# Patient Record
Sex: FEMALE | Race: WHITE | NOT HISPANIC OR LATINO | URBAN - METROPOLITAN AREA
[De-identification: names, ages, dates, MRNs, and addresses within clinical notes are randomized per-mention and may not be internally consistent; named-entity substitution may affect disease eponyms.]

---

## 2021-03-11 ENCOUNTER — IMMUNIZATION (OUTPATIENT)
Dept: PRIMARY CARE CLINIC | Facility: CLINIC | Age: 46
End: 2021-03-11

## 2021-03-11 DIAGNOSIS — Z23 NEED FOR VACCINATION: Primary | ICD-10-CM

## 2021-03-11 PROCEDURE — 0011A COVID-19, MRNA, LNP-S, PF, 100 MCG/0.5 ML DOSE VACCINE: ICD-10-PCS | Mod: CV19,S$GLB,, | Performed by: FAMILY MEDICINE

## 2021-03-11 PROCEDURE — 91301 COVID-19, MRNA, LNP-S, PF, 100 MCG/0.5 ML DOSE VACCINE: CPT | Mod: S$GLB,,, | Performed by: FAMILY MEDICINE

## 2021-03-11 PROCEDURE — 91301 COVID-19, MRNA, LNP-S, PF, 100 MCG/0.5 ML DOSE VACCINE: ICD-10-PCS | Mod: S$GLB,,, | Performed by: FAMILY MEDICINE

## 2021-03-11 PROCEDURE — 0011A COVID-19, MRNA, LNP-S, PF, 100 MCG/0.5 ML DOSE VACCINE: CPT | Mod: CV19,S$GLB,, | Performed by: FAMILY MEDICINE

## 2021-04-08 ENCOUNTER — IMMUNIZATION (OUTPATIENT)
Dept: PRIMARY CARE CLINIC | Facility: CLINIC | Age: 46
End: 2021-04-08

## 2021-04-08 DIAGNOSIS — Z23 NEED FOR VACCINATION: Primary | ICD-10-CM

## 2021-04-08 PROCEDURE — 91301 COVID-19, MRNA, LNP-S, PF, 100 MCG/0.5 ML DOSE VACCINE: ICD-10-PCS | Mod: S$GLB,,, | Performed by: FAMILY MEDICINE

## 2021-04-08 PROCEDURE — 0012A COVID-19, MRNA, LNP-S, PF, 100 MCG/0.5 ML DOSE VACCINE: CPT | Mod: CV19,S$GLB,, | Performed by: FAMILY MEDICINE

## 2021-04-08 PROCEDURE — 91301 COVID-19, MRNA, LNP-S, PF, 100 MCG/0.5 ML DOSE VACCINE: CPT | Mod: S$GLB,,, | Performed by: FAMILY MEDICINE

## 2021-04-08 PROCEDURE — 0012A COVID-19, MRNA, LNP-S, PF, 100 MCG/0.5 ML DOSE VACCINE: ICD-10-PCS | Mod: CV19,S$GLB,, | Performed by: FAMILY MEDICINE

## 2024-04-01 ENCOUNTER — HOSPITAL ENCOUNTER (OUTPATIENT)
Dept: RADIOLOGY | Facility: HOSPITAL | Age: 49
Discharge: HOME | End: 2024-04-01
Attending: NURSE PRACTITIONER
Payer: COMMERCIAL

## 2024-04-01 ENCOUNTER — OFFICE VISIT (OUTPATIENT)
Dept: INTERNAL MEDICINE | Facility: CLINIC | Age: 49
End: 2024-04-01
Payer: COMMERCIAL

## 2024-04-01 VITALS
TEMPERATURE: 98 F | RESPIRATION RATE: 16 BRPM | SYSTOLIC BLOOD PRESSURE: 120 MMHG | DIASTOLIC BLOOD PRESSURE: 86 MMHG | HEIGHT: 64 IN | WEIGHT: 106.06 LBS | BODY MASS INDEX: 18.11 KG/M2 | OXYGEN SATURATION: 95 % | HEART RATE: 94 BPM

## 2024-04-01 DIAGNOSIS — R06.02 SHORTNESS OF BREATH: ICD-10-CM

## 2024-04-01 DIAGNOSIS — J45.31 MILD PERSISTENT ASTHMA WITH ACUTE EXACERBATION: ICD-10-CM

## 2024-04-01 DIAGNOSIS — R05.9 COUGH, UNSPECIFIED TYPE: ICD-10-CM

## 2024-04-01 DIAGNOSIS — Z76.89 ENCOUNTER TO ESTABLISH CARE: Primary | ICD-10-CM

## 2024-04-01 DIAGNOSIS — J18.9 PNEUMONIA OF RIGHT MIDDLE LOBE DUE TO INFECTIOUS ORGANISM: ICD-10-CM

## 2024-04-01 PROCEDURE — 71046 X-RAY EXAM CHEST 2 VIEWS: CPT | Mod: 26,,, | Performed by: RADIOLOGY

## 2024-04-01 PROCEDURE — 71046 X-RAY EXAM CHEST 2 VIEWS: CPT | Mod: TC,PO

## 2024-04-01 PROCEDURE — 1160F RVW MEDS BY RX/DR IN RCRD: CPT | Mod: CPTII,S$GLB,, | Performed by: NURSE PRACTITIONER

## 2024-04-01 PROCEDURE — 3074F SYST BP LT 130 MM HG: CPT | Mod: CPTII,S$GLB,, | Performed by: NURSE PRACTITIONER

## 2024-04-01 PROCEDURE — 3079F DIAST BP 80-89 MM HG: CPT | Mod: CPTII,S$GLB,, | Performed by: NURSE PRACTITIONER

## 2024-04-01 PROCEDURE — 3044F HG A1C LEVEL LT 7.0%: CPT | Mod: CPTII,S$GLB,, | Performed by: NURSE PRACTITIONER

## 2024-04-01 PROCEDURE — 1159F MED LIST DOCD IN RCRD: CPT | Mod: CPTII,S$GLB,, | Performed by: NURSE PRACTITIONER

## 2024-04-01 PROCEDURE — 3008F BODY MASS INDEX DOCD: CPT | Mod: CPTII,S$GLB,, | Performed by: NURSE PRACTITIONER

## 2024-04-01 PROCEDURE — 99999 PR PBB SHADOW E&M-EST. PATIENT-LVL V: CPT | Mod: PBBFAC,,, | Performed by: NURSE PRACTITIONER

## 2024-04-01 PROCEDURE — 99204 OFFICE O/P NEW MOD 45 MIN: CPT | Mod: S$GLB,,, | Performed by: NURSE PRACTITIONER

## 2024-04-01 RX ORDER — LEVOFLOXACIN 750 MG/1
750 TABLET ORAL DAILY
Qty: 5 TABLET | Refills: 0 | Status: SHIPPED | OUTPATIENT
Start: 2024-04-01 | End: 2024-04-06

## 2024-04-01 RX ORDER — FLUTICASONE PROPIONATE AND SALMETEROL 100; 50 UG/1; UG/1
1 POWDER RESPIRATORY (INHALATION) 2 TIMES DAILY
Qty: 60 EACH | Refills: 0 | Status: SHIPPED | OUTPATIENT
Start: 2024-04-01 | End: 2024-04-04 | Stop reason: SDUPTHER

## 2024-04-01 NOTE — PROGRESS NOTES
Subjective:       Patient ID: Iman Faust is a 48 y.o. female.    Chief Complaint: Cough (4 weeks ago got a shot and antibiotic and 2 weeks ago a steriod shot)    Mrs. Faust presents to clinic for persistent cough.  She would also like to establish care with [facility].  Reports only medical history is asthma, which she was diagnosed with few years prior due to persistent cough.  She was reportedly on an inhaler at one time, but did not follow-up and no longer has inhaler. She is unsure of inhaler name and believes it was a maintenance inhaler.    She started with acute cough and congestion proximally 4 weeks prior.  She was seen at urgent care were she reportedly tested negative for COVID.  States she was diagnosed with bronchitis and received oral antibiotic and steroids.  Symptoms were improving, then worsened.  She returned to urgent care 2 weeks ago for shortness of breath and persistent cough.  Received steroid injection and albuterol inhaler.  Today, reports no improvement in symptoms.  Continues with productive cough and sinus pressure.  Complains of shortness a breath which worsens on exertion.  Using albuterol inhaler without relief.  Denies chest pain, but reports soreness to bilateral sides from coughing. Denies fever or chills. +fatigue    Cough  This is a new problem. The current episode started 1 to 4 weeks ago. The problem has been rapidly worsening. The cough is Productive of sputum. Associated symptoms include postnasal drip, shortness of breath and wheezing. Pertinent negatives include no chest pain, chills, ear congestion, ear pain, fever, headaches, heartburn, hemoptysis, myalgias, nasal congestion, rhinorrhea or sore throat. She has tried oral steroids and a beta-agonist inhaler (abx) for the symptoms. The treatment provided no relief. Her past medical history is significant for asthma and bronchitis.       Patient Active Problem List   Diagnosis    Pneumonia of right middle lobe due to infectious  "organism    Mild persistent asthma with acute exacerbation       History reviewed. No pertinent family history.  History reviewed. No pertinent surgical history.      Current Outpatient Medications:     albuterol (PROVENTIL/VENTOLIN HFA) 90 mcg/actuation inhaler, Inhale 1-2 puffs into the lungs every 6 (six) hours as needed for Wheezing or Shortness of Breath., Disp: , Rfl:     fluticasone-salmeterol diskus inhaler 100-50 mcg, Inhale 1 puff into the lungs 2 (two) times daily. Controller, Disp: 60 each, Rfl: 0    levoFLOXacin (LEVAQUIN) 750 MG tablet, Take 1 tablet (750 mg total) by mouth once daily. for 5 days, Disp: 5 tablet, Rfl: 0    Review of Systems   Constitutional:  Positive for activity change and fatigue. Negative for chills and fever.   HENT:  Positive for congestion, postnasal drip and sinus pressure. Negative for ear pain, rhinorrhea, sore throat and trouble swallowing.    Respiratory:  Positive for cough, shortness of breath and wheezing. Negative for hemoptysis and chest tightness.    Cardiovascular:  Negative for chest pain, palpitations and leg swelling.   Gastrointestinal:  Negative for heartburn.   Musculoskeletal:  Negative for myalgias.   Neurological:  Negative for dizziness, syncope, weakness and headaches.       Objective:   /86 (BP Location: Left arm, Patient Position: Sitting, BP Method: Large (Manual))   Pulse 94   Temp 98.2 °F (36.8 °C) (Oral)   Resp 16   Ht 5' 4" (1.626 m)   Wt 48.1 kg (106 lb 0.7 oz)   SpO2 95%   BMI 18.20 kg/m²      Physical Exam  Constitutional:       General: She is not in acute distress.     Appearance: Normal appearance. She is ill-appearing (mild). She is not toxic-appearing or diaphoretic.   HENT:      Head: Normocephalic.      Right Ear: Tympanic membrane and ear canal normal. There is no impacted cerumen.      Left Ear: Tympanic membrane and ear canal normal. There is no impacted cerumen.      Nose: Congestion present.      Mouth/Throat:      Mouth: " Mucous membranes are moist.      Pharynx: Oropharynx is clear. Posterior oropharyngeal erythema (mild) present.   Eyes:      Extraocular Movements: Extraocular movements intact.      Conjunctiva/sclera: Conjunctivae normal.   Cardiovascular:      Rate and Rhythm: Normal rate and regular rhythm.      Heart sounds: Normal heart sounds.   Pulmonary:      Effort: Pulmonary effort is normal. No respiratory distress.      Breath sounds: Decreased breath sounds present. No wheezing, rhonchi or rales.      Comments: +cough  Musculoskeletal:         General: Normal range of motion.      Cervical back: Normal range of motion. No rigidity or tenderness.   Lymphadenopathy:      Cervical: No cervical adenopathy.   Neurological:      Mental Status: She is alert and oriented to person, place, and time.      Coordination: Coordination normal.      Gait: Gait normal.   Psychiatric:         Behavior: Behavior normal.         Assessment & Plan     1. Encounter to establish care  Comments:  Routine labs today. No prior records available in EMR.  Orders:  -     CBC Auto Differential; Future; Expected date: 04/01/2024  -     Comprehensive Metabolic Panel; Future; Expected date: 04/01/2024  -     TSH; Future; Expected date: 04/01/2024  -     Hemoglobin A1C; Future; Expected date: 04/01/2024  -     Lipid Panel; Future; Expected date: 04/01/2024    2. Mild persistent asthma with acute exacerbation  Overview:  No records available in EMR from prior providers. Start maintenance inhaler. Continue albuterol PRN. Referral to pulmonology to establish care    Orders:  -     Ambulatory referral/consult to Pulmonology; Future; Expected date: 04/08/2024  -     fluticasone-salmeterol diskus inhaler 100-50 mcg; Inhale 1 puff into the lungs 2 (two) times daily. Controller  Dispense: 60 each; Refill: 0    3. Cough, unspecified type  Comments:  Completed abx, oral and IM steroids with worsening symptoms. CXR today. Will further discuss treament plan  pending results.  Orders:  -     X-Ray Chest PA And Lateral; Future; Expected date: 04/01/2024  -     Ambulatory referral/consult to Pulmonology; Future; Expected date: 04/08/2024    4. Shortness of breath  -     X-Ray Chest PA And Lateral; Future; Expected date: 04/01/2024  -     B-TYPE NATRIURETIC PEPTIDE; Future; Expected date: 04/01/2024    5. Pneumonia of right middle lobe due to infectious organism  Assessment & Plan:  Abnormal CXR with elevated WBC. Start Levaquin. Completed antibiotic 4 weeks prior (pt unsure of name, possibly amoxicillin). If no improvement, will proceed with CT chest. Strict RTC and ER precautions reviewed.     Orders:  -     levoFLOXacin (LEVAQUIN) 750 MG tablet; Take 1 tablet (750 mg total) by mouth once daily. for 5 days  Dispense: 5 tablet; Refill: 0        X-Ray Chest PA And Lateral  Narrative: EXAM:  XR CHEST PA AND LATERAL    CLINICAL HISTORY: Shortness of breath    COMPARISON: None available.    TECHNIQUE: PA and lateral views of the chest.    FINDINGS: Small opacity in the region of the right middle lobe at the lung bases.  Additional tiny, irregular opacities appreciated within the bilateral mid lung zones.  No evidence of a large pleural effusion.  No pneumothorax.  The cardiomediastinal silhouette is within normal size limits.  The hilar and mediastinal structures are within normal limits.  No acute osseous abnormality is evident.  Degenerative changes of the spine.  The visualized osseous structures appear intact.  Impression:  Right middle lobe minor infiltrate or atelectasis at the lung base.    Irregular opacities in the bilateral midlung zones may reflect multifocal infiltrates or nodules.  Continued chest radiograph follow-up versus further evaluation with chest CT.    Finalized on: 4/1/2024 4:11 PM By:  Faustino Koehler MD  [facility]# [id number]      2024-04-01 16:13:54.550    [facility]    Lab Results   Component Value Date    WBC 15.21 (H) 04/01/2024    HGB 14.0 04/01/2024    HCT  "43.9 04/01/2024    MCV 91 04/01/2024     (H) 04/01/2024           MICHELE Kline      Portions of this note may have been created with voice recognition software. Occasional "wrong-word" or "sound-a-like" substitutions may have occurred due to the inherent limitations of voice recognition software. Please, read the note carefully and recognize, using context, where substitutions have occurred.     "

## 2024-04-02 PROBLEM — J45.31 MILD PERSISTENT ASTHMA WITH ACUTE EXACERBATION: Status: ACTIVE | Noted: 2024-04-02

## 2024-04-02 PROBLEM — J18.9 PNEUMONIA OF RIGHT MIDDLE LOBE DUE TO INFECTIOUS ORGANISM: Status: ACTIVE | Noted: 2024-04-02

## 2024-04-02 NOTE — ASSESSMENT & PLAN NOTE
Abnormal CXR with elevated WBC. Start Levaquin. Completed antibiotic 4 weeks prior (pt unsure of name, possibly amoxicillin). If no improvement, will proceed with CT chest. Strict RTC and ER precautions reviewed.

## 2024-04-04 ENCOUNTER — OFFICE VISIT (OUTPATIENT)
Dept: PULMONOLOGY | Facility: CLINIC | Age: 49
End: 2024-04-04
Payer: COMMERCIAL

## 2024-04-04 VITALS
HEIGHT: 64 IN | BODY MASS INDEX: 18.06 KG/M2 | HEART RATE: 94 BPM | OXYGEN SATURATION: 95 % | RESPIRATION RATE: 17 BRPM | DIASTOLIC BLOOD PRESSURE: 78 MMHG | WEIGHT: 105.81 LBS | SYSTOLIC BLOOD PRESSURE: 112 MMHG

## 2024-04-04 DIAGNOSIS — J18.9 PNEUMONIA OF RIGHT MIDDLE LOBE DUE TO INFECTIOUS ORGANISM: Primary | ICD-10-CM

## 2024-04-04 DIAGNOSIS — R05.9 COUGH, UNSPECIFIED TYPE: ICD-10-CM

## 2024-04-04 DIAGNOSIS — J45.991 COUGH VARIANT ASTHMA: ICD-10-CM

## 2024-04-04 DIAGNOSIS — J45.31 MILD PERSISTENT ASTHMA WITH ACUTE EXACERBATION: ICD-10-CM

## 2024-04-04 PROCEDURE — 99204 OFFICE O/P NEW MOD 45 MIN: CPT | Mod: S$GLB,,, | Performed by: INTERNAL MEDICINE

## 2024-04-04 PROCEDURE — 3074F SYST BP LT 130 MM HG: CPT | Mod: CPTII,S$GLB,, | Performed by: INTERNAL MEDICINE

## 2024-04-04 PROCEDURE — 3008F BODY MASS INDEX DOCD: CPT | Mod: CPTII,S$GLB,, | Performed by: INTERNAL MEDICINE

## 2024-04-04 PROCEDURE — 3078F DIAST BP <80 MM HG: CPT | Mod: CPTII,S$GLB,, | Performed by: INTERNAL MEDICINE

## 2024-04-04 PROCEDURE — 1159F MED LIST DOCD IN RCRD: CPT | Mod: CPTII,S$GLB,, | Performed by: INTERNAL MEDICINE

## 2024-04-04 PROCEDURE — 99999 PR PBB SHADOW E&M-EST. PATIENT-LVL IV: CPT | Mod: PBBFAC,,, | Performed by: INTERNAL MEDICINE

## 2024-04-04 PROCEDURE — 3044F HG A1C LEVEL LT 7.0%: CPT | Mod: CPTII,S$GLB,, | Performed by: INTERNAL MEDICINE

## 2024-04-04 RX ORDER — ALBUTEROL SULFATE 0.83 MG/ML
2.5 SOLUTION RESPIRATORY (INHALATION)
Qty: 360 ML | Refills: 11 | Status: SHIPPED | OUTPATIENT
Start: 2024-04-04 | End: 2025-04-04

## 2024-04-04 RX ORDER — ALBUTEROL SULFATE 90 UG/1
2 AEROSOL, METERED RESPIRATORY (INHALATION) EVERY 4 HOURS PRN
Qty: 18 G | Refills: 11 | Status: SHIPPED | OUTPATIENT
Start: 2024-04-04

## 2024-04-04 RX ORDER — FLUTICASONE PROPIONATE AND SALMETEROL 100; 50 UG/1; UG/1
1 POWDER RESPIRATORY (INHALATION) 2 TIMES DAILY
Qty: 60 EACH | Refills: 11 | Status: SHIPPED | OUTPATIENT
Start: 2024-04-04 | End: 2024-05-23

## 2024-04-04 RX ORDER — PREDNISONE 20 MG/1
TABLET ORAL
Qty: 20 TABLET | Refills: 0 | Status: SHIPPED | OUTPATIENT
Start: 2024-04-04 | End: 2024-05-23 | Stop reason: SDUPTHER

## 2024-04-04 NOTE — PROGRESS NOTES
Subjective:     Patient ID: Iman Faust is a 48 y.o. female.    Chief Complaint:  Cough and wheezing worse this spring    HPI 49 y/o with history of asthma  This is the first time I am seeing this patient. Prior medical records, radiographic studies and other relevant document available were reviewed as part of the visit.  Asthma  She presents for initial evaluation of asthma and a history of wheezing. The patient has been previously diagnosed with asthma. She was diagnosed with asthma at about age 39 . The patient has never been admitted to the hospital for asthma. The patient is currently having symptoms / an exacerbation. Current symptoms include dyspnea, productive cough, and wheezing. Symptoms have been present since several weeks ago and have been gradually worsening. She denies chest pain. Associated symptoms include poor exercise tolerance, shortness of breath, weakness, and wheezing.  This episode appears to have been triggered by upper respiratory infection. Treatments tried for the current exacerbation include short-acting inhaled beta-adrenergic agonists, which have provided some relief of symptoms. The patient has been having similar episodes for approximately 4 weeks.    Current Disease Severity  The patient is having daytime symptoms throughout the day. The patient is having daytime symptoms more than once per week, but not nightly. The patient is using short-acting beta agonists for symptom control several times per day. She has exacerbations requiring oral systemic corticosteroids 0 times per year. Current limitations in activity from asthma:  cut back on daily activities . Number of days of school or work missed in the last month: 2. Number of urgent/emergent visit in last year: 1.  The patient is not using a spacer with MDIs. Her best peak flow rate is na. She is not monitoring peak flow rates at home.    Past Medical History:   Diagnosis Date    Mild intermittent asthma, uncomplicated      No  "past surgical history on file.  Review of patient's allergies indicates:  No Known Allergies  Current Outpatient Medications on File Prior to Visit   Medication Sig Dispense Refill    levoFLOXacin (LEVAQUIN) 750 MG tablet Take 1 tablet (750 mg total) by mouth once daily. for 5 days 5 tablet 0    [DISCONTINUED] albuterol (PROVENTIL/VENTOLIN HFA) 90 mcg/actuation inhaler Inhale 1-2 puffs into the lungs every 6 (six) hours as needed for Wheezing or Shortness of Breath.      [DISCONTINUED] fluticasone-salmeterol diskus inhaler 100-50 mcg Inhale 1 puff into the lungs 2 (two) times daily. Controller 60 each 0     No current facility-administered medications on file prior to visit.     Social History     Socioeconomic History    Marital status: Single   Tobacco Use    Smoking status: Never    Smokeless tobacco: Never     No family history on file.    Review of Systems   Constitutional:  Negative for fever and fatigue.   HENT:  Negative for postnasal drip, rhinorrhea and congestion.    Eyes:  Negative for redness and itching.   Respiratory:  Positive for cough, sputum production, shortness of breath, dyspnea on extertion, use of rescue inhaler and Paroxysmal Nocturnal Dyspnea.    Cardiovascular:  Negative for chest pain, palpitations and leg swelling.   Genitourinary:  Negative for difficulty urinating and hematuria.   Endocrine:  Negative for cold intolerance and heat intolerance.    Skin:  Negative for rash.   Gastrointestinal:  Negative for nausea and abdominal pain.   Neurological:  Negative for dizziness, syncope, weakness and light-headedness.   Hematological:  Negative for adenopathy. Does not bruise/bleed easily.   Psychiatric/Behavioral:  Negative for sleep disturbance. The patient is not nervous/anxious.        Objective:      /78   Pulse 94   Resp 17   Ht 5' 4" (1.626 m)   Wt 48 kg (105 lb 13.1 oz)   LMP  (LMP Unknown)   SpO2 95%   BMI 18.16 kg/m²   Physical Exam  Vitals and nursing note reviewed. " "  Constitutional:       Appearance: She is well-developed.   HENT:      Head: Normocephalic and atraumatic.      Nose: Nose normal.      Mouth/Throat:      Pharynx: No oropharyngeal exudate.   Eyes:      Conjunctiva/sclera: Conjunctivae normal.      Pupils: Pupils are equal, round, and reactive to light.   Neck:      Thyroid: No thyromegaly.      Vascular: No JVD.      Trachea: No tracheal deviation.   Cardiovascular:      Rate and Rhythm: Normal rate and regular rhythm.      Heart sounds: Normal heart sounds.   Pulmonary:      Effort: Pulmonary effort is normal. No respiratory distress.      Breath sounds: Examination of the right-lower field reveals wheezing. Examination of the left-lower field reveals wheezing. Decreased breath sounds and wheezing present. No rhonchi or rales.   Chest:      Chest wall: No tenderness.   Abdominal:      General: Bowel sounds are normal.      Palpations: Abdomen is soft.   Musculoskeletal:         General: Normal range of motion.      Cervical back: Neck supple.   Lymphadenopathy:      Cervical: No cervical adenopathy.   Skin:     General: Skin is warm and dry.   Neurological:      Mental Status: She is alert and oriented to person, place, and time.       Personal Diagnostic Review  Chest x-ray: hyperinflation , right middle lobe infiiltrate        4/4/2024     9:45 AM   Pulmonary Studies Review   SpO2 95 %   Height 5' 4" (1.626 m)   Weight 48 kg (105 lb 13.1 oz)   BMI (Calculated) 18.2   Predicted Distance 478.76   Predicted Distance Meters (Calculated) 616.95 meters       X-Ray Chest PA And Lateral  Narrative: EXAM:  XR CHEST PA AND LATERAL    CLINICAL HISTORY: Shortness of breath    COMPARISON: None available.    TECHNIQUE: PA and lateral views of the chest.    FINDINGS: Small opacity in the region of the right middle lobe at the lung bases.  Additional tiny, irregular opacities appreciated within the bilateral mid lung zones.  No evidence of a large pleural effusion.  No " "pneumothorax.  The cardiomediastinal silhouette is within normal size limits.  The hilar and mediastinal structures are within normal limits.  No acute osseous abnormality is evident.  Degenerative changes of the spine.  The visualized osseous structures appear intact.  Impression:  Right middle lobe minor infiltrate or atelectasis at the lung base.    Irregular opacities in the bilateral midlung zones may reflect multifocal infiltrates or nodules.  Continued chest radiograph follow-up versus further evaluation with chest CT.    Finalized on: 4/1/2024 4:11 PM By:  Faustino Koehler MD  [facility]# [id number]      2024-04-01 16:13:54.550    [facility]      Office Spirometry Results:         4/4/2024     9:45 AM 4/1/2024     2:21 PM   Pulmonary Function Tests   SpO2 95 % 95 %   Height 5' 4" (1.626 m) 5' 4" (1.626 m)   Weight 48 kg (105 lb 13.1 oz) 48.1 kg (106 lb 0.7 oz)   BMI (Calculated) 18.2 18.2         4/4/2024     9:45 AM   Pulmonary Studies Review   SpO2 95 %   Height 5' 4" (1.626 m)   Weight 48 kg (105 lb 13.1 oz)   BMI (Calculated) 18.2   Predicted Distance 478.76   Predicted Distance Meters (Calculated) 616.95 meters           Recent Results (from the past 336 hour(s))   CBC Auto Differential    Collection Time: 04/01/24  3:05 PM   Result Value Ref Range    WBC 15.21 (H) 3.90 - 12.70 K/uL    Hemoglobin 14.0 12.0 - 16.0 g/dL    Hematocrit 43.9 37.0 - 48.5 %    Platelets 589 (H) 150 - 450 K/uL       REVIEW OF OUTSIDE RECORDS:  Dandre Muñiz MD     3/10/2016  4:38 PM  Severe obstructive ventilatory impairment with no significant  improvement after bronchodilator administration. The lung volumes  are increased in particular the residual volume. This is  indicative of overinflation. The diffusion capacity is normal  Specimen Collected: -- Last Resulted: 03/10/16 16:38   [facility]idi[facility]  Result Received: 04/04/24 1         Assessment:        "     Pneumonia of right middle lobe due to infectious organism  -     X-Ray Chest PA And Lateral; Future; Expected date: 04/04/2024    Cough variant asthma  -     Ambulatory referral/consult to Pulmonology  -     albuterol (PROVENTIL/VENTOLIN HFA) 90 mcg/actuation inhaler; Inhale 2 puffs into the lungs every 4 (four) hours as needed for Wheezing or Shortness of Breath.  Dispense: 18 g; Refill: 11  -     X-Ray Chest PA And Lateral; Future; Expected date: 04/04/2024  -     Spirometry with/without bronchodilator; Future; Expected date: 10/05/2024    Mild persistent asthma with acute exacerbation  -     Ambulatory referral/consult to Pulmonology  -     predniSONE (DELTASONE) 20 MG tablet; Prednisone 60 mg/ day for 3 days, 40 mg/day for 3 days,20 mg/ day for 3 days, (1/2 tablet )10 mg a day for 3 days.  Dispense: 20 tablet; Refill: 0  -     albuterol (PROVENTIL) 2.5 mg /3 mL (0.083 %) nebulizer solution; Take 3 mLs (2.5 mg total) by nebulization every 4 to 6 hours as needed for Wheezing or Shortness of Breath.  Dispense: 360 mL; Refill: 11  -     NEBULIZER KIT (SUPPLIES) FOR HOME USE  -     fluticasone-salmeterol diskus inhaler 100-50 mcg; Inhale 1 puff into the lungs 2 (two) times daily. Controller.Wash out mouth after use  Dispense: 60 each; Refill: 11  -     albuterol (PROVENTIL/VENTOLIN HFA) 90 mcg/actuation inhaler; Inhale 2 puffs into the lungs every 4 (four) hours as needed for Wheezing or Shortness of Breath.  Dispense: 18 g; Refill: 11  -     X-Ray Chest PA And Lateral; Future; Expected date: 04/04/2024    Cough, unspecified type          Outpatient Encounter Medications as of 4/4/2024   Medication Sig Dispense Refill    levoFLOXacin (LEVAQUIN) 750 MG tablet Take 1 tablet (750 mg total) by mouth once daily. for 5 days 5 tablet 0    [DISCONTINUED] albuterol (PROVENTIL/VENTOLIN HFA) 90 mcg/actuation inhaler Inhale 1-2 puffs into the lungs every 6 (six) hours as needed for Wheezing or Shortness of Breath.       [DISCONTINUED] fluticasone-salmeterol diskus inhaler 100-50 mcg Inhale 1 puff into the lungs 2 (two) times daily. Controller 60 each 0    albuterol (PROVENTIL) 2.5 mg /3 mL (0.083 %) nebulizer solution Take 3 mLs (2.5 mg total) by nebulization every 4 to 6 hours as needed for Wheezing or Shortness of Breath. 360 mL 11    albuterol (PROVENTIL/VENTOLIN HFA) 90 mcg/actuation inhaler Inhale 2 puffs into the lungs every 4 (four) hours as needed for Wheezing or Shortness of Breath. 18 g 11    fluticasone-salmeterol diskus inhaler 100-50 mcg Inhale 1 puff into the lungs 2 (two) times daily. Controller.Wash out mouth after use 60 each 11    predniSONE (DELTASONE) 20 MG tablet Prednisone 60 mg/ day for 3 days, 40 mg/day for 3 days,20 mg/ day for 3 days, (1/2 tablet )10 mg a day for 3 days. 20 tablet 0     No facility-administered encounter medications on file as of 4/4/2024.     Plan:       Requested Prescriptions     Signed Prescriptions Disp Refills    predniSONE (DELTASONE) 20 MG tablet 20 tablet 0     Sig: Prednisone 60 mg/ day for 3 days, 40 mg/day for 3 days,20 mg/ day for 3 days, (1/2 tablet )10 mg a day for 3 days.    albuterol (PROVENTIL) 2.5 mg /3 mL (0.083 %) nebulizer solution 360 mL 11     Sig: Take 3 mLs (2.5 mg total) by nebulization every 4 to 6 hours as needed for Wheezing or Shortness of Breath.    fluticasone-salmeterol diskus inhaler 100-50 mcg 60 each 11     Sig: Inhale 1 puff into the lungs 2 (two) times daily. Controller.Wash out mouth after use    albuterol (PROVENTIL/VENTOLIN HFA) 90 mcg/actuation inhaler 18 g 11     Sig: Inhale 2 puffs into the lungs every 4 (four) hours as needed for Wheezing or Shortness of Breath.     Problem List Items Addressed This Visit       Mild persistent asthma with acute exacerbation    Overview     No records available in EMR from prior providers. Start maintenance inhaler. Continue albuterol PRN. Referral to pulmonology to establish care         Relevant Medications     predniSONE (DELTASONE) 20 MG tablet    albuterol (PROVENTIL) 2.5 mg /3 mL (0.083 %) nebulizer solution    fluticasone-salmeterol diskus inhaler 100-50 mcg    albuterol (PROVENTIL/VENTOLIN HFA) 90 mcg/actuation inhaler    Other Relevant Orders    NEBULIZER KIT (SUPPLIES) FOR HOME USE    X-Ray Chest PA And Lateral    Pneumonia of right middle lobe due to infectious organism - Primary    Relevant Orders    X-Ray Chest PA And Lateral     Other Visit Diagnoses       Cough variant asthma        Relevant Medications    albuterol (PROVENTIL/VENTOLIN HFA) 90 mcg/actuation inhaler    Other Relevant Orders    X-Ray Chest PA And Lateral    Spirometry with/without bronchodilator    Cough, unspecified type                   Follow up in about 7 weeks (around 5/22/2024) for CXR and Feliberto, Review progress.    MEDICAL DECISION MAKING: Moderate to high complexity.  Overall, the multiple problems listed are of moderate to high severity that may impact quality of life and activities of daily living. Side effects of medications, treatment plan as well as options and alternatives reviewed and discussed with patient. There was counseling of patient concerning these issues.    Total time spent in counseling and coordination of care - 50  minutes of total time spent on the encounter, which includes face to face time and non-face to face time preparing to see the patient (eg, review of tests), Obtaining and/or reviewing separately obtained history, Documenting clinical information in the electronic or other health record, Independently interpreting results (not separately reported) and communicating results to the patient/family/caregiver, or Care coordination (not separately reported).    Time was used in discussion of prognosis, risks, benefits of treatment, instructions and compliance with regimen . Discussion with other physicians and/or health care providers - home health or for use of durable medical equipment (oxygen, nebulizers,  CPAP, BiPAP) occurred.

## 2024-04-15 ENCOUNTER — TELEPHONE (OUTPATIENT)
Dept: PULMONOLOGY | Facility: CLINIC | Age: 49
End: 2024-04-15
Payer: COMMERCIAL

## 2024-04-15 NOTE — TELEPHONE ENCOUNTER
----- Message from Jeniffer Orellana sent at 4/15/2024  9:17 AM CDT -----  Regarding: pt callback  Name of Who is Calling:Pt         What is the request in detail: Requesting callback please advise           Can the clinic reply by [facility]: no         [facility]:Telephone Information:  Mobile          [phone]

## 2024-04-26 ENCOUNTER — PATIENT MESSAGE (OUTPATIENT)
Dept: PULMONOLOGY | Facility: CLINIC | Age: 49
End: 2024-04-26
Payer: COMMERCIAL

## 2024-04-26 DIAGNOSIS — R60.0 LOCALIZED EDEMA: Primary | ICD-10-CM

## 2024-04-30 RX ORDER — FUROSEMIDE 20 MG/1
20 TABLET ORAL DAILY PRN
Qty: 30 TABLET | Refills: 11 | Status: SHIPPED | OUTPATIENT
Start: 2024-04-30 | End: 2024-05-23 | Stop reason: SDUPTHER

## 2024-04-30 NOTE — TELEPHONE ENCOUNTER
Prescription for lasix sent  The prednisone makes you retain fluid due to excess salt intake.  Reduce your salt intake  Will send prescription for a diuretic to your pharmacy  Only take the diuretic if you have swollen  feet

## 2024-05-23 ENCOUNTER — CLINICAL SUPPORT (OUTPATIENT)
Dept: PULMONOLOGY | Facility: CLINIC | Age: 49
End: 2024-05-23
Attending: INTERNAL MEDICINE
Payer: COMMERCIAL

## 2024-05-23 ENCOUNTER — HOSPITAL ENCOUNTER (OUTPATIENT)
Dept: RADIOLOGY | Facility: HOSPITAL | Age: 49
Discharge: HOME | End: 2024-05-23
Attending: INTERNAL MEDICINE
Payer: COMMERCIAL

## 2024-05-23 VITALS
RESPIRATION RATE: 18 BRPM | WEIGHT: 100.75 LBS | HEIGHT: 64 IN | SYSTOLIC BLOOD PRESSURE: 120 MMHG | DIASTOLIC BLOOD PRESSURE: 74 MMHG | OXYGEN SATURATION: 100 % | BODY MASS INDEX: 17.2 KG/M2 | HEART RATE: 106 BPM

## 2024-05-23 DIAGNOSIS — J45.991 COUGH VARIANT ASTHMA: ICD-10-CM

## 2024-05-23 DIAGNOSIS — J45.31 MILD PERSISTENT ASTHMA WITH ACUTE EXACERBATION: ICD-10-CM

## 2024-05-23 DIAGNOSIS — J45.41 MODERATE PERSISTENT ASTHMA WITH ACUTE EXACERBATION: Primary | ICD-10-CM

## 2024-05-23 DIAGNOSIS — J18.9 PNEUMONIA OF RIGHT MIDDLE LOBE DUE TO INFECTIOUS ORGANISM: ICD-10-CM

## 2024-05-23 DIAGNOSIS — R09.02 EXERCISE HYPOXEMIA: ICD-10-CM

## 2024-05-23 DIAGNOSIS — R60.0 LOCALIZED EDEMA: ICD-10-CM

## 2024-05-23 LAB
BRPFT: NORMAL
FEF 25 75 CHG: -9.9 %
FEF 25 75 LLN: 1.55
FEF 25 75 POST REF: 12.5 %
FEF 25 75 PRE REF: 13.9 %
FEF 25 75 REF: 2.75
FET100 CHG: 45 %
FEV1 CHG: 7 %
FEV1 FVC CHG: -11 %
FEV1 FVC LLN: 69
FEV1 FVC POST REF: 54 %
FEV1 FVC PRE REF: 60.6 %
FEV1 FVC REF: 81
FEV1 LLN: 2.17
FEV1 POST REF: 29.8 %
FEV1 PRE REF: 27.9 %
FEV1 REF: 2.78
FVC CHG: 20.1 %
FVC LLN: 2.72
FVC POST REF: 54.9 %
FVC PRE REF: 45.7 %
FVC REF: 3.48
PEF CHG: 26.4 %
PEF LLN: 5.07
PEF POST REF: 35.3 %
PEF PRE REF: 27.9 %
PEF REF: 6.79
POST FEF 25 75: 0.34 L/S
POST FET 100: 6.81 SEC
POST FEV1 FVC: 43.48 %
POST FEV1: 0.83 L
POST FVC: 1.91 L
POST PEF: 2.4 L/S
PRE FEF 25 75: 0.38 L/S
PRE FET 100: 4.7 SEC
PRE FEV1 FVC: 48.84 %
PRE FEV1: 0.78 L
PRE FVC: 1.59 L
PRE PEF: 1.9 L/S

## 2024-05-23 PROCEDURE — 96372 THER/PROPH/DIAG INJ SC/IM: CPT | Mod: S$GLB,,, | Performed by: INTERNAL MEDICINE

## 2024-05-23 PROCEDURE — 71046 X-RAY EXAM CHEST 2 VIEWS: CPT | Mod: 26,,, | Performed by: STUDENT IN AN ORGANIZED HEALTH CARE EDUCATION/TRAINING PROGRAM

## 2024-05-23 PROCEDURE — 94060 EVALUATION OF WHEEZING: CPT | Mod: S$GLB,,, | Performed by: INTERNAL MEDICINE

## 2024-05-23 PROCEDURE — 3078F DIAST BP <80 MM HG: CPT | Mod: CPTII,S$GLB,, | Performed by: INTERNAL MEDICINE

## 2024-05-23 PROCEDURE — 3008F BODY MASS INDEX DOCD: CPT | Mod: CPTII,S$GLB,, | Performed by: INTERNAL MEDICINE

## 2024-05-23 PROCEDURE — 99214 OFFICE O/P EST MOD 30 MIN: CPT | Mod: 25,S$GLB,, | Performed by: INTERNAL MEDICINE

## 2024-05-23 PROCEDURE — 99999 PR PBB SHADOW E&M-EST. PATIENT-LVL IV: CPT | Mod: PBBFAC,,, | Performed by: INTERNAL MEDICINE

## 2024-05-23 PROCEDURE — 3044F HG A1C LEVEL LT 7.0%: CPT | Mod: CPTII,S$GLB,, | Performed by: INTERNAL MEDICINE

## 2024-05-23 PROCEDURE — 71046 X-RAY EXAM CHEST 2 VIEWS: CPT | Mod: TC

## 2024-05-23 PROCEDURE — 3074F SYST BP LT 130 MM HG: CPT | Mod: CPTII,S$GLB,, | Performed by: INTERNAL MEDICINE

## 2024-05-23 PROCEDURE — 1160F RVW MEDS BY RX/DR IN RCRD: CPT | Mod: CPTII,S$GLB,, | Performed by: INTERNAL MEDICINE

## 2024-05-23 PROCEDURE — 1159F MED LIST DOCD IN RCRD: CPT | Mod: CPTII,S$GLB,, | Performed by: INTERNAL MEDICINE

## 2024-05-23 RX ORDER — PREDNISONE 20 MG/1
TABLET ORAL
Qty: 20 TABLET | Refills: 0 | Status: SHIPPED | OUTPATIENT
Start: 2024-05-23

## 2024-05-23 RX ORDER — FUROSEMIDE 20 MG/1
20 TABLET ORAL 2 TIMES DAILY
Qty: 60 TABLET | Refills: 11 | Status: SHIPPED | OUTPATIENT
Start: 2024-05-23

## 2024-05-23 RX ORDER — DOXYCYCLINE 100 MG/1
100 CAPSULE ORAL EVERY 12 HOURS
Qty: 20 CAPSULE | Refills: 0 | Status: SHIPPED | OUTPATIENT
Start: 2024-05-23 | End: 2024-06-02

## 2024-05-23 RX ORDER — FLUTICASONE PROPIONATE AND SALMETEROL 500; 50 UG/1; UG/1
1 POWDER RESPIRATORY (INHALATION) 2 TIMES DAILY
Qty: 60 EACH | Refills: 11 | Status: SHIPPED | OUTPATIENT
Start: 2024-05-23 | End: 2025-05-23

## 2024-05-23 RX ORDER — POTASSIUM CHLORIDE 20 MEQ/1
20 TABLET, EXTENDED RELEASE ORAL DAILY
Qty: 30 TABLET | Refills: 11 | Status: SHIPPED | OUTPATIENT
Start: 2024-05-23

## 2024-05-23 RX ORDER — TRIAMCINOLONE ACETONIDE 40 MG/ML
40 INJECTION, SUSPENSION INTRA-ARTICULAR; INTRAMUSCULAR
Status: COMPLETED | OUTPATIENT
Start: 2024-05-23 | End: 2024-05-23

## 2024-05-23 RX ADMIN — TRIAMCINOLONE ACETONIDE 40 MG: 40 INJECTION, SUSPENSION INTRA-ARTICULAR; INTRAMUSCULAR at 04:05

## 2024-05-23 NOTE — PROGRESS NOTES
Subjective:     Patient ID: Iman Faust is a 48 y.o. female.    Chief Complaint:  Still wheezing and coughing    HPI C/o bilateral lower extremity edema    Asthma  She presents for follow up evaluation of asthma  The patient has been previously diagnosed with asthma. She was diagnosed with asthma at about age 39 . The patient has never been admitted to the hospital for asthma. The patient is currently having symptoms / an exacerbation. Current symptoms include dyspnea, productive cough, and wheezing. Symptoms have been present since several weeks ago and have been gradually worsening. She denies chest pain. Associated symptoms include poor exercise tolerance, shortness of breath, weakness, and wheezing.  This episode appears to have been triggered by upper respiratory infection. Treatments tried for the current exacerbation include short-acting inhaled beta-adrenergic agonists, which have provided some relief of symptoms. The patient has been having similar episodes for approximately 2- 3 months.  C/o edema in both feet       Current Disease Severity  The patient is having daytime symptoms throughout the day. The patient is having daytime symptoms more than once per week, but not nightly. The patient is using short-acting beta agonists for symptom control several times per day. She has exacerbations requiring oral systemic corticosteroids 0 times per year. Current limitations in activity from asthma: cut back on daily activities. Number of days of school or work missed in the last month: 2. Number of urgent/emergent visit in last year: 1.  The patient is not using a spacer with MDIs. Her best peak flow rate is na. She is not monitoring peak flow rates at home.    The patient is an  and has had no significant occupational exposure. There is no family history of lung disease or liver disease. She does have a dog at home but does not feel sensitive to it. She has no birds. No history of allergy testing.  Does note this strong smells such as cleaning fluids will increase her cough. No swallowing symptoms and no symptoms of reflux.     Past Medical History:   Diagnosis Date    Mild intermittent asthma, uncomplicated      No past surgical history on file.  Review of patient's allergies indicates:  No Known Allergies  Current Outpatient Medications on File Prior to Visit   Medication Sig Dispense Refill    albuterol (PROVENTIL) 2.5 mg /3 mL (0.083 %) nebulizer solution Take 3 mLs (2.5 mg total) by nebulization every 4 to 6 hours as needed for Wheezing or Shortness of Breath. 360 mL 11    albuterol (PROVENTIL/VENTOLIN HFA) 90 mcg/actuation inhaler Inhale 2 puffs into the lungs every 4 (four) hours as needed for Wheezing or Shortness of Breath. 18 g 11    [DISCONTINUED] fluticasone-salmeterol diskus inhaler 100-50 mcg Inhale 1 puff into the lungs 2 (two) times daily. Controller.Wash out mouth after use 60 each 11    [DISCONTINUED] furosemide (LASIX) 20 MG tablet Take 1 tablet (20 mg total) by mouth daily as needed (edema in feet). 30 tablet 11    [DISCONTINUED] predniSONE (DELTASONE) 20 MG tablet Prednisone 60 mg/ day for 3 days, 40 mg/day for 3 days,20 mg/ day for 3 days, (1/2 tablet )10 mg a day for 3 days. 20 tablet 0     No current facility-administered medications on file prior to visit.     Social History     Socioeconomic History    Marital status: Single   Tobacco Use    Smoking status: Never    Smokeless tobacco: Never     No family history on file.    Review of Systems   Constitutional:  Positive for fatigue. Negative for fever.   HENT:  Positive for postnasal drip, rhinorrhea and congestion.    Eyes:  Negative for redness and itching.   Respiratory:  Positive for cough, sputum production, shortness of breath, dyspnea on extertion, use of rescue inhaler and Paroxysmal Nocturnal Dyspnea.    Cardiovascular:  Negative for chest pain, palpitations and leg swelling.   Genitourinary:  Negative for difficulty urinating  "and hematuria.   Endocrine:  Negative for cold intolerance and heat intolerance.    Skin:  Negative for rash.   Gastrointestinal:  Negative for nausea and abdominal pain.   Neurological:  Negative for dizziness, syncope, weakness and light-headedness.   Hematological:  Negative for adenopathy. Does not bruise/bleed easily.   Psychiatric/Behavioral:  Negative for sleep disturbance. The patient is not nervous/anxious.        Objective:      /74   Pulse 106   Resp 18   Ht 5' 4" (1.626 m)   Wt 45.7 kg (100 lb 12 oz)   SpO2 100%   BMI 17.29 kg/m²   Physical Exam  Vitals and nursing note reviewed.   Constitutional:       Appearance: She is well-developed. She is ill-appearing.   HENT:      Head: Normocephalic and atraumatic.      Nose: Nose normal.      Mouth/Throat:      Pharynx: No oropharyngeal exudate.   Eyes:      Conjunctiva/sclera: Conjunctivae normal.      Pupils: Pupils are equal, round, and reactive to light.   Neck:      Thyroid: No thyromegaly.      Vascular: No JVD.      Trachea: No tracheal deviation.   Cardiovascular:      Rate and Rhythm: Normal rate and regular rhythm.      Heart sounds: Normal heart sounds.   Pulmonary:      Effort: Pulmonary effort is normal. No respiratory distress.      Breath sounds: Examination of the right-lower field reveals wheezing. Examination of the left-lower field reveals wheezing. Decreased breath sounds and wheezing present. No rhonchi or rales.   Chest:      Chest wall: No tenderness.   Abdominal:      General: Bowel sounds are normal.      Palpations: Abdomen is soft.   Musculoskeletal:         General: Normal range of motion.      Cervical back: Neck supple.   Lymphadenopathy:      Cervical: No cervical adenopathy.   Skin:     General: Skin is warm and dry.   Neurological:      Mental Status: She is alert and oriented to person, place, and time.       Personal Diagnostic Review  Severe obstruction         5/23/2024     4:08 PM   Pulmonary Studies Review " "  SpO2 100 %   Height 5' 4" (1.626 m)   Weight 45.7 kg (100 lb 12 oz)   BMI (Calculated) 17.3   Predicted Distance 484.38   Predicted Distance Meters (Calculated) 622.21 meters       X-Ray Chest PA And Lateral  Narrative: EXAM: XR CHEST PA AND LATERAL    CLINICAL HISTORY: SOB; [J45.991]-Cough variant asthma./[J45.31]-Mild persistent asthma with (acute) exacerbation./[J18.9]-Pneumonia, unspecified organism.    TECHNIQUE: PA and lateral views of the chest.    COMPARISON: X-ray dated 04/01/2024    FINDINGS:  Faint aortic arch calcification noted. Cardiomediastinal silhouette is otherwise within normal limits. Trachea is midline. Interstitial irregularity noted throughout the mid to lower lungs with faint nodular densities, similar compared to 04/01/2024. Small left greater than right pleural effusion. No significant pneumothorax.  No acute bony abnormality.  Degenerative changes noted in the spine.  Impression: New small left greater than right pleural effusion.  Otherwise unchanged interstitial irregularity in the mid to lower lungs with faint nodular densities.    Finalized on: 5/23/2024 3:03 PM By:  Ang Castillo MD  [facility]# [id number]      2024-05-23 15:05:54.374    [facility]      Office Spirometry Results:         5/23/2024     4:08 PM 4/4/2024     9:45 AM 4/1/2024     2:21 PM   Pulmonary Function Tests   SpO2 100 % 95 % 95 %   Height 5' 4" (1.626 m) 5' 4" (1.626 m) 5' 4" (1.626 m)   Weight 45.7 kg (100 lb 12 oz) 48 kg (105 lb 13.1 oz) 48.1 kg (106 lb 0.7 oz)   BMI (Calculated) 17.3 18.2 18.2         5/23/2024     4:08 PM   Pulmonary Studies Review   SpO2 100 %   Height 5' 4" (1.626 m)   Weight 45.7 kg (100 lb 12 oz)   BMI (Calculated) 17.3   Predicted Distance 484.38   Predicted Distance Meters (Calculated) 622.21 meters           No results found for this or any previous visit (from the past 336 hour(s)).    REVIEW OF PAST RECORDS:  Dandre Muñiz MD     3/10/2016  4:38 PM  Pulmonary Function Studies:  Severe " obstructive ventilatory impairment with no significant  improvement after bronchodilator administration. The lung volumes  are increased in particular the residual volume. This is  indicative of overinflation. The diffusion capacity is normal  Specimen Collected: -- Last Resulted: 03/10/16 16:38   Received From: [facility]idi[facility] Aff[facility]  Result Received: 04/04/24 10:3         Assessment:            Moderate persistent asthma with acute exacerbation  -     triamcinolone acetonide injection 40 mg  -     fluticasone-salmeterol diskus inhaler 500-50 mcg; Inhale 1 puff into the lungs 2 (two) times daily.  Dispense: 60 each; Refill: 11  -     furosemide (LASIX) 20 MG tablet; Take 1 tablet (20 mg total) by mouth 2 (two) times a day.  Dispense: 60 tablet; Refill: 11  -     potassium chloride SA (K-DUR,KLOR-CON) 20 MEQ tablet; Take 1 tablet (20 mEq total) by mouth once daily.  Dispense: 30 tablet; Refill: 11  -     predniSONE (DELTASONE) 20 MG tablet; Prednisone 60 mg/ day for 3 days, 40 mg/day for 3 days,20 mg/ day for 3 days, (1/2 tablet )10 mg a day for 3 days.  Dispense: 20 tablet; Refill: 0  -     doxycycline (MONODOX) 100 MG capsule; Take 1 capsule (100 mg total) by mouth every 12 (twelve) hours. for 10 days  Dispense: 20 capsule; Refill: 0    Localized edema  -     furosemide (LASIX) 20 MG tablet; Take 1 tablet (20 mg total) by mouth 2 (two) times a day.  Dispense: 60 tablet; Refill: 11  -     potassium chloride SA (K-DUR,KLOR-CON) 20 MEQ tablet; Take 1 tablet (20 mEq total) by mouth once daily.  Dispense: 30 tablet; Refill: 11    Exercise hypoxemia  -     Six Minute Walk Test to qualify for Home Oxygen; Future          Outpatient Encounter Medications as of 5/23/2024   Medication Sig Dispense Refill    albuterol (PROVENTIL) 2.5 mg /3 mL (0.083 %) nebulizer solution Take 3 mLs (2.5 mg total) by nebulization every 4 to 6 hours as needed for Wheezing or  Shortness of Breath. 360 mL 11    albuterol (PROVENTIL/VENTOLIN HFA) 90 mcg/actuation inhaler Inhale 2 puffs into the lungs every 4 (four) hours as needed for Wheezing or Shortness of Breath. 18 g 11    [DISCONTINUED] fluticasone-salmeterol diskus inhaler 100-50 mcg Inhale 1 puff into the lungs 2 (two) times daily. Controller.Wash out mouth after use 60 each 11    [DISCONTINUED] furosemide (LASIX) 20 MG tablet Take 1 tablet (20 mg total) by mouth daily as needed (edema in feet). 30 tablet 11    [DISCONTINUED] predniSONE (DELTASONE) 20 MG tablet Prednisone 60 mg/ day for 3 days, 40 mg/day for 3 days,20 mg/ day for 3 days, (1/2 tablet )10 mg a day for 3 days. 20 tablet 0    doxycycline (MONODOX) 100 MG capsule Take 1 capsule (100 mg total) by mouth every 12 (twelve) hours. for 10 days 20 capsule 0    fluticasone-salmeterol diskus inhaler 500-50 mcg Inhale 1 puff into the lungs 2 (two) times daily. 60 each 11    furosemide (LASIX) 20 MG tablet Take 1 tablet (20 mg total) by mouth 2 (two) times a day. 60 tablet 11    potassium chloride SA (K-DUR,KLOR-CON) 20 MEQ tablet Take 1 tablet (20 mEq total) by mouth once daily. 30 tablet 11    predniSONE (DELTASONE) 20 MG tablet Prednisone 60 mg/ day for 3 days, 40 mg/day for 3 days,20 mg/ day for 3 days, (1/2 tablet )10 mg a day for 3 days. 20 tablet 0     Facility-Administered Encounter Medications as of 5/23/2024   Medication Dose Route Frequency Provider Last Rate Last Admin    [COMPLETED] triamcinolone acetonide injection 40 mg  40 mg Intramuscular 1 time in Clinic/HOD    40 mg at 05/23/24 1644     Plan:       Requested Prescriptions     Signed Prescriptions Disp Refills    fluticasone-salmeterol diskus inhaler 500-50 mcg 60 each 11     Sig: Inhale 1 puff into the lungs 2 (two) times daily.    furosemide (LASIX) 20 MG tablet 60 tablet 11     Sig: Take 1 tablet (20 mg total) by mouth 2 (two) times a day.    potassium chloride SA (K-DUR,KLOR-CON) 20 MEQ tablet 30 tablet 11      Sig: Take 1 tablet (20 mEq total) by mouth once daily.    predniSONE (DELTASONE) 20 MG tablet 20 tablet 0     Sig: Prednisone 60 mg/ day for 3 days, 40 mg/day for 3 days,20 mg/ day for 3 days, (1/2 tablet )10 mg a day for 3 days.    doxycycline (MONODOX) 100 MG capsule 20 capsule 0     Sig: Take 1 capsule (100 mg total) by mouth every 12 (twelve) hours. for 10 days     Problem List Items Addressed This Visit       Moderate persistent asthma with acute exacerbation - Primary    Overview     No records available in EMR from prior providers. Start maintenance inhaler. Continue albuterol PRN. Referral to pulmonology to establish care         Relevant Medications    triamcinolone acetonide injection 40 mg (Completed)    fluticasone-salmeterol diskus inhaler 500-50 mcg    furosemide (LASIX) 20 MG tablet    potassium chloride SA (K-DUR,KLOR-CON) 20 MEQ tablet    predniSONE (DELTASONE) 20 MG tablet    doxycycline (MONODOX) 100 MG capsule     Other Visit Diagnoses       Localized edema        Relevant Medications    furosemide (LASIX) 20 MG tablet    potassium chloride SA (K-DUR,KLOR-CON) 20 MEQ tablet    Exercise hypoxemia        Relevant Orders    Six Minute Walk Test to qualify for Home Oxygen               Follow up in about 6 weeks (around 7/4/2024) for Review progress, 6 min walk - on return.    MEDICAL DECISION MAKING: Moderate to high complexity.  Overall, the multiple problems listed are of moderate to high severity that may impact quality of life and activities of daily living. Side effects of medications, treatment plan as well as options and alternatives reviewed and discussed with patient. There was counseling of patient concerning these issues.    Total time spent in counseling and coordination of care - 35  minutes of total time spent on the encounter, which includes face to face time and non-face to face time preparing to see the patient (eg, review of tests), Obtaining and/or reviewing separately obtained  history, Documenting clinical information in the electronic or other health record, Independently interpreting results (not separately reported) and communicating results to the patient/family/caregiver, or Care coordination (not separately reported).    Time was used in discussion of prognosis, risks, benefits of treatment, instructions and compliance with regimen . Discussion with other physicians and/or health care providers - home health or for use of durable medical equipment (oxygen, nebulizers, CPAP, BiPAP) occurred.

## 2024-07-08 PROBLEM — J18.9 PNEUMONIA OF RIGHT MIDDLE LOBE DUE TO INFECTIOUS ORGANISM: Status: RESOLVED | Noted: 2024-04-02 | Resolved: 2024-07-08

## 2024-07-31 ENCOUNTER — LAB VISIT (OUTPATIENT)
Dept: LAB | Facility: HOSPITAL | Age: 49
End: 2024-07-31
Attending: INTERNAL MEDICINE
Payer: COMMERCIAL

## 2024-07-31 ENCOUNTER — CLINICAL SUPPORT (OUTPATIENT)
Dept: PULMONOLOGY | Facility: CLINIC | Age: 49
End: 2024-07-31
Payer: COMMERCIAL

## 2024-07-31 ENCOUNTER — OFFICE VISIT (OUTPATIENT)
Dept: PULMONOLOGY | Facility: CLINIC | Age: 49
End: 2024-07-31
Payer: COMMERCIAL

## 2024-07-31 VITALS
HEIGHT: 64 IN | SYSTOLIC BLOOD PRESSURE: 110 MMHG | OXYGEN SATURATION: 99 % | WEIGHT: 93.5 LBS | DIASTOLIC BLOOD PRESSURE: 80 MMHG | RESPIRATION RATE: 17 BRPM | HEIGHT: 64 IN | BODY MASS INDEX: 15.96 KG/M2 | BODY MASS INDEX: 15.96 KG/M2 | HEART RATE: 90 BPM | WEIGHT: 93.5 LBS

## 2024-07-31 DIAGNOSIS — J45.41 MODERATE PERSISTENT ASTHMA WITH ACUTE EXACERBATION: ICD-10-CM

## 2024-07-31 DIAGNOSIS — R09.02 EXERCISE HYPOXEMIA: ICD-10-CM

## 2024-07-31 DIAGNOSIS — J45.41 MODERATE PERSISTENT ASTHMA WITH ACUTE EXACERBATION: Primary | ICD-10-CM

## 2024-07-31 DIAGNOSIS — R05.9 COUGH, UNSPECIFIED TYPE: ICD-10-CM

## 2024-07-31 DIAGNOSIS — R60.0 LOCALIZED EDEMA: ICD-10-CM

## 2024-07-31 DIAGNOSIS — R05.3 CHRONIC COUGH: ICD-10-CM

## 2024-07-31 LAB
ALBUMIN SERPL BCP-MCNC: 2.4 G/DL (ref 3.5–5.2)
ALP SERPL-CCNC: 86 U/L (ref 55–135)
ALT SERPL W/O P-5'-P-CCNC: 13 U/L (ref 10–44)
ANION GAP SERPL CALC-SCNC: 10 MMOL/L (ref 8–16)
AST SERPL-CCNC: 18 U/L (ref 10–40)
BASOPHILS # BLD AUTO: 0.16 K/UL (ref 0–0.2)
BASOPHILS NFR BLD: 1 % (ref 0–1.9)
BILIRUB SERPL-MCNC: 0.2 MG/DL (ref 0.1–1)
BUN SERPL-MCNC: 32 MG/DL (ref 6–20)
CALCIUM SERPL-MCNC: 9.3 MG/DL (ref 8.7–10.5)
CHLORIDE SERPL-SCNC: 105 MMOL/L (ref 95–110)
CO2 SERPL-SCNC: 23 MMOL/L (ref 23–29)
CREAT SERPL-MCNC: 2.1 MG/DL (ref 0.5–1.4)
DIFFERENTIAL METHOD BLD: ABNORMAL
EOSINOPHIL # BLD AUTO: 0.8 K/UL (ref 0–0.5)
EOSINOPHIL NFR BLD: 4.8 % (ref 0–8)
ERYTHROCYTE [DISTWIDTH] IN BLOOD BY AUTOMATED COUNT: 16.4 % (ref 11.5–14.5)
ERYTHROCYTE [SEDIMENTATION RATE] IN BLOOD BY WESTERGREN METHOD: 97 MM/HR (ref 0–20)
EST. GFR  (NO RACE VARIABLE): 28.2 ML/MIN/1.73 M^2
GLUCOSE SERPL-MCNC: 74 MG/DL (ref 70–110)
HCT VFR BLD AUTO: 31.9 % (ref 37–48.5)
HGB BLD-MCNC: 9 G/DL (ref 12–16)
IMM GRANULOCYTES # BLD AUTO: 0.08 K/UL (ref 0–0.04)
IMM GRANULOCYTES NFR BLD AUTO: 0.5 % (ref 0–0.5)
LYMPHOCYTES # BLD AUTO: 2.4 K/UL (ref 1–4.8)
LYMPHOCYTES NFR BLD: 14.3 % (ref 18–48)
MCH RBC QN AUTO: 25.4 PG (ref 27–31)
MCHC RBC AUTO-ENTMCNC: 28.2 G/DL (ref 32–36)
MCV RBC AUTO: 90 FL (ref 82–98)
MONOCYTES # BLD AUTO: 1.3 K/UL (ref 0.3–1)
MONOCYTES NFR BLD: 7.7 % (ref 4–15)
NEUTROPHILS # BLD AUTO: 12 K/UL (ref 1.8–7.7)
NEUTROPHILS NFR BLD: 71.7 % (ref 38–73)
NRBC BLD-RTO: 0 /100 WBC
PLATELET # BLD AUTO: 864 K/UL (ref 150–450)
PMV BLD AUTO: 10.2 FL (ref 9.2–12.9)
POTASSIUM SERPL-SCNC: 5.1 MMOL/L (ref 3.5–5.1)
PROT SERPL-MCNC: 7.9 G/DL (ref 6–8.4)
RBC # BLD AUTO: 3.55 M/UL (ref 4–5.4)
SODIUM SERPL-SCNC: 138 MMOL/L (ref 136–145)
WBC # BLD AUTO: 16.7 K/UL (ref 3.9–12.7)

## 2024-07-31 PROCEDURE — 85025 COMPLETE CBC W/AUTO DIFF WBC: CPT | Performed by: INTERNAL MEDICINE

## 2024-07-31 PROCEDURE — 83520 IMMUNOASSAY QUANT NOS NONAB: CPT | Performed by: INTERNAL MEDICINE

## 2024-07-31 PROCEDURE — 86038 ANTINUCLEAR ANTIBODIES: CPT | Performed by: INTERNAL MEDICINE

## 2024-07-31 PROCEDURE — 82164 ANGIOTENSIN I ENZYME TEST: CPT | Performed by: INTERNAL MEDICINE

## 2024-07-31 PROCEDURE — 80053 COMPREHEN METABOLIC PANEL: CPT | Performed by: INTERNAL MEDICINE

## 2024-07-31 PROCEDURE — 99999 PR PBB SHADOW E&M-EST. PATIENT-LVL IV: CPT | Mod: PBBFAC,,, | Performed by: INTERNAL MEDICINE

## 2024-07-31 PROCEDURE — 82542 COL CHROMOTOGRAPHY QUAL/QUAN: CPT | Performed by: INTERNAL MEDICINE

## 2024-07-31 PROCEDURE — 99999 PR PBB SHADOW E&M-EST. PATIENT-LVL I: CPT | Mod: PBBFAC,,,

## 2024-07-31 PROCEDURE — 36415 COLL VENOUS BLD VENIPUNCTURE: CPT | Performed by: INTERNAL MEDICINE

## 2024-07-31 PROCEDURE — 85651 RBC SED RATE NONAUTOMATED: CPT | Performed by: INTERNAL MEDICINE

## 2024-07-31 PROCEDURE — 86431 RHEUMATOID FACTOR QUANT: CPT | Performed by: INTERNAL MEDICINE

## 2024-07-31 NOTE — PROGRESS NOTES
Subjective:     Patient ID: Iman Faust is a 49 y.o. female.    Chief Complaint:  Still wheezing and coughing    HPI 49 y.o. c/o nausea, still with a cough , aching , tired     bilateral lower extremity edema has resolved. Shooting pain in right foot    Asthma  She presents for follow up evaluation of asthma  The patient has been previously diagnosed with asthma. She was diagnosed with asthma at about age 39 . The patient has never been admitted to the hospital for asthma. The patient is currently having symptoms / an exacerbation. Current symptoms include dyspnea, productive cough, and wheezing. Symptoms have been present since several weeks ago and have been gradually worsening. She denies chest pain. Associated symptoms include poor exercise tolerance, shortness of breath, weakness, and wheezing.  This episode appears to have been triggered by upper respiratory infection. Treatments tried for the current exacerbation include short-acting inhaled beta-adrenergic agonists, which have provided some relief of symptoms. The patient has been having similar episodes for approximately 2- 3 months.  C/o edema in both feet       Current Disease Severity  The patient is having daytime symptoms throughout the day. The patient is having daytime symptoms more than once per week, but not nightly. The patient is using short-acting beta agonists for symptom control several times per day. She has exacerbations requiring oral systemic corticosteroids 0 times per year. Current limitations in activity from asthma: cut back on daily activities. Number of days of school or work missed in the last month: 2. Number of urgent/emergent visit in last year: 1.  The patient is not using a spacer with MDIs. Her best peak flow rate is na. She is not monitoring peak flow rates at home.    The patient is an  and has had no significant occupational exposure. There is no family history of lung disease or liver disease. She does have a  dog at home but does not feel sensitive to it. She has no birds. No history of allergy testing. Does note this strong smells such as cleaning fluids will increase her cough. No swallowing symptoms and no symptoms of reflux.     Past Medical History:   Diagnosis Date    Mild intermittent asthma, uncomplicated      History reviewed. No pertinent surgical history.  Review of patient's allergies indicates:  No Known Allergies  Current Outpatient Medications on File Prior to Visit   Medication Sig Dispense Refill    albuterol (PROVENTIL) 2.5 mg /3 mL (0.083 %) nebulizer solution Take 3 mLs (2.5 mg total) by nebulization every 4 to 6 hours as needed for Wheezing or Shortness of Breath. 360 mL 11    albuterol (PROVENTIL/VENTOLIN HFA) 90 mcg/actuation inhaler Inhale 2 puffs into the lungs every 4 (four) hours as needed for Wheezing or Shortness of Breath. 18 g 11    fluticasone-salmeterol diskus inhaler 500-50 mcg Inhale 1 puff into the lungs 2 (two) times daily. 60 each 11    furosemide (LASIX) 20 MG tablet Take 1 tablet (20 mg total) by mouth 2 (two) times a day. 60 tablet 11    potassium chloride SA (K-DUR,KLOR-CON) 20 MEQ tablet Take 1 tablet (20 mEq total) by mouth once daily. 30 tablet 11    [DISCONTINUED] predniSONE (DELTASONE) 20 MG tablet Prednisone 60 mg/ day for 3 days, 40 mg/day for 3 days,20 mg/ day for 3 days, (1/2 tablet )10 mg a day for 3 days. (Patient not taking: Reported on 7/31/2024) 20 tablet 0     No current facility-administered medications on file prior to visit.     Social History     Socioeconomic History    Marital status: Single   Tobacco Use    Smoking status: Never    Smokeless tobacco: Never     No family history on file.    Review of Systems   Constitutional:  Positive for fatigue. Negative for fever.   HENT:  Positive for postnasal drip, rhinorrhea and congestion.    Eyes:  Negative for redness and itching.   Respiratory:  Positive for cough, sputum production, shortness of breath, dyspnea  "on extertion, use of rescue inhaler and Paroxysmal Nocturnal Dyspnea.    Cardiovascular:  Negative for chest pain, palpitations and leg swelling.   Genitourinary:  Negative for difficulty urinating and hematuria.   Endocrine:  Negative for cold intolerance and heat intolerance.    Skin:  Negative for rash.   Gastrointestinal:  Negative for nausea and abdominal pain.   Neurological:  Negative for dizziness, syncope, weakness and light-headedness.   Hematological:  Negative for adenopathy. Does not bruise/bleed easily.   Psychiatric/Behavioral:  Negative for sleep disturbance. The patient is not nervous/anxious.        Objective:      /80   Pulse 90   Resp 17   Ht 5' 4" (1.626 m)   Wt 42.4 kg (93 lb 7.6 oz)   SpO2 99%   BMI 16.04 kg/m²   Physical Exam  Vitals and nursing note reviewed.   Constitutional:       Appearance: She is well-developed. She is ill-appearing.   HENT:      Head: Normocephalic and atraumatic.      Nose: Nose normal.      Mouth/Throat:      Pharynx: No oropharyngeal exudate.   Eyes:      Conjunctiva/sclera: Conjunctivae normal.      Pupils: Pupils are equal, round, and reactive to light.   Neck:      Thyroid: No thyromegaly.      Vascular: No JVD.      Trachea: No tracheal deviation.   Cardiovascular:      Rate and Rhythm: Normal rate and regular rhythm.      Heart sounds: Normal heart sounds.   Pulmonary:      Effort: Pulmonary effort is normal. No respiratory distress.      Breath sounds: Examination of the right-lower field reveals wheezing. Examination of the left-lower field reveals wheezing. Decreased breath sounds and wheezing present. No rhonchi or rales.   Chest:      Chest wall: No tenderness.   Abdominal:      General: Bowel sounds are normal.      Palpations: Abdomen is soft.   Musculoskeletal:         General: Normal range of motion.      Cervical back: Neck supple.   Lymphadenopathy:      Cervical: No cervical adenopathy.   Skin:     General: Skin is warm and dry. " "  Neurological:      Mental Status: She is alert and oriented to person, place, and time.       Personal Diagnostic Review  Severe obstruction         7/31/2024    10:54 AM   Pulmonary Studies Review   SpO2 99 %   Height 5' 4" (1.626 m)   Weight 42.4 kg (93 lb 7.6 oz)   BMI (Calculated) 16   Predicted Distance 486.66   Predicted Distance Meters (Calculated) 623.99 meters       X-Ray Chest PA And Lateral  Narrative: EXAM: XR CHEST PA AND LATERAL    CLINICAL HISTORY: SOB; [J45.991]-Cough variant asthma./[J45.31]-Mild persistent asthma with (acute) exacerbation./[J18.9]-Pneumonia, unspecified organism.    TECHNIQUE: PA and lateral views of the chest.    COMPARISON: X-ray dated 04/01/2024    FINDINGS:  Faint aortic arch calcification noted. Cardiomediastinal silhouette is otherwise within normal limits. Trachea is midline. Interstitial irregularity noted throughout the mid to lower lungs with faint nodular densities, similar compared to 04/01/2024. Small left greater than right pleural effusion. No significant pneumothorax.  No acute bony abnormality.  Degenerative changes noted in the spine.  Impression: New small left greater than right pleural effusion.  Otherwise unchanged interstitial irregularity in the mid to lower lungs with faint nodular densities.    Finalized on: 5/23/2024 3:03 PM By:  Ang Castillo MD  R# [id number]      2024-05-23 15:05:[id number]    [facility]      Office Spirometry Results:  Spirometry shows obstruction with very severe ventilatory impairment. This impairment may be due to the obstruction alone but restrictive disease is not ruled out. Spirometry remains unimproved following bronchodilator.   Â   Notes:   Â   The failure to demonstrate improvement in spirometry does not preclude a clinical response to a trial of bronchodilators. Consider lung volume determination to help explain the etiology for the reduced FVC (restriction and/or air trapping). Also consider    DLCO determination if clinically " "indicated. The vital capacity and degree of obstruction seen in baseline spirometry may be underestimated due to the short expiratory time.            7/31/2024    10:54 AM 7/31/2024    10:04 AM 5/23/2024     4:08 PM 4/4/2024     9:45 AM 4/1/2024     2:21 PM   Pulmonary Function Tests   SpO2 99 %  100 % 95 % 95 %   Ordering Provider  Tino ORTEZ      Performing nurse/tech/RT  LS RRT      Height 5' 4" (1.626 m) 5' 4" (1.626 m) 5' 4" (1.626 m) 5' 4" (1.626 m) 5' 4" (1.626 m)   Weight 42.4 kg (93 lb 7.6 oz) 42.4 kg (93 lb 7.6 oz) 45.7 kg (100 lb 12 oz) 48 kg (105 lb 13.1 oz) 48.1 kg (106 lb 0.7 oz)   BMI (Calculated) 16 16 17.3 18.2 18.2   Patient Race        6MWT Status  completed without stopping      Patient Reported  No complaints      Was O2 used?  No      6MW Distance walked (feet)  1325 feet      Distance walked (meters)  403.86 meters      Did patient stop?  No      Type of assistive device(s) used?  no assistive devices      Oxygen Saturation  98 %      Supplemental Oxygen  Room Air      Heart Rate  99 bpm      Blood Pressure  131/74      Chris Dyspnea Rating   moderate      Oxygen Saturation  95 %      Supplemental Oxygen  Room Air      Heart Rate  128 bpm      Blood Pressure  146/80      Chris Dyspnea Rating   somewhat heavy      Recovery Time (seconds)  240 seconds      Oxygen Saturation  98 %      Supplemental Oxygen  Room Air      Heart Rate  100 bpm      Blood Pressure  128/70      Chris Dyspnea Rating   moderate      Is procedure ready for interpretation?  Yes      Oxygen Qualification?  No            7/31/2024    10:54 AM   Pulmonary Studies Review   SpO2 99 %   Height 5' 4" (1.626 m)   Weight 42.4 kg (93 lb 7.6 oz)   BMI (Calculated) 16   Predicted Distance 486.66   Predicted Distance Meters (Calculated) 623.99 meters           No results found for this or any previous visit (from the past 336 hour(s)).    REVIEW OF PAST RECORDS:  Dandre Muñiz MD     3/10/2016  4:38 PM  Pulmonary Function " Studies:  Severe obstructive ventilatory impairment with no significant  improvement after bronchodilator administration. The lung volumes  are increased in particular the residual volume. This is  indicative of overinflation. The diffusion capacity is normal  Specimen Collected: -- Last Resulted: 03/10/16 16:38   Received From: [facility]  Result Received: 04/04/24 10:3     REVIEW OF OUTSIDE RECORDS:        Assessment:            Moderate persistent asthma with acute exacerbation  -     Sedimentation rate; Future; Expected date: 07/31/2024  -     AZAM Screen w/Reflex; Future; Expected date: 07/31/2024  -     Rheumatoid Factor; Future; Expected date: 07/31/2024  -     Angiotensin Converting Enzyme; Future; Expected date: 07/31/2024  -     ALPHA 1 ANTITRYPSIN DEFIICIENCY PROFILE; Future; Expected date: 07/31/2024  -     INTERLEUKIN-2 RECEPTOR; Future; Expected date: 07/31/2024  -     CBC Auto Differential; Future; Expected date: 07/31/2024  -     Comprehensive Metabolic Panel; Future; Expected date: 07/31/2024  -     CT Chest Without Contrast; Future; Expected date: 07/31/2024    Chronic cough  -     CT Chest Without Contrast; Future; Expected date: 07/31/2024    Localized edema    Cough, unspecified type  -     CT Chest Without Contrast; Future; Expected date: 07/31/2024            Outpatient Encounter Medications as of 7/31/2024   Medication Sig Dispense Refill    albuterol (PROVENTIL) 2.5 mg /3 mL (0.083 %) nebulizer solution Take 3 mLs (2.5 mg total) by nebulization every 4 to 6 hours as needed for Wheezing or Shortness of Breath. 360 mL 11    albuterol (PROVENTIL/VENTOLIN HFA) 90 mcg/actuation inhaler Inhale 2 puffs into the lungs every 4 (four) hours as needed for Wheezing or Shortness of Breath. 18 g 11    fluticasone-salmeterol diskus inhaler 500-50 mcg Inhale 1 puff into the lungs 2 (two) times daily. 60 each 11    furosemide (LASIX)  20 MG tablet Take 1 tablet (20 mg total) by mouth 2 (two) times a day. 60 tablet 11    potassium chloride SA (K-DUR,KLOR-CON) 20 MEQ tablet Take 1 tablet (20 mEq total) by mouth once daily. 30 tablet 11    [DISCONTINUED] predniSONE (DELTASONE) 20 MG tablet Prednisone 60 mg/ day for 3 days, 40 mg/day for 3 days,20 mg/ day for 3 days, (1/2 tablet )10 mg a day for 3 days. (Patient not taking: Reported on 7/31/2024) 20 tablet 0     No facility-administered encounter medications on file as of 7/31/2024.     Plan:       Requested Prescriptions      No prescriptions requested or ordered in this encounter     Problem List Items Addressed This Visit       Moderate persistent asthma with acute exacerbation - Primary    Overview     No records available in EMR from prior providers. Start maintenance inhaler. Continue albuterol PRN. Referral to pulmonology to establish care         Relevant Orders    Sedimentation rate    AZAM Screen w/Reflex    Rheumatoid Factor    Angiotensin Converting Enzyme    ALPHA 1 ANTITRYPSIN DEFIICIENCY PROFILE    INTERLEUKIN-2 RECEPTOR    CBC Auto Differential    Comprehensive Metabolic Panel    CT Chest Without Contrast     Other Visit Diagnoses       Chronic cough        Relevant Orders    CT Chest Without Contrast    Localized edema        Cough, unspecified type        Relevant Orders    CT Chest Without Contrast                 Follow up in about 2 weeks (around 8/14/2024) for Review progress, CT - on return visit.    MEDICAL DECISION MAKING: Moderate to high complexity.  Overall, the multiple problems listed are of moderate to high severity that may impact quality of life and activities of daily living. Side effects of medications, treatment plan as well as options and alternatives reviewed and discussed with patient. There was counseling of patient concerning these issues.    Total time spent in counseling and coordination of care - 35  minutes of total time spent on the encounter, which  includes face to face time and non-face to face time preparing to see the patient (eg, review of tests), Obtaining and/or reviewing separately obtained history, Documenting clinical information in the electronic or other health record, Independently interpreting results (not separately reported) and communicating results to the patient/family/caregiver, or Care coordination (not separately reported).    Time was used in discussion of prognosis, risks, benefits of treatment, instructions and compliance with regimen . Discussion with other physicians and/or health care providers - home health or for use of durable medical equipment (oxygen, nebulizers, CPAP, BiPAP) occurred.

## 2024-07-31 NOTE — PROCEDURES
"[facility] - Pulmonary Function  Six Minute Walk     SUMMARY     Ordering Provider: Tino ORTEZ   Interpreting Provider: Tino ORTEZ  Performing nurse/tech/RT: LS RRT  Diagnosis:  (Excercise Hypoxemia)  Height: 5' 4" (162.6 cm)  Weight: 42.4 kg (93 lb 7.6 oz)  BMI (Calculated): 16   Patient Race:             Phase Oxygen Assessment Supplemental O2 Heart   Rate Blood Pressure Chris Dyspnea Scale Rating   Resting 98 % Room Air 99 bpm 131/74 3   Exercise        Minute        1 97 % Room Air 111 bpm     2 98 % Room Air 115 bpm     3 99 % Room Air 121 bpm     4 98 % Room Air 119 bpm     5 95 % Room Air 125 bpm     6  95 % Room Air 128 bpm 146/80 4   Recovery        Minute        1 99 % Room Air 116 bpm     2 99 % Room Air 102 bpm     3 99 % Room Air 101 bpm     4 98 % Room Air 100 bpm 128/70 3     Six Minute Walk Summary  6MWT Status: completed without stopping  Patient Reported: No complaints     Interpretation:  Did the patient stop or pause?: No                                         Total Time Walked (Calculated): 360 seconds  Final Partial Lap Distance (feet): 125 feet  Total Distance Meters (Calculated): 403.86 meters  Predicted Distance Meters (Calculated): 623.99 meters  Percentage of Predicted (Calculated): 64.72  Peak VO2 (Calculated): 16.1  Mets: 4.6  Has The Patient Had a Previous Six Minute Walk Test?: No       Previous 6MWT Results  Has The Patient Had a Previous Six Minute Walk Test?: No      Interpretation:  Total distance walked in six minutes is mildly reduced indicating a reduction in overall  functional capacity. The patient did not meet criteria for supplemental oxygen prescription.  Clinical correlation suggested.  [] Mild exercise-induced hypoxemia described as an arterial oxygen saturation of 93-95% (with a fall of 3-4% with exercise),   [] Moderate exercise-induced hypoxemia as a fall in oxygen saturation to  89-93% (with a fall of 3-4 % with exercise)  [] Severe exercise induced hypoxemia as < " 89% O2 saturation (88% and below).  Medicare Criteria for Oxygen prescription comments: When arterial oxygen saturation is at or below 88% during exercise (severe exercise induced hypoxemia) then the patient falls under   Details about Medicare Group Criteria coverage can be found at http://www.cms.[facility].gov/manuals/downloads/     Ulysses Jiménez MD

## 2024-08-01 LAB
ANA SER QL IF: NORMAL
RHEUMATOID FACT SERPL-ACNC: 412 IU/ML (ref 0–15)

## 2024-08-02 LAB — ACE SERPL-CCNC: 38 U/L (ref 16–85)

## 2024-08-04 LAB — SOL IL2 RECEP SERPL-MCNC: 2818.3 PG/ML (ref 175.3–858.2)

## 2024-08-07 ENCOUNTER — PATIENT MESSAGE (OUTPATIENT)
Dept: PULMONOLOGY | Facility: CLINIC | Age: 49
End: 2024-08-07
Payer: COMMERCIAL

## 2024-08-07 DIAGNOSIS — R05.3 CHRONIC COUGH: ICD-10-CM

## 2024-08-07 DIAGNOSIS — D64.89 ANEMIA DUE TO OTHER CAUSE, NOT CLASSIFIED: ICD-10-CM

## 2024-08-07 DIAGNOSIS — J18.9 PERSISTENT PNEUMONIA: ICD-10-CM

## 2024-08-07 DIAGNOSIS — J45.31 MILD PERSISTENT ASTHMA WITH ACUTE EXACERBATION: Primary | ICD-10-CM

## 2024-08-07 LAB
A1AT PHENOTYP SERPL-IMP: ABNORMAL
A1AT SERPL NEPH-MCNC: 327 MG/DL (ref 100–190)

## 2024-08-13 ENCOUNTER — OFFICE VISIT (OUTPATIENT)
Dept: INTERNAL MEDICINE | Facility: CLINIC | Age: 49
End: 2024-08-13
Payer: COMMERCIAL

## 2024-08-13 ENCOUNTER — HOSPITAL ENCOUNTER (OUTPATIENT)
Dept: RADIOLOGY | Facility: HOSPITAL | Age: 49
Discharge: HOME | End: 2024-08-13
Attending: INTERNAL MEDICINE
Payer: COMMERCIAL

## 2024-08-13 VITALS
HEART RATE: 98 BPM | SYSTOLIC BLOOD PRESSURE: 104 MMHG | HEIGHT: 64 IN | TEMPERATURE: 98 F | DIASTOLIC BLOOD PRESSURE: 72 MMHG | WEIGHT: 97.25 LBS | OXYGEN SATURATION: 95 % | BODY MASS INDEX: 16.6 KG/M2 | RESPIRATION RATE: 18 BRPM

## 2024-08-13 DIAGNOSIS — R76.8 RHEUMATOID FACTOR POSITIVE: ICD-10-CM

## 2024-08-13 DIAGNOSIS — R05.9 COUGH, UNSPECIFIED TYPE: Primary | ICD-10-CM

## 2024-08-13 DIAGNOSIS — Z11.59 ENCOUNTER FOR HEPATITIS C SCREENING TEST FOR LOW RISK PATIENT: ICD-10-CM

## 2024-08-13 DIAGNOSIS — N17.9 AKI (ACUTE KIDNEY INJURY): ICD-10-CM

## 2024-08-13 DIAGNOSIS — D64.9 ANEMIA, UNSPECIFIED TYPE: ICD-10-CM

## 2024-08-13 DIAGNOSIS — D75.839 THROMBOCYTOSIS: ICD-10-CM

## 2024-08-13 DIAGNOSIS — R63.4 UNINTENTIONAL WEIGHT LOSS: ICD-10-CM

## 2024-08-13 DIAGNOSIS — J45.41 MODERATE PERSISTENT ASTHMA WITH ACUTE EXACERBATION: ICD-10-CM

## 2024-08-13 DIAGNOSIS — Z11.4 ENCOUNTER FOR SCREENING FOR HIV: ICD-10-CM

## 2024-08-13 DIAGNOSIS — R05.3 CHRONIC COUGH: ICD-10-CM

## 2024-08-13 DIAGNOSIS — R05.9 COUGH, UNSPECIFIED TYPE: ICD-10-CM

## 2024-08-13 PROCEDURE — 1160F RVW MEDS BY RX/DR IN RCRD: CPT | Mod: CPTII,S$GLB,, | Performed by: NURSE PRACTITIONER

## 2024-08-13 PROCEDURE — 71250 CT THORAX DX C-: CPT | Mod: TC,PO

## 2024-08-13 PROCEDURE — 99999 PR PBB SHADOW E&M-EST. PATIENT-LVL V: CPT | Mod: PBBFAC,,, | Performed by: NURSE PRACTITIONER

## 2024-08-13 PROCEDURE — 3074F SYST BP LT 130 MM HG: CPT | Mod: CPTII,S$GLB,, | Performed by: NURSE PRACTITIONER

## 2024-08-13 PROCEDURE — 99215 OFFICE O/P EST HI 40 MIN: CPT | Mod: S$GLB,,, | Performed by: NURSE PRACTITIONER

## 2024-08-13 PROCEDURE — 3078F DIAST BP <80 MM HG: CPT | Mod: CPTII,S$GLB,, | Performed by: NURSE PRACTITIONER

## 2024-08-13 PROCEDURE — 3044F HG A1C LEVEL LT 7.0%: CPT | Mod: CPTII,S$GLB,, | Performed by: NURSE PRACTITIONER

## 2024-08-13 PROCEDURE — 1159F MED LIST DOCD IN RCRD: CPT | Mod: CPTII,S$GLB,, | Performed by: NURSE PRACTITIONER

## 2024-08-13 PROCEDURE — 3008F BODY MASS INDEX DOCD: CPT | Mod: CPTII,S$GLB,, | Performed by: NURSE PRACTITIONER

## 2024-08-13 PROCEDURE — 71250 CT THORAX DX C-: CPT | Mod: 26,,, | Performed by: RADIOLOGY

## 2024-08-13 RX ORDER — DOXYCYCLINE 100 MG/1
100 CAPSULE ORAL 2 TIMES DAILY
Qty: 20 CAPSULE | Refills: 0 | Status: ON HOLD | OUTPATIENT
Start: 2024-08-13

## 2024-08-13 NOTE — TELEPHONE ENCOUNTER
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

## 2024-08-13 NOTE — PROGRESS NOTES
"Subjective:       Patient ID: Iman Faust is a 49 y.o. female.    Chief Complaint: Fatigue and Cough (Still hasn't felt better since last visit.)    Mrs. Faust returns to clinic for persistent cough and fatigue. She was last seen in clinic on 4/1/24 to establish care and persistent cough. At last visit, she reported history of asthma, but no longer using inhalers. Started with acute cough and congestion with shortness of breath and fatigue in March 2024. She was seen by urgent care twice and treated with antibiotics, steroids, and albuterol inhaler. CXR and labs completed at last visit.    CXR revealed:     "Impression:   Right middle lobe minor infiltrate or atelectasis at the lung base.     Irregular opacities in the bilateral midlung zones may reflect multifocal infiltrates or nodules.  Continued chest radiograph follow-up versus further evaluation with chest CT."    Labs revealed WBC 15.21. Otherwise unremarkable. She was treated with Levaquin and referred to pulmonology for asthma.    She has since been following up with pulmonlogy. Treated with prednisone and started on controller inhaler without improvement in symptoms. Inhaler dosing increased.    Last appt with pulmonology on 7/31 labs were completed with CT chest scheduled to be completed on 8/20.    Labs completed on 7/31 revealing elevated ESR, CRP, RF, Alpha 1 antitrypsin, and interleukin 2. Significant decrease in H/H and kidney function (previously normal).    She continues with cough, fatigue, dyspnea on exertion, sinus congestion, and headache. Also notes she is unintentionally losing weight. No appetite. Lost 8 lbs since April. Drinking protein drinks which has helped. Stopped using controller inhaler as she felt it was making symptoms worse.       Fatigue  Associated symptoms include anorexia, chills, coughing (improved), fatigue, headaches, nausea, numbness (resolved) and weakness (generalized). Pertinent negatives include no abdominal pain, " arthralgias, change in bowel habit, chest pain, congestion, diaphoresis, fever, joint swelling, myalgias, neck pain, rash, sore throat, swollen glands, urinary symptoms, vertigo, visual change or vomiting.     Patient Active Problem List   Diagnosis    Moderate persistent asthma with acute exacerbation    Rheumatoid factor positive    Mild persistent asthma with acute exacerbation    Anemia    JAIME (acute kidney injury)    Cough    Thrombocytosis    Unintentional weight loss    Fatigue       No family history on file.  History reviewed. No pertinent surgical history.      Current Outpatient Medications:     albuterol (PROVENTIL/VENTOLIN HFA) 90 mcg/actuation inhaler, Inhale 2 puffs into the lungs every 4 (four) hours as needed for Wheezing or Shortness of Breath., Disp: 18 g, Rfl: 11    doxycycline (VIBRAMYCIN) 100 MG Cap, Take 1 capsule (100 mg total) by mouth 2 (two) times daily., Disp: 20 capsule, Rfl: 0    Review of Systems   Constitutional:  Positive for activity change, appetite change, chills, fatigue and unexpected weight change. Negative for diaphoresis and fever.   HENT:  Positive for postnasal drip and sinus pressure. Negative for congestion, sore throat and trouble swallowing.    Eyes:  Negative for visual disturbance.   Respiratory:  Positive for cough (improved), shortness of breath (on exertion) and wheezing (at times). Negative for chest tightness.    Cardiovascular:  Positive for palpitations (few days ago, not constant) and leg swelling (resolved). Negative for chest pain.   Gastrointestinal:  Positive for anorexia and nausea. Negative for abdominal pain, blood in stool, change in bowel habit, constipation, diarrhea and vomiting.   Genitourinary:  Negative for difficulty urinating and menstrual problem.   Musculoskeletal:  Negative for arthralgias, joint swelling, myalgias and neck pain.   Skin:  Negative for rash.   Neurological:  Positive for weakness (generalized), numbness (resolved) and  "headaches. Negative for dizziness, vertigo, syncope and light-headedness.   Psychiatric/Behavioral:  Negative for agitation, confusion and dysphoric mood. The patient is not nervous/anxious.        Objective:   /72 (BP Location: Left arm, Patient Position: Sitting, BP Method: Medium (Manual))   Pulse 98   Temp 97.9 °F (36.6 °C)   Resp 18   Ht 5' 4" (1.626 m)   Wt 44.1 kg (97 lb 3.6 oz)   SpO2 95%   BMI 16.69 kg/m²      Physical Exam  Vitals reviewed.   Constitutional:       General: She is awake.      Appearance: Normal appearance. She is underweight. She is not diaphoretic.   HENT:      Head: Normocephalic and atraumatic.      Right Ear: Tympanic membrane, ear canal and external ear normal.      Left Ear: Ear canal and external ear normal.      Nose: Nose normal. No congestion.      Mouth/Throat:      Mouth: Mucous membranes are moist.      Pharynx: Oropharynx is clear. No oropharyngeal exudate or posterior oropharyngeal erythema.   Eyes:      Extraocular Movements: Extraocular movements intact.      Conjunctiva/sclera: Conjunctivae normal.      Pupils: Pupils are equal, round, and reactive to light.   Cardiovascular:      Rate and Rhythm: Normal rate and regular rhythm.      Heart sounds: Normal heart sounds. No murmur heard.  Pulmonary:      Effort: Pulmonary effort is normal. No respiratory distress.      Breath sounds: Examination of the right-lower field reveals wheezing. Decreased breath sounds and wheezing present. No rhonchi or rales.   Abdominal:      General: Bowel sounds are normal. There is no distension.      Palpations: Abdomen is soft. There is no mass.      Tenderness: There is no abdominal tenderness. There is no guarding or rebound.   Musculoskeletal:         General: Normal range of motion.      Cervical back: Normal range of motion. No rigidity or tenderness.      Right lower leg: No edema.      Left lower leg: No edema.   Lymphadenopathy:      Cervical: No cervical adenopathy. "   Skin:     General: Skin is warm and dry.      Coloration: Skin is not pale.      Findings: No erythema or rash.   Neurological:      Mental Status: She is alert and oriented to person, place, and time.      Coordination: Coordination normal.      Gait: Gait normal.   Psychiatric:         Mood and Affect: Mood normal.         Behavior: Behavior normal. Behavior is cooperative.         Assessment & Plan     1. Cough, unspecified type  Assessment & Plan:  Reviewed pulmonology notes and recent labs. Proceed with CT chest now.       2. JAIME (acute kidney injury)  Assessment & Plan:  GFR previously >60, labs 2 week prior GFR 28. No nephrotoxic medications. Increase hydration. Repeat labs today.     Orders:  -     COMPREHENSIVE METABOLIC PANEL; Future; Expected date: 08/13/2024    3. Anemia, unspecified type  Assessment & Plan:  Acute onset. H/H WNL 4 months prior. Denies acute bleeding. Repeat CBC today with anemia panel. Referral to hem/onc. Appt scheduled Friday.    Orders:  -     CBC W/ AUTO DIFFERENTIAL; Future; Expected date: 08/13/2024  -     Ambulatory referral/consult to Hematology / Oncology; Future; Expected date: 08/20/2024  -     C-reactive protein; Future; Expected date: 08/13/2024  -     Haptoglobin; Future; Expected date: 08/13/2024  -     Lactate dehydrogenase; Future; Expected date: 08/13/2024  -     Sedimentation rate; Future; Expected date: 08/13/2024  -     Ferritin; Future; Expected date: 08/13/2024  -     Protein electrophoresis, serum; Future; Expected date: 08/13/2024  -     Iron and TIBC; Future; Expected date: 08/13/2024  -     Reticulocytes; Future; Expected date: 08/13/2024  -     Vitamin B12; Future; Expected date: 08/13/2024  -     Folate; Future; Expected date: 08/13/2024  -     Pathologist Interpretation Differential; Future; Expected date: 08/13/2024    4. Thrombocytosis  Assessment & Plan:  Repeat labs today.    Orders:  -     Ambulatory referral/consult to Hematology / Oncology; Future;  "Expected date: 08/20/2024    5. Rheumatoid factor positive  Assessment & Plan:  Lab Results   Component Value Date    .0 (H) 07/31/2024   Referral to rheumatology. Soonest appointment is January. Will complete Econsult pending labs and imaging today.    Orders:  -     Ambulatory referral/consult to Rheumatology; Future; Expected date: 08/20/2024    6. Unintentional weight loss  Assessment & Plan:  Labs today. Proceed with CT chest today. Referral to hem/onc.      7. Moderate persistent asthma with acute exacerbation  Assessment & Plan:  Reviewed pulmonology notes. No improvement despite inhalers.       8. Encounter for screening for HIV  -     HIV 1/2 Ag/Ab (4th Gen); Future; Expected date: 08/13/2024    9. Encounter for hepatitis C screening test for low risk patient  -     HEPATITIS C ANTIBODY; Future; Expected date: 08/13/2024        I spent a total of 45 minutes on the day of the visit.  This includes face to face time and non-face to face time preparing to see the patient (eg, review of tests), obtaining and/or reviewing separately obtained history, documenting clinical information in the electronic or other health record, independently interpreting results and communicating results to the patient/family/caregiver, or care coordinator.       MICHELE Kline      Portions of this note may have been created with voice recognition software. Occasional "wrong-word" or "sound-a-like" substitutions may have occurred due to the inherent limitations of voice recognition software. Please, read the note carefully and recognize, using context, where substitutions have occurred.     "

## 2024-08-14 ENCOUNTER — TELEPHONE (OUTPATIENT)
Dept: PULMONOLOGY | Facility: CLINIC | Age: 49
End: 2024-08-14
Payer: COMMERCIAL

## 2024-08-14 ENCOUNTER — TELEPHONE (OUTPATIENT)
Dept: HEMATOLOGY/ONCOLOGY | Facility: CLINIC | Age: 49
End: 2024-08-14
Payer: COMMERCIAL

## 2024-08-14 ENCOUNTER — PATIENT MESSAGE (OUTPATIENT)
Dept: INTERNAL MEDICINE | Facility: CLINIC | Age: 49
End: 2024-08-14
Payer: COMMERCIAL

## 2024-08-14 PROBLEM — R76.8 RHEUMATOID FACTOR POSITIVE: Status: ACTIVE | Noted: 2024-08-14

## 2024-08-14 PROBLEM — J45.31 MILD PERSISTENT ASTHMA WITH ACUTE EXACERBATION: Status: ACTIVE | Noted: 2024-08-14

## 2024-08-14 PROBLEM — R53.83 FATIGUE: Status: ACTIVE | Noted: 2024-08-14

## 2024-08-14 PROBLEM — D64.9 ANEMIA: Status: ACTIVE | Noted: 2024-08-14

## 2024-08-14 PROBLEM — D75.839 THROMBOCYTOSIS: Status: ACTIVE | Noted: 2024-08-14

## 2024-08-14 PROBLEM — R05.9 COUGH: Status: ACTIVE | Noted: 2024-08-14

## 2024-08-14 PROBLEM — N17.9 AKI (ACUTE KIDNEY INJURY): Status: ACTIVE | Noted: 2024-08-14

## 2024-08-14 PROBLEM — R63.4 UNINTENTIONAL WEIGHT LOSS: Status: ACTIVE | Noted: 2024-08-14

## 2024-08-14 NOTE — TELEPHONE ENCOUNTER
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

## 2024-08-14 NOTE — ASSESSMENT & PLAN NOTE
Acute onset. H/H WNL 4 months prior. Denies acute bleeding. Repeat CBC today with anemia panel. Referral to hem/onc. Appt scheduled Friday.

## 2024-08-14 NOTE — ASSESSMENT & PLAN NOTE
GFR previously >60, labs 2 week prior GFR 28. No nephrotoxic medications. Increase hydration. Repeat labs today.

## 2024-08-14 NOTE — TELEPHONE ENCOUNTER
Discussed significant abnormal labs and CT chest with patient. Pulmonology recently called patient and advised to proceed with ED for acute work up. Encouraged patient to go to ED at this time. Will have her follow up in clinic after. Keep scheduled appointment with pulmonology. She verbalized understanding.

## 2024-08-14 NOTE — ASSESSMENT & PLAN NOTE
Lab Results   Component Value Date    .0 (H) 07/31/2024   Referral to rheumatology. Soonest appointment is January. Will complete Econsult pending labs and imaging today.

## 2024-08-14 NOTE — TELEPHONE ENCOUNTER
Spoke to pt in reference to Hem/Onc appt scheduled on 8/16 has been canceled and will need to be r/s d/t being scheduled incorrectly on Onc schedule. Appt r/s per request next available. Notice via pt portal.

## 2024-08-15 ENCOUNTER — HOSPITAL ENCOUNTER (INPATIENT)
Facility: HOSPITAL | Age: 49
LOS: 11 days | Discharge: HOME | DRG: 698 | End: 2024-08-26
Attending: EMERGENCY MEDICINE | Admitting: FAMILY MEDICINE
Payer: COMMERCIAL

## 2024-08-15 ENCOUNTER — TELEPHONE (OUTPATIENT)
Dept: INTERNAL MEDICINE | Facility: CLINIC | Age: 49
End: 2024-08-15
Payer: COMMERCIAL

## 2024-08-15 DIAGNOSIS — N18.32 ANEMIA OF CHRONIC RENAL FAILURE, STAGE 3B: ICD-10-CM

## 2024-08-15 DIAGNOSIS — N18.9 ACUTE ON CHRONIC RENAL FAILURE: ICD-10-CM

## 2024-08-15 DIAGNOSIS — N17.9 ACUTE ON CHRONIC RENAL FAILURE: ICD-10-CM

## 2024-08-15 DIAGNOSIS — D63.1 ANEMIA OF CHRONIC RENAL FAILURE, STAGE 3B: ICD-10-CM

## 2024-08-15 DIAGNOSIS — D50.8 OTHER IRON DEFICIENCY ANEMIA: ICD-10-CM

## 2024-08-15 DIAGNOSIS — N17.9 AKI (ACUTE KIDNEY INJURY): Primary | ICD-10-CM

## 2024-08-15 DIAGNOSIS — D64.9 ANEMIA, UNSPECIFIED TYPE: ICD-10-CM

## 2024-08-15 DIAGNOSIS — I77.82 ANCA-POSITIVE VASCULITIS: ICD-10-CM

## 2024-08-15 LAB
ALBUMIN SERPL BCP-MCNC: 2.2 G/DL (ref 3.5–5.2)
ALP SERPL-CCNC: 80 U/L (ref 55–135)
ALT SERPL W/O P-5'-P-CCNC: 21 U/L (ref 10–44)
ANION GAP SERPL CALC-SCNC: 10 MMOL/L (ref 8–16)
AST SERPL-CCNC: 18 U/L (ref 10–40)
BACTERIA #/AREA URNS HPF: ABNORMAL /HPF
BASOPHILS # BLD AUTO: 0.11 K/UL (ref 0–0.2)
BASOPHILS NFR BLD: 0.7 % (ref 0–1.9)
BILIRUB SERPL-MCNC: 0.3 MG/DL (ref 0.1–1)
BILIRUB UR QL STRIP: NEGATIVE
BUN SERPL-MCNC: 43 MG/DL (ref 6–20)
CALCIUM SERPL-MCNC: 9 MG/DL (ref 8.7–10.5)
CHLORIDE SERPL-SCNC: 105 MMOL/L (ref 95–110)
CLARITY UR: CLEAR
CO2 SERPL-SCNC: 22 MMOL/L (ref 23–29)
COLOR UR: YELLOW
CREAT SERPL-MCNC: 2.7 MG/DL (ref 0.5–1.4)
CREAT UR-MCNC: 46.6 MG/DL (ref 15–325)
CREAT UR-MCNC: 46.6 MG/DL (ref 15–325)
DIFFERENTIAL METHOD BLD: ABNORMAL
EOSINOPHIL # BLD AUTO: 0.4 K/UL (ref 0–0.5)
EOSINOPHIL NFR BLD: 2.9 % (ref 0–8)
ERYTHROCYTE [DISTWIDTH] IN BLOOD BY AUTOMATED COUNT: 15.6 % (ref 11.5–14.5)
EST. GFR  (NO RACE VARIABLE): 21 ML/MIN/1.73 M^2
GLUCOSE SERPL-MCNC: 78 MG/DL (ref 70–110)
GLUCOSE UR QL STRIP: NEGATIVE
HCT VFR BLD AUTO: 25.8 % (ref 37–48.5)
HGB BLD-MCNC: 7.8 G/DL (ref 12–16)
HGB UR QL STRIP: ABNORMAL
HYALINE CASTS #/AREA URNS LPF: 3 /LPF
IMM GRANULOCYTES # BLD AUTO: 0.1 K/UL (ref 0–0.04)
IMM GRANULOCYTES NFR BLD AUTO: 0.7 % (ref 0–0.5)
KETONES UR QL STRIP: NEGATIVE
LEUKOCYTE ESTERASE UR QL STRIP: NEGATIVE
LYMPHOCYTES # BLD AUTO: 2.2 K/UL (ref 1–4.8)
LYMPHOCYTES NFR BLD: 14.7 % (ref 18–48)
MCH RBC QN AUTO: 25.5 PG (ref 27–31)
MCHC RBC AUTO-ENTMCNC: 30.2 G/DL (ref 32–36)
MCV RBC AUTO: 84 FL (ref 82–98)
MICROSCOPIC COMMENT: ABNORMAL
MONOCYTES # BLD AUTO: 1.1 K/UL (ref 0.3–1)
MONOCYTES NFR BLD: 7.4 % (ref 4–15)
NEUTROPHILS # BLD AUTO: 11.1 K/UL (ref 1.8–7.7)
NEUTROPHILS NFR BLD: 73.6 % (ref 38–73)
NITRITE UR QL STRIP: NEGATIVE
NRBC BLD-RTO: 0 /100 WBC
PH UR STRIP: 7 [PH] (ref 5–8)
PLATELET # BLD AUTO: 826 K/UL (ref 150–450)
PMV BLD AUTO: 9.4 FL (ref 9.2–12.9)
POTASSIUM SERPL-SCNC: 5 MMOL/L (ref 3.5–5.1)
PROT SERPL-MCNC: 7.7 G/DL (ref 6–8.4)
PROT UR QL STRIP: ABNORMAL
PROT UR-MCNC: 47 MG/DL (ref 0–15)
PROT/CREAT UR: 1.01 MG/G{CREAT} (ref 0–0.2)
RBC # BLD AUTO: 3.06 M/UL (ref 4–5.4)
RBC #/AREA URNS HPF: 17 /HPF (ref 0–4)
SODIUM SERPL-SCNC: 137 MMOL/L (ref 136–145)
SODIUM UR-SCNC: 106 MMOL/L (ref 20–250)
SP GR UR STRIP: 1.01 (ref 1–1.03)
SQUAMOUS #/AREA URNS HPF: 2 /HPF
URN SPEC COLLECT METH UR: ABNORMAL
UROBILINOGEN UR STRIP-ACNC: NEGATIVE EU/DL
WBC # BLD AUTO: 15.14 K/UL (ref 3.9–12.7)
WBC #/AREA URNS HPF: 5 /HPF (ref 0–5)

## 2024-08-15 PROCEDURE — 86160 COMPLEMENT ANTIGEN: CPT | Mod: 59 | Performed by: EMERGENCY MEDICINE

## 2024-08-15 PROCEDURE — 84300 ASSAY OF URINE SODIUM: CPT | Performed by: FAMILY MEDICINE

## 2024-08-15 PROCEDURE — 80053 COMPREHEN METABOLIC PANEL: CPT | Performed by: EMERGENCY MEDICINE

## 2024-08-15 PROCEDURE — 84156 ASSAY OF PROTEIN URINE: CPT | Performed by: FAMILY MEDICINE

## 2024-08-15 PROCEDURE — 87040 BLOOD CULTURE FOR BACTERIA: CPT | Performed by: INTERNAL MEDICINE

## 2024-08-15 PROCEDURE — 86160 COMPLEMENT ANTIGEN: CPT | Performed by: EMERGENCY MEDICINE

## 2024-08-15 PROCEDURE — 85025 COMPLETE CBC W/AUTO DIFF WBC: CPT | Performed by: EMERGENCY MEDICINE

## 2024-08-15 PROCEDURE — 96360 HYDRATION IV INFUSION INIT: CPT

## 2024-08-15 PROCEDURE — 86036 ANCA SCREEN EACH ANTIBODY: CPT | Performed by: EMERGENCY MEDICINE

## 2024-08-15 PROCEDURE — 99285 EMERGENCY DEPT VISIT HI MDM: CPT | Mod: 25

## 2024-08-15 PROCEDURE — 81000 URINALYSIS NONAUTO W/SCOPE: CPT | Performed by: EMERGENCY MEDICINE

## 2024-08-15 PROCEDURE — 86480 TB TEST CELL IMMUN MEASURE: CPT | Performed by: EMERGENCY MEDICINE

## 2024-08-15 PROCEDURE — 99223 1ST HOSP IP/OBS HIGH 75: CPT | Mod: ,,, | Performed by: INTERNAL MEDICINE

## 2024-08-15 PROCEDURE — 25000003 PHARM REV CODE 250: Performed by: FAMILY MEDICINE

## 2024-08-15 PROCEDURE — 11000001 HC ACUTE MED/SURG PRIVATE ROOM

## 2024-08-15 PROCEDURE — 86038 ANTINUCLEAR ANTIBODIES: CPT | Performed by: EMERGENCY MEDICINE

## 2024-08-15 PROCEDURE — 83516 IMMUNOASSAY NONANTIBODY: CPT | Performed by: EMERGENCY MEDICINE

## 2024-08-15 RX ORDER — LANOLIN ALCOHOL/MO/W.PET/CERES
1 CREAM (GRAM) TOPICAL DAILY
Status: DISCONTINUED | OUTPATIENT
Start: 2024-08-15 | End: 2024-08-26 | Stop reason: HOSPADM

## 2024-08-15 RX ORDER — DOXYCYCLINE HYCLATE 100 MG
100 TABLET ORAL 2 TIMES DAILY
Status: DISCONTINUED | OUTPATIENT
Start: 2024-08-15 | End: 2024-08-22

## 2024-08-15 RX ORDER — ACETAMINOPHEN 325 MG/1
650 TABLET ORAL EVERY 6 HOURS PRN
Status: DISCONTINUED | OUTPATIENT
Start: 2024-08-15 | End: 2024-08-26 | Stop reason: HOSPADM

## 2024-08-15 RX ORDER — SODIUM CHLORIDE 9 MG/ML
INJECTION, SOLUTION INTRAVENOUS CONTINUOUS
Status: DISCONTINUED | OUTPATIENT
Start: 2024-08-15 | End: 2024-08-16

## 2024-08-15 RX ORDER — SODIUM CHLORIDE 9 MG/ML
INJECTION, SOLUTION INTRAVENOUS CONTINUOUS
Status: DISCONTINUED | OUTPATIENT
Start: 2024-08-15 | End: 2024-08-23

## 2024-08-15 RX ORDER — HYDRALAZINE HYDROCHLORIDE 20 MG/ML
10 INJECTION INTRAMUSCULAR; INTRAVENOUS EVERY 6 HOURS PRN
Status: DISCONTINUED | OUTPATIENT
Start: 2024-08-15 | End: 2024-08-22

## 2024-08-15 RX ORDER — ONDANSETRON 4 MG/1
4 TABLET, ORALLY DISINTEGRATING ORAL EVERY 6 HOURS PRN
Status: DISCONTINUED | OUTPATIENT
Start: 2024-08-15 | End: 2024-08-26 | Stop reason: HOSPADM

## 2024-08-15 RX ORDER — IPRATROPIUM BROMIDE AND ALBUTEROL SULFATE 2.5; .5 MG/3ML; MG/3ML
3 SOLUTION RESPIRATORY (INHALATION) EVERY 6 HOURS PRN
Status: DISCONTINUED | OUTPATIENT
Start: 2024-08-15 | End: 2024-08-26 | Stop reason: HOSPADM

## 2024-08-15 RX ADMIN — SODIUM CHLORIDE: 9 INJECTION, SOLUTION INTRAVENOUS at 04:08

## 2024-08-15 RX ADMIN — DOXYCYCLINE HYCLATE 100 MG: 100 TABLET, COATED ORAL at 09:08

## 2024-08-15 RX ADMIN — FERROUS SULFATE TAB 325 MG (65 MG ELEMENTAL FE) 1 EACH: 325 (65 FE) TAB at 04:08

## 2024-08-15 NOTE — SUBJECTIVE & OBJECTIVE
Past Medical History:   Diagnosis Date    Mild intermittent asthma, uncomplicated        Past Surgical History:   Procedure Laterality Date    BILATERAL TUBAL LIGATION N/A        Review of patient's allergies indicates:  No Known Allergies    No current facility-administered medications on file prior to encounter.     Current Outpatient Medications on File Prior to Encounter   Medication Sig    doxycycline (VIBRAMYCIN) 100 MG Cap Take 1 capsule (100 mg total) by mouth 2 (two) times daily.    albuterol (PROVENTIL/VENTOLIN HFA) 90 mcg/actuation inhaler Inhale 2 puffs into the lungs every 4 (four) hours as needed for Wheezing or Shortness of Breath.     Family History       Problem Relation (Age of Onset)    Hypertension Mother, Father          Tobacco Use    Smoking status: Never    Smokeless tobacco: Never   Substance and Sexual Activity    Alcohol use: Not Currently    Drug use: Never    Sexual activity: Not on file     Review of Systems   Constitutional:  Positive for fatigue. Negative for fever.   HENT:  Negative for sinus pressure.    Eyes:  Negative for visual disturbance.   Respiratory:  Positive for shortness of breath.    Cardiovascular:  Negative for chest pain.   Gastrointestinal:  Negative for nausea and vomiting.   Genitourinary:  Negative for decreased urine volume and difficulty urinating.   Musculoskeletal:  Negative for back pain.   Skin:  Negative for rash.   Neurological:  Negative for headaches.   Psychiatric/Behavioral:  Negative for confusion.      Objective:     Vital Signs (Most Recent):  Temp: 98.6 °F (37 °C) (08/15/24 1001)  Pulse: 91 (08/15/24 1300)  Resp: 18 (08/15/24 1300)  BP: 129/83 (08/15/24 1300)  SpO2: 97 % (08/15/24 1300) Vital Signs (24h Range):  Temp:  [98.6 °F (37 °C)] 98.6 °F (37 °C)  Pulse:  [82-99] 91  Resp:  [16-19] 18  SpO2:  [96 %-99 %] 97 %  BP: (122-146)/(77-83) 129/83     Weight: 44.2 kg (97 lb 6.4 oz)  Body mass index is 16.72 kg/m².     Physical Exam  Constitutional:        General: She is not in acute distress.     Appearance: She is well-developed. She is not diaphoretic.   HENT:      Head: Normocephalic and atraumatic.   Eyes:      Pupils: Pupils are equal, round, and reactive to light.   Cardiovascular:      Rate and Rhythm: Normal rate and regular rhythm.      Heart sounds: Normal heart sounds. No murmur heard.     No friction rub. No gallop.   Pulmonary:      Effort: Pulmonary effort is normal. No respiratory distress.      Breath sounds: Normal breath sounds. No stridor. No wheezing or rales.   Abdominal:      General: Bowel sounds are normal. There is no distension.      Palpations: Abdomen is soft. There is no mass.      Tenderness: There is no abdominal tenderness. There is no guarding.   Musculoskeletal:      Right lower leg: No edema.      Left lower leg: No edema.   Skin:     General: Skin is warm.      Findings: No erythema.   Neurological:      Mental Status: She is alert and oriented to person, place, and time.              CRANIAL NERVES     CN III, IV, VI   Pupils are equal, round, and reactive to light.       Significant Labs:   Results for orders placed or performed during the hospital encounter of 08/15/24   CBC auto differential   Result Value Ref Range    WBC 15.14 (H) 3.90 - 12.70 K/uL    RBC 3.06 (L) 4.00 - 5.40 M/uL    Hemoglobin 7.8 (L) 12.0 - 16.0 g/dL    Hematocrit 25.8 (L) 37.0 - 48.5 %    MCV 84 82 - 98 fL    MCH 25.5 (L) 27.0 - 31.0 pg    MCHC 30.2 (L) 32.0 - 36.0 g/dL    RDW 15.6 (H) 11.5 - 14.5 %    Platelets 826 (H) 150 - 450 K/uL    MPV 9.4 9.2 - 12.9 fL    Immature Granulocytes 0.7 (H) 0.0 - 0.5 %    Gran # (ANC) 11.1 (H) 1.8 - 7.7 K/uL    Immature Grans (Abs) 0.10 (H) 0.00 - 0.04 K/uL    Lymph # 2.2 1.0 - 4.8 K/uL    Mono # 1.1 (H) 0.3 - 1.0 K/uL    Eos # 0.4 0.0 - 0.5 K/uL    Baso # 0.11 0.00 - 0.20 K/uL    nRBC 0 0 /100 WBC    Gran % 73.6 (H) 38.0 - 73.0 %    Lymph % 14.7 (L) 18.0 - 48.0 %    Mono % 7.4 4.0 - 15.0 %    Eosinophil % 2.9 0.0 -  8.0 %    Basophil % 0.7 0.0 - 1.9 %    Differential Method Automated    Comprehensive metabolic panel   Result Value Ref Range    Sodium 137 136 - 145 mmol/L    Potassium 5.0 3.5 - 5.1 mmol/L    Chloride 105 95 - 110 mmol/L    CO2 22 (L) 23 - 29 mmol/L    Glucose 78 70 - 110 mg/dL    BUN 43 (H) 6 - 20 mg/dL    Creatinine 2.7 (H) 0.5 - 1.4 mg/dL    Calcium 9.0 8.7 - 10.5 mg/dL    Total Protein 7.7 6.0 - 8.4 g/dL    Albumin 2.2 (L) 3.5 - 5.2 g/dL    Total Bilirubin 0.3 0.1 - 1.0 mg/dL    Alkaline Phosphatase 80 55 - 135 U/L    AST 18 10 - 40 U/L    ALT 21 10 - 44 U/L    eGFR 21 (A) >60 mL/min/1.73 m^2    Anion Gap 10 8 - 16 mmol/L   Urinalysis, Reflex to Urine Culture Urine, Clean Catch    Specimen: Urine   Result Value Ref Range    Specimen UA Urine, Clean Catch     Color, UA Yellow Yellow, Straw, Roxy    Appearance, UA Clear Clear    pH, UA 7.0 5.0 - 8.0    Specific Gravity, UA 1.015 1.005 - 1.030    Protein, UA 1+ (A) Negative    Glucose, UA Negative Negative    Ketones, UA Negative Negative    Bilirubin (UA) Negative Negative    Occult Blood UA 1+ (A) Negative    Nitrite, UA Negative Negative    Urobilinogen, UA Negative <2.0 EU/dL    Leukocytes, UA Negative Negative   Urinalysis Microscopic   Result Value Ref Range    RBC, UA 17 (H) 0 - 4 /hpf    WBC, UA 5 0 - 5 /hpf    Bacteria Rare None-Occ /hpf    Squam Epithel, UA 2 /hpf    Hyaline Casts, UA 3 (A) 0-1/lpf /lpf    Microscopic Comment SEE COMMENT         Significant Imaging: I have reviewed all pertinent imaging results/findings within the past 24 hours.

## 2024-08-15 NOTE — H&P
[facility] Emergency Dept.  [facility] Medicine  History & Physical    Patient Name: Iman Faust  MRN: [id number]  Patient Class: OP- Observation  Admission Date: 8/15/2024  Attending Physician: Major See MD  Primary Care Provider: Karine Primary Doctor         Patient information was obtained from patient and ER records.     Subjective:     Principal Problem:JAIME (acute kidney injury)    Chief Complaint:   Chief Complaint   Patient presents with    abnormal labs     Pt. Was sent by her pulmonologist for abnormal blood work.         HPI: Patient is a 49 y.o.  female with a PMHx of asthma who presents to the Emergency Department due to abnormal blood work. Patient currently being seen by Dr. Jiménez for pulmonology. She reports not feeling well for several weeks. Endorses chills and sob. Denies any fever or congestion. Reports having good urinary output. Denies any home meds at this time.     In the ED, wbc: 15.1k, h/h 7.8/25.8, plt: 826, bun/cr: 43/2.7.     Past Medical History:   Diagnosis Date    Mild intermittent asthma, uncomplicated        Past Surgical History:   Procedure Laterality Date    BILATERAL TUBAL LIGATION N/A        Review of patient's allergies indicates:  No Known Allergies    No current facility-administered medications on file prior to encounter.     Current Outpatient Medications on File Prior to Encounter   Medication Sig    doxycycline (VIBRAMYCIN) 100 MG Cap Take 1 capsule (100 mg total) by mouth 2 (two) times daily.    albuterol (PROVENTIL/VENTOLIN HFA) 90 mcg/actuation inhaler Inhale 2 puffs into the lungs every 4 (four) hours as needed for Wheezing or Shortness of Breath.     Family History       Problem Relation (Age of Onset)    Hypertension Mother, Father          Tobacco Use    Smoking status: Never    Smokeless tobacco: Never   Substance and Sexual Activity    Alcohol use: Not Currently    Drug use: Never    Sexual activity: Not on file     Review of Systems   Constitutional:  Positive  for fatigue. Negative for fever.   HENT:  Negative for sinus pressure.    Eyes:  Negative for visual disturbance.   Respiratory:  Positive for shortness of breath.    Cardiovascular:  Negative for chest pain.   Gastrointestinal:  Negative for nausea and vomiting.   Genitourinary:  Negative for decreased urine volume and difficulty urinating.   Musculoskeletal:  Negative for back pain.   Skin:  Negative for rash.   Neurological:  Negative for headaches.   Psychiatric/Behavioral:  Negative for confusion.      Objective:     Vital Signs (Most Recent):  Temp: 98.6 °F (37 °C) (08/15/24 1001)  Pulse: 91 (08/15/24 1300)  Resp: 18 (08/15/24 1300)  BP: 129/83 (08/15/24 1300)  SpO2: 97 % (08/15/24 1300) Vital Signs (24h Range):  Temp:  [98.6 °F (37 °C)] 98.6 °F (37 °C)  Pulse:  [82-99] 91  Resp:  [16-19] 18  SpO2:  [96 %-99 %] 97 %  BP: (122-146)/(77-83) 129/83     Weight: 44.2 kg (97 lb 6.4 oz)  Body mass index is 16.72 kg/m².     Physical Exam  Constitutional:       General: She is not in acute distress.     Appearance: She is well-developed. She is not diaphoretic.   HENT:      Head: Normocephalic and atraumatic.   Eyes:      Pupils: Pupils are equal, round, and reactive to light.   Cardiovascular:      Rate and Rhythm: Normal rate and regular rhythm.      Heart sounds: Normal heart sounds. No murmur heard.     No friction rub. No gallop.   Pulmonary:      Effort: Pulmonary effort is normal. No respiratory distress.      Breath sounds: Normal breath sounds. No stridor. No wheezing or rales.   Abdominal:      General: Bowel sounds are normal. There is no distension.      Palpations: Abdomen is soft. There is no mass.      Tenderness: There is no abdominal tenderness. There is no guarding.   Musculoskeletal:      Right lower leg: No edema.      Left lower leg: No edema.   Skin:     General: Skin is warm.      Findings: No erythema.   Neurological:      Mental Status: She is alert and oriented to person, place, and time.               CRANIAL NERVES     CN III, IV, VI   Pupils are equal, round, and reactive to light.       Significant Labs:   Results for orders placed or performed during the hospital encounter of 08/15/24   CBC auto differential   Result Value Ref Range    WBC 15.14 (H) 3.90 - 12.70 K/uL    RBC 3.06 (L) 4.00 - 5.40 M/uL    Hemoglobin 7.8 (L) 12.0 - 16.0 g/dL    Hematocrit 25.8 (L) 37.0 - 48.5 %    MCV 84 82 - 98 fL    MCH 25.5 (L) 27.0 - 31.0 pg    MCHC 30.2 (L) 32.0 - 36.0 g/dL    RDW 15.6 (H) 11.5 - 14.5 %    Platelets 826 (H) 150 - 450 K/uL    MPV 9.4 9.2 - 12.9 fL    Immature Granulocytes 0.7 (H) 0.0 - 0.5 %    Gran # (ANC) 11.1 (H) 1.8 - 7.7 K/uL    Immature Grans (Abs) 0.10 (H) 0.00 - 0.04 K/uL    Lymph # 2.2 1.0 - 4.8 K/uL    Mono # 1.1 (H) 0.3 - 1.0 K/uL    Eos # 0.4 0.0 - 0.5 K/uL    Baso # 0.11 0.00 - 0.20 K/uL    nRBC 0 0 /100 WBC    Gran % 73.6 (H) 38.0 - 73.0 %    Lymph % 14.7 (L) 18.0 - 48.0 %    Mono % 7.4 4.0 - 15.0 %    Eosinophil % 2.9 0.0 - 8.0 %    Basophil % 0.7 0.0 - 1.9 %    Differential Method Automated    Comprehensive metabolic panel   Result Value Ref Range    Sodium 137 136 - 145 mmol/L    Potassium 5.0 3.5 - 5.1 mmol/L    Chloride 105 95 - 110 mmol/L    CO2 22 (L) 23 - 29 mmol/L    Glucose 78 70 - 110 mg/dL    BUN 43 (H) 6 - 20 mg/dL    Creatinine 2.7 (H) 0.5 - 1.4 mg/dL    Calcium 9.0 8.7 - 10.5 mg/dL    Total Protein 7.7 6.0 - 8.4 g/dL    Albumin 2.2 (L) 3.5 - 5.2 g/dL    Total Bilirubin 0.3 0.1 - 1.0 mg/dL    Alkaline Phosphatase 80 55 - 135 U/L    AST 18 10 - 40 U/L    ALT 21 10 - 44 U/L    eGFR 21 (A) >60 mL/min/1.73 m^2    Anion Gap 10 8 - 16 mmol/L   Urinalysis, Reflex to Urine Culture Urine, Clean Catch    Specimen: Urine   Result Value Ref Range    Specimen UA Urine, Clean Catch     Color, UA Yellow Yellow, Straw, Roxy    Appearance, UA Clear Clear    pH, UA 7.0 5.0 - 8.0    Specific Gravity, UA 1.015 1.005 - 1.030    Protein, UA 1+ (A) Negative    Glucose, UA Negative Negative     Ketones, UA Negative Negative    Bilirubin (UA) Negative Negative    Occult Blood UA 1+ (A) Negative    Nitrite, UA Negative Negative    Urobilinogen, UA Negative <2.0 EU/dL    Leukocytes, UA Negative Negative   Urinalysis Microscopic   Result Value Ref Range    RBC, UA 17 (H) 0 - 4 /hpf    WBC, UA 5 0 - 5 /hpf    Bacteria Rare None-Occ /hpf    Squam Epithel, UA 2 /hpf    Hyaline Casts, UA 3 (A) 0-1/lpf /lpf    Microscopic Comment SEE COMMENT         Significant Imaging: I have reviewed all pertinent imaging results/findings within the past 24 hours.  Assessment/Plan:     * JAIME (acute kidney injury)  08/15/2024  Outpt pulm concerned for wegeners disease  Labs pending   Nephrology consulted on case  Will start IVF  Renal U/S   Urine Na/cr   Discussed possible need for biopsy  May be outpatient if creatinine stable  Patient making urine       Thrombocytosis  Likely reactive due to inflammation       Cough  Cont doxycyline prescribed   Duonebs       Anemia  MARIA ESTHER noted  Will start ferrous sulfate      VTE Risk Mitigation (From admission, onward)      None               On 08/15/2024, patient should be placed in hospital observation services under my care.             Major See MD  Department of Hospital Medicine  [facility] - Emergency Dept.

## 2024-08-15 NOTE — PHARMACY MED REC
"Admission Medication History     The home medication history was taken by Manolo Donato.    You may go to "Admission" then "Reconcile Home Medications" tabs to review and/or act upon these items.     The home medication list has been updated by the Pharmacy department.   Please read ALL comments highlighted in yellow.   Please address this information as you see fit.    Feel free to contact us if you have any questions or require assistance.      Medications listed below were obtained from: Analytic software- [employer], Medical records, and Patient's pharmacy  (Not in a hospital admission)      Manolo Donato  [id number]    Current Outpatient Medications on File Prior to Encounter   Medication Sig Dispense Refill Last Dose    doxycycline (VIBRAMYCIN) 100 MG Cap Take 1 capsule (100 mg total) by mouth 2 (two) times daily. 20 capsule 0 8/15/2024    albuterol (PROVENTIL/VENTOLIN HFA) 90 mcg/actuation inhaler Inhale 2 puffs into the lungs every 4 (four) hours as needed for Wheezing or Shortness of Breath. 18 g 11 Unknown                           .          "

## 2024-08-15 NOTE — CONSULTS
Nephrology Consultation  Consulting Physician:  Mayi  Reason for consultation:  JAIME   Informants: PT and medical records     History of Present Illness     The patient is a 49 y.o. female with a hx of past medical history significant of asthma that has been recently seen by her primary care physician back in April with a chronic cough.  She had stated have been progressing for about 1-4 weeks and has actually been treated with oral steroids as well as a beta agonist inhaler.  None of these improving her symptoms.  Ultimately she was sent to be seen by pulmonology.  CT scan of the chest without contrast did show numerous bilateral lung opacities which could be either atypical infection/inflammation or metastatic neoplasm.  Tree-in-bud micronodularity was also noted in the lower lobes consistent with chronic small airways disease.  PET scan recommended.  Around the same time she has had her labs drawn showing a creatinine that was elevated at 2.1.  Baseline creatinine has been 0.7.  Urinalysis from today did show blood and protein.  Creatinine has progressed and is now at 2.6 thus Nephrology was asked to assist in management.    Patient has no history of nonsteroidal use or other over-the-counter medications.  She denies any other significant past medical history.  She has no arthralgias or myalgias.  She does report weakness and fatigue.  She has had weight loss over the last several months.  She reports appetite has not been good but is able to keep up with fluids.  She has no hematuria or dysuria.  She denies having any hemoptysis.  She denies any fevers been has been having chills.  White count 50400.    Lab results in the computer showed negative studies for hepatitis, AZAM,  and HIV. RF was 412. SPEP and UPEP         PMHx:    Past Medical History:   Diagnosis Date    Mild intermittent asthma, uncomplicated        SocHx:    Social History     Socioeconomic History    Marital status: Single   Tobacco Use    Smoking  "status: Never    Smokeless tobacco: Never   Substance and Sexual Activity    Alcohol use: Not Currently    Drug use: Never       FamHx:    Family History   Problem Relation Name Age of Onset    Hypertension Mother      Hypertension Father         ROS:      12 point review of systems otherwise mentioned in the HPI was negative     Health Status   Allergies:    has No Known Allergies.    Current medications:     Current Facility-Administered Medications:     0.9%  NaCl infusion, , Intravenous, Continuous, Major See MD, Last Rate: 75 mL/hr at 08/15/24 1610, New Bag at 08/15/24 1610    acetaminophen tablet 650 mg, 650 mg, Oral, Q6H PRN, Major See MD    albuterol-ipratropium 2.5 mg-0.5 mg/3 mL nebulizer solution 3 mL, 3 mL, Nebulization, Q6H PRN, Major See MD    doxycycline tablet 100 mg, 100 mg, Oral, BID, Major See MD    ferrous sulfate tablet 1 each, 1 tablet, Oral, Daily, Major See MD, 1 each at 08/15/24 1610    hydrALAZINE injection 10 mg, 10 mg, Intravenous, Q6H PRN, Major See MD    ondansetron disintegrating tablet 4 mg, 4 mg, Oral, Q6H PRN, Major See MD    Current Outpatient Medications:     doxycycline (VIBRAMYCIN) 100 MG Cap, Take 1 capsule (100 mg total) by mouth 2 (two) times daily., Disp: 20 capsule, Rfl: 0    albuterol (PROVENTIL/VENTOLIN HFA) 90 mcg/actuation inhaler, Inhale 2 puffs into the lungs every 4 (four) hours as needed for Wheezing or Shortness of Breath., Disp: 18 g, Rfl: 11     Physical Examination   VS/Measurements   BP (!) 140/77   Pulse 96   Temp 98.6 °F (37 °C) (Oral)   Resp 19   Ht 5' 4" (1.626 m)   Wt 44.2 kg (97 lb 6.4 oz)   SpO2 96%   Breastfeeding No   BMI 16.72 kg/m²     Physical Exam  Vitals reviewed.   Constitutional:       Appearance: Normal appearance.   HENT:      Head: Normocephalic and atraumatic.      Mouth/Throat:      Mouth: Mucous membranes are moist.   Eyes:      Extraocular Movements: Extraocular movements intact.      Pupils: Pupils are equal, round, and reactive to " light.   Cardiovascular:      Rate and Rhythm: Regular rhythm. Tachycardia present.      Pulses: Normal pulses.      Heart sounds: Normal heart sounds.      No friction rub.   Pulmonary:      Effort: Pulmonary effort is normal. No respiratory distress.      Breath sounds: Normal breath sounds. No rhonchi.   Abdominal:      General: Abdomen is flat. There is no distension.      Palpations: Abdomen is soft.      Tenderness: There is no abdominal tenderness.   Musculoskeletal:         General: No swelling.      Cervical back: Normal range of motion.   Skin:     General: Skin is warm and dry.      Findings: No lesion or rash.   Neurological:      Mental Status: She is alert and oriented to person, place, and time.            Review / Management   Laboratory Results   Today's Lab Results :    Recent Results (from the past 24 hour(s))   CBC auto differential    Collection Time: 08/15/24 11:34 AM   Result Value Ref Range    WBC 15.14 (H) 3.90 - 12.70 K/uL    RBC 3.06 (L) 4.00 - 5.40 M/uL    Hemoglobin 7.8 (L) 12.0 - 16.0 g/dL    Hematocrit 25.8 (L) 37.0 - 48.5 %    MCV 84 82 - 98 fL    MCH 25.5 (L) 27.0 - 31.0 pg    MCHC 30.2 (L) 32.0 - 36.0 g/dL    RDW 15.6 (H) 11.5 - 14.5 %    Platelets 826 (H) 150 - 450 K/uL    MPV 9.4 9.2 - 12.9 fL    Immature Granulocytes 0.7 (H) 0.0 - 0.5 %    Gran # (ANC) 11.1 (H) 1.8 - 7.7 K/uL    Immature Grans (Abs) 0.10 (H) 0.00 - 0.04 K/uL    Lymph # 2.2 1.0 - 4.8 K/uL    Mono # 1.1 (H) 0.3 - 1.0 K/uL    Eos # 0.4 0.0 - 0.5 K/uL    Baso # 0.11 0.00 - 0.20 K/uL    nRBC 0 0 /100 WBC    Gran % 73.6 (H) 38.0 - 73.0 %    Lymph % 14.7 (L) 18.0 - 48.0 %    Mono % 7.4 4.0 - 15.0 %    Eosinophil % 2.9 0.0 - 8.0 %    Basophil % 0.7 0.0 - 1.9 %    Differential Method Automated    Comprehensive metabolic panel    Collection Time: 08/15/24 11:34 AM   Result Value Ref Range    Sodium 137 136 - 145 mmol/L    Potassium 5.0 3.5 - 5.1 mmol/L    Chloride 105 95 - 110 mmol/L    CO2 22 (L) 23 - 29 mmol/L    Glucose  78 70 - 110 mg/dL    BUN 43 (H) 6 - 20 mg/dL    Creatinine 2.7 (H) 0.5 - 1.4 mg/dL    Calcium 9.0 8.7 - 10.5 mg/dL    Total Protein 7.7 6.0 - 8.4 g/dL    Albumin 2.2 (L) 3.5 - 5.2 g/dL    Total Bilirubin 0.3 0.1 - 1.0 mg/dL    Alkaline Phosphatase 80 55 - 135 U/L    AST 18 10 - 40 U/L    ALT 21 10 - 44 U/L    eGFR 21 (A) >60 mL/min/1.73 m^2    Anion Gap 10 8 - 16 mmol/L   Urinalysis, Reflex to Urine Culture Urine, Clean Catch    Collection Time: 08/15/24 11:36 AM    Specimen: Urine   Result Value Ref Range    Specimen UA Urine, Clean Catch     Color, UA Yellow Yellow, Straw, Roxy    Appearance, UA Clear Clear    pH, UA 7.0 5.0 - 8.0    Specific Gravity, UA 1.015 1.005 - 1.030    Protein, UA 1+ (A) Negative    Glucose, UA Negative Negative    Ketones, UA Negative Negative    Bilirubin (UA) Negative Negative    Occult Blood UA 1+ (A) Negative    Nitrite, UA Negative Negative    Urobilinogen, UA Negative <2.0 EU/dL    Leukocytes, UA Negative Negative   Urinalysis Microscopic    Collection Time: 08/15/24 11:36 AM   Result Value Ref Range    RBC, UA 17 (H) 0 - 4 /hpf    WBC, UA 5 0 - 5 /hpf    Bacteria Rare None-Occ /hpf    Squam Epithel, UA 2 /hpf    Hyaline Casts, UA 3 (A) 0-1/lpf /lpf    Microscopic Comment SEE COMMENT         Impression and Plan   Diagnosis     JAIME et unknown although progressive with blood and protein on UA.  She has been having URI symptoms and there is a concern for Pulmonary Renal syndrome.  Her RF is positive with a negative AZAM.  She does have a SPEP and UPEP pending.  Discussed with patient that we should proceed with renal biopsy.  We also discussed the potential for empiric steroids.  Considering she has a significant white count with chills recently we will hold off on empiric steroids.  If labs progress we have discussed reassess him risks and benefits of empiric steroids.  We will place on IV fluids overnight.  Renal bx in the am.     Anemia SPEP and UPEP pending.  She did have a  appointment with Hematology although this was canceled and been rescheduled.  Would follow up SPEP and UPEP and consider hematologic evaluation while in-house.      Positive rheumatoid factor referral to Rheumatology was placed in the outpatient setting.  Patient reports she is scheduled for January of 2025.  May need to move this appointment up sooner.   CRP is high.     Proteinuria we will quantitate with a protein to creatinine ratio hold off Ace or ARB at this time.    Hematuria as above we will complete workup and consider empiric steroids indicated.    Metabolic acidosis mild likely due to kidney failure.  We will follow for now.   .     ________________________________________________  Srinivas Collado      This document was created using voice recognition software.  It is possible that there are errors which have persisted after original proofreading.  If there is a question regarding contents of this document please contact me for clarification.

## 2024-08-15 NOTE — ED NOTES
Bed: 09  Expected date:   Expected time:   Means of arrival: Personal Transportation  Comments:     José Gregg, GENET  08/15/24 1045

## 2024-08-15 NOTE — ASSESSMENT & PLAN NOTE
08/15/2024  Outpt pulm concerned for wegeners disease  Labs pending   Nephrology consulted on case  Will start IVF  Renal U/S   Urine Na/cr   Discussed possible need for biopsy  May be outpatient if creatinine stable  Patient making urine

## 2024-08-15 NOTE — ED PROVIDER NOTES
"SCRIBE #1 NOTE: I, Sarah Rosa Elena, am scribing for, and in the presence of, Aaron Ramirez MD. I have scribed the entire note.       History     Chief Complaint   Patient presents with    abnormal labs     Pt. Was sent by her pulmonologist for abnormal blood work.      Review of patient's allergies indicates:  No Known Allergies      History of Present Illness     HPI    8/15/2024, 12:00 PM  History obtained from the patient      History of Present Illness: Iman Faust is a 49 y.o. female patient with a PMHx of asthma who presents to the Emergency Department due to abnormal blood work. Pt was sent by Dr. Jiménez (Pulmonology). Pt reports she is "not feeling good". Symptoms are constant and moderate in severity. No mitigating or exacerbating factors reported. Associated sxs include diaphoresis, chills, and SOB. Patient denies any fever, congestion, leg swelling, abd pain, and all other sxs at this time. No prior Tx. Pt has FMHx of HTN (mother and father). No further complaints or concerns at this time.       Arrival mode: Personal vehicle    PCP: No, Primary Doctor        Past Medical History:  Past Medical History:   Diagnosis Date    Mild intermittent asthma, uncomplicated        Past Surgical History:  Past Surgical History:   Procedure Laterality Date    BILATERAL TUBAL LIGATION N/A          Family History:  Family History   Problem Relation Name Age of Onset    Hypertension Mother      Hypertension Father         Social History:  Social History     Tobacco Use    Smoking status: Never    Smokeless tobacco: Never   Substance and Sexual Activity    Alcohol use: Not Currently    Drug use: Never    Sexual activity: Not on file        Review of Systems     Review of Systems   Constitutional:  Positive for chills and diaphoresis. Negative for fever.   HENT:  Negative for congestion and sore throat.    Respiratory:  Positive for shortness of breath.    Cardiovascular:  Negative for chest pain and leg swelling. " "  Gastrointestinal:  Negative for abdominal pain and nausea.   Genitourinary:  Negative for dysuria.   Musculoskeletal:  Negative for back pain.   Skin:  Negative for rash.   Neurological:  Negative for dizziness and weakness.   Hematological:  Does not bruise/bleed easily.   All other systems reviewed and are negative.       Physical Exam     Initial Vitals [08/15/24 1001]   BP Pulse Resp Temp SpO2   (!) 142/79 99 16 98.6 °F (37 °C) 99 %      MAP       --          Physical Exam  Nursing Notes and Vital Signs Reviewed.  Constitutional: Patient is in no acute distress. Well-developed and well-nourished.  Head: Atraumatic. Normocephalic.  Eyes: PERRL. EOM intact. Conjunctivae are not pale. No scleral icterus.  ENT: Mucous membranes are moist. Oropharynx is clear and symmetric.    Neck: Supple. Full ROM. No lymphadenopathy.  Cardiovascular: Regular rate. Regular rhythm. No murmurs, rubs, or gallops. Distal pulses are 2+ and symmetric.  Pulmonary/Chest: No respiratory distress. Clear to auscultation bilaterally. No wheezing or rales.  Abdominal: Soft and non-distended.  There is no tenderness.  No rebound, guarding, or rigidity. Good bowel sounds.  Genitourinary: No CVA tenderness  Musculoskeletal: Moves all extremities. No obvious deformities. No edema. No calf tenderness.  Skin: Warm and dry.  Neurological:  Alert, awake, and appropriate.  Normal speech.  No acute focal neurological deficits are appreciated.  Psychiatric: Normal affect. Good eye contact. Appropriate in content.     ED Course   Procedures  ED Vital Signs:  Vitals:    08/15/24 1001 08/15/24 1130 08/15/24 1200 08/15/24 1230   BP: (!) 142/79 (!) 146/77 122/77 128/79   Pulse: 99 90 88 82   Resp: 16 18 17 19   Temp: 98.6 °F (37 °C)      TempSrc: Oral      SpO2: 99%  99% 96%   Weight: 44.2 kg (97 lb 6.4 oz)      Height: 5' 4" (1.626 m)       08/15/24 1300   BP: 129/83   Pulse: 91   Resp: 18   Temp:    TempSrc:    SpO2: 97%   Weight:    Height:  "       Abnormal Lab Results:  Labs Reviewed   CBC W/ AUTO DIFFERENTIAL - Abnormal       Result Value    WBC 15.14 (*)     RBC 3.06 (*)     Hemoglobin 7.8 (*)     Hematocrit 25.8 (*)     MCV 84      MCH 25.5 (*)     MCHC 30.2 (*)     RDW 15.6 (*)     Platelets 826 (*)     MPV 9.4      Immature Granulocytes 0.7 (*)     Gran # (ANC) 11.1 (*)     Immature Grans (Abs) 0.10 (*)     Lymph # 2.2      Mono # 1.1 (*)     Eos # 0.4      Baso # 0.11      nRBC 0      Gran % 73.6 (*)     Lymph % 14.7 (*)     Mono % 7.4      Eosinophil % 2.9      Basophil % 0.7      Differential Method Automated     COMPREHENSIVE METABOLIC PANEL - Abnormal    Sodium 137      Potassium 5.0      Chloride 105      CO2 22 (*)     Glucose 78      BUN 43 (*)     Creatinine 2.7 (*)     Calcium 9.0      Total Protein 7.7      Albumin 2.2 (*)     Total Bilirubin 0.3      Alkaline Phosphatase 80      AST 18      ALT 21      eGFR 21 (*)     Anion Gap 10     URINALYSIS, REFLEX TO URINE CULTURE - Abnormal    Specimen UA Urine, Clean Catch      Color, UA Yellow      Appearance, UA Clear      pH, UA 7.0      Specific Gravity, UA 1.015      Protein, UA 1+ (*)     Glucose, UA Negative      Ketones, UA Negative      Bilirubin (UA) Negative      Occult Blood UA 1+ (*)     Nitrite, UA Negative      Urobilinogen, UA Negative      Leukocytes, UA Negative      Narrative:     Specimen Source->Urine   URINALYSIS MICROSCOPIC - Abnormal    RBC, UA 17 (*)     WBC, UA 5      Bacteria Rare      Squam Epithel, UA 2      Hyaline Casts, UA 3 (*)     Microscopic Comment SEE COMMENT      Narrative:     Specimen Source->Urine   C4 COMPLEMENT   C3 COMPLEMENT   GLOMERULAR BASEMENT MEMBRANE ANTIBODIES   QUANTIFERON GOLD TB   ANTI-NEUTROPHILIC CYTOPLASMIC ANTIBODY   AZAM PROFILE I (SCREEN) W/ REFLEX        All Lab Results:  Results for orders placed or performed during the hospital encounter of 08/15/24   CBC auto differential   Result Value Ref Range    WBC 15.14 (H) 3.90 - 12.70 K/uL     RBC 3.06 (L) 4.00 - 5.40 M/uL    Hemoglobin 7.8 (L) 12.0 - 16.0 g/dL    Hematocrit 25.8 (L) 37.0 - 48.5 %    MCV 84 82 - 98 fL    MCH 25.5 (L) 27.0 - 31.0 pg    MCHC 30.2 (L) 32.0 - 36.0 g/dL    RDW 15.6 (H) 11.5 - 14.5 %    Platelets 826 (H) 150 - 450 K/uL    MPV 9.4 9.2 - 12.9 fL    Immature Granulocytes 0.7 (H) 0.0 - 0.5 %    Gran # (ANC) 11.1 (H) 1.8 - 7.7 K/uL    Immature Grans (Abs) 0.10 (H) 0.00 - 0.04 K/uL    Lymph # 2.2 1.0 - 4.8 K/uL    Mono # 1.1 (H) 0.3 - 1.0 K/uL    Eos # 0.4 0.0 - 0.5 K/uL    Baso # 0.11 0.00 - 0.20 K/uL    nRBC 0 0 /100 WBC    Gran % 73.6 (H) 38.0 - 73.0 %    Lymph % 14.7 (L) 18.0 - 48.0 %    Mono % 7.4 4.0 - 15.0 %    Eosinophil % 2.9 0.0 - 8.0 %    Basophil % 0.7 0.0 - 1.9 %    Differential Method Automated    Comprehensive metabolic panel   Result Value Ref Range    Sodium 137 136 - 145 mmol/L    Potassium 5.0 3.5 - 5.1 mmol/L    Chloride 105 95 - 110 mmol/L    CO2 22 (L) 23 - 29 mmol/L    Glucose 78 70 - 110 mg/dL    BUN 43 (H) 6 - 20 mg/dL    Creatinine 2.7 (H) 0.5 - 1.4 mg/dL    Calcium 9.0 8.7 - 10.5 mg/dL    Total Protein 7.7 6.0 - 8.4 g/dL    Albumin 2.2 (L) 3.5 - 5.2 g/dL    Total Bilirubin 0.3 0.1 - 1.0 mg/dL    Alkaline Phosphatase 80 55 - 135 U/L    AST 18 10 - 40 U/L    ALT 21 10 - 44 U/L    eGFR 21 (A) >60 mL/min/1.73 m^2    Anion Gap 10 8 - 16 mmol/L   Urinalysis, Reflex to Urine Culture Urine, Clean Catch    Specimen: Urine   Result Value Ref Range    Specimen UA Urine, Clean Catch     Color, UA Yellow Yellow, Straw, Roxy    Appearance, UA Clear Clear    pH, UA 7.0 5.0 - 8.0    Specific Gravity, UA 1.015 1.005 - 1.030    Protein, UA 1+ (A) Negative    Glucose, UA Negative Negative    Ketones, UA Negative Negative    Bilirubin (UA) Negative Negative    Occult Blood UA 1+ (A) Negative    Nitrite, UA Negative Negative    Urobilinogen, UA Negative <2.0 EU/dL    Leukocytes, UA Negative Negative   Urinalysis Microscopic   Result Value Ref Range    RBC, UA 17 (H) 0 - 4 /hpf     WBC, UA 5 0 - 5 /hpf    Bacteria Rare None-Occ /hpf    Squam Epithel, UA 2 /hpf    Hyaline Casts, UA 3 (A) 0-1/lpf /lpf    Microscopic Comment SEE COMMENT          Imaging Results:  Imaging Results    None                     The Emergency Provider reviewed the vital signs and test results, which are outlined above.     ED Discussion       1:25 PM: Spoke with MICHELE Weiss. States she saw pt 2 days ago for worsening symptoms. According to Dr. Jiménez phone note to patient, he suspects Wegerner's granulomatosis and advised pt to go to ED for renal evaluation.    1:28 PM: Spoke with Ulysses Jiménez MD (Pulmonary Disease) at [facility]. States pt has abnormalities on CT scan--possible pneumonia and has rapidly advancing renal failure. Pt needs to see nephrology for renal biopsy. 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 Needs AZAM Screen w/Reflex, Anti-neutrophilic cytoplasmic antibody, C3 Complement, C4 Complement, Glomerular Basement Membrane Antibodies, and Quantiferon Gold TB. States reason for admission is rapidly advancing renal failure.    1:56 PM: Discussed case with KRISTEN Atkins (Hospital Medicine). Dr. See agrees with current care and management of pt and accepts admission.   Admitting Service:   Admitting Physician: Dr. See  Admit to: OBS Med Surg     1:56 PM: Re-evaluated pt. I have discussed test results, shared treatment plan, and the need for admission with patient and family at bedside. Pt and family express understanding at this time and agree with all information. All questions answered. Pt and family have no further questions or concerns at this time. Pt is ready for admit.        Medical Decision Making  Problems Addressed:  Acute on chronic renal failure: chronic illness or injury with exacerbation, progression, or side effects of treatment  JAIME (acute kidney injury): acute illness or injury that poses a threat to life or bodily functions    Amount and/or Complexity of Data  Reviewed  Independent Historian:      Details: Sister stated pt has FMHx of HTN (mother and father).  External Data Reviewed: notes.     Details: I reviewed the notes from patient's pulmonologist recommended her come to the emergency department for expedited workup  Labs: ordered. Decision-making details documented in ED Course.    Risk  Decision regarding hospitalization.  Risk Details: Differential diagnosis includes Wegener's, dehydration, postobstructive uropathy, cancer, etc.                ED Medication(s):  Medications - No data to display    New Prescriptions    No medications on file               Scribe Attestation:   Scribe #1: I performed the above scribed service and the documentation accurately describes the services I performed. I attest to the accuracy of the note.     Attending:   Physician Attestation Statement for Scribe #1: I, Aaron Ramirez MD, personally performed the services described in this documentation, as scribed by Sarah Cuba, in my presence, and it is both accurate and complete.           Clinical Impression       ICD-10-CM ICD-9-CM   1. Acute on chronic renal failure  N17.9 584.9    N18.9 585.9   2. JAIME (acute kidney injury)  N17.9 584.9       Disposition:   Disposition: Admitted  Condition: Stable         Aaron Ramirez MD  08/16/24 1022

## 2024-08-15 NOTE — TELEPHONE ENCOUNTER
Spoke with patient and advised her that she needed to go to to the ER. Patient advised me that she had to make arrangements for her kids last night. She states that she is going in a little while. She assured me she was going.     *we will check on her before lunch*

## 2024-08-15 NOTE — HPI
Patient is a 49 y.o.  female with a PMHx of asthma who presents to the Emergency Department due to abnormal blood work. Patient currently being seen by Dr. Jiménez for pulmonology. She reports not feeling well for several weeks. Endorses chills and sob. Denies any fever or congestion. Reports having good urinary output. Denies any home meds at this time.     In the ED, wbc: 15.1k, h/h 7.8/25.8, plt: 826, bun/cr: 43/2.7.

## 2024-08-15 NOTE — Clinical Note
Diagnosis: Acute on chronic renal failure [100730]   Future Attending Provider: LEENA SHAH [[id number]]   Special Needs:: No Special Needs [1]

## 2024-08-16 ENCOUNTER — TELEPHONE (OUTPATIENT)
Dept: RADIOLOGY | Facility: HOSPITAL | Age: 49
End: 2024-08-16
Payer: COMMERCIAL

## 2024-08-16 DIAGNOSIS — R80.9 PROTEINURIA: ICD-10-CM

## 2024-08-16 DIAGNOSIS — N17.9 ACUTE KIDNEY FAILURE, UNSPECIFIED: Primary | ICD-10-CM

## 2024-08-16 DIAGNOSIS — D50.0 IRON DEFICIENCY ANEMIA DUE TO CHRONIC BLOOD LOSS: ICD-10-CM

## 2024-08-16 DIAGNOSIS — N17.9 AKI (ACUTE KIDNEY INJURY): Primary | ICD-10-CM

## 2024-08-16 LAB
ALBUMIN SERPL BCP-MCNC: 1.9 G/DL (ref 3.5–5.2)
ANA SER QL IF: NORMAL
ANION GAP SERPL CALC-SCNC: 10 MMOL/L (ref 8–16)
ANION GAP SERPL CALC-SCNC: 9 MMOL/L (ref 8–16)
BASOPHILS # BLD AUTO: 0.1 K/UL (ref 0–0.2)
BASOPHILS # BLD AUTO: 0.1 K/UL (ref 0–0.2)
BASOPHILS NFR BLD: 0.7 % (ref 0–1.9)
BASOPHILS NFR BLD: 0.7 % (ref 0–1.9)
BUN SERPL-MCNC: 37 MG/DL (ref 6–20)
BUN SERPL-MCNC: 39 MG/DL (ref 6–20)
C3 SERPL-MCNC: 131 MG/DL (ref 50–180)
C4 SERPL-MCNC: 33 MG/DL (ref 11–44)
CALCIUM SERPL-MCNC: 8.5 MG/DL (ref 8.7–10.5)
CALCIUM SERPL-MCNC: 8.6 MG/DL (ref 8.7–10.5)
CHLORIDE SERPL-SCNC: 106 MMOL/L (ref 95–110)
CHLORIDE SERPL-SCNC: 107 MMOL/L (ref 95–110)
CK SERPL-CCNC: <7 U/L (ref 20–180)
CO2 SERPL-SCNC: 20 MMOL/L (ref 23–29)
CO2 SERPL-SCNC: 20 MMOL/L (ref 23–29)
CREAT SERPL-MCNC: 2.6 MG/DL (ref 0.5–1.4)
CREAT SERPL-MCNC: 2.6 MG/DL (ref 0.5–1.4)
DIFFERENTIAL METHOD BLD: ABNORMAL
DIFFERENTIAL METHOD BLD: ABNORMAL
EOSINOPHIL # BLD AUTO: 0.3 K/UL (ref 0–0.5)
EOSINOPHIL # BLD AUTO: 0.3 K/UL (ref 0–0.5)
EOSINOPHIL NFR BLD: 2.2 % (ref 0–8)
EOSINOPHIL NFR BLD: 2.2 % (ref 0–8)
ERYTHROCYTE [DISTWIDTH] IN BLOOD BY AUTOMATED COUNT: 15.8 % (ref 11.5–14.5)
ERYTHROCYTE [DISTWIDTH] IN BLOOD BY AUTOMATED COUNT: 15.8 % (ref 11.5–14.5)
EST. GFR  (NO RACE VARIABLE): 22 ML/MIN/1.73 M^2
EST. GFR  (NO RACE VARIABLE): 22 ML/MIN/1.73 M^2
GLUCOSE SERPL-MCNC: 146 MG/DL (ref 70–110)
GLUCOSE SERPL-MCNC: 149 MG/DL (ref 70–110)
HCT VFR BLD AUTO: 27.5 % (ref 37–48.5)
HCT VFR BLD AUTO: 27.5 % (ref 37–48.5)
HGB BLD-MCNC: 8.3 G/DL (ref 12–16)
HGB BLD-MCNC: 8.3 G/DL (ref 12–16)
IMM GRANULOCYTES # BLD AUTO: 0.09 K/UL (ref 0–0.04)
IMM GRANULOCYTES # BLD AUTO: 0.09 K/UL (ref 0–0.04)
IMM GRANULOCYTES NFR BLD AUTO: 0.6 % (ref 0–0.5)
IMM GRANULOCYTES NFR BLD AUTO: 0.6 % (ref 0–0.5)
LYMPHOCYTES # BLD AUTO: 1.8 K/UL (ref 1–4.8)
LYMPHOCYTES # BLD AUTO: 1.8 K/UL (ref 1–4.8)
LYMPHOCYTES NFR BLD: 12.2 % (ref 18–48)
LYMPHOCYTES NFR BLD: 12.2 % (ref 18–48)
MCH RBC QN AUTO: 26.1 PG (ref 27–31)
MCH RBC QN AUTO: 26.1 PG (ref 27–31)
MCHC RBC AUTO-ENTMCNC: 30.2 G/DL (ref 32–36)
MCHC RBC AUTO-ENTMCNC: 30.2 G/DL (ref 32–36)
MCV RBC AUTO: 87 FL (ref 82–98)
MCV RBC AUTO: 87 FL (ref 82–98)
MONOCYTES # BLD AUTO: 1 K/UL (ref 0.3–1)
MONOCYTES # BLD AUTO: 1 K/UL (ref 0.3–1)
MONOCYTES NFR BLD: 6.4 % (ref 4–15)
MONOCYTES NFR BLD: 6.4 % (ref 4–15)
NEUTROPHILS # BLD AUTO: 11.6 K/UL (ref 1.8–7.7)
NEUTROPHILS # BLD AUTO: 11.6 K/UL (ref 1.8–7.7)
NEUTROPHILS NFR BLD: 77.9 % (ref 38–73)
NEUTROPHILS NFR BLD: 77.9 % (ref 38–73)
NRBC BLD-RTO: 0 /100 WBC
NRBC BLD-RTO: 0 /100 WBC
PHOSPHATE SERPL-MCNC: 4.1 MG/DL (ref 2.7–4.5)
PLATELET # BLD AUTO: 487 K/UL (ref 150–450)
PLATELET # BLD AUTO: 487 K/UL (ref 150–450)
PLATELET BLD QL SMEAR: ABNORMAL
PLATELET BLD QL SMEAR: ABNORMAL
PMV BLD AUTO: 11.8 FL (ref 9.2–12.9)
PMV BLD AUTO: 11.8 FL (ref 9.2–12.9)
POTASSIUM SERPL-SCNC: 4.9 MMOL/L (ref 3.5–5.1)
POTASSIUM SERPL-SCNC: 4.9 MMOL/L (ref 3.5–5.1)
RBC # BLD AUTO: 3.18 M/UL (ref 4–5.4)
RBC # BLD AUTO: 3.18 M/UL (ref 4–5.4)
SODIUM SERPL-SCNC: 136 MMOL/L (ref 136–145)
SODIUM SERPL-SCNC: 136 MMOL/L (ref 136–145)
WBC # BLD AUTO: 14.85 K/UL (ref 3.9–12.7)
WBC # BLD AUTO: 14.85 K/UL (ref 3.9–12.7)

## 2024-08-16 PROCEDURE — 11000001 HC ACUTE MED/SURG PRIVATE ROOM

## 2024-08-16 PROCEDURE — 99223 1ST HOSP IP/OBS HIGH 75: CPT | Mod: ,,, | Performed by: INTERNAL MEDICINE

## 2024-08-16 PROCEDURE — 85025 COMPLETE CBC W/AUTO DIFF WBC: CPT | Performed by: FAMILY MEDICINE

## 2024-08-16 PROCEDURE — 82550 ASSAY OF CK (CPK): CPT | Performed by: FAMILY MEDICINE

## 2024-08-16 PROCEDURE — 25000003 PHARM REV CODE 250: Performed by: FAMILY MEDICINE

## 2024-08-16 PROCEDURE — 80048 BASIC METABOLIC PNL TOTAL CA: CPT | Performed by: FAMILY MEDICINE

## 2024-08-16 PROCEDURE — 80069 RENAL FUNCTION PANEL: CPT | Performed by: FAMILY MEDICINE

## 2024-08-16 PROCEDURE — 99232 SBSQ HOSP IP/OBS MODERATE 35: CPT | Mod: ,,, | Performed by: INTERNAL MEDICINE

## 2024-08-16 PROCEDURE — 36415 COLL VENOUS BLD VENIPUNCTURE: CPT | Performed by: FAMILY MEDICINE

## 2024-08-16 PROCEDURE — 83521 IG LIGHT CHAINS FREE EACH: CPT | Mod: 59 | Performed by: INTERNAL MEDICINE

## 2024-08-16 RX ADMIN — DOXYCYCLINE HYCLATE 100 MG: 100 TABLET, COATED ORAL at 08:08

## 2024-08-16 RX ADMIN — FERROUS SULFATE TAB 325 MG (65 MG ELEMENTAL FE) 1 EACH: 325 (65 FE) TAB at 08:08

## 2024-08-16 NOTE — HPI
49-year-old female with a 25 lb weight loss over the last several months.  Patient reports increasing fatigue and weakness patient found to be profoundly iron deficient.  CT chest demonstrated questionable infiltrative lung possible metastatic disease.  ECOG status 2 at bedside with mother

## 2024-08-16 NOTE — ASSESSMENT & PLAN NOTE
Concern over metastatic disease.  Would strongly recommend that patient have imaging done of abdomen least ultrasound of liver.  Abdomen would also recommend that patient undergo EGD colonoscopies ASAP

## 2024-08-16 NOTE — ASSESSMENT & PLAN NOTE
Documented severe iron deficiency anemia exacerbated by renal insufficiency.  Recommend 1 dose of intravenous iron ASAP.  With documented iron deficiency 4 month duration weight loss.  High suspicion for metastatic disease would recommend that patient have ultrasound of abdomen.  Would recommend upper lower endoscopies ASAP to make sure see if she has a GI malignancy.  Discussed implications of answered questions with she and her mother

## 2024-08-16 NOTE — PROGRESS NOTES
Nephrology Progress Note     History of Present Illness     49 y.o. female with a hx of past medical history significant of asthma that has been recently seen by her primary care physician back in April with a chronic cough.  She had stated have been progressing for about 1-4 weeks and has actually been treated with oral steroids as well as a beta agonist inhaler.  None of these improving her symptoms.  Ultimately she was sent to be seen by pulmonology.  CT scan of the chest without contrast did show numerous bilateral lung opacities which could be either atypical infection/inflammation or metastatic neoplasm.  Tree-in-bud micronodularity was also noted in the lower lobes consistent with chronic small airways disease.  PET scan recommended.  Around the same time she has had her labs drawn showing a creatinine that was elevated at 2.1.  Baseline creatinine has been 0.7.  Urinalysis from today did show blood and protein.  Creatinine has progressed and is now at 2.6 thus Nephrology was asked to assist in management.     negative studies for hepatitis, AZAM,  and HIV.   RF was 412.   Serum protein electrophoresis:Two faint closely adjacent IgM kappa specific monoclonal protein   bands in near-gamma and mid-gamma are identified.           Interval History     Overnight/currently:  Overnight no significant response to IV fluids although she has not worsened.  She is tolerating p.o..  Notified by primary team the biopsy will have to be done as an outpatient.  No acute issues at the moment.     Health Status   Allergies:    has No Known Allergies.    Current medications:     Current Facility-Administered Medications:     0.9%  NaCl infusion, , Intravenous, Continuous, Srinivas Collado MD, Last Rate: 100 mL/hr at 08/15/24 1800, Rate Change at 08/15/24 1800    acetaminophen tablet 650 mg, 650 mg, Oral, Q6H PRN, Major See MD    albuterol-ipratropium 2.5 mg-0.5 mg/3 mL nebulizer solution 3 mL, 3 mL, Nebulization, Q6H PRN, Mayi  "MD Major    doxycycline tablet 100 mg, 100 mg, Oral, BID, Major See MD, 100 mg at 08/16/24 0846    ferrous sulfate tablet 1 each, 1 tablet, Oral, Daily, Major See MD, 1 each at 08/16/24 0846    hydrALAZINE injection 10 mg, 10 mg, Intravenous, Q6H PRN, Major See MD    ondansetron disintegrating tablet 4 mg, 4 mg, Oral, Q6H PRN, Major See MD     Physical Examination   VS/Measurements   /69 (BP Location: Left arm, Patient Position: Sitting)   Pulse 86   Temp 98.8 °F (37.1 °C) (Oral)   Resp 18   Ht 5' 4" (1.626 m)   Wt 44.2 kg (97 lb 6.4 oz)   LMP  (LMP Unknown)   SpO2 97%   Breastfeeding No   BMI 16.72 kg/m²      General:  Alert and oriented X3, No acute distress.         Nutritional status:  Thin   Neck:  Supple, No lymphadenopathy.     Respiratory:  Lungs are clear to auscultation, Respirations are non-labored, Symmetrical chest wall expansion.    Cardiovascular:  Normal rate, Regular rhythm.   Gastrointestinal:  Soft, Non-tender, Normal bowel sounds.   Integumentary:  Warm, Dry.   Psychiatric:  Cooperative, Appropriate mood & affect.        Review / Management   Laboratory Results   Today's Lab Results :    Recent Results (from the past 24 hour(s))   Sodium, urine, random    Collection Time: 08/15/24  4:12 PM   Result Value Ref Range    Sodium, Urine 106 20 - 250 mmol/L   Creatinine, urine, random    Collection Time: 08/15/24  4:12 PM   Result Value Ref Range    Creatinine, Urine 46.6 15.0 - 325.0 mg/dL   Protein/Creatinine Ratio, Urine    Collection Time: 08/15/24  4:12 PM   Result Value Ref Range    Protein, Urine Random 47 (H) 0 - 15 mg/dL    Creatinine, Urine 46.6 15.0 - 325.0 mg/dL    Prot/Creat Ratio, Urine 1.01 (H) 0.00 - 0.20   Blood culture    Collection Time: 08/15/24  5:04 PM    Specimen: Peripheral, Antecubital, Right; Blood   Result Value Ref Range    Blood Culture, Routine No Growth to date    CBC Auto Differential    Collection Time: 08/16/24  7:39 AM   Result Value Ref Range    WBC 14.85 " (H) 3.90 - 12.70 K/uL    RBC 3.18 (L) 4.00 - 5.40 M/uL    Hemoglobin 8.3 (L) 12.0 - 16.0 g/dL    Hematocrit 27.5 (L) 37.0 - 48.5 %    MCV 87 82 - 98 fL    MCH 26.1 (L) 27.0 - 31.0 pg    MCHC 30.2 (L) 32.0 - 36.0 g/dL    RDW 15.8 (H) 11.5 - 14.5 %    Platelets 487 (H) 150 - 450 K/uL    MPV 11.8 9.2 - 12.9 fL    Immature Granulocytes 0.6 (H) 0.0 - 0.5 %    Gran # (ANC) 11.6 (H) 1.8 - 7.7 K/uL    Immature Grans (Abs) 0.09 (H) 0.00 - 0.04 K/uL    Lymph # 1.8 1.0 - 4.8 K/uL    Mono # 1.0 0.3 - 1.0 K/uL    Eos # 0.3 0.0 - 0.5 K/uL    Baso # 0.10 0.00 - 0.20 K/uL    nRBC 0 0 /100 WBC    Gran % 77.9 (H) 38.0 - 73.0 %    Lymph % 12.2 (L) 18.0 - 48.0 %    Mono % 6.4 4.0 - 15.0 %    Eosinophil % 2.2 0.0 - 8.0 %    Basophil % 0.7 0.0 - 1.9 %    Platelet Estimate Increased (A)     Differential Method Automated    CBC Auto Differential    Collection Time: 08/16/24  7:39 AM   Result Value Ref Range    WBC 14.85 (H) 3.90 - 12.70 K/uL    RBC 3.18 (L) 4.00 - 5.40 M/uL    Hemoglobin 8.3 (L) 12.0 - 16.0 g/dL    Hematocrit 27.5 (L) 37.0 - 48.5 %    MCV 87 82 - 98 fL    MCH 26.1 (L) 27.0 - 31.0 pg    MCHC 30.2 (L) 32.0 - 36.0 g/dL    RDW 15.8 (H) 11.5 - 14.5 %    Platelets 487 (H) 150 - 450 K/uL    MPV 11.8 9.2 - 12.9 fL    Immature Granulocytes 0.6 (H) 0.0 - 0.5 %    Gran # (ANC) 11.6 (H) 1.8 - 7.7 K/uL    Immature Grans (Abs) 0.09 (H) 0.00 - 0.04 K/uL    Lymph # 1.8 1.0 - 4.8 K/uL    Mono # 1.0 0.3 - 1.0 K/uL    Eos # 0.3 0.0 - 0.5 K/uL    Baso # 0.10 0.00 - 0.20 K/uL    nRBC 0 0 /100 WBC    Gran % 77.9 (H) 38.0 - 73.0 %    Lymph % 12.2 (L) 18.0 - 48.0 %    Mono % 6.4 4.0 - 15.0 %    Eosinophil % 2.2 0.0 - 8.0 %    Basophil % 0.7 0.0 - 1.9 %    Platelet Estimate Increased (A)     Differential Method Automated    Renal function panel    Collection Time: 08/16/24  9:42 AM   Result Value Ref Range    Glucose 149 (H) 70 - 110 mg/dL    Sodium 136 136 - 145 mmol/L    Potassium 4.9 3.5 - 5.1 mmol/L    Chloride 106 95 - 110 mmol/L    CO2 20  (L) 23 - 29 mmol/L    BUN 37 (H) 6 - 20 mg/dL    Calcium 8.6 (L) 8.7 - 10.5 mg/dL    Creatinine 2.6 (H) 0.5 - 1.4 mg/dL    Albumin 1.9 (L) 3.5 - 5.2 g/dL    Phosphorus 4.1 2.7 - 4.5 mg/dL    eGFR 22 (A) >60 mL/min/1.73 m^2    Anion Gap 10 8 - 16 mmol/L   CK    Collection Time: 08/16/24  9:42 AM   Result Value Ref Range    CPK <7 (L) 20 - 180 U/L   Basic Metabolic Panel    Collection Time: 08/16/24  9:43 AM   Result Value Ref Range    Sodium 136 136 - 145 mmol/L    Potassium 4.9 3.5 - 5.1 mmol/L    Chloride 107 95 - 110 mmol/L    CO2 20 (L) 23 - 29 mmol/L    Glucose 146 (H) 70 - 110 mg/dL    BUN 39 (H) 6 - 20 mg/dL    Creatinine 2.6 (H) 0.5 - 1.4 mg/dL    Calcium 8.5 (L) 8.7 - 10.5 mg/dL    Anion Gap 9 8 - 16 mmol/L    eGFR 22 (A) >60 mL/min/1.73 m^2        Impression and Plan   Diagnosis     JAIME et unknown although progressive with blood and protein on UA.  She has been having URI symptoms and there is a concern for Pulmonary Renal syndrome.    --serologic workup pending  --AZAM and complements normal,   --RF was positive.   --SPEP with two monoclonal spikes UPEP and IF pending. Add free light chains.  --Creatinine stable at this time.   --Renal Bx to be done.     Anemia SPEP as above with UPEP pending.  Free light chains added,   --She did have a appointment with Hematology although this was canceled and been rescheduled.  Recommend seeing her here due to prolonged follow up.     Positive rheumatoid factor referral to Rheumatology was placed in the outpatient setting.  Patient reports she is scheduled for January of 2025.  May need to move this appointment up sooner.   CRP is high.      Proteinuria approx 1 gram on PCR.    --hold off Ace or ARB at this time.     Hematuria as above we will complete workup and consider empiric steroids if creatinine rapidly rises. Blood cx are negative at this time.  PT did have a leukocytosis with chills on presentation.      Metabolic acidosis mild likely due to kidney failure.  We  will follow for now.    Addendum:  Nephrology follow-up will be difficult to obtain as spoke with Dr. Montero and he is out of town next week.  I will see the patient in my clinic next week for further assessment.  We will try to arrange renal biopsy prior to Friday if possible.        ______________________________________________  Srinivas Collado      This document was created using voice recognition software.  It is possible that there are errors which have persisted after original proofreading.  If there is a question regarding contents of this document please contact me for clarification.

## 2024-08-16 NOTE — HOSPITAL COURSE
49 year-old female admitted with c/o abnormal blood work. She reports she hasn't been feeling well for a few weeks now. She is being seen and worked up by Dr. Jiménez with pulmonology for for SOB and chills. She started with an URI that progressed to SOB. She had Spirometry in pulm clinic that showed obstruction with very severe ventilatory impairment unimproved with post bronchodilator. She reports unintentional weight loss of 25 lbs. Pulm concerned for Wegener's disease. CT chest showed numerous bilateral lung opacities; consider atypical infection/inflammation as well as metastatic neoplasm; recommend further evaluation with PET-CT scan. Tree in bud micronodularity in lower lobes consistent with chronic small airway disease. CT a/p with punctate nonobstructing renal calculi, no hydronephrosis. Mild to moderate amount of stool in colon, with diverticulitis.  Worsening kidney function. Nephrology consulted. US Kidney showed increased echogenicity to kidneys concerning for medical renal disease. Recommends kidney biopsy, pending this next week, will try for Monday. Will make NPO after midnight. Hem/onc consulted, recommends further metastatic workup with upper/lower endoscopy. Consult GI for evaluation.  Cont rocephin and doxycycline. Hgb 6.7, unknown bleeding source. Transfuse 1 unit PRBC (8/18).      08/19/2024  H&H improved after 1UPRBC. Continue to remain stable since that time. Endoscopic procedure planned this hospitalization. Dr. 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 Consider high dose steroids after renal biopsy. Renal biopsy priority moved up to this hospitalization. Reached out to IR, they will facilitate as soon as schedule allows.      08/20/2024  EGD/Colon complete. Minor gastritis, biopsies taken. Small bowel normal, biopsied for MARIA ESTHER. Otherwise normal. Prep in right colon poor, left colon fair. No masses appreciated. Ileum intubated and normal.  GI Recommend outpatient video capsule to complete work up for MARIA ESTHER. Discussed with IR, plans for renal biopsy sometime today. Will initiate high dose steroids thereafter per Dr. Jiménez' request.     08/21/2024  Discussed case with specialists, the decision for plasma apheresis/exchange to done at this location through [facility] was made. Dr. Galeas with nephrology is communicating with [facility] team to assure this gets done. IR consulted for vascular access.      08/22/2024  Unable to undergo vasc cath yesterday due to urgent case per IR. Vasc cath placed this morning. Plans to undergo first session of apheresis this evening. Discussed case with Nephrology, they are spearheading this treatment. Expect patient to be here for extended period of time.      08/23/2024-8/24/2024  Vasc cath place, underwent first two sessions of apheresis. Tolerated it well.     08/25/2024  Renal biopsy report returned yesterday afternoon, verbally communicated to Dr. Braun. The immunofluorescence results were negative, notably showing no anti-GBM antibodies. 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 Plasmapheresis discontinued.  Nephrology initiated rituximab therapy, with the first dose of 1g administered today. A second dose is planned for two weeks later, with a follow-up plan to continue rituximab therapy for two years in accordance with maintenance trials.  Patient will need follow up with Rheumatology, Nephrology, and Pulmonology. A pulmonologist was recommended in six months to repeat PFTs. Per Nephrology, Vas-Cath may be removed in the morning if renal function remains stable.    08/26/2024:   MARIA. Cr improved to 1.6, pt tolerated Rituxan infusion without issues. Reports good UOP. Denies abd pain, CP, SOB, n/v. After discussion with Nephrology Dr. Rose, vas-cath was removed by IR. She will continue Prednisone taper as recommended by nephrology (decrease Prednisone by 10 mg weekly until pt is on  10 mg daily and can followup with rheumatology for further management). Pt seen and examined on day of discharge and discharged home in stable condition. Followup scheduled with Rheumatology Dr. Douglass on 08/29 for further management and further Rituxan infusions.  Will followup with [facility] Dr. Montero [id number]. Followup with PCP within 3-5 days, GI for video capsule endoscopy and Pulm as prev scheduled

## 2024-08-16 NOTE — ASSESSMENT & PLAN NOTE
08/16/2024  Outpt pulm concerned for wegeners disease  Labs pending   Nephrology consulted on case  Cont VF  Renal U/S negative for obstruction  However does show medical renal disease  Urine Na/cr   Discussed case with IR who plans for outpatient renal biopsy

## 2024-08-16 NOTE — SUBJECTIVE & OBJECTIVE
Interval History:  No acute issues overnight.    Review of Systems   Constitutional:  Positive for fatigue. Negative for fever.   HENT:  Negative for sinus pressure.    Eyes:  Negative for visual disturbance.   Respiratory:  Positive for shortness of breath.    Cardiovascular:  Negative for chest pain.   Gastrointestinal:  Negative for nausea and vomiting.   Genitourinary:  Negative for decreased urine volume and difficulty urinating.   Musculoskeletal:  Negative for back pain.   Skin:  Negative for rash.   Neurological:  Negative for headaches.   Psychiatric/Behavioral:  Negative for confusion.      Objective:     Vital Signs (Most Recent):  Temp: 98.8 °F (37.1 °C) (08/16/24 1204)  Pulse: 86 (08/16/24 1204)  Resp: 18 (08/16/24 1204)  BP: 125/69 (08/16/24 1204)  SpO2: 97 % (08/16/24 1204) Vital Signs (24h Range):  Temp:  [98.1 °F (36.7 °C)-98.9 °F (37.2 °C)] 98.8 °F (37.1 °C)  Pulse:  [] 86  Resp:  [16-18] 18  SpO2:  [90 %-99 %] 97 %  BP: (110-145)/(66-83) 125/69     Weight: 44.2 kg (97 lb 6.4 oz)  Body mass index is 16.72 kg/m².    Intake/Output Summary (Last 24 hours) at 8/16/2024 1634  Last data filed at 8/16/2024 0623  Gross per 24 hour   Intake 418.64 ml   Output --   Net 418.64 ml         Physical Exam  Constitutional:       General: She is not in acute distress.     Appearance: She is well-developed. She is not diaphoretic.   HENT:      Head: Normocephalic and atraumatic.   Eyes:      Pupils: Pupils are equal, round, and reactive to light.   Cardiovascular:      Rate and Rhythm: Normal rate and regular rhythm.      Heart sounds: Normal heart sounds. No murmur heard.     No friction rub. No gallop.   Pulmonary:      Effort: Pulmonary effort is normal. No respiratory distress.      Breath sounds: Normal breath sounds. No stridor. No wheezing or rales.   Abdominal:      General: Bowel sounds are normal. There is no distension.      Palpations: Abdomen is soft. There is no mass.      Tenderness: There is no  abdominal tenderness. There is no guarding.   Musculoskeletal:      Right lower leg: No edema.      Left lower leg: No edema.   Skin:     General: Skin is warm.      Findings: No erythema.   Neurological:      Mental Status: She is alert and oriented to person, place, and time.             Significant Labs: All pertinent labs within the past 24 hours have been reviewed.    Significant Imaging: I have reviewed all pertinent imaging results/findings within the past 24 hours.

## 2024-08-16 NOTE — SUBJECTIVE & OBJECTIVE
Oncology Treatment Plan:   [No matching plan found]    Medications:  Continuous Infusions:   0.9% NaCl   Intravenous Continuous 100 mL/hr at 08/15/24 1800 Rate Change at 08/15/24 1800     Scheduled Meds:   doxycycline  100 mg Oral BID    ferrous sulfate  1 tablet Oral Daily    [START ON 8/17/2024] iron sucrose  200 mg Intravenous Q7 Days     PRN Meds:  Current Facility-Administered Medications:     acetaminophen, 650 mg, Oral, Q6H PRN    albuterol-ipratropium, 3 mL, Nebulization, Q6H PRN    hydrALAZINE, 10 mg, Intravenous, Q6H PRN    ondansetron, 4 mg, Oral, Q6H PRN     Review of patient's allergies indicates:  No Known Allergies     Past Medical History:   Diagnosis Date    Mild intermittent asthma, uncomplicated      Past Surgical History:   Procedure Laterality Date    BILATERAL TUBAL LIGATION N/A      Family History       Problem Relation (Age of Onset)    Hypertension Mother, Father          Tobacco Use    Smoking status: Never    Smokeless tobacco: Never   Substance and Sexual Activity    Alcohol use: Not Currently    Drug use: Never    Sexual activity: Not on file       Review of Systems   Constitutional:  Positive for activity change, appetite change and unexpected weight change. Negative for chills, diaphoresis, fatigue and fever.   HENT:  Negative for congestion, dental problem, drooling, ear discharge, ear pain, facial swelling, hearing loss, mouth sores, nosebleeds, postnasal drip, rhinorrhea, sinus pressure, sneezing, sore throat, tinnitus, trouble swallowing and voice change.    Eyes:  Negative for photophobia, pain, discharge, redness, itching and visual disturbance.   Respiratory:  Negative for cough, choking, chest tightness, shortness of breath, wheezing and stridor.    Cardiovascular:  Negative for chest pain, palpitations and leg swelling.   Gastrointestinal:  Negative for abdominal distention, abdominal pain, anal bleeding, blood in stool, constipation, diarrhea, nausea, rectal pain and  vomiting.   Endocrine: Negative for cold intolerance, heat intolerance, polydipsia, polyphagia and polyuria.   Genitourinary:  Negative for decreased urine volume, difficulty urinating, dyspareunia, dysuria, enuresis, flank pain, frequency, genital sores, hematuria, menstrual problem, pelvic pain, urgency, vaginal bleeding, vaginal discharge and vaginal pain.   Musculoskeletal:  Negative for arthralgias, back pain, gait problem, joint swelling, myalgias, neck pain and neck stiffness.   Skin:  Negative for color change, pallor and rash.   Allergic/Immunologic: Negative for environmental allergies, food allergies and immunocompromised state.   Neurological:  Positive for weakness. Negative for dizziness, tremors, seizures, syncope, facial asymmetry, speech difficulty, light-headedness, numbness and headaches.   Hematological:  Negative for adenopathy. Does not bruise/bleed easily.   Psychiatric/Behavioral:  Positive for dysphoric mood. Negative for agitation, behavioral problems, confusion, decreased concentration, hallucinations, self-injury, sleep disturbance and suicidal ideas. The patient is nervous/anxious. The patient is not hyperactive.      Objective:     Vital Signs (Most Recent):  Temp: 98.8 °F (37.1 °C) (08/16/24 1204)  Pulse: 86 (08/16/24 1204)  Resp: 18 (08/16/24 1204)  BP: 125/69 (08/16/24 1204)  SpO2: 97 % (08/16/24 1204) Vital Signs (24h Range):  Temp:  [98.1 °F (36.7 °C)-98.9 °F (37.2 °C)] 98.8 °F (37.1 °C)  Pulse:  [] 86  Resp:  [16-18] 18  SpO2:  [90 %-99 %] 97 %  BP: (110-145)/(66-88) 125/69     Weight: 44.2 kg (97 lb 6.4 oz)  Body mass index is 16.72 kg/m².  Body surface area is 1.41 meters squared.      Intake/Output Summary (Last 24 hours) at 8/16/2024 1547  Last data filed at 8/16/2024 0623  Gross per 24 hour   Intake 418.64 ml   Output --   Net 418.64 ml        Physical Exam  Vitals reviewed.   Constitutional:       General: She is not in acute distress.     Appearance: She is  well-developed. She is ill-appearing. She is not diaphoretic.   HENT:      Head: Normocephalic and atraumatic.      Right Ear: External ear normal.      Left Ear: External ear normal.      Nose: Nose normal.      Right Sinus: No maxillary sinus tenderness or frontal sinus tenderness.      Left Sinus: No maxillary sinus tenderness or frontal sinus tenderness.      Mouth/Throat:      Pharynx: No oropharyngeal exudate.   Eyes:      General: Lids are normal. No scleral icterus.        Right eye: No discharge.         Left eye: No discharge.      Conjunctiva/sclera: Conjunctivae normal.      Right eye: Right conjunctiva is not injected. No hemorrhage.     Left eye: Left conjunctiva is not injected. No hemorrhage.     Pupils: Pupils are equal, round, and reactive to light.   Neck:      Thyroid: No thyromegaly.      Vascular: No JVD.      Trachea: No tracheal deviation.   Cardiovascular:      Rate and Rhythm: Normal rate and regular rhythm.      Heart sounds: Normal heart sounds.   Pulmonary:      Effort: Pulmonary effort is normal. No respiratory distress.      Breath sounds: Normal breath sounds. No stridor.   Chest:      Chest wall: No tenderness.   Abdominal:      General: Bowel sounds are normal. There is no distension.      Palpations: Abdomen is soft. There is no hepatomegaly, splenomegaly or mass.      Tenderness: There is no abdominal tenderness. There is no rebound.   Musculoskeletal:         General: No tenderness. Normal range of motion.      Cervical back: Normal range of motion and neck supple.   Lymphadenopathy:      Cervical: No cervical adenopathy.      Upper Body:      Right upper body: No supraclavicular adenopathy.      Left upper body: No supraclavicular adenopathy.   Skin:     General: Skin is dry.      Findings: No erythema or rash.   Neurological:      Mental Status: She is alert and oriented to person, place, and time.      Cranial Nerves: No cranial nerve deficit.      Motor: Weakness present.      " Coordination: Coordination normal.   Psychiatric:         Behavior: Behavior normal.         Thought Content: Thought content normal.         Judgment: Judgment normal.          Significant Labs:   BMP:   Recent Labs   Lab 08/15/24  1134 08/16/24  0942 08/16/24  0943   GLU 78 149* 146*    136 136   K 5.0 4.9 4.9    106 107   CO2 22* 20* 20*   BUN 43* 37* 39*   CREATININE 2.7* 2.6* 2.6*   CALCIUM 9.0 8.6* 8.5*   , CBC:   Recent Labs   Lab 08/15/24  1134 08/16/24  0739   WBC 15.14* 14.85*  14.85*   HGB 7.8* 8.3*  8.3*   HCT 25.8* 27.5*  27.5*   * 487*  487*   , CMP:   Recent Labs   Lab 08/15/24  1134 08/16/24  0942 08/16/24  0943    136 136   K 5.0 4.9 4.9    106 107   CO2 22* 20* 20*   GLU 78 149* 146*   BUN 43* 37* 39*   CREATININE 2.7* 2.6* 2.6*   CALCIUM 9.0 8.6* 8.5*   PROT 7.7  --   --    ALBUMIN 2.2* 1.9*  --    BILITOT 0.3  --   --    ALKPHOS 80  --   --    AST 18  --   --    ALT 21  --   --    ANIONGAP 10 10 9   , Coagulation: No results for input(s): "PT", "INR", "APTT" in the last 48 hours., Haptoglobin: No results for input(s): "HAPTOGLOBIN" in the last 48 hours., Immunology: No results for input(s): "SPEP", "ABDELRAHMAN", "AZAM", "FREELAMBDALI" in the last 48 hours., LDH: No results for input(s): "LDHCSF", "BFSOURCE" in the last 48 hours., LFTs:   Recent Labs   Lab 08/15/24  1134 08/16/24  0942   ALT 21  --    AST 18  --    ALKPHOS 80  --    BILITOT 0.3  --    PROT 7.7  --    ALBUMIN 2.2* 1.9*   , Reticulocytes: No results for input(s): "RETIC" in the last 48 hours., Tumor Markers: No results for input(s): "PSA", "CEA", "", "AFPTM", "BS3099", "" in the last 48 hours.    Invalid input(s): "ALGTM", Uric Acid No results for input(s): "URICACID" in the last 48 hours., and Urine Studies:   Recent Labs   Lab 08/15/24  1136   COLORU Yellow   APPEARANCEUA Clear   PHUR 7.0   SPECGRAV 1.015   PROTEINUA 1+*   GLUCUA Negative   KETONESU Negative   BILIRUBINUA Negative   OCCULTUA " 1+*   NITRITE Negative   UROBILINOGEN Negative   LEUKOCYTESUR Negative   RBCUA 17*   WBCUA 5   BACTERIA Rare   SQUAMEPITHEL 2   HYALINECASTS 3*       Diagnostic Results:  I have reviewed all pertinent imaging results/findings within the past 24 hours.

## 2024-08-16 NOTE — CONSULTS
[facility]  Hematology/Oncology  Consult Note    Patient Name: Iman Faust  MRN: [id number]  Admission Date: 8/15/2024  Hospital Length of Stay: 1 days  Code Status: Full Code   Attending Provider: Major See MD  Consulting Provider: Marcin Mercado MD  Primary Care Physician: Karine, Primary Doctor  Principal Problem:JAIME (acute kidney injury)    Consults  Subjective:     HPI:  49-year-old female with a 25 lb weight loss over the last several months.  Patient reports increasing fatigue and weakness patient found to be profoundly iron deficient.  CT chest demonstrated questionable infiltrative lung possible metastatic disease.  ECOG status 2 at bedside with mother    Oncology Treatment Plan:   [No matching plan found]    Medications:  Continuous Infusions:   0.9% NaCl   Intravenous Continuous 100 mL/hr at 08/15/24 1800 Rate Change at 08/15/24 1800     Scheduled Meds:   doxycycline  100 mg Oral BID    ferrous sulfate  1 tablet Oral Daily    [START ON 8/17/2024] iron sucrose  200 mg Intravenous Q7 Days     PRN Meds:  Current Facility-Administered Medications:     acetaminophen, 650 mg, Oral, Q6H PRN    albuterol-ipratropium, 3 mL, Nebulization, Q6H PRN    hydrALAZINE, 10 mg, Intravenous, Q6H PRN    ondansetron, 4 mg, Oral, Q6H PRN     Review of patient's allergies indicates:  No Known Allergies     Past Medical History:   Diagnosis Date    Mild intermittent asthma, uncomplicated      Past Surgical History:   Procedure Laterality Date    BILATERAL TUBAL LIGATION N/A      Family History       Problem Relation (Age of Onset)    Hypertension Mother, Father          Tobacco Use    Smoking status: Never    Smokeless tobacco: Never   Substance and Sexual Activity    Alcohol use: Not Currently    Drug use: Never    Sexual activity: Not on file       Review of Systems   Constitutional:  Positive for activity change, appetite change and unexpected weight change. Negative for chills, diaphoresis, fatigue and fever.   HENT:   Negative for congestion, dental problem, drooling, ear discharge, ear pain, facial swelling, hearing loss, mouth sores, nosebleeds, postnasal drip, rhinorrhea, sinus pressure, sneezing, sore throat, tinnitus, trouble swallowing and voice change.    Eyes:  Negative for photophobia, pain, discharge, redness, itching and visual disturbance.   Respiratory:  Negative for cough, choking, chest tightness, shortness of breath, wheezing and stridor.    Cardiovascular:  Negative for chest pain, palpitations and leg swelling.   Gastrointestinal:  Negative for abdominal distention, abdominal pain, anal bleeding, blood in stool, constipation, diarrhea, nausea, rectal pain and vomiting.   Endocrine: Negative for cold intolerance, heat intolerance, polydipsia, polyphagia and polyuria.   Genitourinary:  Negative for decreased urine volume, difficulty urinating, dyspareunia, dysuria, enuresis, flank pain, frequency, genital sores, hematuria, menstrual problem, pelvic pain, urgency, vaginal bleeding, vaginal discharge and vaginal pain.   Musculoskeletal:  Negative for arthralgias, back pain, gait problem, joint swelling, myalgias, neck pain and neck stiffness.   Skin:  Negative for color change, pallor and rash.   Allergic/Immunologic: Negative for environmental allergies, food allergies and immunocompromised state.   Neurological:  Positive for weakness. Negative for dizziness, tremors, seizures, syncope, facial asymmetry, speech difficulty, light-headedness, numbness and headaches.   Hematological:  Negative for adenopathy. Does not bruise/bleed easily.   Psychiatric/Behavioral:  Positive for dysphoric mood. Negative for agitation, behavioral problems, confusion, decreased concentration, hallucinations, self-injury, sleep disturbance and suicidal ideas. The patient is nervous/anxious. The patient is not hyperactive.      Objective:     Vital Signs (Most Recent):  Temp: 98.8 °F (37.1 °C) (08/16/24 1204)  Pulse: 86 (08/16/24  1204)  Resp: 18 (08/16/24 1204)  BP: 125/69 (08/16/24 1204)  SpO2: 97 % (08/16/24 1204) Vital Signs (24h Range):  Temp:  [98.1 °F (36.7 °C)-98.9 °F (37.2 °C)] 98.8 °F (37.1 °C)  Pulse:  [] 86  Resp:  [16-18] 18  SpO2:  [90 %-99 %] 97 %  BP: (110-145)/(66-88) 125/69     Weight: 44.2 kg (97 lb 6.4 oz)  Body mass index is 16.72 kg/m².  Body surface area is 1.41 meters squared.      Intake/Output Summary (Last 24 hours) at 8/16/2024 1549  Last data filed at 8/16/2024 0623  Gross per 24 hour   Intake 418.64 ml   Output --   Net 418.64 ml        Physical Exam  Vitals reviewed.   Constitutional:       General: She is not in acute distress.     Appearance: She is well-developed. She is ill-appearing. She is not diaphoretic.   HENT:      Head: Normocephalic and atraumatic.      Right Ear: External ear normal.      Left Ear: External ear normal.      Nose: Nose normal.      Right Sinus: No maxillary sinus tenderness or frontal sinus tenderness.      Left Sinus: No maxillary sinus tenderness or frontal sinus tenderness.      Mouth/Throat:      Pharynx: No oropharyngeal exudate.   Eyes:      General: Lids are normal. No scleral icterus.        Right eye: No discharge.         Left eye: No discharge.      Conjunctiva/sclera: Conjunctivae normal.      Right eye: Right conjunctiva is not injected. No hemorrhage.     Left eye: Left conjunctiva is not injected. No hemorrhage.     Pupils: Pupils are equal, round, and reactive to light.   Neck:      Thyroid: No thyromegaly.      Vascular: No JVD.      Trachea: No tracheal deviation.   Cardiovascular:      Rate and Rhythm: Normal rate and regular rhythm.      Heart sounds: Normal heart sounds.   Pulmonary:      Effort: Pulmonary effort is normal. No respiratory distress.      Breath sounds: Normal breath sounds. No stridor.   Chest:      Chest wall: No tenderness.   Abdominal:      General: Bowel sounds are normal. There is no distension.      Palpations: Abdomen is soft. There  "is no hepatomegaly, splenomegaly or mass.      Tenderness: There is no abdominal tenderness. There is no rebound.   Musculoskeletal:         General: No tenderness. Normal range of motion.      Cervical back: Normal range of motion and neck supple.   Lymphadenopathy:      Cervical: No cervical adenopathy.      Upper Body:      Right upper body: No supraclavicular adenopathy.      Left upper body: No supraclavicular adenopathy.   Skin:     General: Skin is dry.      Findings: No erythema or rash.   Neurological:      Mental Status: She is alert and oriented to person, place, and time.      Cranial Nerves: No cranial nerve deficit.      Motor: Weakness present.      Coordination: Coordination normal.   Psychiatric:         Behavior: Behavior normal.         Thought Content: Thought content normal.         Judgment: Judgment normal.          Significant Labs:   BMP:   Recent Labs   Lab 08/15/24  1134 08/16/24  0942 08/16/24  0943   GLU 78 149* 146*    136 136   K 5.0 4.9 4.9    106 107   CO2 22* 20* 20*   BUN 43* 37* 39*   CREATININE 2.7* 2.6* 2.6*   CALCIUM 9.0 8.6* 8.5*   , CBC:   Recent Labs   Lab 08/15/24  1134 08/16/24  0739   WBC 15.14* 14.85*  14.85*   HGB 7.8* 8.3*  8.3*   HCT 25.8* 27.5*  27.5*   * 487*  487*   , CMP:   Recent Labs   Lab 08/15/24  1134 08/16/24  0942 08/16/24  0943    136 136   K 5.0 4.9 4.9    106 107   CO2 22* 20* 20*   GLU 78 149* 146*   BUN 43* 37* 39*   CREATININE 2.7* 2.6* 2.6*   CALCIUM 9.0 8.6* 8.5*   PROT 7.7  --   --    ALBUMIN 2.2* 1.9*  --    BILITOT 0.3  --   --    ALKPHOS 80  --   --    AST 18  --   --    ALT 21  --   --    ANIONGAP 10 10 9   , Coagulation: No results for input(s): "PT", "INR", "APTT" in the last 48 hours., Haptoglobin: No results for input(s): "HAPTOGLOBIN" in the last 48 hours., Immunology: No results for input(s): "SPEP", "ABDELRAHMAN", "AZAM", "FREELAMBDALI" in the last 48 hours., LDH: No results for input(s): "LDHCSF", "BFSOURCE" in " "the last 48 hours., LFTs:   Recent Labs   Lab 08/15/24  1134 08/16/24  0942   ALT 21  --    AST 18  --    ALKPHOS 80  --    BILITOT 0.3  --    PROT 7.7  --    ALBUMIN 2.2* 1.9*   , Reticulocytes: No results for input(s): "RETIC" in the last 48 hours., Tumor Markers: No results for input(s): "PSA", "CEA", "", "AFPTM", "[id number]", "" in the last 48 hours.    Invalid input(s): "ALGTM", Uric Acid No results for input(s): "URICACID" in the last 48 hours., and Urine Studies:   Recent Labs   Lab 08/15/24  1136   COLORU Yellow   APPEARANCEUA Clear   PHUR 7.0   SPECGRAV 1.015   PROTEINUA 1+*   GLUCUA Negative   KETONESU Negative   BILIRUBINUA Negative   OCCULTUA 1+*   NITRITE Negative   UROBILINOGEN Negative   LEUKOCYTESUR Negative   RBCUA 17*   WBCUA 5   BACTERIA Rare   SQUAMEPITHEL 2   HYALINECASTS 3*       Diagnostic Results:  I have reviewed all pertinent imaging results/findings within the past 24 hours.  Assessment/Plan:     Thrombocytosis  Thrombocytosis probably secondary to severe iron deficiency anemia reactive in nature    Cough  Concern over metastatic disease.  Would strongly recommend that patient have imaging done of abdomen least ultrasound of liver.  Abdomen would also recommend that patient undergo EGD colonoscopies ASAP    Anemia  Documented severe iron deficiency anemia exacerbated by renal insufficiency.  Recommend 1 dose of intravenous iron ASAP.  With documented iron deficiency 4 month duration weight loss.  High suspicion for metastatic disease would recommend that patient have ultrasound of abdomen.  Would recommend upper lower endoscopies ASAP to make sure see if she has a GI malignancy.  Discussed implications of answered questions with she and her mother        Thank you for your consult. I will follow-up with patient. Please contact us if you have any additional questions.    Marcin Mercado MD  Hematology/Oncology  [facility]  "

## 2024-08-16 NOTE — PROGRESS NOTES
[facility] Medicine  Progress Note    Patient Name: Iman Faust  MRN: [id number]  Patient Class: IP- Inpatient   Admission Date: 8/15/2024  Length of Stay: 1 days  Attending Physician: Major See MD  Primary Care Provider: Karine, Primary Doctor        Subjective:     Principal Problem:JAIME (acute kidney injury)        HPI:  Patient is a 49 y.o.  female with a PMHx of asthma who presents to the Emergency Department due to abnormal blood work. Patient currently being seen by Dr. Jiménez for pulmonology. She reports not feeling well for several weeks. Endorses chills and sob. Denies any fever or congestion. Reports having good urinary output. Denies any home meds at this time.     In the ED, wbc: 15.1k, h/h 7.8/25.8, plt: 826, bun/cr: 43/2.7.     Overview/Hospital Course:  8/16: Creatinine appears stable.  Discussed case with IR who plans for kidney biopsy sometime next week.  Hematology consult on case.  Concern for metastatic disease.  Will obtain CT abdomen and pelvis.    Interval History:  No acute issues overnight.    Review of Systems   Constitutional:  Positive for fatigue. Negative for fever.   HENT:  Negative for sinus pressure.    Eyes:  Negative for visual disturbance.   Respiratory:  Positive for shortness of breath.    Cardiovascular:  Negative for chest pain.   Gastrointestinal:  Negative for nausea and vomiting.   Genitourinary:  Negative for decreased urine volume and difficulty urinating.   Musculoskeletal:  Negative for back pain.   Skin:  Negative for rash.   Neurological:  Negative for headaches.   Psychiatric/Behavioral:  Negative for confusion.      Objective:     Vital Signs (Most Recent):  Temp: 98.8 °F (37.1 °C) (08/16/24 1204)  Pulse: 86 (08/16/24 1204)  Resp: 18 (08/16/24 1204)  BP: 125/69 (08/16/24 1204)  SpO2: 97 % (08/16/24 1204) Vital Signs (24h Range):  Temp:  [98.1 °F (36.7 °C)-98.9 °F (37.2 °C)] 98.8 °F (37.1 °C)  Pulse:  [] 86  Resp:  [16-18] 18  SpO2:  [90 %-99  %] 97 %  BP: (110-145)/(66-83) 125/69     Weight: 44.2 kg (97 lb 6.4 oz)  Body mass index is 16.72 kg/m².    Intake/Output Summary (Last 24 hours) at 8/16/2024 1634  Last data filed at 8/16/2024 0623  Gross per 24 hour   Intake 418.64 ml   Output --   Net 418.64 ml         Physical Exam  Constitutional:       General: She is not in acute distress.     Appearance: She is well-developed. She is not diaphoretic.   HENT:      Head: Normocephalic and atraumatic.   Eyes:      Pupils: Pupils are equal, round, and reactive to light.   Cardiovascular:      Rate and Rhythm: Normal rate and regular rhythm.      Heart sounds: Normal heart sounds. No murmur heard.     No friction rub. No gallop.   Pulmonary:      Effort: Pulmonary effort is normal. No respiratory distress.      Breath sounds: Normal breath sounds. No stridor. No wheezing or rales.   Abdominal:      General: Bowel sounds are normal. There is no distension.      Palpations: Abdomen is soft. There is no mass.      Tenderness: There is no abdominal tenderness. There is no guarding.   Musculoskeletal:      Right lower leg: No edema.      Left lower leg: No edema.   Skin:     General: Skin is warm.      Findings: No erythema.   Neurological:      Mental Status: She is alert and oriented to person, place, and time.             Significant Labs: All pertinent labs within the past 24 hours have been reviewed.    Significant Imaging: I have reviewed all pertinent imaging results/findings within the past 24 hours.    Assessment/Plan:      * JAIME (acute kidney injury)  08/16/2024  Outpt pulm concerned for wegeners disease  Labs pending   Nephrology consulted on case  Cont VF  Renal U/S negative for obstruction  However does show medical renal disease  Urine Na/cr   Discussed case with IR who plans for outpatient renal biopsy         Thrombocytosis  Likely reactive due to inflammation       Cough  Cont doxycyline prescribed   Duonebs       Anemia  MARIA ESTHER noted  Will start  ferrous sulfate  Venofer given   Hematology consulted on case      VTE Risk Mitigation (From admission, onward)      None            Discharge Planning   POOJA:      Code Status: Full Code   Is the patient medically ready for discharge?:     Reason for patient still in hospital (select all that apply): Patient trending condition  Discharge Plan A: Home                  Major See MD  Department of Hospital Medicine   '[facility] Surg

## 2024-08-16 NOTE — TELEPHONE ENCOUNTER
Interventional Radiology  Scheduled 8:30AM kidney biopsy for 8/23/24 with patient.  Instructed that pt. should arrive by 7:15AM to the hospital ([address]/ Entr[address]) off [facility] [address] in [place] and check in at Patient Registration located on the first floor.  She must have a ride and NPO after midnight the night before.  Pt. denies taking ASA or other blood thinners, NSAIDS, fish oil or GLP-1/GIP agonists.  I gave pt. our direct callback number [phone] and she verbalized understanding of all instructions.

## 2024-08-16 NOTE — PLAN OF CARE
[facility]  Initial Discharge Assessment       Primary Care Provider: No, Primary Doctor    Admission Diagnosis: Acute on chronic renal failure [N17.9, N18.9]  JAIME (acute kidney injury) [N17.9]    Admission Date: 8/15/2024  Expected Discharge Date: Per Attending     Transition of Care Barriers: None    Payor: [employer] / Plan: [facility] CHOICE PLUS / Product Type: Commercial /     Extended Emergency Contact Information  Primary Emergency Contact: Deisy Faust  Mobile Phone: [phone]  Relation: Mother   needed? No    Discharge Plan A: Home         [facility] - [place], [place] - [address]  [address] [place] [address]  Phone: [phone] Fax: [phone]      Initial Assessment (most recent)       Adult Discharge Assessment - 08/16/24 0928          Discharge Assessment    Assessment Type Discharge Planning Assessment     Confirmed/corrected address, phone number and insurance Yes     Confirmed Demographics Correct on Facesheet     Source of Information patient;family     Communicated POOJA with patient/caregiver Date not available/Unable to determine     Reason For Admission JAIME     People in Home significant other     Do you expect to return to your current living situation? Yes     Do you have help at home or someone to help you manage your care at home? Yes     Who are your caregiver(s) and their phone number(s)? family     Prior to hospitilization cognitive status: Alert/Oriented     Current cognitive status: Alert/Oriented     Walking or Climbing Stairs Difficulty no     Dressing/Bathing Difficulty no     Home Layout Able to live on 1st floor     Equipment Currently Used at Home nebulizer     Readmission within 30 days? No     Patient currently being followed by outpatient case management? No     Do you currently have service(s) that help you manage your care at home? No     Do you take prescription medications? Yes     Do you have prescription coverage? Yes      Coverage [facility] Choice Plus     Do you have any problems affording any of your prescribed medications? No     Is the patient taking medications as prescribed? yes     Who is going to help you get home at discharge? family     How do you get to doctors appointments? car, drives self     Discharge Plan A Home     DME Needed Upon Discharge  none     Discharge Plan discussed with: Patient;Parent(s)     Transition of Care Barriers None                   Anticipated DC dispo: Home   Prior Level of Function: Independent   People in home:  Significant other     Comments:  CM met with patient at bedside to introduce role and discuss discharge planning. Family will be help at home and can provide transport at time of discharge. CM discharge needs depends on hospital progress. CM will continue following to assist with other needs.

## 2024-08-17 PROBLEM — J18.9 PNA (PNEUMONIA): Status: ACTIVE | Noted: 2024-08-14

## 2024-08-17 LAB
ALBUMIN SERPL BCP-MCNC: 1.7 G/DL (ref 3.5–5.2)
ANION GAP SERPL CALC-SCNC: 8 MMOL/L (ref 8–16)
ANISOCYTOSIS BLD QL SMEAR: SLIGHT
BASOPHILS # BLD AUTO: 0.12 K/UL (ref 0–0.2)
BASOPHILS # BLD AUTO: 0.12 K/UL (ref 0–0.2)
BASOPHILS NFR BLD: 0.7 % (ref 0–1.9)
BASOPHILS NFR BLD: 1 % (ref 0–1.9)
BUN SERPL-MCNC: 31 MG/DL (ref 6–20)
CALCIUM SERPL-MCNC: 8.1 MG/DL (ref 8.7–10.5)
CHLORIDE SERPL-SCNC: 113 MMOL/L (ref 95–110)
CO2 SERPL-SCNC: 17 MMOL/L (ref 23–29)
CREAT SERPL-MCNC: 2.2 MG/DL (ref 0.5–1.4)
DIFFERENTIAL METHOD BLD: ABNORMAL
DIFFERENTIAL METHOD BLD: ABNORMAL
EOSINOPHIL # BLD AUTO: 0.4 K/UL (ref 0–0.5)
EOSINOPHIL # BLD AUTO: 0.5 K/UL (ref 0–0.5)
EOSINOPHIL NFR BLD: 2.2 % (ref 0–8)
EOSINOPHIL NFR BLD: 4.1 % (ref 0–8)
ERYTHROCYTE [DISTWIDTH] IN BLOOD BY AUTOMATED COUNT: 15.3 % (ref 11.5–14.5)
ERYTHROCYTE [DISTWIDTH] IN BLOOD BY AUTOMATED COUNT: 15.5 % (ref 11.5–14.5)
EST. GFR  (NO RACE VARIABLE): 27 ML/MIN/1.73 M^2
GAMMA INTERFERON BACKGROUND BLD IA-ACNC: 0 IU/ML
GLUCOSE SERPL-MCNC: 73 MG/DL (ref 70–110)
HCT VFR BLD AUTO: 22.9 % (ref 37–48.5)
HCT VFR BLD AUTO: 23.5 % (ref 37–48.5)
HGB BLD-MCNC: 7 G/DL (ref 12–16)
HGB BLD-MCNC: 7.1 G/DL (ref 12–16)
HYPOCHROMIA BLD QL SMEAR: ABNORMAL
IMM GRANULOCYTES # BLD AUTO: 0.08 K/UL (ref 0–0.04)
IMM GRANULOCYTES # BLD AUTO: 0.11 K/UL (ref 0–0.04)
IMM GRANULOCYTES NFR BLD AUTO: 0.6 % (ref 0–0.5)
IMM GRANULOCYTES NFR BLD AUTO: 0.7 % (ref 0–0.5)
LYMPHOCYTES # BLD AUTO: 1.4 K/UL (ref 1–4.8)
LYMPHOCYTES # BLD AUTO: 1.9 K/UL (ref 1–4.8)
LYMPHOCYTES NFR BLD: 15.6 % (ref 18–48)
LYMPHOCYTES NFR BLD: 8.7 % (ref 18–48)
M TB IFN-G CD4+ BCKGRND COR BLD-ACNC: 0 IU/ML
M TB IFN-G CD4+ BCKGRND COR BLD-ACNC: 0 IU/ML
MCH RBC QN AUTO: 25.4 PG (ref 27–31)
MCH RBC QN AUTO: 26.2 PG (ref 27–31)
MCHC RBC AUTO-ENTMCNC: 29.8 G/DL (ref 32–36)
MCHC RBC AUTO-ENTMCNC: 31 G/DL (ref 32–36)
MCV RBC AUTO: 85 FL (ref 82–98)
MCV RBC AUTO: 85 FL (ref 82–98)
MITOGEN IGNF BCKGRD COR BLD-ACNC: 0.01 IU/ML
MONOCYTES # BLD AUTO: 1.1 K/UL (ref 0.3–1)
MONOCYTES # BLD AUTO: 1.3 K/UL (ref 0.3–1)
MONOCYTES NFR BLD: 8.3 % (ref 4–15)
MONOCYTES NFR BLD: 8.5 % (ref 4–15)
NEUTROPHILS # BLD AUTO: 12.9 K/UL (ref 1.8–7.7)
NEUTROPHILS # BLD AUTO: 8.7 K/UL (ref 1.8–7.7)
NEUTROPHILS NFR BLD: 70.2 % (ref 38–73)
NEUTROPHILS NFR BLD: 79.4 % (ref 38–73)
NRBC BLD-RTO: 0 /100 WBC
NRBC BLD-RTO: 0 /100 WBC
PHOSPHATE SERPL-MCNC: 4.4 MG/DL (ref 2.7–4.5)
PLATELET # BLD AUTO: 673 K/UL (ref 150–450)
PLATELET # BLD AUTO: 693 K/UL (ref 150–450)
PLATELET BLD QL SMEAR: ABNORMAL
PMV BLD AUTO: 9.1 FL (ref 9.2–12.9)
PMV BLD AUTO: 9.4 FL (ref 9.2–12.9)
POTASSIUM SERPL-SCNC: 4.7 MMOL/L (ref 3.5–5.1)
RBC # BLD AUTO: 2.71 M/UL (ref 4–5.4)
RBC # BLD AUTO: 2.76 M/UL (ref 4–5.4)
SODIUM SERPL-SCNC: 138 MMOL/L (ref 136–145)
TB GOLD PLUS: ABNORMAL
WBC # BLD AUTO: 12.34 K/UL (ref 3.9–12.7)
WBC # BLD AUTO: 16.19 K/UL (ref 3.9–12.7)

## 2024-08-17 PROCEDURE — 63600175 PHARM REV CODE 636 W HCPCS: Performed by: FAMILY MEDICINE

## 2024-08-17 PROCEDURE — 25000003 PHARM REV CODE 250: Performed by: FAMILY MEDICINE

## 2024-08-17 PROCEDURE — 85025 COMPLETE CBC W/AUTO DIFF WBC: CPT | Mod: 91 | Performed by: FAMILY MEDICINE

## 2024-08-17 PROCEDURE — 63600175 PHARM REV CODE 636 W HCPCS: Performed by: INTERNAL MEDICINE

## 2024-08-17 PROCEDURE — 99232 SBSQ HOSP IP/OBS MODERATE 35: CPT | Mod: ,,, | Performed by: INTERNAL MEDICINE

## 2024-08-17 PROCEDURE — 80069 RENAL FUNCTION PANEL: CPT | Performed by: FAMILY MEDICINE

## 2024-08-17 PROCEDURE — 36415 COLL VENOUS BLD VENIPUNCTURE: CPT | Performed by: INTERNAL MEDICINE

## 2024-08-17 PROCEDURE — 11000001 HC ACUTE MED/SURG PRIVATE ROOM

## 2024-08-17 PROCEDURE — 36415 COLL VENOUS BLD VENIPUNCTURE: CPT | Performed by: FAMILY MEDICINE

## 2024-08-17 PROCEDURE — 83521 IG LIGHT CHAINS FREE EACH: CPT | Mod: 59 | Performed by: INTERNAL MEDICINE

## 2024-08-17 RX ORDER — POLYETHYLENE GLYCOL 3350 17 G/17G
17 POWDER, FOR SOLUTION ORAL DAILY
Status: DISCONTINUED | OUTPATIENT
Start: 2024-08-17 | End: 2024-08-18

## 2024-08-17 RX ADMIN — CEFTRIAXONE 1 G: 1 INJECTION, POWDER, FOR SOLUTION INTRAMUSCULAR; INTRAVENOUS at 02:08

## 2024-08-17 RX ADMIN — ACETAMINOPHEN 650 MG: 325 TABLET ORAL at 12:08

## 2024-08-17 RX ADMIN — IRON SUCROSE 200 MG: 20 INJECTION, SOLUTION INTRAVENOUS at 09:08

## 2024-08-17 RX ADMIN — DOXYCYCLINE HYCLATE 100 MG: 100 TABLET, COATED ORAL at 09:08

## 2024-08-17 RX ADMIN — DOXYCYCLINE HYCLATE 100 MG: 100 TABLET, COATED ORAL at 08:08

## 2024-08-17 RX ADMIN — ACETAMINOPHEN 650 MG: 325 TABLET ORAL at 01:08

## 2024-08-17 RX ADMIN — ONDANSETRON 4 MG: 4 TABLET, ORALLY DISINTEGRATING ORAL at 12:08

## 2024-08-17 RX ADMIN — FERROUS SULFATE TAB 325 MG (65 MG ELEMENTAL FE) 1 EACH: 325 (65 FE) TAB at 09:08

## 2024-08-17 NOTE — ASSESSMENT & PLAN NOTE
08/17/2024  Cont doxycyline prescribed   Duonebs   Patient febrile  Will broaden to Rocephin  DC tomorrow if stable

## 2024-08-17 NOTE — ASSESSMENT & PLAN NOTE
08/17/2024  Outpt pulm concerned for wegeners disease  Labs pending   Nephrology consulted on case  Cont VF  Renal U/S negative for obstruction  However does show medical renal disease  Urine Na/cr   Discussed case with IR who plans for outpatient renal biopsy next week  Creatinine appears stable

## 2024-08-17 NOTE — PLAN OF CARE
[facility]  Discharge Final Note    Primary Care Provider: No, Primary Doctor    Expected Discharge Date: 8/17/2024    Final Discharge Note (most recent)       Final Note - 08/17/24 1003          Final Note    Assessment Type Final Discharge Note     Anticipated Discharge Disposition Home or Self Care        Post-Acute Status    Discharge Delays None known at this time                     Important Message from Medicare         Discharge home, no home health or dme orders noted.

## 2024-08-17 NOTE — PLAN OF CARE
Discussed poc with pt, pt verbalized understanding    Purposeful rounding every 2hours    VS wnl    Fall precautions in place, remains injury free  Pt denies c/o pain   Pain and nausea under control with PRN meds    IVFs  Accurate I&Os  Abx given as prescribed  Bed locked at lowest position  Call light within reach    Chart check complete  Will cont with POC

## 2024-08-17 NOTE — SUBJECTIVE & OBJECTIVE
Interval History:  No acute issues overnight however patient febrile this afternoon.    Review of Systems   Constitutional:  Positive for fever. Negative for fatigue.   HENT:  Negative for sinus pressure.    Eyes:  Negative for visual disturbance.   Respiratory:  Negative for shortness of breath.    Cardiovascular:  Negative for chest pain.   Gastrointestinal:  Negative for nausea and vomiting.   Genitourinary:  Negative for difficulty urinating.   Musculoskeletal:  Negative for back pain.   Skin:  Negative for rash.   Neurological:  Negative for headaches.   Psychiatric/Behavioral:  Negative for confusion.      Objective:     Vital Signs (Most Recent):  Temp: (!) 100.5 °F (38.1 °C) (08/17/24 1248)  Pulse: 90 (08/17/24 1203)  Resp: 16 (08/17/24 1203)  BP: 133/78 (08/17/24 1203)  SpO2: 95 % (08/17/24 1203) Vital Signs (24h Range):  Temp:  [97.9 °F (36.6 °C)-101.1 °F (38.4 °C)] 100.5 °F (38.1 °C)  Pulse:  [76-97] 90  Resp:  [16-18] 16  SpO2:  [94 %-99 %] 95 %  BP: (116-133)/(71-78) 133/78     Weight: 44.2 kg (97 lb 6.4 oz)  Body mass index is 16.72 kg/m².  No intake or output data in the 24 hours ending 08/17/24 2384      Physical Exam  Constitutional:       General: She is not in acute distress.     Appearance: She is well-developed. She is not diaphoretic.   HENT:      Head: Normocephalic and atraumatic.   Eyes:      Pupils: Pupils are equal, round, and reactive to light.   Cardiovascular:      Rate and Rhythm: Normal rate and regular rhythm.      Heart sounds: Normal heart sounds. No murmur heard.     No friction rub. No gallop.   Pulmonary:      Effort: Pulmonary effort is normal. No respiratory distress.      Breath sounds: Normal breath sounds. No stridor. No wheezing or rales.   Abdominal:      General: Bowel sounds are normal. There is no distension.      Palpations: Abdomen is soft. There is no mass.      Tenderness: There is no abdominal tenderness. There is no guarding.   Musculoskeletal:      Right lower  leg: No edema.      Left lower leg: No edema.   Skin:     General: Skin is warm.      Findings: No erythema.   Neurological:      Mental Status: She is alert and oriented to person, place, and time.             Significant Labs: All pertinent labs within the past 24 hours have been reviewed.    Significant Imaging: I have reviewed all pertinent imaging results/findings within the past 24 hours.

## 2024-08-17 NOTE — PROGRESS NOTES
[facility] Surg  Hematology/Oncology  Progress Note    Patient Name: Iman Faust  Admission Date: 8/15/2024  Hospital Length of Stay: 2 days  Code Status: Full Code     Subjective:     Interval History: Patient seen in initial consult by Dr. Mercado 8/16 for acute anemia. Following up with patient today. Noted to have normal MCV. Ferritin is elevated, but suspecting as an acute phase reactant. Iron panel has globally depressed iron markers. Hemoglobin currently 7 grams. Suspected blood loss, source currently unknown. Now on oral ferrous sulfate and received IV iron sucrose 200mg dose this morning.     Oncology Treatment Plan:   [No matching plan found]    Medications:  Continuous Infusions:   0.9% NaCl   Intravenous Continuous 100 mL/hr at 08/15/24 1800 Rate Change at 08/15/24 1800     Scheduled Meds:   doxycycline  100 mg Oral BID    ferrous sulfate  1 tablet Oral Daily    iron sucrose  200 mg Intravenous Q7 Days    polyethylene glycol  17 g Oral Daily     PRN Meds:  Current Facility-Administered Medications:     acetaminophen, 650 mg, Oral, Q6H PRN    albuterol-ipratropium, 3 mL, Nebulization, Q6H PRN    hydrALAZINE, 10 mg, Intravenous, Q6H PRN    ondansetron, 4 mg, Oral, Q6H PRN     Review of Systems  Objective:     Vital Signs (Most Recent):  Temp: (!) 100.5 °F (38.1 °C) (08/17/24 1248)  Pulse: 90 (08/17/24 1203)  Resp: 16 (08/17/24 1203)  BP: 133/78 (08/17/24 1203)  SpO2: 95 % (08/17/24 1203) Vital Signs (24h Range):  Temp:  [97.9 °F (36.6 °C)-101.1 °F (38.4 °C)] 100.5 °F (38.1 °C)  Pulse:  [76-97] 90  Resp:  [16-18] 16  SpO2:  [94 %-99 %] 95 %  BP: (116-133)/(71-78) 133/78     Weight: 44.2 kg (97 lb 6.4 oz)  Body mass index is 16.72 kg/m².  Body surface area is 1.41 meters squared.    No intake or output data in the 24 hours ending 08/17/24 1324    Physical Exam  Vitals and nursing note reviewed.   Constitutional:       Appearance: She is well-developed.   HENT:      Head: Normocephalic and atraumatic.    Eyes:      General: No scleral icterus.     Conjunctiva/sclera: Conjunctivae normal.   Cardiovascular:      Rate and Rhythm: Normal rate.   Pulmonary:      Effort: Pulmonary effort is normal. No respiratory distress.   Abdominal:      General: There is no distension.      Palpations: Abdomen is soft.      Tenderness: There is no abdominal tenderness.   Musculoskeletal:         General: Normal range of motion.      Cervical back: Normal range of motion and neck supple.   Skin:     General: Skin is warm and dry.   Neurological:      Mental Status: She is alert and oriented to person, place, and time.      Cranial Nerves: No cranial nerve deficit.   Psychiatric:         Behavior: Behavior normal.         Significant Labs:   CBC:   Recent Labs   Lab 08/16/24  0739 08/17/24  0657   WBC 14.85*  14.85* 12.34   HGB 8.3*  8.3* 7.0*   HCT 27.5*  27.5* 23.5*   *  487* 693*    and CMP:   Recent Labs   Lab 08/16/24  0942 08/16/24  0943 08/17/24  0657    136 138   K 4.9 4.9 4.7    107 113*   CO2 20* 20* 17*   * 146* 73   BUN 37* 39* 31*   CREATININE 2.6* 2.6* 2.2*   CALCIUM 8.6* 8.5* 8.1*   ALBUMIN 1.9*  --  1.7*   ANIONGAP 10 9 8       Diagnostic Results:  I have reviewed all pertinent imaging results/findings within the past 24 hours.    Assessment/Plan:     Active Diagnoses:    Diagnosis Date Noted POA    PRINCIPAL PROBLEM:  JAIME (acute kidney injury) [N17.9] 08/14/2024 Yes    Anemia [D64.9] 08/14/2024 Yes    Thrombocytosis [D75.839] 08/14/2024 Yes    Cough [R05.9] 08/14/2024 Yes      Problems Resolved During this Admission:     Acute Blood Loss Anemia with reactive thrombocytosis  Continue to monitor CBC  Continue oral iron  If IV iron is given again consider iron dextran with 25mg test dose followed by 975mg treatment dose to give complete treatment total dose that is targeted at 1 gram (can get there with single dose IV dextran vs 5 weekly doses of iron sucrose)  Agree with prior Oncology  recommendation for upper and lower endoscopy for possible identification of source of blood loss.         Thank you for your consult.  Will continue monitor CBC.       Rissa Aggarwal MD  Hematology/Oncology  [facility] - Med S[facility]

## 2024-08-17 NOTE — PLAN OF CARE
Problem: Adult Inpatient Plan of Care  Goal: Plan of Care Review  Outcome: Progressing     Problem: Adult Inpatient Plan of Care  Goal: Patient-Specific Goal (Individualized)  Outcome: Progressing  Flowsheets (Taken 8/17/2024 0353)  Individualized Care Needs: blankets  Anxieties, Fears or Concerns: worried about labs     Problem: Acute Kidney Injury/Impairment  Goal: Fluid and Electrolyte Balance  Outcome: Progressing     Problem: Acute Kidney Injury/Impairment  Goal: Improved Oral Intake  Outcome: Progressing     Problem: Pain Acute  Goal: Optimal Pain Control and Function  Outcome: Progressing     Problem: Fall Injury Risk  Goal: Absence of Fall and Fall-Related Injury  Outcome: Progressing     Problem: Infection  Goal: Absence of Infection Signs and Symptoms  Outcome: Progressing      Discussed POC with pt and family, verbalized understanding.  Patient remains free from injury.  Safety precautions maintained.   Call light and personal belongings within reach, bed in lowest position with bed wheels locked.   No s/s of acute distress.  Purposeful rounding continued this shift.  Pain controlled per MD order.  IVF infusing.   Diet orders continued, pt diet: regular  Vital signs continued per orders this shift.   Chart and orders review completed. Pt education about care completed.

## 2024-08-17 NOTE — PROGRESS NOTES
[facility] Medicine  Progress Note    Patient Name: Iman Faust  MRN: [id number]  Patient Class: IP- Inpatient   Admission Date: 8/15/2024  Length of Stay: 2 days  Attending Physician: Major See MD  Primary Care Provider: Karine, Primary Doctor        Subjective:     Principal Problem:JAIME (acute kidney injury)        HPI:  Patient is a 49 y.o.  female with a PMHx of asthma who presents to the Emergency Department due to abnormal blood work. Patient currently being seen by Dr. Jiménez for pulmonology. She reports not feeling well for several weeks. Endorses chills and sob. Denies any fever or congestion. Reports having good urinary output. Denies any home meds at this time.     In the ED, wbc: 15.1k, h/h 7.8/25.8, plt: 826, bun/cr: 43/2.7.     Overview/Hospital Course:  8/16: Creatinine appears stable.  Discussed case with IR who plans for kidney biopsy sometime next week.  Hematology consult on case.  Concern for metastatic disease.  Will obtain CT abdomen and pelvis.  08/17/2024  Creatinine improving.  H&H appears stable.  Continue iron supplementation.  CT scan negative for intra abdominal malignancy.  Recommended outpatient colonoscopy.  Patient febrile today.  Will add Rocephin.  Continue doxycycline.  DC tomorrow if stable.    Interval History:  No acute issues overnight however patient febrile this afternoon.    Review of Systems   Constitutional:  Positive for fever. Negative for fatigue.   HENT:  Negative for sinus pressure.    Eyes:  Negative for visual disturbance.   Respiratory:  Negative for shortness of breath.    Cardiovascular:  Negative for chest pain.   Gastrointestinal:  Negative for nausea and vomiting.   Genitourinary:  Negative for difficulty urinating.   Musculoskeletal:  Negative for back pain.   Skin:  Negative for rash.   Neurological:  Negative for headaches.   Psychiatric/Behavioral:  Negative for confusion.      Objective:     Vital Signs (Most Recent):  Temp: (!) 100.5  °F (38.1 °C) (08/17/24 1248)  Pulse: 90 (08/17/24 1203)  Resp: 16 (08/17/24 1203)  BP: 133/78 (08/17/24 1203)  SpO2: 95 % (08/17/24 1203) Vital Signs (24h Range):  Temp:  [97.9 °F (36.6 °C)-101.1 °F (38.4 °C)] 100.5 °F (38.1 °C)  Pulse:  [76-97] 90  Resp:  [16-18] 16  SpO2:  [94 %-99 %] 95 %  BP: (116-133)/(71-78) 133/78     Weight: 44.2 kg (97 lb 6.4 oz)  Body mass index is 16.72 kg/m².  No intake or output data in the 24 hours ending 08/17/24 1454      Physical Exam  Constitutional:       General: She is not in acute distress.     Appearance: She is well-developed. She is not diaphoretic.   HENT:      Head: Normocephalic and atraumatic.   Eyes:      Pupils: Pupils are equal, round, and reactive to light.   Cardiovascular:      Rate and Rhythm: Normal rate and regular rhythm.      Heart sounds: Normal heart sounds. No murmur heard.     No friction rub. No gallop.   Pulmonary:      Effort: Pulmonary effort is normal. No respiratory distress.      Breath sounds: Normal breath sounds. No stridor. No wheezing or rales.   Abdominal:      General: Bowel sounds are normal. There is no distension.      Palpations: Abdomen is soft. There is no mass.      Tenderness: There is no abdominal tenderness. There is no guarding.   Musculoskeletal:      Right lower leg: No edema.      Left lower leg: No edema.   Skin:     General: Skin is warm.      Findings: No erythema.   Neurological:      Mental Status: She is alert and oriented to person, place, and time.             Significant Labs: All pertinent labs within the past 24 hours have been reviewed.    Significant Imaging: I have reviewed all pertinent imaging results/findings within the past 24 hours.      Assessment/Plan:      * JAIME (acute kidney injury)  08/17/2024  Outpt pulm concerned for wegeners disease  Labs pending   Nephrology consulted on case  Cont VF  Renal U/S negative for obstruction  However does show medical renal disease  Urine Na/cr   Discussed case with IR who  plans for outpatient renal biopsy next week  Creatinine appears stable        Thrombocytosis  Likely reactive due to inflammation       PNA (pneumonia)  08/17/2024  Cont doxycyline prescribed   Duonebs   Patient febrile  Will broaden to Rocephin  DC tomorrow if stable      Anemia  MARIA ESTHER noted  Will start ferrous sulfate  Venofer given   Hematology consulted on case      VTE Risk Mitigation (From admission, onward)      None            Discharge Planning   POOJA: 8/17/2024     Code Status: Full Code   Is the patient medically ready for discharge?:     Reason for patient still in hospital (select all that apply): Patient trending condition  Discharge Plan A: Home   Discharge Delays: None known at this time              Major See MD  Department of Hospital Medicine   [facility] Med [facility]

## 2024-08-17 NOTE — PROGRESS NOTES
Nephrology Progress Note     History of Present Illness     49 y.o. female with a hx of past medical history significant of asthma that has been recently seen by her primary care physician back in April with a chronic cough.  She had stated have been progressing for about 1-4 weeks and has actually been treated with oral steroids as well as a beta agonist inhaler.  None of these improving her symptoms.  Ultimately she was sent to be seen by pulmonology.  CT scan of the chest without contrast did show numerous bilateral lung opacities which could be either atypical infection/inflammation or metastatic neoplasm.  Tree-in-bud micronodularity was also noted in the lower lobes consistent with chronic small airways disease.  PET scan recommended.  Around the same time she has had her labs drawn showing a creatinine that was elevated at 2.1.  Baseline creatinine has been 0.7.  Urinalysis from today did show blood and protein.  Creatinine has progressed and is now at 2.6 thus Nephrology was asked to assist in management.     negative studies for hepatitis, AZAM,  and HIV.   RF was 412.   Serum protein electrophoresis:Two faint closely adjacent IgM kappa specific monoclonal protein   bands in near-gamma and mid-gamma are identified.           Interval History     Overnight/currently:  Patient is seen by Hematology overnight.  CT scan completed..  Nonobstructing renal calculi noted, pulmonary micronodules in the anterior lung bases once again noted.  She is a little nauseated this morning as she just received IV iron as well as an oral iron dose.  Creatinine overnight has improved slightly at 2.2.  Labs are unrevealing otherwise     Health Status   Allergies:    has No Known Allergies.    Current medications:     Current Facility-Administered Medications:     0.9%  NaCl infusion, , Intravenous, Continuous, Srinivas Collado MD, Last Rate: 100 mL/hr at 08/15/24 1800, Rate Change at 08/15/24 1800    acetaminophen tablet 650  "mg, 650 mg, Oral, Q6H PRN, Major See MD, 650 mg at 08/17/24 1248    albuterol-ipratropium 2.5 mg-0.5 mg/3 mL nebulizer solution 3 mL, 3 mL, Nebulization, Q6H PRN, Major See MD    doxycycline tablet 100 mg, 100 mg, Oral, BID, Major See MD, 100 mg at 08/17/24 0934    ferrous sulfate tablet 1 each, 1 tablet, Oral, Daily, Major See MD, 1 each at 08/17/24 0909    hydrALAZINE injection 10 mg, 10 mg, Intravenous, Q6H PRN, Major See MD    iron sucrose injection 200 mg, 200 mg, Intravenous, Q7 Days, Marcin Mercado MD, 200 mg at 08/17/24 0919    ondansetron disintegrating tablet 4 mg, 4 mg, Oral, Q6H PRN, Major See MD, 4 mg at 08/17/24 1248    polyethylene glycol packet 17 g, 17 g, Oral, Daily, Major See MD     Physical Examination   VS/Measurements   /78 (BP Location: Right arm, Patient Position: Lying)   Pulse 90   Temp (!) 100.5 °F (38.1 °C)   Resp 16   Ht 5' 4" (1.626 m)   Wt 44.2 kg (97 lb 6.4 oz)   LMP  (LMP Unknown)   SpO2 95%   Breastfeeding No   BMI 16.72 kg/m²      General:  Alert and oriented X3, No acute distress.         Nutritional status:  Thin   Neck:  Supple, No lymphadenopathy.     Respiratory:  Lungs are clear to auscultation, Respirations are non-labored, Symmetrical chest wall expansion.    Cardiovascular:  Normal rate, Regular rhythm.   Gastrointestinal:  Soft, Non-tender, Normal bowel sounds.   Integumentary:  Warm, Dry.   Psychiatric:  Cooperative, Appropriate mood & affect.        Review / Management   Laboratory Results   Today's Lab Results :    Recent Results (from the past 24 hour(s))   CBC Auto Differential    Collection Time: 08/17/24  6:57 AM   Result Value Ref Range    WBC 12.34 3.90 - 12.70 K/uL    RBC 2.76 (L) 4.00 - 5.40 M/uL    Hemoglobin 7.0 (L) 12.0 - 16.0 g/dL    Hematocrit 23.5 (L) 37.0 - 48.5 %    MCV 85 82 - 98 fL    MCH 25.4 (L) 27.0 - 31.0 pg    MCHC 29.8 (L) 32.0 - 36.0 g/dL    RDW 15.3 (H) 11.5 - 14.5 %    Platelets 693 (H) 150 - 450 K/uL    MPV 9.4 9.2 - 12.9 fL    " Immature Granulocytes 0.6 (H) 0.0 - 0.5 %    Gran # (ANC) 8.7 (H) 1.8 - 7.7 K/uL    Immature Grans (Abs) 0.08 (H) 0.00 - 0.04 K/uL    Lymph # 1.9 1.0 - 4.8 K/uL    Mono # 1.1 (H) 0.3 - 1.0 K/uL    Eos # 0.5 0.0 - 0.5 K/uL    Baso # 0.12 0.00 - 0.20 K/uL    nRBC 0 0 /100 WBC    Gran % 70.2 38.0 - 73.0 %    Lymph % 15.6 (L) 18.0 - 48.0 %    Mono % 8.5 4.0 - 15.0 %    Eosinophil % 4.1 0.0 - 8.0 %    Basophil % 1.0 0.0 - 1.9 %    Platelet Estimate Increased (A)     Aniso Slight     Hypo Occasional     Differential Method Automated    Renal Function Panel    Collection Time: 08/17/24  6:57 AM   Result Value Ref Range    Glucose 73 70 - 110 mg/dL    Sodium 138 136 - 145 mmol/L    Potassium 4.7 3.5 - 5.1 mmol/L    Chloride 113 (H) 95 - 110 mmol/L    CO2 17 (L) 23 - 29 mmol/L    BUN 31 (H) 6 - 20 mg/dL    Calcium 8.1 (L) 8.7 - 10.5 mg/dL    Creatinine 2.2 (H) 0.5 - 1.4 mg/dL    Albumin 1.7 (L) 3.5 - 5.2 g/dL    Phosphorus 4.4 2.7 - 4.5 mg/dL    eGFR 27 (A) >60 mL/min/1.73 m^2    Anion Gap 8 8 - 16 mmol/L        Impression and Plan   Diagnosis     JAIME et unknown although progressive with blood and protein on UA.  She has been having URI symptoms and there is a concern for Pulmonary Renal syndrome.    --serologic workup pending  --AZAM and complements normal,   --RF was positive.   --SPEP with two monoclonal spikes UPEP and IF pending. Add free light chains.  --Creatinine stable at this time.   --Renal Bx to be done. Next week.     Anemia SPEP as above with UPEP pending.  Free light chains added,   --Heme eval noted  --IVFE x 1, with oral iron.   --CT unrevealing.     Positive rheumatoid factor referral to Rheumatology was placed in the outpatient setting.  Patient reports she is scheduled for January of 2025.  May need to move this appointment up sooner.   CRP is high.    --serology pending    Proteinuria approx 1 gram on PCR.    --hold off Ace or ARB at this time.     Hematuria as above we will complete workup and consider  empiric steroids if creatinine rapidly rises. With improvement would wait until bx results return.  Blood cx are negative at this time.  PT did have a leukocytosis with chills on presentation.      Metabolic acidosis mild likely due to kidney failure.  We will follow for now.            ______________________________________________  Srinivas Collado      This document was created using voice recognition software.  It is possible that there are errors which have persisted after original proofreading.  If there is a question regarding contents of this document please contact me for clarification.

## 2024-08-18 PROBLEM — D50.9 IRON DEFICIENCY ANEMIA: Status: ACTIVE | Noted: 2024-08-14

## 2024-08-18 LAB
ABO + RH BLD: NORMAL
ALBUMIN SERPL BCP-MCNC: 1.7 G/DL (ref 3.5–5.2)
ANION GAP SERPL CALC-SCNC: 8 MMOL/L (ref 8–16)
BASOPHILS # BLD AUTO: 0.12 K/UL (ref 0–0.2)
BASOPHILS NFR BLD: 0.9 % (ref 0–1.9)
BLD GP AB SCN CELLS X3 SERPL QL: NORMAL
BLD PROD TYP BPU: NORMAL
BLOOD UNIT EXPIRATION DATE: NORMAL
BLOOD UNIT TYPE CODE: 5100
BLOOD UNIT TYPE: NORMAL
BUN SERPL-MCNC: 26 MG/DL (ref 6–20)
CALCIUM SERPL-MCNC: 8.2 MG/DL (ref 8.7–10.5)
CHLORIDE SERPL-SCNC: 111 MMOL/L (ref 95–110)
CO2 SERPL-SCNC: 19 MMOL/L (ref 23–29)
CODING SYSTEM: NORMAL
CREAT SERPL-MCNC: 2.1 MG/DL (ref 0.5–1.4)
CROSSMATCH INTERPRETATION: NORMAL
DIFFERENTIAL METHOD BLD: ABNORMAL
DISPENSE STATUS: NORMAL
EOSINOPHIL # BLD AUTO: 0.4 K/UL (ref 0–0.5)
EOSINOPHIL NFR BLD: 3.5 % (ref 0–8)
ERYTHROCYTE [DISTWIDTH] IN BLOOD BY AUTOMATED COUNT: 15.4 % (ref 11.5–14.5)
EST. GFR  (NO RACE VARIABLE): 28 ML/MIN/1.73 M^2
GLUCOSE SERPL-MCNC: 71 MG/DL (ref 70–110)
HCT VFR BLD AUTO: 23 % (ref 37–48.5)
HGB BLD-MCNC: 6.7 G/DL (ref 12–16)
IMM GRANULOCYTES # BLD AUTO: 0.08 K/UL (ref 0–0.04)
IMM GRANULOCYTES NFR BLD AUTO: 0.6 % (ref 0–0.5)
LYMPHOCYTES # BLD AUTO: 2.1 K/UL (ref 1–4.8)
LYMPHOCYTES NFR BLD: 16.8 % (ref 18–48)
MCH RBC QN AUTO: 25.3 PG (ref 27–31)
MCHC RBC AUTO-ENTMCNC: 29.1 G/DL (ref 32–36)
MCV RBC AUTO: 87 FL (ref 82–98)
MONOCYTES # BLD AUTO: 0.9 K/UL (ref 0.3–1)
MONOCYTES NFR BLD: 7.4 % (ref 4–15)
NEUTROPHILS # BLD AUTO: 9 K/UL (ref 1.8–7.7)
NEUTROPHILS NFR BLD: 70.8 % (ref 38–73)
NRBC BLD-RTO: 0 /100 WBC
NUM UNITS TRANS PACKED RBC: NORMAL
PHOSPHATE SERPL-MCNC: 4.8 MG/DL (ref 2.7–4.5)
PLATELET # BLD AUTO: 654 K/UL (ref 150–450)
PMV BLD AUTO: 9.5 FL (ref 9.2–12.9)
POTASSIUM SERPL-SCNC: 5.5 MMOL/L (ref 3.5–5.1)
PROT UR-MCNC: 20 MG/DL (ref 0–15)
RBC # BLD AUTO: 2.65 M/UL (ref 4–5.4)
SODIUM SERPL-SCNC: 138 MMOL/L (ref 136–145)
SPECIMEN OUTDATE: NORMAL
WBC # BLD AUTO: 12.67 K/UL (ref 3.9–12.7)

## 2024-08-18 PROCEDURE — 36415 COLL VENOUS BLD VENIPUNCTURE: CPT | Performed by: NURSE PRACTITIONER

## 2024-08-18 PROCEDURE — 86920 COMPATIBILITY TEST SPIN: CPT | Performed by: NURSE PRACTITIONER

## 2024-08-18 PROCEDURE — 25000003 PHARM REV CODE 250: Performed by: INTERNAL MEDICINE

## 2024-08-18 PROCEDURE — P9016 RBC LEUKOCYTES REDUCED: HCPCS | Performed by: NURSE PRACTITIONER

## 2024-08-18 PROCEDURE — 80069 RENAL FUNCTION PANEL: CPT | Performed by: INTERNAL MEDICINE

## 2024-08-18 PROCEDURE — 36415 COLL VENOUS BLD VENIPUNCTURE: CPT | Performed by: INTERNAL MEDICINE

## 2024-08-18 PROCEDURE — 63600175 PHARM REV CODE 636 W HCPCS: Performed by: FAMILY MEDICINE

## 2024-08-18 PROCEDURE — 36430 TRANSFUSION BLD/BLD COMPNT: CPT

## 2024-08-18 PROCEDURE — 86901 BLOOD TYPING SEROLOGIC RH(D): CPT | Performed by: NURSE PRACTITIONER

## 2024-08-18 PROCEDURE — 84166 PROTEIN E-PHORESIS/URINE/CSF: CPT | Performed by: INTERNAL MEDICINE

## 2024-08-18 PROCEDURE — 85025 COMPLETE CBC W/AUTO DIFF WBC: CPT | Performed by: FAMILY MEDICINE

## 2024-08-18 PROCEDURE — 99232 SBSQ HOSP IP/OBS MODERATE 35: CPT | Mod: ,,, | Performed by: INTERNAL MEDICINE

## 2024-08-18 PROCEDURE — 84166 PROTEIN E-PHORESIS/URINE/CSF: CPT | Mod: 26,,, | Performed by: PATHOLOGY

## 2024-08-18 PROCEDURE — 84156 ASSAY OF PROTEIN URINE: CPT | Performed by: INTERNAL MEDICINE

## 2024-08-18 PROCEDURE — 86850 RBC ANTIBODY SCREEN: CPT | Performed by: NURSE PRACTITIONER

## 2024-08-18 PROCEDURE — 25000003 PHARM REV CODE 250: Performed by: FAMILY MEDICINE

## 2024-08-18 PROCEDURE — 36415 COLL VENOUS BLD VENIPUNCTURE: CPT | Mod: XB | Performed by: STUDENT IN AN ORGANIZED HEALTH CARE EDUCATION/TRAINING PROGRAM

## 2024-08-18 PROCEDURE — 30233N1 TRANSFUSION OF NONAUTOLOGOUS RED BLOOD CELLS INTO PERIPHERAL VEIN, PERCUTANEOUS APPROACH: ICD-10-PCS | Performed by: STUDENT IN AN ORGANIZED HEALTH CARE EDUCATION/TRAINING PROGRAM

## 2024-08-18 PROCEDURE — 11000001 HC ACUTE MED/SURG PRIVATE ROOM

## 2024-08-18 RX ORDER — POLYETHYLENE GLYCOL 3350, SODIUM SULFATE ANHYDROUS, SODIUM BICARBONATE, SODIUM CHLORIDE, POTASSIUM CHLORIDE 236; 22.74; 6.74; 5.86; 2.97 G/4L; G/4L; G/4L; G/4L; G/4L
4000 POWDER, FOR SOLUTION ORAL ONCE
Status: COMPLETED | OUTPATIENT
Start: 2024-08-19 | End: 2024-08-19

## 2024-08-18 RX ORDER — HYDROCODONE BITARTRATE AND ACETAMINOPHEN 500; 5 MG/1; MG/1
TABLET ORAL
Status: DISCONTINUED | OUTPATIENT
Start: 2024-08-18 | End: 2024-08-26 | Stop reason: HOSPADM

## 2024-08-18 RX ORDER — SYRING-NEEDL,DISP,INSUL,0.3 ML 29 G X1/2"
296 SYRINGE, EMPTY DISPOSABLE MISCELLANEOUS ONCE
Status: DISCONTINUED | OUTPATIENT
Start: 2024-08-19 | End: 2024-08-18

## 2024-08-18 RX ADMIN — SODIUM ZIRCONIUM CYCLOSILICATE 10 G: 5 POWDER, FOR SUSPENSION ORAL at 01:08

## 2024-08-18 RX ADMIN — DOXYCYCLINE HYCLATE 100 MG: 100 TABLET, COATED ORAL at 08:08

## 2024-08-18 RX ADMIN — FERROUS SULFATE TAB 325 MG (65 MG ELEMENTAL FE) 1 EACH: 325 (65 FE) TAB at 08:08

## 2024-08-18 RX ADMIN — ONDANSETRON 4 MG: 4 TABLET, ORALLY DISINTEGRATING ORAL at 05:08

## 2024-08-18 RX ADMIN — POLYETHYLENE GLYCOL 3350 17 G: 17 POWDER, FOR SOLUTION ORAL at 08:08

## 2024-08-18 RX ADMIN — CEFTRIAXONE 1 G: 1 INJECTION, POWDER, FOR SOLUTION INTRAMUSCULAR; INTRAVENOUS at 03:08

## 2024-08-18 RX ADMIN — DOXYCYCLINE HYCLATE 100 MG: 100 TABLET, COATED ORAL at 09:08

## 2024-08-18 NOTE — PROGRESS NOTES
Nephrology Progress Note     History of Present Illness     49 y.o. female with a hx of past medical history significant of asthma that has been recently seen by her primary care physician back in April with a chronic cough.  She had stated have been progressing for about 1-4 weeks and has actually been treated with oral steroids as well as a beta agonist inhaler.  None of these improving her symptoms.  Ultimately she was sent to be seen by pulmonology.  CT scan of the chest without contrast did show numerous bilateral lung opacities which could be either atypical infection/inflammation or metastatic neoplasm.  Tree-in-bud micronodularity was also noted in the lower lobes consistent with chronic small airways disease.  PET scan recommended.  Around the same time she has had her labs drawn showing a creatinine that was elevated at 2.1.  Baseline creatinine has been 0.7.  Urinalysis from today did show blood and protein.  Creatinine has progressed and is now at 2.6 thus Nephrology was asked to assist in management.     negative studies for hepatitis, AZAM, comp nl,  and HIV.   RF was 412.   Serum protein electrophoresis:Two faint closely adjacent IgM kappa specific monoclonal protein   bands in near-gamma and mid-gamma are identified.     Pending labs GBM, ANCA, FLC, UPEP,       Interval History     Overnight/currently:  Creatinine stable.  K up, CO2 drifting down.   HH down and getting RBC today.  Sitting up in the bed eating lunch.  Family is at bedside.  She reports feeling well and was without complaints.  Discussed with her again that may try renal biopsy in the morning considering she is still here.  Explained rationale why biopsy is necessary and concern we have unexplained renal failure.  Labs remain pending.  Health Status   Allergies:    has No Known Allergies.    Current medications:     Current Facility-Administered Medications:     0.9%  NaCl infusion (for blood administration), , Intravenous, Q24H PRN,  "Shanda Farris, NP    0.9%  NaCl infusion, , Intravenous, Continuous, Srinivas Collado MD, Last Rate: 100 mL/hr at 08/15/24 1800, Rate Change at 08/15/24 1800    acetaminophen tablet 650 mg, 650 mg, Oral, Q6H PRN, Major See MD, 650 mg at 08/17/24 1248    albuterol-ipratropium 2.5 mg-0.5 mg/3 mL nebulizer solution 3 mL, 3 mL, Nebulization, Q6H PRN, Major See MD    cefTRIAXone (Rocephin) 1 g in D5W 100 mL IVPB (MB+), 1 g, Intravenous, Q24H, Major See MD, Stopped at 08/17/24 1522    doxycycline tablet 100 mg, 100 mg, Oral, BID, Major See MD, 100 mg at 08/18/24 0847    ferrous sulfate tablet 1 each, 1 tablet, Oral, Daily, Major See MD, 1 each at 08/18/24 0847    hydrALAZINE injection 10 mg, 10 mg, Intravenous, Q6H PRN, Major See MD    iron sucrose injection 200 mg, 200 mg, Intravenous, Q7 Days, Marcin Mercado MD, 200 mg at 08/17/24 0919    ondansetron disintegrating tablet 4 mg, 4 mg, Oral, Q6H PRN, Major See MD, 4 mg at 08/17/24 1248    polyethylene glycol packet 17 g, 17 g, Oral, Daily, Major See MD, 17 g at 08/18/24 0847     Physical Examination   VS/Measurements   /73   Pulse 81   Temp 99 °F (37.2 °C) (Oral)   Resp 16   Ht 5' 4" (1.626 m)   Wt 44.2 kg (97 lb 6.4 oz)   LMP  (LMP Unknown)   SpO2 97%   Breastfeeding No   BMI 16.72 kg/m²      General:  Alert and oriented X3, No acute distress.         Nutritional status:  Thin   Neck:  Supple, No lymphadenopathy.     Respiratory:  Lungs are clear to auscultation, Respirations are non-labored, Symmetrical chest wall expansion.    Cardiovascular:  Normal rate, Regular rhythm.   Gastrointestinal:  Soft, Non-tender, Normal bowel sounds.   Integumentary:  Warm, Dry.   Psychiatric:  Cooperative, Appropriate mood & affect.        Review / Management   Laboratory Results   Today's Lab Results :    Recent Results (from the past 24 hour(s))   CBC auto differential    Collection Time: 08/17/24  1:26 PM   Result Value Ref Range    WBC 16.19 (H) 3.90 - 12.70 K/uL    " RBC 2.71 (L) 4.00 - 5.40 M/uL    Hemoglobin 7.1 (L) 12.0 - 16.0 g/dL    Hematocrit 22.9 (L) 37.0 - 48.5 %    MCV 85 82 - 98 fL    MCH 26.2 (L) 27.0 - 31.0 pg    MCHC 31.0 (L) 32.0 - 36.0 g/dL    RDW 15.5 (H) 11.5 - 14.5 %    Platelets 673 (H) 150 - 450 K/uL    MPV 9.1 (L) 9.2 - 12.9 fL    Immature Granulocytes 0.7 (H) 0.0 - 0.5 %    Gran # (ANC) 12.9 (H) 1.8 - 7.7 K/uL    Immature Grans (Abs) 0.11 (H) 0.00 - 0.04 K/uL    Lymph # 1.4 1.0 - 4.8 K/uL    Mono # 1.3 (H) 0.3 - 1.0 K/uL    Eos # 0.4 0.0 - 0.5 K/uL    Baso # 0.12 0.00 - 0.20 K/uL    nRBC 0 0 /100 WBC    Gran % 79.4 (H) 38.0 - 73.0 %    Lymph % 8.7 (L) 18.0 - 48.0 %    Mono % 8.3 4.0 - 15.0 %    Eosinophil % 2.2 0.0 - 8.0 %    Basophil % 0.7 0.0 - 1.9 %    Differential Method Automated    CBC Auto Differential    Collection Time: 08/18/24  5:52 AM   Result Value Ref Range    WBC 12.67 3.90 - 12.70 K/uL    RBC 2.65 (L) 4.00 - 5.40 M/uL    Hemoglobin 6.7 (L) 12.0 - 16.0 g/dL    Hematocrit 23.0 (L) 37.0 - 48.5 %    MCV 87 82 - 98 fL    MCH 25.3 (L) 27.0 - 31.0 pg    MCHC 29.1 (L) 32.0 - 36.0 g/dL    RDW 15.4 (H) 11.5 - 14.5 %    Platelets 654 (H) 150 - 450 K/uL    MPV 9.5 9.2 - 12.9 fL    Immature Granulocytes 0.6 (H) 0.0 - 0.5 %    Gran # (ANC) 9.0 (H) 1.8 - 7.7 K/uL    Immature Grans (Abs) 0.08 (H) 0.00 - 0.04 K/uL    Lymph # 2.1 1.0 - 4.8 K/uL    Mono # 0.9 0.3 - 1.0 K/uL    Eos # 0.4 0.0 - 0.5 K/uL    Baso # 0.12 0.00 - 0.20 K/uL    nRBC 0 0 /100 WBC    Gran % 70.8 38.0 - 73.0 %    Lymph % 16.8 (L) 18.0 - 48.0 %    Mono % 7.4 4.0 - 15.0 %    Eosinophil % 3.5 0.0 - 8.0 %    Basophil % 0.9 0.0 - 1.9 %    Differential Method Automated    Renal Function Panel    Collection Time: 08/18/24  5:52 AM   Result Value Ref Range    Glucose 71 70 - 110 mg/dL    Sodium 138 136 - 145 mmol/L    Potassium 5.5 (H) 3.5 - 5.1 mmol/L    Chloride 111 (H) 95 - 110 mmol/L    CO2 19 (L) 23 - 29 mmol/L    BUN 26 (H) 6 - 20 mg/dL    Calcium 8.2 (L) 8.7 - 10.5 mg/dL    Creatinine 2.1  (H) 0.5 - 1.4 mg/dL    Albumin 1.7 (L) 3.5 - 5.2 g/dL    Phosphorus 4.8 (H) 2.7 - 4.5 mg/dL    eGFR 28 (A) >60 mL/min/1.73 m^2    Anion Gap 8 8 - 16 mmol/L   Prepare RBC 1 Unit    Collection Time: 08/18/24  9:31 AM   Result Value Ref Range    UNIT NUMBER [id number]     Product Code A7958T29     DISPENSE STATUS ISSUED     CODING SYSTEM YZEL307     Unit Blood Type Code 5100     Unit Blood Type O POS     Unit Expiration [id number]     CROSSMATCH INTERPRETATION Compatible    Type & Screen    Collection Time: 08/18/24  9:31 AM   Result Value Ref Range    Group & Rh O POS     Indirect Wali NEG     Specimen Outdate 08/21/2024 23:59         Impression and Plan   Diagnosis     JAIME et unknown although progressive with blood and protein on UA.  She has been having URI symptoms and there is a concern for Pulmonary Renal syndrome.    --serologic workup pending  --AZAM and complements normal,   --RF was positive.   --SPEP with two monoclonal spikes UPEP and IF pending. free light chains and UPEP pending  --Creatinine stable at this time.   --Renal Bx in the morning if still here. Would recommend trying to get it done while in house if possible and not wait until Friday.    --hyperkalemia, low k diet with lokelma x 1    Anemia SPEP as above with UPEP pending.  Free light chains added,   --Heme eval noted  --IVFE x 1, with oral iron.   --CT unrevealing.  --recommending Upper and lower GI     Positive rheumatoid factor referral to Rheumatology was placed in the outpatient setting.  Patient reports she is scheduled for January of 2025.  May need to move this appointment up sooner.   CRP is high.    --serology pending    Proteinuria approx 1 gram on PCR.    --hold off Ace or ARB at this time.     Hematuria as above we will complete workup and consider empiric steroids if creatinine rapidly rises. With improvement would wait until bx results return.  Blood cx are negative at this time.  PT did have a leukocytosis with chills on  presentation.      Metabolic acidosis mild likely due to kidney failure.  We will follow for now.            ______________________________________________  Srinivas Collado      This document was created using voice recognition software.  It is possible that there are errors which have persisted after original proofreading.  If there is a question regarding contents of this document please contact me for clarification.

## 2024-08-18 NOTE — ASSESSMENT & PLAN NOTE
Outpt pulm concerned for wegeners disease  Nephrology following, appreciate  Cont IVF  Renal U/S negative for obstruction  Urine protein/Cr 1.01; urine protein 47; urine sodium 106  Discussed case with IR who plans for outpatient renal biopsy next week, possible Monday  NPO after midnight  Creatinine 2.1, trending down  Monitor labs daily

## 2024-08-18 NOTE — PROGRESS NOTES
[facility] Medicine  Progress Note    Patient Name: Shayy Faust  MRN: [id number]  Patient Class: IP- Inpatient   Admission Date: 8/15/2024  Length of Stay: 3 days  Attending Physician: Cm Lorenzo MD  Primary Care Provider: Karine, Primary Doctor        Subjective:     Principal Problem:JAIME (acute kidney injury)      HPI:  Patient is a 49 y.o.  female with a PMHx of asthma who presents to the Emergency Department due to abnormal blood work. Patient currently being seen by Dr. Jiménez for pulmonology. She reports not feeling well for several weeks. Endorses chills and sob. Denies any fever or congestion. Reports having good urinary output. Denies any home meds at this time.     In the ED, wbc: 15.1k, h/h 7.8/25.8, plt: 826, bun/cr: 43/2.7.     Overview/Hospital Course:  49 year-old female admitted with c/o abnormal blood work. She reports she hasn't been feeling well for a few weeks now. She is being seen and worked up by Dr. Jiménez with pulmonology for for SOB and chills. She started with an URI that progressed to SOB. She had Spirometry in pulm clinic that showed obstruction with very severe ventilatory impairment unimproved with post bronchodilator. She reports unintentional weight loss of 25 lbs. Pulm concerned for Wegener's disease.  CT chest showed numerous bilateral lung opacities; consider atypical infection/inflammation as well as metastatic neoplasm; recommend further evaluation with PET-CT scan. Tree in bud micronodularity in lower lobes consistent with chronic small airway disease.  CT a/p with punctate nonobstructing renal calculi, no hydronephrosis. Mild to moderate amount of stool in colon, with diverticulitis.     Worsening kidney function. Nephrology consulted. US Kidney showed increased echogenicity to kidneys concerning for medical renal disease. Recommends kidney biopsy, pending this next week, will try for Monday. Will make NPO after midnight.    Hem/onc consulted,  recommends further metastatic workup with upper/lower endoscopy. Consult GI for evaluation.   Cont rocephin and doxycycline.     Hgb 6.7, unknown bleeding source. Transfuse 1 unit PRBC (8/18).     Interval History: f/u JAIME. Awake, alert, sitting up in bed, family at bedside. No complaints currently. Hgb 6.7, transfuse 1 unit PRBC today. Nephrology may do kidney biopsy tomorrow? NPO after midnight. Consult GI in am for endoscopy.     Review of Systems  Objective:     Vital Signs (Most Recent):  Temp: 99 °F (37.2 °C) (08/18/24 1155)  Pulse: 81 (08/18/24 1155)  Resp: 16 (08/18/24 1155)  BP: 117/73 (08/18/24 1155)  SpO2: 97 % (08/18/24 1155) Vital Signs (24h Range):  Temp:  [98.2 °F (36.8 °C)-99.4 °F (37.4 °C)] 99 °F (37.2 °C)  Pulse:  [77-92] 81  Resp:  [15-18] 16  SpO2:  [95 %-99 %] 97 %  BP: (109-155)/(63-79) 117/73     Weight: 44.2 kg (97 lb 6.4 oz)  Body mass index is 16.72 kg/m².  No intake or output data in the 24 hours ending 08/18/24 1446      Physical Exam  Vitals and nursing note reviewed.   Constitutional:       Appearance: Normal appearance.   HENT:      Head: Normocephalic.   Eyes:      Pupils: Pupils are equal, round, and reactive to light.   Cardiovascular:      Rate and Rhythm: Normal rate and regular rhythm.      Heart sounds: No murmur heard.  Pulmonary:      Effort: Pulmonary effort is normal.      Breath sounds: Normal breath sounds. No wheezing or rales.   Abdominal:      General: Bowel sounds are normal.      Palpations: Abdomen is soft.   Musculoskeletal:         General: Normal range of motion.   Skin:     General: Skin is warm and dry.   Neurological:      General: No focal deficit present.      Mental Status: She is alert and oriented to person, place, and time.   Psychiatric:         Mood and Affect: Mood normal.         Behavior: Behavior normal.             Significant Labs: All pertinent labs within the past 24 hours have been reviewed.  CBC:   Recent Labs   Lab 08/17/24  0657  08/17/24  1326 08/18/24  0552   WBC 12.34 16.19* 12.67   HGB 7.0* 7.1* 6.7*   HCT 23.5* 22.9* 23.0*   * 673* 654*     CMP:   Recent Labs   Lab 08/17/24  0657 08/18/24  0552    138   K 4.7 5.5*   * 111*   CO2 17* 19*   GLU 73 71   BUN 31* 26*   CREATININE 2.2* 2.1*   CALCIUM 8.1* 8.2*   ALBUMIN 1.7* 1.7*   ANIONGAP 8 8       Significant Imaging: I have reviewed all pertinent imaging results/findings within the past 24 hours.    Assessment/Plan:      * JAIME (acute kidney injury)  Outpt pulm concerned for wegeners disease  Nephrology following, appreciate  Cont IVF  Renal U/S negative for obstruction  Urine protein/Cr 1.01; urine protein 47; urine sodium 106  Discussed case with IR who plans for outpatient renal biopsy next week, possible Monday  NPO after midnight  Creatinine 2.1, trending down  Monitor labs daily    Thrombocytosis  Likely reactive due to inflammation     PNA (pneumonia)  Cont doxycyline and Rocephin IV   Duonebs as needed    Iron deficiency anemia  S/p Venofer given, cont ferrous sulfate  Hema/onc following, appreciate, recommended endoscopy  Consult GI in the morning for endoscopy  Hgb 6.7  Transfuse 1 unit PRBC (8/18)  Repeat labs in the morning      VTE Risk Mitigation (From admission, onward)      None            Discharge Planning   POOJA: 8/17/2024     Code Status: Full Code   Is the patient medically ready for discharge?:     Reason for patient still in hospital (select all that apply): Patient trending condition, Laboratory test, Treatment, and Consult recommendations  Discharge Plan A: Home   Discharge Delays: None known at this time          Shanda Farris NP  Department of Hospital Medicine   [facility] - Med Surg

## 2024-08-18 NOTE — SUBJECTIVE & OBJECTIVE
Interval History: f/u JAIME. Awake, alert, sitting up in bed, family at bedside. No complaints currently. Hgb 6.7, transfuse 1 unit PRBC today. Nephrology may do kidney biopsy tomorrow? NPO after midnight. Consult GI in am for endoscopy.     Review of Systems  Objective:     Vital Signs (Most Recent):  Temp: 99 °F (37.2 °C) (08/18/24 1155)  Pulse: 81 (08/18/24 1155)  Resp: 16 (08/18/24 1155)  BP: 117/73 (08/18/24 1155)  SpO2: 97 % (08/18/24 1155) Vital Signs (24h Range):  Temp:  [98.2 °F (36.8 °C)-99.4 °F (37.4 °C)] 99 °F (37.2 °C)  Pulse:  [77-92] 81  Resp:  [15-18] 16  SpO2:  [95 %-99 %] 97 %  BP: (109-155)/(63-79) 117/73     Weight: 44.2 kg (97 lb 6.4 oz)  Body mass index is 16.72 kg/m².  No intake or output data in the 24 hours ending 08/18/24 1446      Physical Exam  Vitals and nursing note reviewed.   Constitutional:       Appearance: Normal appearance.   HENT:      Head: Normocephalic.   Eyes:      Pupils: Pupils are equal, round, and reactive to light.   Cardiovascular:      Rate and Rhythm: Normal rate and regular rhythm.      Heart sounds: No murmur heard.  Pulmonary:      Effort: Pulmonary effort is normal.      Breath sounds: Normal breath sounds. No wheezing or rales.   Abdominal:      General: Bowel sounds are normal.      Palpations: Abdomen is soft.   Musculoskeletal:         General: Normal range of motion.   Skin:     General: Skin is warm and dry.   Neurological:      General: No focal deficit present.      Mental Status: She is alert and oriented to person, place, and time.   Psychiatric:         Mood and Affect: Mood normal.         Behavior: Behavior normal.             Significant Labs: All pertinent labs within the past 24 hours have been reviewed.  CBC:   Recent Labs   Lab 08/17/24  0657 08/17/24  1326 08/18/24  0552   WBC 12.34 16.19* 12.67   HGB 7.0* 7.1* 6.7*   HCT 23.5* 22.9* 23.0*   * 673* 654*     CMP:   Recent Labs   Lab 08/17/24  0657 08/18/24  0552    138   K 4.7 5.5*    * 111*   CO2 17* 19*   GLU 73 71   BUN 31* 26*   CREATININE 2.2* 2.1*   CALCIUM 8.1* 8.2*   ALBUMIN 1.7* 1.7*   ANIONGAP 8 8       Significant Imaging: I have reviewed all pertinent imaging results/findings within the past 24 hours.

## 2024-08-18 NOTE — PLAN OF CARE
Discussed poc with pt, pt verbalized understanding    Purposeful rounding every 2hours        Blood glucose monitoring   Fall precautions in place, remains injury free  Pt denies c/o pain  Pain and nausea under control with PRN meds    IVFs  Accurate I&Os  Abx given as prescribed  Bed locked at lowest position  Call light within reach    Chart check complete  Will cont with POC

## 2024-08-18 NOTE — PLAN OF CARE
Patient is in stable condition, no acute distress, remained free from injuries, no pain reported this shift, VSS with no temp overnight, and all active orders reviewed. 24 hr chart check performed.

## 2024-08-18 NOTE — ASSESSMENT & PLAN NOTE
S/p Venofer given, cont ferrous sulfate  Hema/onc following, appreciate, recommended endoscopy  Consult GI in the morning for endoscopy  Hgb 6.7  Transfuse 1 unit PRBC (8/18)  Repeat labs in the morning

## 2024-08-19 ENCOUNTER — ANESTHESIA EVENT (OUTPATIENT)
Dept: ENDOSCOPY | Facility: HOSPITAL | Age: 49
End: 2024-08-19
Payer: COMMERCIAL

## 2024-08-19 ENCOUNTER — PATIENT MESSAGE (OUTPATIENT)
Dept: PULMONOLOGY | Facility: CLINIC | Age: 49
End: 2024-08-19
Payer: COMMERCIAL

## 2024-08-19 DIAGNOSIS — N17.9 ACUTE KIDNEY FAILURE, UNSPECIFIED: Primary | ICD-10-CM

## 2024-08-19 DIAGNOSIS — R80.9 PROTEINURIA: ICD-10-CM

## 2024-08-19 LAB
ALBUMIN SERPL BCP-MCNC: 1.8 G/DL (ref 3.5–5.2)
ANION GAP SERPL CALC-SCNC: 9 MMOL/L (ref 8–16)
BASOPHILS # BLD AUTO: 0.12 K/UL (ref 0–0.2)
BASOPHILS NFR BLD: 0.9 % (ref 0–1.9)
BM IGG SER-ACNC: 3.8 U
BUN SERPL-MCNC: 24 MG/DL (ref 6–20)
CALCIUM SERPL-MCNC: 8 MG/DL (ref 8.7–10.5)
CHLORIDE SERPL-SCNC: 109 MMOL/L (ref 95–110)
CO2 SERPL-SCNC: 17 MMOL/L (ref 23–29)
CREAT SERPL-MCNC: 2 MG/DL (ref 0.5–1.4)
DIFFERENTIAL METHOD BLD: ABNORMAL
EOSINOPHIL # BLD AUTO: 0.6 K/UL (ref 0–0.5)
EOSINOPHIL NFR BLD: 4.2 % (ref 0–8)
ERYTHROCYTE [DISTWIDTH] IN BLOOD BY AUTOMATED COUNT: 15.2 % (ref 11.5–14.5)
EST. GFR  (NO RACE VARIABLE): 30 ML/MIN/1.73 M^2
GLUCOSE SERPL-MCNC: 74 MG/DL (ref 70–110)
HCT VFR BLD AUTO: 29.4 % (ref 37–48.5)
HGB BLD-MCNC: 9 G/DL (ref 12–16)
IMM GRANULOCYTES # BLD AUTO: 0.1 K/UL (ref 0–0.04)
IMM GRANULOCYTES NFR BLD AUTO: 0.7 % (ref 0–0.5)
LYMPHOCYTES # BLD AUTO: 2.1 K/UL (ref 1–4.8)
LYMPHOCYTES NFR BLD: 15.2 % (ref 18–48)
MCH RBC QN AUTO: 26.2 PG (ref 27–31)
MCHC RBC AUTO-ENTMCNC: 30.6 G/DL (ref 32–36)
MCV RBC AUTO: 86 FL (ref 82–98)
MONOCYTES # BLD AUTO: 1.1 K/UL (ref 0.3–1)
MONOCYTES NFR BLD: 7.6 % (ref 4–15)
NEUTROPHILS # BLD AUTO: 10 K/UL (ref 1.8–7.7)
NEUTROPHILS NFR BLD: 71.4 % (ref 38–73)
NRBC BLD-RTO: 0 /100 WBC
PHOSPHATE SERPL-MCNC: 4.2 MG/DL (ref 2.7–4.5)
PLATELET # BLD AUTO: 622 K/UL (ref 150–450)
PMV BLD AUTO: 9.5 FL (ref 9.2–12.9)
POTASSIUM SERPL-SCNC: 4.7 MMOL/L (ref 3.5–5.1)
PROT UR-MCNC: 12 MG/DL (ref 0–15)
RBC # BLD AUTO: 3.44 M/UL (ref 4–5.4)
SODIUM SERPL-SCNC: 135 MMOL/L (ref 136–145)
WBC # BLD AUTO: 13.95 K/UL (ref 3.9–12.7)

## 2024-08-19 PROCEDURE — 85025 COMPLETE CBC W/AUTO DIFF WBC: CPT | Performed by: INTERNAL MEDICINE

## 2024-08-19 PROCEDURE — 63600175 PHARM REV CODE 636 W HCPCS: Performed by: FAMILY MEDICINE

## 2024-08-19 PROCEDURE — 25000003 PHARM REV CODE 250: Performed by: FAMILY MEDICINE

## 2024-08-19 PROCEDURE — 99232 SBSQ HOSP IP/OBS MODERATE 35: CPT | Mod: ,,, | Performed by: INTERNAL MEDICINE

## 2024-08-19 PROCEDURE — 99223 1ST HOSP IP/OBS HIGH 75: CPT | Mod: ,,, | Performed by: INTERNAL MEDICINE

## 2024-08-19 PROCEDURE — 86335 IMMUNFIX E-PHORSIS/URINE/CSF: CPT | Mod: 26,,, | Performed by: PATHOLOGY

## 2024-08-19 PROCEDURE — 36415 COLL VENOUS BLD VENIPUNCTURE: CPT | Performed by: INTERNAL MEDICINE

## 2024-08-19 PROCEDURE — 11000001 HC ACUTE MED/SURG PRIVATE ROOM

## 2024-08-19 PROCEDURE — 25000003 PHARM REV CODE 250: Performed by: INTERNAL MEDICINE

## 2024-08-19 PROCEDURE — 84156 ASSAY OF PROTEIN URINE: CPT | Performed by: INTERNAL MEDICINE

## 2024-08-19 PROCEDURE — 80069 RENAL FUNCTION PANEL: CPT | Performed by: INTERNAL MEDICINE

## 2024-08-19 PROCEDURE — 86335 IMMUNFIX E-PHORSIS/URINE/CSF: CPT | Performed by: INTERNAL MEDICINE

## 2024-08-19 RX ADMIN — POLYETHYLENE GLYCOL 3350, SODIUM SULFATE ANHYDROUS, SODIUM BICARBONATE, SODIUM CHLORIDE, POTASSIUM CHLORIDE 4000 ML: 236; 22.74; 6.74; 5.86; 2.97 POWDER, FOR SOLUTION ORAL at 06:08

## 2024-08-19 RX ADMIN — FERROUS SULFATE TAB 325 MG (65 MG ELEMENTAL FE) 1 EACH: 325 (65 FE) TAB at 08:08

## 2024-08-19 RX ADMIN — CEFTRIAXONE 1 G: 1 INJECTION, POWDER, FOR SOLUTION INTRAMUSCULAR; INTRAVENOUS at 03:08

## 2024-08-19 RX ADMIN — DOXYCYCLINE HYCLATE 100 MG: 100 TABLET, COATED ORAL at 08:08

## 2024-08-19 RX ADMIN — SODIUM CHLORIDE: 9 INJECTION, SOLUTION INTRAVENOUS at 11:08

## 2024-08-19 RX ADMIN — ONDANSETRON 4 MG: 4 TABLET, ORALLY DISINTEGRATING ORAL at 11:08

## 2024-08-19 RX ADMIN — DOXYCYCLINE HYCLATE 100 MG: 100 TABLET, COATED ORAL at 09:08

## 2024-08-19 NOTE — ASSESSMENT & PLAN NOTE
Outpt pulm concerned for wegeners disease  Nephrology following, appreciate  Cont IVF  Renal U/S negative for obstruction  Urine protein/Cr 1.01; urine protein 47; urine sodium 106  NPO after midnight  Creatinine 2.1, trending down  Monitor labs daily    08/19/2024  Renal function steadily improving   Discussed with Pulmnology, suspects an element of vasculitis (possibly Wegener's)  Renal biopsy to be perfomed inpatient when IR's schedule allows.   Discussed with IR, will attempt to obtain today if their schedule allows

## 2024-08-19 NOTE — SUBJECTIVE & OBJECTIVE
Interval History:  Patient is scheduled for EGD colonoscopies today [place]    Oncology Treatment Plan:   [No matching plan found]    Medications:  Continuous Infusions:   0.9% NaCl   Intravenous Continuous 100 mL/hr at 08/15/24 1800 Rate Change at 08/15/24 1800     Scheduled Meds:   cefTRIAXone (Rocephin) IV (PEDS and ADULTS)  1 g Intravenous Q24H    doxycycline  100 mg Oral BID    ferrous sulfate  1 tablet Oral Daily    iron sucrose  200 mg Intravenous Q7 Days     PRN Meds:  Current Facility-Administered Medications:     0.9%  NaCl infusion (for blood administration), , Intravenous, Q24H PRN    acetaminophen, 650 mg, Oral, Q6H PRN    albuterol-ipratropium, 3 mL, Nebulization, Q6H PRN    hydrALAZINE, 10 mg, Intravenous, Q6H PRN    ondansetron, 4 mg, Oral, Q6H PRN     Review of Systems   Constitutional:  Positive for fatigue. Negative for activity change, appetite change, chills, diaphoresis, fever and unexpected weight change.   HENT:  Negative for congestion, dental problem, drooling, ear discharge, ear pain, facial swelling, hearing loss, mouth sores, nosebleeds, postnasal drip, rhinorrhea, sinus pressure, sneezing, sore throat, tinnitus, trouble swallowing and voice change.    Eyes:  Negative for photophobia, pain, discharge, redness, itching and visual disturbance.   Respiratory:  Negative for cough, choking, chest tightness, shortness of breath, wheezing and stridor.    Cardiovascular:  Negative for chest pain, palpitations and leg swelling.   Gastrointestinal:  Negative for abdominal distention, abdominal pain, anal bleeding, blood in stool, constipation, diarrhea, nausea, rectal pain and vomiting.   Endocrine: Negative for cold intolerance, heat intolerance, polydipsia, polyphagia and polyuria.   Genitourinary:  Negative for decreased urine volume, difficulty urinating, dyspareunia, dysuria, enuresis, flank pain, frequency, genital sores, hematuria, menstrual problem, pelvic pain, urgency, vaginal bleeding,  vaginal discharge and vaginal pain.   Musculoskeletal:  Negative for arthralgias, back pain, gait problem, joint swelling, myalgias, neck pain and neck stiffness.   Skin:  Negative for color change, pallor and rash.   Allergic/Immunologic: Negative for environmental allergies, food allergies and immunocompromised state.   Neurological:  Positive for weakness. Negative for dizziness, tremors, seizures, syncope, facial asymmetry, speech difficulty, light-headedness, numbness and headaches.   Hematological:  Negative for adenopathy. Does not bruise/bleed easily.   Psychiatric/Behavioral:  Positive for dysphoric mood. Negative for agitation, behavioral problems, confusion, decreased concentration, hallucinations, self-injury, sleep disturbance and suicidal ideas. The patient is nervous/anxious. The patient is not hyperactive.      Objective:     Vital Signs (Most Recent):  Temp: 98.9 °F (37.2 °C) (08/19/24 0740)  Pulse: 79 (08/19/24 0740)  Resp: 18 (08/19/24 0740)  BP: 128/89 (08/19/24 0740)  SpO2: 96 % (08/19/24 0740) Vital Signs (24h Range):  Temp:  [98.4 °F (36.9 °C)-99.6 °F (37.6 °C)] 98.9 °F (37.2 °C)  Pulse:  [78-84] 79  Resp:  [15-18] 18  SpO2:  [93 %-98 %] 96 %  BP: (117-152)/(67-89) 128/89     Weight: 44.2 kg (97 lb 6.4 oz)  Body mass index is 16.72 kg/m².  Body surface area is 1.41 meters squared.      Intake/Output Summary (Last 24 hours) at 8/19/2024 0742  Last data filed at 8/18/2024 1155  Gross per 24 hour   Intake 25 ml   Output --   Net 25 ml        Physical Exam  Vitals reviewed.   Constitutional:       General: She is not in acute distress.     Appearance: She is well-developed. She is ill-appearing. She is not diaphoretic.   HENT:      Head: Normocephalic and atraumatic.      Right Ear: External ear normal.      Left Ear: External ear normal.      Nose: Nose normal.      Right Sinus: No maxillary sinus tenderness or frontal sinus tenderness.      Left Sinus: No maxillary sinus tenderness or frontal  sinus tenderness.      Mouth/Throat:      Pharynx: No oropharyngeal exudate.   Eyes:      General: Lids are normal. No scleral icterus.        Right eye: No discharge.         Left eye: No discharge.      Conjunctiva/sclera: Conjunctivae normal.      Right eye: Right conjunctiva is not injected. No hemorrhage.     Left eye: Left conjunctiva is not injected. No hemorrhage.     Pupils: Pupils are equal, round, and reactive to light.   Neck:      Thyroid: No thyromegaly.      Vascular: No JVD.      Trachea: No tracheal deviation.   Cardiovascular:      Rate and Rhythm: Normal rate.   Pulmonary:      Effort: Pulmonary effort is normal. No respiratory distress.      Breath sounds: No stridor.   Chest:      Chest wall: No tenderness.   Abdominal:      General: Bowel sounds are normal. There is no distension.      Palpations: Abdomen is soft. There is no hepatomegaly, splenomegaly or mass.      Tenderness: There is no abdominal tenderness. There is no rebound.   Musculoskeletal:         General: No tenderness. Normal range of motion.      Cervical back: Normal range of motion and neck supple.   Lymphadenopathy:      Cervical: No cervical adenopathy.      Upper Body:      Right upper body: No supraclavicular adenopathy.      Left upper body: No supraclavicular adenopathy.   Skin:     General: Skin is dry.      Findings: No erythema or rash.   Neurological:      Mental Status: She is alert and oriented to person, place, and time.      Cranial Nerves: No cranial nerve deficit.      Coordination: Coordination normal.   Psychiatric:         Behavior: Behavior normal.         Thought Content: Thought content normal.         Judgment: Judgment normal.          Significant Labs:   BMP:   Recent Labs   Lab 08/18/24  0552 08/19/24  0452   GLU 71 74    135*   K 5.5* 4.7   * 109   CO2 19* 17*   BUN 26* 24*   CREATININE 2.1* 2.0*   CALCIUM 8.2* 8.0*   , CBC:   Recent Labs   Lab 08/17/24  1326 08/18/24  0552 08/19/24  0452  "  WBC 16.19* 12.67 13.95*   HGB 7.1* 6.7* 9.0*   HCT 22.9* 23.0* 29.4*   * 654* 622*   , CMP:   Recent Labs   Lab 08/18/24  0552 08/19/24  0452    135*   K 5.5* 4.7   * 109   CO2 19* 17*   GLU 71 74   BUN 26* 24*   CREATININE 2.1* 2.0*   CALCIUM 8.2* 8.0*   ALBUMIN 1.7* 1.8*   ANIONGAP 8 9   , Coagulation: No results for input(s): "PT", "INR", "APTT" in the last 48 hours., Haptoglobin: No results for input(s): "HAPTOGLOBIN" in the last 48 hours., Immunology: No results for input(s): "SPEP", "ABDELRAHMAN", "AZAM", "FREELAMBDALI" in the last 48 hours., LDH: No results for input(s): "LDHCSF", "BFSOURCE" in the last 48 hours., LFTs:   Recent Labs   Lab 08/18/24  0552 08/19/24  0452   ALBUMIN 1.7* 1.8*   , Reticulocytes: No results for input(s): "RETIC" in the last 48 hours., Tumor Markers: No results for input(s): "PSA", "CEA", "", "AFPTM", "PY5890", "" in the last 48 hours.    Invalid input(s): "ALGTM", Uric Acid No results for input(s): "URICACID" in the last 48 hours., and Urine Studies: No results for input(s): "COLORU", "APPEARANCEUA", "PHUR", "SPECGRAV", "PROTEINUA", "GLUCUA", "KETONESU", "BILIRUBINUA", "OCCULTUA", "NITRITE", "UROBILINOGEN", "LEUKOCYTESUR", "RBCUA", "WBCUA", "BACTERIA", "SQUAMEPITHEL", "HYALINECASTS" in the last 48 hours.    Invalid input(s): "WRIGHTSUR"    Diagnostic Results:  I have reviewed all pertinent imaging results/findings within the past 24 hours.  "

## 2024-08-19 NOTE — SUBJECTIVE & OBJECTIVE
Past Medical History:   Diagnosis Date    Mild intermittent asthma, uncomplicated        Past Surgical History:   Procedure Laterality Date    BILATERAL TUBAL LIGATION N/A        Review of patient's allergies indicates:  No Known Allergies  Family History       Problem Relation (Age of Onset)    Hypertension Mother, Father          Tobacco Use    Smoking status: Never    Smokeless tobacco: Never   Substance and Sexual Activity    Alcohol use: Not Currently    Drug use: Never    Sexual activity: Not on file     Review of Systems   Constitutional:  Positive for fatigue and unexpected weight change.   Respiratory:  Positive for cough.    Neurological:  Positive for weakness.   All other systems reviewed and are negative.    Objective:     Vital Signs (Most Recent):  Temp: 98.5 °F (36.9 °C) (08/19/24 1606)  Pulse: 76 (08/19/24 1606)  Resp: 18 (08/19/24 1606)  BP: 135/84 (08/19/24 1606)  SpO2: 97 % (08/19/24 1606) Vital Signs (24h Range):  Temp:  [98.1 °F (36.7 °C)-99.5 °F (37.5 °C)] 98.5 °F (36.9 °C)  Pulse:  [76-84] 76  Resp:  [16-18] 18  SpO2:  [93 %-97 %] 97 %  BP: (121-152)/(67-89) 135/84     Weight: 44.2 kg (97 lb 6.4 oz) (08/15/24 1001)  Body mass index is 16.72 kg/m².      Intake/Output Summary (Last 24 hours) at 8/19/2024 1738  Last data filed at 8/19/2024 0745  Gross per 24 hour   Intake 0 ml   Output --   Net 0 ml       Lines/Drains/Airways       Peripheral Intravenous Line  Duration                  Peripheral IV - Single Lumen 08/18/24 1826 22 G Anterior;Right Forearm <1 day                     Physical Exam  Vitals and nursing note reviewed.   Constitutional:       Appearance: She is ill-appearing.   HENT:      Head: Normocephalic and atraumatic.   Abdominal:      General: There is no distension.      Palpations: Abdomen is soft.      Tenderness: There is no abdominal tenderness. There is no guarding or rebound.   Neurological:      General: No focal deficit present.      Mental Status: She is alert and  "oriented to person, place, and time. Mental status is at baseline.   Psychiatric:         Mood and Affect: Mood normal.         Thought Content: Thought content normal.      Significant Labs:  CBC:   Recent Labs   Lab 08/18/24  0552 08/19/24  0452   WBC 12.67 13.95*   HGB 6.7* 9.0*   HCT 23.0* 29.4*   * 622*     CMP:   Recent Labs   Lab 08/19/24 0452   GLU 74   CALCIUM 8.0*   ALBUMIN 1.8*   *   K 4.7   CO2 17*      BUN 24*   CREATININE 2.0*     Coagulation: No results for input(s): "PT", "INR", "APTT" in the last 48 hours.  ESR: No results for input(s): "SEDRATE" in the last 48 hours.    Significant Imaging:  Imaging results within the past 24 hours have been reviewed.  "

## 2024-08-19 NOTE — HPI
49 y.o female with chronic cough since March 2024, weakness and significant weight loss referred to [facility] last week for abnormal labs. She was admitted with SOB and ARF. Labs on presentation showed H&H 7.8/25.8, previously labs from 04/2024 show H&H 14/43.9. Iron studies show iron level <10, ferritin 918. Creatinine 2.7 and ESR 87. Patient denies GI symptoms. No melena, hematochezia or hematemesis. There is no abdominal pain. Documented weight loss. In 2016, she weighed 122lbs. On 4/1/24, she weighed 106 lbs and by 8/13/24 she weighed 97 lbs. Denies Fhx of CRC. Denies NSAID use or blood thinners. Sister Regina at bedside.     CT on 8/13/24 showed numerous bilateral irregular and spiculated nodular opacities. Mild bilateral pleuroparenchymal lung scarring. No consolidation or pleural effusion. Patient has been evaluated heme/onc who recommends endoscopic evaluation. She has been transfused pRBCs and administered iron infusions. She is awaiting kidney biopsy.

## 2024-08-19 NOTE — ASSESSMENT & PLAN NOTE
No previous endoscopic evaluation  Labs show iron deficiency  Documented weight loss  Symptomatic anemia s/p pRBC transfusion

## 2024-08-19 NOTE — CONSULTS
[facility]  G[facility]ology  Consult Note    Patient Name: Shayy Faust  MRN: [id number]  Admission Date: 8/15/2024  Hospital Length of Stay: 4 days  Code Status: Full Code   Attending Provider: Cm Lorenzo MD   Consulting Provider: Gena Saxena MD  Primary Care Physician: Karine, Primary Doctor  Principal Problem:JAIME (acute kidney injury)    Inpatient consult to Gastroenterology  Consult performed by: Gena Saxena MD  Consult ordered by: Shanda Farris NP  Reason for consult: MARIA ESTHER        Subjective:     HPI:  49 y.o female with chronic cough since March 2024, weakness and significant weight loss referred to [facility] last week for abnormal labs. She was admitted with SOB and ARF. Labs on presentation showed H&H 7.8/25.8, previously labs from 04/2024 show H&H 14/43.9. Iron studies show iron level <10, ferritin 918. Creatinine 2.7 and ESR 87. Patient denies GI symptoms. No melena, hematochezia or hematemesis. There is no abdominal pain. Documented weight loss. In 2016, she weighed 122lbs. On 4/1/24, she weighed 106 lbs and by 8/13/24 she weighed 97 lbs. Denies Fhx of CRC. Denies NSAID use or blood thinners. Sister eRgina at bedside.     CT on 8/13/24 showed numerous bilateral irregular and spiculated nodular opacities. Mild bilateral pleuroparenchymal lung scarring. No consolidation or pleural effusion. Patient has been evaluated heme/onc who recommends endoscopic evaluation. She has been transfused pRBCs and administered iron infusions. She is awaiting kidney biopsy.    Past Medical History:   Diagnosis Date    Mild intermittent asthma, uncomplicated        Past Surgical History:   Procedure Laterality Date    BILATERAL TUBAL LIGATION N/A        Review of patient's allergies indicates:  No Known Allergies  Family History       Problem Relation (Age of Onset)    Hypertension Mother, Father          Tobacco Use    Smoking status: Never    Smokeless tobacco: Never   Substance and Sexual  Activity    Alcohol use: Not Currently    Drug use: Never    Sexual activity: Not on file     Review of Systems   Constitutional:  Positive for fatigue and unexpected weight change.   Respiratory:  Positive for cough.    Neurological:  Positive for weakness.   All other systems reviewed and are negative.    Objective:     Vital Signs (Most Recent):  Temp: 98.5 °F (36.9 °C) (08/19/24 1606)  Pulse: 76 (08/19/24 1606)  Resp: 18 (08/19/24 1606)  BP: 135/84 (08/19/24 1606)  SpO2: 97 % (08/19/24 1606) Vital Signs (24h Range):  Temp:  [98.1 °F (36.7 °C)-99.5 °F (37.5 °C)] 98.5 °F (36.9 °C)  Pulse:  [76-84] 76  Resp:  [16-18] 18  SpO2:  [93 %-97 %] 97 %  BP: (121-152)/(67-89) 135/84     Weight: 44.2 kg (97 lb 6.4 oz) (08/15/24 1001)  Body mass index is 16.72 kg/m².      Intake/Output Summary (Last 24 hours) at 8/19/2024 1738  Last data filed at 8/19/2024 0745  Gross per 24 hour   Intake 0 ml   Output --   Net 0 ml       Lines/Drains/Airways       Peripheral Intravenous Line  Duration                  Peripheral IV - Single Lumen 08/18/24 1826 22 G Anterior;Right Forearm <1 day                     Physical Exam  Vitals and nursing note reviewed.   Constitutional:       Appearance: She is ill-appearing.   HENT:      Head: Normocephalic and atraumatic.   Abdominal:      General: There is no distension.      Palpations: Abdomen is soft.      Tenderness: There is no abdominal tenderness. There is no guarding or rebound.   Neurological:      General: No focal deficit present.      Mental Status: She is alert and oriented to person, place, and time. Mental status is at baseline.   Psychiatric:         Mood and Affect: Mood normal.         Thought Content: Thought content normal.      Significant Labs:  CBC:   Recent Labs   Lab 08/18/24  0552 08/19/24  0452   WBC 12.67 13.95*   HGB 6.7* 9.0*   HCT 23.0* 29.4*   * 622*     CMP:   Recent Labs   Lab 08/19/24  0452   GLU 74   CALCIUM 8.0*   ALBUMIN 1.8*   *   K 4.7   CO2  "17*      BUN 24*   CREATININE 2.0*     Coagulation: No results for input(s): "PT", "INR", "APTT" in the last 48 hours.  ESR: No results for input(s): "SEDRATE" in the last 48 hours.    Significant Imaging:  Imaging results within the past 24 hours have been reviewed.  Assessment/Plan:     Pulmonary  PNA (pneumonia)  Abnormal CT 8/13/24 showed numerous bilateral irregular and spiculated nodular opacities.  Findings concerning for infection vs malignancy  On abxs      Renal/  * JAIME (acute kidney injury)  No hx renal disease  Crea 2.7 on admission  Nephrology following  Renal biopsy pending    Oncology  Thrombocytosis  2/2 severe MARIA ESTHER    Iron deficiency anemia  No previous endoscopic evaluation  Labs show iron deficiency  Documented weight loss  Symptomatic anemia s/p pRBC transfusion      Recommendations:  Prep today  Clears till MN  EGD/Colonoscopy in AM      Thank you for your consult. I will follow-up with patient. Please contact us if you have any additional questions.    Gena Saxena MD  Gastroenterology  [facility]  "

## 2024-08-19 NOTE — PLAN OF CARE
08/19/24 1400   Rounds   Attendance Provider;Nurse ;Physical therapist;Charge nurse;Occupational therapist   Discharge Plan A Home with family   Why the patient remains in the hospital Requires continued medical care   Transition of Care Barriers None

## 2024-08-19 NOTE — H&P (VIEW-ONLY)
[facility]  [facility]  Consult [facility]    Patient Name: Shayy Faust  MRN: [id number]  Admission Date: 8/15/2024  Hospital Length of Stay: 4 days  Code Status: Full Code   Attending Provider: Cm Lorenzo MD   Consulting Provider: Gena Saxena MD  Primary Care Physician: Karine, Primary Doctor  Principal Problem:JAIME (acute kidney injury)    Inpatient consult to Gastroenterology  Consult performed by: Gena Saxena MD  Consult ordered by: Shanda Farris NP  Reason for consult: MARIA ESTHER        Subjective:     HPI:  49 y.o female with chronic cough since March 2024, weakness and significant weight loss referred to [facility] last week for abnormal labs. She was admitted with SOB and ARF. Labs on presentation showed H&H 7.8/25.8, previously labs from 04/2024 show H&H 14/43.9. Iron studies show iron level <10, ferritin 918. Creatinine 2.7 and ESR 87. Patient denies GI symptoms. No melena, hematochezia or hematemesis. There is no abdominal pain. Documented weight loss. In 2016, she weighed 122lbs. On 4/1/24, she weighed 106 lbs and by 8/13/24 she weighed 97 lbs. Denies Fhx of CRC. Denies NSAID use or blood thinners. Sister Regina at bedside.     CT on 8/13/24 showed numerous bilateral irregular and spiculated nodular opacities. Mild bilateral pleuroparenchymal lung scarring. No consolidation or pleural effusion. Patient has been evaluated heme/onc who recommends endoscopic evaluation. She has been transfused pRBCs and administered iron infusions. She is awaiting kidney biopsy.    Past Medical History:   Diagnosis Date    Mild intermittent asthma, uncomplicated        Past Surgical History:   Procedure Laterality Date    BILATERAL TUBAL LIGATION N/A        Review of patient's allergies indicates:  No Known Allergies  Family History       Problem Relation (Age of Onset)    Hypertension Mother, Father          Tobacco Use    Smoking status: Never    Smokeless tobacco: Never   Substance and Sexual  Activity    Alcohol use: Not Currently    Drug use: Never    Sexual activity: Not on file     Review of Systems   Constitutional:  Positive for fatigue and unexpected weight change.   Respiratory:  Positive for cough.    Neurological:  Positive for weakness.   All other systems reviewed and are negative.    Objective:     Vital Signs (Most Recent):  Temp: 98.5 °F (36.9 °C) (08/19/24 1606)  Pulse: 76 (08/19/24 1606)  Resp: 18 (08/19/24 1606)  BP: 135/84 (08/19/24 1606)  SpO2: 97 % (08/19/24 1606) Vital Signs (24h Range):  Temp:  [98.1 °F (36.7 °C)-99.5 °F (37.5 °C)] 98.5 °F (36.9 °C)  Pulse:  [76-84] 76  Resp:  [16-18] 18  SpO2:  [93 %-97 %] 97 %  BP: (121-152)/(67-89) 135/84     Weight: 44.2 kg (97 lb 6.4 oz) (08/15/24 1001)  Body mass index is 16.72 kg/m².      Intake/Output Summary (Last 24 hours) at 8/19/2024 1738  Last data filed at 8/19/2024 0745  Gross per 24 hour   Intake 0 ml   Output --   Net 0 ml       Lines/Drains/Airways       Peripheral Intravenous Line  Duration                  Peripheral IV - Single Lumen 08/18/24 1826 22 G Anterior;Right Forearm <1 day                     Physical Exam  Vitals and nursing note reviewed.   Constitutional:       Appearance: She is ill-appearing.   HENT:      Head: Normocephalic and atraumatic.   Abdominal:      General: There is no distension.      Palpations: Abdomen is soft.      Tenderness: There is no abdominal tenderness. There is no guarding or rebound.   Neurological:      General: No focal deficit present.      Mental Status: She is alert and oriented to person, place, and time. Mental status is at baseline.   Psychiatric:         Mood and Affect: Mood normal.         Thought Content: Thought content normal.      Significant Labs:  CBC:   Recent Labs   Lab 08/18/24  0552 08/19/24  0452   WBC 12.67 13.95*   HGB 6.7* 9.0*   HCT 23.0* 29.4*   * 622*     CMP:   Recent Labs   Lab 08/19/24  0452   GLU 74   CALCIUM 8.0*   ALBUMIN 1.8*   *   K 4.7   CO2  "17*      BUN 24*   CREATININE 2.0*     Coagulation: No results for input(s): "PT", "INR", "APTT" in the last 48 hours.  ESR: No results for input(s): "SEDRATE" in the last 48 hours.    Significant Imaging:  Imaging results within the past 24 hours have been reviewed.  Assessment/Plan:     Pulmonary  PNA (pneumonia)  Abnormal CT 8/13/24 showed numerous bilateral irregular and spiculated nodular opacities.  Findings concerning for infection vs malignancy  On abxs      Renal/  * JAIME (acute kidney injury)  No hx renal disease  Crea 2.7 on admission  Nephrology following  Renal biopsy pending    Oncology  Thrombocytosis  2/2 severe MARIA ESTHER    Iron deficiency anemia  No previous endoscopic evaluation  Labs show iron deficiency  Documented weight loss  Symptomatic anemia s/p pRBC transfusion      Recommendations:  Prep today  Clears till MN  EGD/Colonoscopy in AM      Thank you for your consult. I will follow-up with patient. Please contact us if you have any additional questions.    Gena Saxena MD  Gastroenterology  [facility]  "

## 2024-08-19 NOTE — ASSESSMENT & PLAN NOTE
Abnormal CT 8/13/24 showed numerous bilateral irregular and spiculated nodular opacities.  Findings concerning for infection vs malignancy  On abxs

## 2024-08-19 NOTE — PROGRESS NOTES
[facility] Medicine  Progress Note    Patient Name: Shayy Faust  MRN: [id number]  Patient Class: IP- Inpatient   Admission Date: 8/15/2024  Length of Stay: 4 days  Attending Physician: Cm Lorenzo MD  Primary Care Provider: Karine, Primary Doctor        Subjective:     Principal Problem:JAIME (acute kidney injury)        HPI:  Patient is a 49 y.o.  female with a PMHx of asthma who presents to the Emergency Department due to abnormal blood work. Patient currently being seen by Dr. Jiménez for pulmonology. She reports not feeling well for several weeks. Endorses chills and sob. Denies any fever or congestion. Reports having good urinary output. Denies any home meds at this time.     In the ED, wbc: 15.1k, h/h 7.8/25.8, plt: 826, bun/cr: 43/2.7.     Overview/Hospital Course:  49 year-old female admitted with c/o abnormal blood work. She reports she hasn't been feeling well for a few weeks now. She is being seen and worked up by Dr. Jiménez with pulmonology for for SOB and chills. She started with an URI that progressed to SOB. She had Spirometry in pulm clinic that showed obstruction with very severe ventilatory impairment unimproved with post bronchodilator. She reports unintentional weight loss of 25 lbs. Pulm concerned for Wegener's disease.  CT chest showed numerous bilateral lung opacities; consider atypical infection/inflammation as well as metastatic neoplasm; recommend further evaluation with PET-CT scan. Tree in bud micronodularity in lower lobes consistent with chronic small airway disease.  CT a/p with punctate nonobstructing renal calculi, no hydronephrosis. Mild to moderate amount of stool in colon, with diverticulitis.     Worsening kidney function. Nephrology consulted. US Kidney showed increased echogenicity to kidneys concerning for medical renal disease. Recommends kidney biopsy, pending this next week, will try for Monday. Will make NPO after midnight.    Hem/onc  "consulted, recommends further metastatic workup with upper/lower endoscopy. Consult GI for evaluation.   Cont rocephin and doxycycline.     Hgb 6.7, unknown bleeding source. Transfuse 1 unit PRBC (8/18).     08/19/2024  H&H improved after 1UPRBC. Continue to remain stable since that time. Endoscopic procedure planned this hospitalization. Dr. 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 Consider high dose steroids after renal biopsy. Renal biopsy priority moved up to this hospitalization. Reached out to IR, they will facilitate as soon as schedule allows.     Objective:   /74 (BP Location: Right arm, Patient Position: Lying)   Pulse 76   Temp 98.1 °F (36.7 °C) (Oral)   Resp 18   Ht 5' 4" (1.626 m)   Wt 44.2 kg (97 lb 6.4 oz)   LMP  (LMP Unknown)   SpO2 97%   Breastfeeding No   BMI 16.72 kg/m²     Intake/Output Summary (Last 24 hours) at 8/19/2024 1400  Last data filed at 8/19/2024 0745  Gross per 24 hour   Intake 0 ml   Output --   Net 0 ml       PHYSICAL EXAM  Vitals reviewed  Constitutional:       Appearance: Normal appearance.   HENT:      Head: Normocephalic.   Eyes:      Pupils: Pupils are equal, round, and reactive to light.   Cardiovascular:      Rate and Rhythm: Normal rate and regular rhythm.      Heart sounds: No murmur heard.  Pulmonary:      Effort: Pulmonary effort is normal.      Breath sounds: Normal breath sounds. No wheezing or rales.   Abdominal:      General: Bowel sounds are normal.      Palpations: Abdomen is soft.   Musculoskeletal:         General: Normal range of motion.   Skin:     General: Skin is warm and dry.   Neurological:      General: No focal deficit present.      Mental Status: She is alert and oriented to person, place, and time.   Psychiatric:         Mood and Affect: Mood normal.         Behavior: Behavior normal.     LABS  All labs from the past 24 hours were reviewed.     BMP:   Recent Labs   Lab " "08/19/24  0452   GLU 74   *   K 4.7      CO2 17*   BUN 24*   CREATININE 2.0*   CALCIUM 8.0*     CBC:   Recent Labs   Lab 08/18/24  0552 08/19/24  0452   WBC 12.67 13.95*   HGB 6.7* 9.0*   HCT 23.0* 29.4*   * 622*     CMP:   Recent Labs   Lab 08/18/24  0552 08/19/24  0452    135*   K 5.5* 4.7   * 109   CO2 19* 17*   GLU 71 74   BUN 26* 24*   CREATININE 2.1* 2.0*   CALCIUM 8.2* 8.0*   ALBUMIN 1.7* 1.8*   ANIONGAP 8 9     Cardiac Markers: No results for input(s): "CKMB", "MYOGLOBIN", "BNP", "TROPISTAT" in the last 48 hours.  Coagulation: No results for input(s): "PT", "INR", "APTT" in the last 48 hours.  Lactic Acid: No results for input(s): "LACTATE" in the last 48 hours.  Magnesium: No results for input(s): "MG" in the last 48 hours.  Troponin: No results for input(s): "TROPONINI", "TROPONINIHS" in the last 48 hours.  TSH:   Recent Labs   Lab 04/01/24  1505   TSH 2.852     Urine Studies:   No results for input(s): "COLORU", "APPEARANCEUA", "PHUR", "SPECGRAV", "PROTEINUA", "GLUCUA", "KETONESU", "BILIRUBINUA", "OCCULTUA", "NITRITE", "UROBILINOGEN", "LEUKOCYTESUR", "RBCUA", "WBCUA", "BACTERIA", "SQUAMEPITHEL", "HYALINECASTS" in the last 48 hours.    Invalid input(s): "WRIGHTSUR"    IMAGING  All imaging from the past 24 hours were reviewed.     Imaging Results              US Kidney (Final result)  Result time 08/15/24 15:43:06      Final result by Storm Boyce MD (08/15/24 15:43:06)                   Impression:      1.  Increased echogenicity to the kidneys concerning for medical renal disease.  Negative for hydronephrosis.    2.  Findings most likely representing small bilateral nonobstructing renal stones measuring up to 3 mm.      Electronically signed by: Storm Boyce MD  Date:    08/15/2024  Time:    15:43               Narrative:    EXAMINATION:  US KIDNEY    CLINICAL HISTORY:  vinh;    TECHNIQUE:  Ultrasound of the kidneys and urinary bladder was performed including color flow " and Doppler evaluation of the kidneys.    COMPARISON:  None.    FINDINGS:  Right kidney: The right kidney measures 12.0 cm. No cortical thinning. There is loss of corticomedullary distinction. Resistive index measures 0.65.  No mass. Possible tiny nonobstructing inferior pole stones noted.  No hydronephrosis.    Left kidney: The left kidney measures 12.2 cm. No cortical thinning. There is loss of corticomedullary distinction. Resistive index measures 0.63.  No mass. Possible 3 mm midpole stone.  No hydronephrosis.    The bladder is partially distended at the time of scanning and has an unremarkable appearance.                                        Assessment/Plan:      * JAIME (acute kidney injury)  Outpt pulm concerned for wegeners disease  Nephrology following, appreciate  Cont IVF  Renal U/S negative for obstruction  Urine protein/Cr 1.01; urine protein 47; urine sodium 106  NPO after midnight  Creatinine 2.1, trending down  Monitor labs daily    08/19/2024  Renal function steadily improving   Discussed with Pulmnology, suspects an element of vasculitis (possibly Wegener's)  Renal biopsy to be perfomed inpatient when IR's schedule allows.   Discussed with IR, will attempt to obtain today if their schedule allows    Thrombocytosis  Likely reactive due to inflammation     PNA (pneumonia)  Cont doxycyline and Rocephin IV   Duonebs as needed    Iron deficiency anemia  S/p Venofer given, cont ferrous sulfate  Hema/onc following, appreciate, recommended endoscopy  Consult GI in the morning for endoscopy  Hgb 6.7  Transfuse 1 unit PRBC (8/18)  Repeat labs in the morning    08/19/2024  --hgb promptly corrected to 9.0 after 1uPRBC  --no obvious signs of bleeding on exam  --serial CBC to trend h&h  --transfuse for hgb < 7          VTE Risk Mitigation (From admission, onward)      None            Discharge Planning   POOJA: 8/17/2024     Code Status: Full Code   Is the patient medically ready for discharge?:     Reason for  patient still in hospital (select all that apply): Patient trending condition and Consult recommendations  Discharge Plan A: Home   Discharge Delays: None known at this time              Cm Lorenzo MD  Department of Hospital Medicine   [facility]

## 2024-08-19 NOTE — ASSESSMENT & PLAN NOTE
S/p Venofer given, cont ferrous sulfate  Hema/onc following, appreciate, recommended endoscopy  Consult GI in the morning for endoscopy  Hgb 6.7  Transfuse 1 unit PRBC (8/18)  Repeat labs in the morning    08/19/2024  --hgb promptly corrected to 9.0 after 1uPRBC  --no obvious signs of bleeding on exam  --serial CBC to trend h&h  --transfuse for hgb < 7

## 2024-08-19 NOTE — PROGRESS NOTES
"Nephrology Progress Note     History of Present Illness     History of asthma presenting to the hospital with 4 week history of respiratory symptoms not improving with steroids and beta agonist inhaler.  On arrival here, creatinine 2.6 with a peak of 2.7 within improving with some IV fluids.  JAIME of uncertain etiology with active sediment on urinalysis in both blood and protein.  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  Thus far AZAM as well as complements unremarkable.  Hepatitis-C negative.  She did have an SPEP with to monoclonal spikes.  Hematology is following.  ANCA, Anti-GBM, FLC, UPEP pending at this time.      Interval History   Overnight/currently:  Creatinine remains stable this morning at 2.0.  Patient feeling well and states that her breathing is overall much improved.  Given the abrupt drop in hemoglobin with need for transfusion, she is evidently being prepped for endoscopy.  This makes doing a renal biopsy tomorrow unlikely.        Allergies:    has No Known Allergies.    Current medications:   Scheduled Meds:   cefTRIAXone (Rocephin) IV (PEDS and ADULTS)  1 g Intravenous Q24H    doxycycline  100 mg Oral BID    ferrous sulfate  1 tablet Oral Daily    iron sucrose  200 mg Intravenous Q7 Days     Continuous Infusions:   0.9% NaCl   Intravenous Continuous 100 mL/hr at 08/15/24 1800 Rate Change at 08/15/24 1800     PRN Meds:.  Current Facility-Administered Medications:     0.9%  NaCl infusion (for blood administration), , Intravenous, Q24H PRN    acetaminophen, 650 mg, Oral, Q6H PRN    albuterol-ipratropium, 3 mL, Nebulization, Q6H PRN    hydrALAZINE, 10 mg, Intravenous, Q6H PRN    ondansetron, 4 mg, Oral, Q6H PRN     Physical Examination     VS/Measurements    /74 (BP Location: Right arm, Patient Position: Lying)   Pulse 76   Temp 98.1 °F (36.7 °C) (Oral)   Resp 18   Ht 5' 4" (1.626 m)   Wt 44.2 kg (97 lb 6.4 oz)   LMP  (LMP Unknown)   SpO2 97%   Breastfeeding No   BMI " 16.72 kg/m²         General:  Moderately built, not in any distress.  Neck:  Supple,   Respiratory: Non-labored,  Lungs are clear to auscultation.    Cardiovascular:  Normal rate, Regular rhythm.   Abdomen:  Soft, Non-tender, Normal bowel sounds.   Muskuloskeletal:  No pedal edema          Laboratory Results   Today's Lab Results :    Recent Results (from the past 24 hour(s))   Protein, Random Urine    Collection Time: 08/19/24  4:41 AM   Result Value Ref Range    Protein, Urine Random 12 0 - 15 mg/dL   CBC Auto Differential    Collection Time: 08/19/24  4:52 AM   Result Value Ref Range    WBC 13.95 (H) 3.90 - 12.70 K/uL    RBC 3.44 (L) 4.00 - 5.40 M/uL    Hemoglobin 9.0 (L) 12.0 - 16.0 g/dL    Hematocrit 29.4 (L) 37.0 - 48.5 %    MCV 86 82 - 98 fL    MCH 26.2 (L) 27.0 - 31.0 pg    MCHC 30.6 (L) 32.0 - 36.0 g/dL    RDW 15.2 (H) 11.5 - 14.5 %    Platelets 622 (H) 150 - 450 K/uL    MPV 9.5 9.2 - 12.9 fL    Immature Granulocytes 0.7 (H) 0.0 - 0.5 %    Gran # (ANC) 10.0 (H) 1.8 - 7.7 K/uL    Immature Grans (Abs) 0.10 (H) 0.00 - 0.04 K/uL    Lymph # 2.1 1.0 - 4.8 K/uL    Mono # 1.1 (H) 0.3 - 1.0 K/uL    Eos # 0.6 (H) 0.0 - 0.5 K/uL    Baso # 0.12 0.00 - 0.20 K/uL    nRBC 0 0 /100 WBC    Gran % 71.4 38.0 - 73.0 %    Lymph % 15.2 (L) 18.0 - 48.0 %    Mono % 7.6 4.0 - 15.0 %    Eosinophil % 4.2 0.0 - 8.0 %    Basophil % 0.9 0.0 - 1.9 %    Differential Method Automated    Renal Function Panel    Collection Time: 08/19/24  4:52 AM   Result Value Ref Range    Glucose 74 70 - 110 mg/dL    Sodium 135 (L) 136 - 145 mmol/L    Potassium 4.7 3.5 - 5.1 mmol/L    Chloride 109 95 - 110 mmol/L    CO2 17 (L) 23 - 29 mmol/L    BUN 24 (H) 6 - 20 mg/dL    Calcium 8.0 (L) 8.7 - 10.5 mg/dL    Creatinine 2.0 (H) 0.5 - 1.4 mg/dL    Albumin 1.8 (L) 3.5 - 5.2 g/dL    Phosphorus 4.2 2.7 - 4.5 mg/dL    eGFR 30 (A) >60 mL/min/1.73 m^2    Anion Gap 9 8 - 16 mmol/L       LABS:  Reviewed Yes      Intake/Output Summary (Last 24 hours) at 8/19/2024  1513  Last data filed at 8/19/2024 0745  Gross per 24 hour   Intake 0 ml   Output --   Net 0 ml       Assessment and Plan     JAIME.  Uncertain etiology with active sediment on urinalysis.  Has also been having your eye problems bringing up and concern for pulmonary renal syndrome.  Thus for AZAM and complements normal.  Hepatitis C was also negative.  Rheumatoid factor was positive and SPEP was positive for to monoclonal spikes.  This point we are still waiting on a UPEP as well as serum free light chain analysis, ANCA panel and anti-GBM.  We have been attempting to get a renal biopsy done.  Question whether or not that can be done tomorrow given the endoscopy.  Unlikely.  Possible outpatient at this point given her improvement in renal function.  Continue supportive efforts with avoidance of hypotension and nephrotoxins.  Continue IV fluids.      Microscopic hematuria.  As above.  Repeat urinalysis tomorrow.    Anemia.  SPEP as above.  Hematology following.  Status post PRBCs.  GI consulted for endoscopy.      Positive rheumatoid factor.  Rheumatology consult placed with the outpatient setting.  Scheduled for January of 2025.  May need that moved up sooner.      Proteinuria.  Approximately 1 g by ratio.  Hold ACE-inhibitor or ARB at this time.    Metabolic acidosis.  Start sodium bicarbonate tablets twice a day.    Thrombocytosis.  Felt to be reactive from severe iron-deficiency anemia.  Hematology following.    ________________________________________________  Hermes Galeas

## 2024-08-19 NOTE — PLAN OF CARE
Problem: Adult Inpatient Plan of Care  Goal: Plan of Care Review  Outcome: Progressing  Goal: Patient-Specific Goal (Individualized)  Outcome: Progressing  Goal: Absence of Hospital-Acquired Illness or Injury  Outcome: Progressing  Goal: Optimal Comfort and Wellbeing  Outcome: Progressing  Goal: Readiness for Transition of Care  Outcome: Progressing     Problem: Acute Kidney Injury/Impairment  Goal: Fluid and Electrolyte Balance  Outcome: Progressing  Goal: Improved Oral Intake  Outcome: Progressing  Goal: Effective Renal Function  Outcome: Progressing     Problem: Pain Acute  Goal: Optimal Pain Control and Function  Outcome: Progressing     Problem: Fall Injury Risk  Goal: Absence of Fall and Fall-Related Injury  Outcome: Progressing     Problem: Infection  Goal: Absence of Infection Signs and Symptoms  Outcome: Progressing     Problem: Pneumonia  Goal: Fluid Balance  Outcome: Progressing  Goal: Resolution of Infection Signs and Symptoms  Outcome: Progressing  Goal: Effective Oxygenation and Ventilation  Outcome: Progressing

## 2024-08-19 NOTE — ASSESSMENT & PLAN NOTE
Documented severe iron deficiency anemia exacerbated by renal insufficiency.  Recommend 1 dose of intravenous iron ASAP.  With documented iron deficiency 4 month duration weight loss.  High suspicion for metastatic disease would recommend that patient have ultrasound of abdomen.  Would recommend upper lower endoscopies ASAP to make sure see if she has a GI malignancy.  Discussed implications of answered questions with she and her mother  08/19/2024 spoke with GI Medicine this morning I spoke with mother in patient's bedside agree with upper lower endoscopies IV iron has been given should be given every 7 days to improve counts

## 2024-08-19 NOTE — PROGRESS NOTES
[facility] Surg  Hematology/Oncology  Progress Note    Patient Name: Shayy Faust  Admission Date: 8/15/2024  Hospital Length of Stay: 4 days  Code Status: Full Code     Subjective:     HPI:  49-year-old female with a 25 lb weight loss over the last several months.  Patient reports increasing fatigue and weakness patient found to be profoundly iron deficient.  CT chest demonstrated questionable infiltrative lung possible metastatic disease.  ECOG status 2 at bedside with mother    Interval History:  Patient is scheduled for EGD colonoscopies today RI    Oncology Treatment Plan:   [No matching plan found]    Medications:  Continuous Infusions:   0.9% NaCl   Intravenous Continuous 100 mL/hr at 08/15/24 1800 Rate Change at 08/15/24 1800     Scheduled Meds:   cefTRIAXone (Rocephin) IV (PEDS and ADULTS)  1 g Intravenous Q24H    doxycycline  100 mg Oral BID    ferrous sulfate  1 tablet Oral Daily    iron sucrose  200 mg Intravenous Q7 Days     PRN Meds:  Current Facility-Administered Medications:     0.9%  NaCl infusion (for blood administration), , Intravenous, Q24H PRN    acetaminophen, 650 mg, Oral, Q6H PRN    albuterol-ipratropium, 3 mL, Nebulization, Q6H PRN    hydrALAZINE, 10 mg, Intravenous, Q6H PRN    ondansetron, 4 mg, Oral, Q6H PRN     Review of Systems   Constitutional:  Positive for fatigue. Negative for activity change, appetite change, chills, diaphoresis, fever and unexpected weight change.   HENT:  Negative for congestion, dental problem, drooling, ear discharge, ear pain, facial swelling, hearing loss, mouth sores, nosebleeds, postnasal drip, rhinorrhea, sinus pressure, sneezing, sore throat, tinnitus, trouble swallowing and voice change.    Eyes:  Negative for photophobia, pain, discharge, redness, itching and visual disturbance.   Respiratory:  Negative for cough, choking, chest tightness, shortness of breath, wheezing and stridor.    Cardiovascular:  Negative for chest pain, palpitations and  leg swelling.   Gastrointestinal:  Negative for abdominal distention, abdominal pain, anal bleeding, blood in stool, constipation, diarrhea, nausea, rectal pain and vomiting.   Endocrine: Negative for cold intolerance, heat intolerance, polydipsia, polyphagia and polyuria.   Genitourinary:  Negative for decreased urine volume, difficulty urinating, dyspareunia, dysuria, enuresis, flank pain, frequency, genital sores, hematuria, menstrual problem, pelvic pain, urgency, vaginal bleeding, vaginal discharge and vaginal pain.   Musculoskeletal:  Negative for arthralgias, back pain, gait problem, joint swelling, myalgias, neck pain and neck stiffness.   Skin:  Negative for color change, pallor and rash.   Allergic/Immunologic: Negative for environmental allergies, food allergies and immunocompromised state.   Neurological:  Positive for weakness. Negative for dizziness, tremors, seizures, syncope, facial asymmetry, speech difficulty, light-headedness, numbness and headaches.   Hematological:  Negative for adenopathy. Does not bruise/bleed easily.   Psychiatric/Behavioral:  Positive for dysphoric mood. Negative for agitation, behavioral problems, confusion, decreased concentration, hallucinations, self-injury, sleep disturbance and suicidal ideas. The patient is nervous/anxious. The patient is not hyperactive.      Objective:     Vital Signs (Most Recent):  Temp: 98.9 °F (37.2 °C) (08/19/24 0740)  Pulse: 79 (08/19/24 0740)  Resp: 18 (08/19/24 0740)  BP: 128/89 (08/19/24 0740)  SpO2: 96 % (08/19/24 0740) Vital Signs (24h Range):  Temp:  [98.4 °F (36.9 °C)-99.6 °F (37.6 °C)] 98.9 °F (37.2 °C)  Pulse:  [78-84] 79  Resp:  [15-18] 18  SpO2:  [93 %-98 %] 96 %  BP: (117-152)/(67-89) 128/89     Weight: 44.2 kg (97 lb 6.4 oz)  Body mass index is 16.72 kg/m².  Body surface area is 1.41 meters squared.      Intake/Output Summary (Last 24 hours) at 8/19/2024 0742  Last data filed at 8/18/2024 1155  Gross per 24 hour   Intake 25 ml    Output --   Net 25 ml        Physical Exam  Vitals reviewed.   Constitutional:       General: She is not in acute distress.     Appearance: She is well-developed. She is ill-appearing. She is not diaphoretic.   HENT:      Head: Normocephalic and atraumatic.      Right Ear: External ear normal.      Left Ear: External ear normal.      Nose: Nose normal.      Right Sinus: No maxillary sinus tenderness or frontal sinus tenderness.      Left Sinus: No maxillary sinus tenderness or frontal sinus tenderness.      Mouth/Throat:      Pharynx: No oropharyngeal exudate.   Eyes:      General: Lids are normal. No scleral icterus.        Right eye: No discharge.         Left eye: No discharge.      Conjunctiva/sclera: Conjunctivae normal.      Right eye: Right conjunctiva is not injected. No hemorrhage.     Left eye: Left conjunctiva is not injected. No hemorrhage.     Pupils: Pupils are equal, round, and reactive to light.   Neck:      Thyroid: No thyromegaly.      Vascular: No JVD.      Trachea: No tracheal deviation.   Cardiovascular:      Rate and Rhythm: Normal rate.   Pulmonary:      Effort: Pulmonary effort is normal. No respiratory distress.      Breath sounds: No stridor.   Chest:      Chest wall: No tenderness.   Abdominal:      General: Bowel sounds are normal. There is no distension.      Palpations: Abdomen is soft. There is no hepatomegaly, splenomegaly or mass.      Tenderness: There is no abdominal tenderness. There is no rebound.   Musculoskeletal:         General: No tenderness. Normal range of motion.      Cervical back: Normal range of motion and neck supple.   Lymphadenopathy:      Cervical: No cervical adenopathy.      Upper Body:      Right upper body: No supraclavicular adenopathy.      Left upper body: No supraclavicular adenopathy.   Skin:     General: Skin is dry.      Findings: No erythema or rash.   Neurological:      Mental Status: She is alert and oriented to person, place, and time.      Cranial  "Nerves: No cranial nerve deficit.      Coordination: Coordination normal.   Psychiatric:         Behavior: Behavior normal.         Thought Content: Thought content normal.         Judgment: Judgment normal.          Significant Labs:   BMP:   Recent Labs   Lab 08/18/24  0552 08/19/24  0452   GLU 71 74    135*   K 5.5* 4.7   * 109   CO2 19* 17*   BUN 26* 24*   CREATININE 2.1* 2.0*   CALCIUM 8.2* 8.0*   , CBC:   Recent Labs   Lab 08/17/24  1326 08/18/24  0552 08/19/24  0452   WBC 16.19* 12.67 13.95*   HGB 7.1* 6.7* 9.0*   HCT 22.9* 23.0* 29.4*   * 654* 622*   , CMP:   Recent Labs   Lab 08/18/24  0552 08/19/24  0452    135*   K 5.5* 4.7   * 109   CO2 19* 17*   GLU 71 74   BUN 26* 24*   CREATININE 2.1* 2.0*   CALCIUM 8.2* 8.0*   ALBUMIN 1.7* 1.8*   ANIONGAP 8 9   , Coagulation: No results for input(s): "PT", "INR", "APTT" in the last 48 hours., Haptoglobin: No results for input(s): "HAPTOGLOBIN" in the last 48 hours., Immunology: No results for input(s): "SPEP", "ABDELRAHMAN", "AZAM", "FREELAMBDALI" in the last 48 hours., LDH: No results for input(s): "LDHCSF", "BFSOURCE" in the last 48 hours., LFTs:   Recent Labs   Lab 08/18/24  0552 08/19/24 0452   ALBUMIN 1.7* 1.8*   , Reticulocytes: No results for input(s): "RETIC" in the last 48 hours., Tumor Markers: No results for input(s): "PSA", "CEA", "", "AFPTM", "IG0643", "" in the last 48 hours.    Invalid input(s): "ALGTM", Uric Acid No results for input(s): "URICACID" in the last 48 hours., and Urine Studies: No results for input(s): "COLORU", "APPEARANCEUA", "PHUR", "SPECGRAV", "PROTEINUA", "GLUCUA", "KETONESU", "BILIRUBINUA", "OCCULTUA", "NITRITE", "UROBILINOGEN", "LEUKOCYTESUR", "RBCUA", "WBCUA", "BACTERIA", "SQUAMEPITHEL", "HYALINECASTS" in the last 48 hours.    Invalid input(s): "WRIGHTSUR"    Diagnostic Results:  I have reviewed all pertinent imaging results/findings within the past 24 hours.  Assessment/Plan: "     Thrombocytosis  Thrombocytosis probably secondary to severe iron deficiency anemia reactive in nature    PNA (pneumonia)  Concern over metastatic disease.  Would strongly recommend that patient have imaging done of abdomen least ultrasound of liver.  Abdomen would also recommend that patient undergo EGD colonoscopies ASAP    Iron deficiency anemia  Documented severe iron deficiency anemia exacerbated by renal insufficiency.  Recommend 1 dose of intravenous iron ASAP.  With documented iron deficiency 4 month duration weight loss.  High suspicion for metastatic disease would recommend that patient have ultrasound of abdomen.  Would recommend upper lower endoscopies ASAP to make sure see if she has a GI malignancy.  Discussed implications of answered questions with she and her mother  08/19/2024 spoke with GI Medicine this morning I spoke with mother in patient's bedside agree with upper lower endoscopies IV iron has been given should be given every 7 days to improve counts        Thank you for your consult. I will follow-up with patient. Please contact us if you have any additional questions.     Marcin Mercado MD  Hematology/Oncology  [facility]

## 2024-08-20 DIAGNOSIS — D50.8 OTHER IRON DEFICIENCY ANEMIA: Primary | ICD-10-CM

## 2024-08-20 LAB
ALBUMIN SERPL BCP-MCNC: 1.8 G/DL (ref 3.5–5.2)
ANCA AB TITR SER IF: ABNORMAL TITER
ANION GAP SERPL CALC-SCNC: 13 MMOL/L (ref 8–16)
B-HCG UR QL: NEGATIVE
BACTERIA #/AREA URNS HPF: ABNORMAL /HPF
BASOPHILS # BLD AUTO: 0.16 K/UL (ref 0–0.2)
BASOPHILS NFR BLD: 0.9 % (ref 0–1.9)
BILIRUB UR QL STRIP: NEGATIVE
BUN SERPL-MCNC: 24 MG/DL (ref 6–20)
CALCIUM SERPL-MCNC: 8.6 MG/DL (ref 8.7–10.5)
CHLORIDE SERPL-SCNC: 108 MMOL/L (ref 95–110)
CLARITY UR: CLEAR
CO2 SERPL-SCNC: 16 MMOL/L (ref 23–29)
COLOR UR: YELLOW
CREAT SERPL-MCNC: 2.1 MG/DL (ref 0.5–1.4)
CTP QC/QA: YES
DIFFERENTIAL METHOD BLD: ABNORMAL
EOSINOPHIL # BLD AUTO: 0.2 K/UL (ref 0–0.5)
EOSINOPHIL NFR BLD: 1.1 % (ref 0–8)
ERYTHROCYTE [DISTWIDTH] IN BLOOD BY AUTOMATED COUNT: 15.1 % (ref 11.5–14.5)
EST. GFR  (NO RACE VARIABLE): 28 ML/MIN/1.73 M^2
GLUCOSE SERPL-MCNC: 60 MG/DL (ref 70–110)
GLUCOSE UR QL STRIP: NEGATIVE
HCT VFR BLD AUTO: 30.3 % (ref 37–48.5)
HGB BLD-MCNC: 9.4 G/DL (ref 12–16)
HGB UR QL STRIP: ABNORMAL
IMM GRANULOCYTES # BLD AUTO: 0.1 K/UL (ref 0–0.04)
IMM GRANULOCYTES NFR BLD AUTO: 0.6 % (ref 0–0.5)
INR PPP: 1.1 (ref 0.8–1.2)
INTERPRETATION UR IFE-IMP: NORMAL
KETONES UR QL STRIP: ABNORMAL
LEUKOCYTE ESTERASE UR QL STRIP: NEGATIVE
LYMPHOCYTES # BLD AUTO: 2 K/UL (ref 1–4.8)
LYMPHOCYTES NFR BLD: 11.5 % (ref 18–48)
MCH RBC QN AUTO: 26.2 PG (ref 27–31)
MCHC RBC AUTO-ENTMCNC: 31 G/DL (ref 32–36)
MCV RBC AUTO: 84 FL (ref 82–98)
MICROSCOPIC COMMENT: ABNORMAL
MONOCYTES # BLD AUTO: 1 K/UL (ref 0.3–1)
MONOCYTES NFR BLD: 5.7 % (ref 4–15)
NEUTROPHILS # BLD AUTO: 14.2 K/UL (ref 1.8–7.7)
NEUTROPHILS NFR BLD: 80.2 % (ref 38–73)
NITRITE UR QL STRIP: NEGATIVE
NRBC BLD-RTO: 0 /100 WBC
P-ANCA TITR SER IF: ABNORMAL TITER
PH UR STRIP: 6 [PH] (ref 5–8)
PHOSPHATE SERPL-MCNC: 4.9 MG/DL (ref 2.7–4.5)
PLATELET # BLD AUTO: 633 K/UL (ref 150–450)
PMV BLD AUTO: 8.8 FL (ref 9.2–12.9)
POTASSIUM SERPL-SCNC: 4.6 MMOL/L (ref 3.5–5.1)
PROT UR QL STRIP: ABNORMAL
PROTHROMBIN TIME: 12.4 SEC (ref 9–12.5)
RBC # BLD AUTO: 3.59 M/UL (ref 4–5.4)
RBC #/AREA URNS HPF: 22 /HPF (ref 0–4)
SODIUM SERPL-SCNC: 137 MMOL/L (ref 136–145)
SP GR UR STRIP: 1.01 (ref 1–1.03)
URN SPEC COLLECT METH UR: ABNORMAL
UROBILINOGEN UR STRIP-ACNC: NEGATIVE EU/DL
WBC # BLD AUTO: 17.67 K/UL (ref 3.9–12.7)
WBC #/AREA URNS HPF: 3 /HPF (ref 0–5)

## 2024-08-20 PROCEDURE — 0TB13ZX EXCISION OF LEFT KIDNEY, PERCUTANEOUS APPROACH, DIAGNOSTIC: ICD-10-PCS | Performed by: RADIOLOGY

## 2024-08-20 PROCEDURE — 63600175 PHARM REV CODE 636 W HCPCS: Performed by: RADIOLOGY

## 2024-08-20 PROCEDURE — 25000003 PHARM REV CODE 250: Performed by: INTERNAL MEDICINE

## 2024-08-20 PROCEDURE — 11000001 HC ACUTE MED/SURG PRIVATE ROOM

## 2024-08-20 PROCEDURE — 81000 URINALYSIS NONAUTO W/SCOPE: CPT | Performed by: INTERNAL MEDICINE

## 2024-08-20 PROCEDURE — 43239 EGD BIOPSY SINGLE/MULTIPLE: CPT | Performed by: INTERNAL MEDICINE

## 2024-08-20 PROCEDURE — 37000009 HC ANESTHESIA EA ADD 15 MINS: Performed by: INTERNAL MEDICINE

## 2024-08-20 PROCEDURE — 81025 URINE PREGNANCY TEST: CPT | Performed by: INTERNAL MEDICINE

## 2024-08-20 PROCEDURE — 0DB98ZX EXCISION OF DUODENUM, VIA NATURAL OR ARTIFICIAL OPENING ENDOSCOPIC, DIAGNOSTIC: ICD-10-PCS | Performed by: INTERNAL MEDICINE

## 2024-08-20 PROCEDURE — 45378 DIAGNOSTIC COLONOSCOPY: CPT | Mod: ,,, | Performed by: INTERNAL MEDICINE

## 2024-08-20 PROCEDURE — 88342 IMHCHEM/IMCYTCHM 1ST ANTB: CPT | Mod: 26,,, | Performed by: STUDENT IN AN ORGANIZED HEALTH CARE EDUCATION/TRAINING PROGRAM

## 2024-08-20 PROCEDURE — 45378 DIAGNOSTIC COLONOSCOPY: CPT | Performed by: INTERNAL MEDICINE

## 2024-08-20 PROCEDURE — 27201012 HC FORCEPS, HOT/COLD, DISP: Performed by: INTERNAL MEDICINE

## 2024-08-20 PROCEDURE — 37000008 HC ANESTHESIA 1ST 15 MINUTES: Performed by: INTERNAL MEDICINE

## 2024-08-20 PROCEDURE — 63600175 PHARM REV CODE 636 W HCPCS: Performed by: INTERNAL MEDICINE

## 2024-08-20 PROCEDURE — 0DJD8ZZ INSPECTION OF LOWER INTESTINAL TRACT, VIA NATURAL OR ARTIFICIAL OPENING ENDOSCOPIC: ICD-10-PCS | Performed by: INTERNAL MEDICINE

## 2024-08-20 PROCEDURE — 88305 TISSUE EXAM BY PATHOLOGIST: CPT | Mod: 26,,, | Performed by: STUDENT IN AN ORGANIZED HEALTH CARE EDUCATION/TRAINING PROGRAM

## 2024-08-20 PROCEDURE — 85025 COMPLETE CBC W/AUTO DIFF WBC: CPT | Performed by: STUDENT IN AN ORGANIZED HEALTH CARE EDUCATION/TRAINING PROGRAM

## 2024-08-20 PROCEDURE — 25000003 PHARM REV CODE 250: Performed by: NURSE ANESTHETIST, CERTIFIED REGISTERED

## 2024-08-20 PROCEDURE — 88342 IMHCHEM/IMCYTCHM 1ST ANTB: CPT | Performed by: STUDENT IN AN ORGANIZED HEALTH CARE EDUCATION/TRAINING PROGRAM

## 2024-08-20 PROCEDURE — 36415 COLL VENOUS BLD VENIPUNCTURE: CPT | Performed by: INTERNAL MEDICINE

## 2024-08-20 PROCEDURE — 0DB78ZX EXCISION OF STOMACH, PYLORUS, VIA NATURAL OR ARTIFICIAL OPENING ENDOSCOPIC, DIAGNOSTIC: ICD-10-PCS | Performed by: INTERNAL MEDICINE

## 2024-08-20 PROCEDURE — 63600175 PHARM REV CODE 636 W HCPCS: Performed by: NURSE ANESTHETIST, CERTIFIED REGISTERED

## 2024-08-20 PROCEDURE — 80069 RENAL FUNCTION PANEL: CPT | Performed by: INTERNAL MEDICINE

## 2024-08-20 PROCEDURE — 99232 SBSQ HOSP IP/OBS MODERATE 35: CPT | Mod: ,,, | Performed by: INTERNAL MEDICINE

## 2024-08-20 PROCEDURE — 88305 TISSUE EXAM BY PATHOLOGIST: CPT | Mod: 59 | Performed by: STUDENT IN AN ORGANIZED HEALTH CARE EDUCATION/TRAINING PROGRAM

## 2024-08-20 PROCEDURE — 99233 SBSQ HOSP IP/OBS HIGH 50: CPT | Mod: ,,, | Performed by: INTERNAL MEDICINE

## 2024-08-20 PROCEDURE — 85610 PROTHROMBIN TIME: CPT | Performed by: STUDENT IN AN ORGANIZED HEALTH CARE EDUCATION/TRAINING PROGRAM

## 2024-08-20 PROCEDURE — 43239 EGD BIOPSY SINGLE/MULTIPLE: CPT | Mod: 51,,, | Performed by: INTERNAL MEDICINE

## 2024-08-20 RX ORDER — DEXTROMETHORPHAN/PSEUDOEPHED 2.5-7.5/.8
DROPS ORAL
Status: DISCONTINUED | OUTPATIENT
Start: 2024-08-20 | End: 2024-08-20 | Stop reason: HOSPADM

## 2024-08-20 RX ORDER — FENTANYL CITRATE 50 UG/ML
INJECTION, SOLUTION INTRAMUSCULAR; INTRAVENOUS CODE/TRAUMA/SEDATION MEDICATION
Status: COMPLETED | OUTPATIENT
Start: 2024-08-20 | End: 2024-08-20

## 2024-08-20 RX ORDER — LIDOCAINE HYDROCHLORIDE 10 MG/ML
INJECTION, SOLUTION EPIDURAL; INFILTRATION; INTRACAUDAL; PERINEURAL
Status: DISCONTINUED | OUTPATIENT
Start: 2024-08-20 | End: 2024-08-20

## 2024-08-20 RX ORDER — SODIUM BICARBONATE 650 MG/1
650 TABLET ORAL 2 TIMES DAILY
Status: DISCONTINUED | OUTPATIENT
Start: 2024-08-20 | End: 2024-08-26 | Stop reason: HOSPADM

## 2024-08-20 RX ORDER — PROPOFOL 10 MG/ML
VIAL (ML) INTRAVENOUS
Status: DISCONTINUED | OUTPATIENT
Start: 2024-08-20 | End: 2024-08-20

## 2024-08-20 RX ORDER — MIDAZOLAM HYDROCHLORIDE 1 MG/ML
INJECTION, SOLUTION INTRAMUSCULAR; INTRAVENOUS CODE/TRAUMA/SEDATION MEDICATION
Status: COMPLETED | OUTPATIENT
Start: 2024-08-20 | End: 2024-08-20

## 2024-08-20 RX ADMIN — FERROUS SULFATE TAB 325 MG (65 MG ELEMENTAL FE) 1 EACH: 325 (65 FE) TAB at 11:08

## 2024-08-20 RX ADMIN — SODIUM BICARBONATE 650 MG: 650 TABLET ORAL at 08:08

## 2024-08-20 RX ADMIN — MIDAZOLAM 0.5 MG: 1 INJECTION INTRAMUSCULAR; INTRAVENOUS at 02:08

## 2024-08-20 RX ADMIN — DOXYCYCLINE HYCLATE 100 MG: 100 TABLET, COATED ORAL at 08:08

## 2024-08-20 RX ADMIN — PROPOFOL 20 MG: 10 INJECTION, EMULSION INTRAVENOUS at 09:08

## 2024-08-20 RX ADMIN — CEFTRIAXONE 1 G: 1 INJECTION, POWDER, FOR SOLUTION INTRAMUSCULAR; INTRAVENOUS at 05:08

## 2024-08-20 RX ADMIN — METHYLPREDNISOLONE SODIUM SUCCINATE 1000 MG: 1 INJECTION, POWDER, LYOPHILIZED, FOR SOLUTION INTRAMUSCULAR; INTRAVENOUS at 05:08

## 2024-08-20 RX ADMIN — DOXYCYCLINE HYCLATE 100 MG: 100 TABLET, COATED ORAL at 11:08

## 2024-08-20 RX ADMIN — FENTANYL CITRATE 25 MCG: 0.05 INJECTION, SOLUTION INTRAMUSCULAR; INTRAVENOUS at 02:08

## 2024-08-20 RX ADMIN — PROPOFOL 100 MG: 10 INJECTION, EMULSION INTRAVENOUS at 09:08

## 2024-08-20 RX ADMIN — SODIUM CHLORIDE, POTASSIUM CHLORIDE, SODIUM LACTATE AND CALCIUM CHLORIDE: 600; 310; 30; 20 INJECTION, SOLUTION INTRAVENOUS at 09:08

## 2024-08-20 RX ADMIN — LIDOCAINE HYDROCHLORIDE 50 MG: 10 SOLUTION INTRAVENOUS at 09:08

## 2024-08-20 NOTE — INTERVAL H&P NOTE
The patient has been examined and the H&P has been reviewed:    I concur with the findings and changes have been noted since the H&P was written: tolerated bowel prep, no bloody output.    Anesthesia/Surgery risks, benefits and alternative options discussed and understood by patient/family.    The risks, benefits and alternatives of the procedure were discussed with the patient in detail. This discussion was had in the presence of endoscopy staff. The risks include, risks of adverse reaction to sedation requiring the use of reversal agents, bleeding requiring blood transfusion, perforation requiring surgical intervention and technical failure. Other risks include aspiration leading to respiratory distress and respiratory failure resulting in endotracheal intubation and mechanical ventilation including death. If anesthesia is being utilized for this procedure, it is up to the anesthesiologist to determine airway safety including elective endotracheal intubation. Questions were answered, they agree to proceed. There was no language barriers.        Active Hospital Problems    Diagnosis  POA    *JAIME (acute kidney injury) [N17.9]  Yes     Priority: 2     Iron deficiency anemia [D50.9]  Yes     Priority: 1 - High    PNA (pneumonia) [J18.9]  Yes     Priority: 3     Thrombocytosis [D75.839]  Yes     Priority: 4       Resolved Hospital Problems   No resolved problems to display.

## 2024-08-20 NOTE — ASSESSMENT & PLAN NOTE
Documented severe iron deficiency anemia exacerbated by renal insufficiency.  Recommend 1 dose of intravenous iron ASAP.  With documented iron deficiency 4 month duration weight loss.  High suspicion for metastatic disease would recommend that patient have ultrasound of abdomen.  Would recommend upper lower endoscopies ASAP to make sure see if she has a GI malignancy.  Discussed implications of answered questions with she and her mother  08/19/2024 spoke with GI Medicine this morning I spoke with mother in patient's bedside agree with upper lower endoscopies IV iron has been given should be given every 7 days to improve counts  08/20/2024 await results of EGD colonoscopies.  Discussed with GI Medicine yesterday rationale for doing so

## 2024-08-20 NOTE — [employer] PATIENT INSTRUCTIONS
Discharge Summary/Instructions after an Endoscopic Procedure  Patient Name: Shayy Faust  Patient MRN: [id number]  Patient YOB: 1975 Tuesday, August 20, 2024 Gena Saxena MD  Dear patient,  As a result of recent federal legislation (The Federal Cures Act), you may   receive lab or pathology results from your procedure in your [facility]   account before your physician is able to contact you. Your physician or   their representative will relay the results to you with their   recommendations at their soonest availability.  Thank you,  RESTRICTIONS:  During your procedure today, you received medications for sedation.  These   medications may affect your judgment, balance and coordination.  Therefore,   for 24 hours, you have the following restrictions:   - DO NOT drive a car, operate machinery, make legal/financial decisions,   sign important papers or drink alcohol.    ACTIVITY:  Today: no heavy lifting, straining or running due to procedural   sedation/anesthesia.  The following day: return to full activity including work.  DIET:  Eat and drink normally unless instructed otherwise.     TREATMENT FOR COMMON SIDE EFFECTS:  - Mild abdominal pain, nausea, belching, bloating or excessive gas:  rest,   eat lightly and use a heating pad.  - Sore Throat: treat with throat lozenges and/or gargle with warm salt   water.  - Because air was used during the procedure, expelling large amounts of air   from your rectum or belching is normal.  - If a bowel prep was taken, you may not have a bowel movement for 1-3 days.    This is normal.  SYMPTOMS TO WATCH FOR AND REPORT TO YOUR PHYSICIAN:  1. Abdominal pain or bloating, other than gas cramps.  2. Chest pain.  3. Back pain.  4. Signs of infection such as: chills or fever occurring within 24 hours   after the procedure.  5. Rectal bleeding, which would show as bright red, maroon, or black stools.   (A tablespoon of blood from the rectum is not serious, especially if    hemorrhoids are present.)  6. Vomiting.  7. Weakness or dizziness.  GO DIRECTLY TO THE NEAREST EMERGENCY ROOM IF YOU HAVE ANY OF THE FOLLOWING:      Difficulty breathing              Chills and/or fever over 101 F   Persistent vomiting and/or vomiting blood   Severe abdominal pain   Severe chest pain   Black, tarry stools   Bleeding- more than one tablespoon   Any other symptom or condition that you feel may need urgent attention  Your doctor recommends these additional instructions:  If any biopsies were taken, your doctors clinic will contact you in 1 to 2   weeks with any results.  - [facility].   - Resume regular diet.   - Continue present medications.   - Repeat colonoscopy in 1 year for screening purposes if health permits.   - The findings and recommendations were discussed with the patient.   - Communicated with hospital medicine and medical oncology services   regarding findings.  - To visualize the small bowel, perform video capsule endoscopy. You will be   contacted to schedule this study by our GI department.  For questions, problems or results please call your physician Gena Saxena MD at Work:  [phone]  If you have any questions about the above instructions, call the GI   department at [phone] or call the endoscopy unit at [phone]   from 7am until 3 pm.  [facility] [place], EMERGENCY ROOM PHONE NUMBER:   [phone]  IF A COMPLICATION OR EMERGENCY SITUATION ARISES AND YOU ARE UNABLE TO REACH   YOUR PHYSICIAN - GO DIRECTLY TO THE EMERGENCY ROOM.  I have read or have had read to me these discharge instructions for my   procedure and have received a written copy.  I understand these   instructions and will follow-up with my physician if I have any questions.     __________________________________       _____________________________________  Nurse Signature                                          Patient/Designated    Responsible Party Signature  MD Gena Arizmendi MD  8/20/2024 10:08:40 AM  This report has been verified and signed electronically.  Dear patient,  As a result of recent federal legislation (The Federal Cures Act), you may   receive lab or pathology results from your procedure in your [facility]   account before your physician is able to contact you. Your physician or   their representative will relay the results to you with their   recommendations at their soonest availability.  Thank you,  [employer]ATION

## 2024-08-20 NOTE — BRIEF OP NOTE
Inpatient Endoscopy Brief Operative Note      Admit Date: 8/15/2024    Procedure Date and Time:  8/20/2024 9:57 AM    Attending Physician: Cm Lorenzo MD     Principal Admitting Diagnoses: JAIME (acute kidney injury)         Discharge Diagnosis: The primary encounter diagnosis was Acute on chronic renal failure. Diagnoses of JAIME (acute kidney injury) and Other iron deficiency anemia were also pertinent to this visit.     Discharged Condition: Stable    Indication for Admission: JAIME (acute kidney injury)     Procedure Performed: Colonoscopy  and EGD with biopsy    SEE [employer]ATIONS REPORTS FOR DETAILS.    Pathology (if any):  Specimen (24h ago, onward)       Start     Ordered    08/20/24 0935  Specimen to Pathology, Surgery Gastrointestinal tract  Once        Comments: Pre-op Diagnosis: Other iron deficiency anemia [D50.8]Procedure(s):EGD (ESOPHAGOGASTRODUODENOSCOPY)COLONOSCOPY 1. Small bowel bx h/o MARIA ESTHER, weight loss r/o celiac2. Gastric bx h/o MARIA ESTHER r/o H. Pylori     References:    Click here for ordering Quick Tip   Question Answer Comment   Procedure Type: Gastrointestinal tract    Release to patient Immediate        08/20/24 0951                    Estimated Blood Loss: 1 ml.    Discussed with: patient.    Disposition: Return to [facility].    Recommendations:   Regular diet  F/U path  Outpatient video capsule to complete work up for MARIA ESTHER      Communicated with hospital medicine and medical oncology services regarding the above findings. Will sign off, please call if questions.       Gena Saxena MD  Inpatient Gastroenterology  [facility]

## 2024-08-20 NOTE — OP NOTE
Pre Op Diagnosis: decreased renal function     Post Op Diagnosis: same     Procedure:  renal biopsy     Procedure performed by: Angelita ORTEZ, Danitza DE LA CRUZ     Written Informed Consent Obtained: Yes     Specimen Removed:  yes     Estimated Blood Loss:  minimal     Findings: Local anesthesia     Sedation:  yes     The patient tolerated the procedure well and there were no complications.      Disposition:  F/U in clinic or with ordering physician    Discharge instructions:  Light activity for 24 hours.  Remove band aid in 24 hours.  No baths (showers are appropriate).      Sterile technique was performed in the left flank, lidocaine was used as a local anesthetic.  Multiple samples taken percutaneously from the left renal cortex.  Pt tolerated the procedure well without immediate complications.  Please see radiologist report for details. F/u with PCP and/or ordering physician.

## 2024-08-20 NOTE — ASSESSMENT & PLAN NOTE
Outpt pulm concerned for wegeners disease  Nephrology following, appreciate  Renal U/S negative for obstruction  Creatinine 2.1, trending down  Monitor labs daily    08/20/2024  Renal function seemingly back at baseline  Discussed with Pulmnology, suspects an element of vasculitis (possibly Wegener's)  Renal biopsy to be perfomed today   Will initiate high dose steroid therapy after biopsy

## 2024-08-20 NOTE — ANESTHESIA POSTPROCEDURE EVALUATION
Anesthesia Post Evaluation    Patient: Shayy Faust    Procedure(s) Performed: Procedure(s) (LRB):  EGD (ESOPHAGOGASTRODUODENOSCOPY) (N/A)  COLONOSCOPY (N/A)    Final Anesthesia Type: MAC      Patient location during evaluation: PACU  Patient participation: Yes- Able to Participate  Level of consciousness: awake  Post-procedure vital signs: reviewed and stable  Pain management: adequate  Airway patency: patent    PONV status at discharge: No PONV  Anesthetic complications: no      Cardiovascular status: blood pressure returned to baseline and hemodynamically stable  Respiratory status: unassisted and spontaneous ventilation  Hydration status: euvolemic  Follow-up not needed.          Vitals Value Taken Time   /60 08/20/24 0959   Temp 36.4 °C (97.6 °F) 08/20/24 0959   Pulse 70 08/20/24 0959   Resp 16 08/20/24 0959   SpO2 100 % 08/20/24 0959         No case tracking events are documented in the log.      Pain/Joel Score: No data recorded

## 2024-08-20 NOTE — ASSESSMENT & PLAN NOTE
S/p Venofer given, cont ferrous sulfate  Hema/onc following, appreciate, recommended endoscopy  Consult GI in the morning for endoscopy  Hgb 6.7  Transfuse 1 unit PRBC (8/18)  Repeat labs in the morning    08/20/2024  --hgb promptly corrected to 9.0 after 1uPRBC  --no obvious signs of bleeding on exam  --serial CBC to trend h&h  --transfuse for hgb < 7    08/20/2024  --s/p scope, no overt findings for MARIA ESTHER per GI report. Biopsies taken, recommend capsule study in the out patient setting. CBC ordered for today and tomorrow to trend

## 2024-08-20 NOTE — PROGRESS NOTES
[facility] Surg  Hematology/Oncology  Progress Note    Patient Name: Shayy Faust  Admission Date: 8/15/2024  Hospital Length of Stay: 5 days  Code Status: Full Code     Subjective:     HPI:  49-year-old female with a 25 lb weight loss over the last several months.  Patient reports increasing fatigue and weakness patient found to be profoundly iron deficient.  CT chest demonstrated questionable infiltrative lung possible metastatic disease.  ECOG status 2 at bedside with mother    Interval History:  Patient have EGD colonoscopies today    Oncology Treatment Plan:   [No matching plan found]    Medications:  Continuous Infusions:   0.9% NaCl   Intravenous Continuous 75 mL/hr at 08/19/24 2319 New Bag at 08/19/24 2319     Scheduled Meds:   cefTRIAXone (Rocephin) IV (PEDS and ADULTS)  1 g Intravenous Q24H    doxycycline  100 mg Oral BID    ferrous sulfate  1 tablet Oral Daily    iron sucrose  200 mg Intravenous Q7 Days     PRN Meds:  Current Facility-Administered Medications:     0.9%  NaCl infusion (for blood administration), , Intravenous, Q24H PRN    acetaminophen, 650 mg, Oral, Q6H PRN    albuterol-ipratropium, 3 mL, Nebulization, Q6H PRN    hydrALAZINE, 10 mg, Intravenous, Q6H PRN    ondansetron, 4 mg, Oral, Q6H PRN     Review of Systems   Constitutional:  Negative for activity change, appetite change, chills, diaphoresis, fatigue, fever and unexpected weight change.   HENT:  Negative for congestion, dental problem, drooling, ear discharge, ear pain, facial swelling, hearing loss, mouth sores, nosebleeds, postnasal drip, rhinorrhea, sinus pressure, sneezing, sore throat, tinnitus, trouble swallowing and voice change.    Eyes:  Negative for photophobia, pain, discharge, redness, itching and visual disturbance.   Respiratory:  Negative for cough, choking, chest tightness, shortness of breath, wheezing and stridor.    Cardiovascular:  Negative for chest pain, palpitations and leg swelling.   Gastrointestinal:   Negative for abdominal distention, abdominal pain, anal bleeding, blood in stool, constipation, diarrhea, nausea, rectal pain and vomiting.   Endocrine: Negative for cold intolerance, heat intolerance, polydipsia, polyphagia and polyuria.   Genitourinary:  Negative for decreased urine volume, difficulty urinating, dyspareunia, dysuria, enuresis, flank pain, frequency, genital sores, hematuria, menstrual problem, pelvic pain, urgency, vaginal bleeding, vaginal discharge and vaginal pain.   Musculoskeletal:  Negative for arthralgias, back pain, gait problem, joint swelling, myalgias, neck pain and neck stiffness.   Skin:  Negative for color change, pallor and rash.   Allergic/Immunologic: Negative for environmental allergies, food allergies and immunocompromised state.   Neurological:  Negative for dizziness, tremors, seizures, syncope, facial asymmetry, speech difficulty, weakness, light-headedness, numbness and headaches.   Hematological:  Negative for adenopathy. Does not bruise/bleed easily.   Psychiatric/Behavioral:  Negative for agitation, behavioral problems, confusion, decreased concentration, dysphoric mood, hallucinations, self-injury, sleep disturbance and suicidal ideas. The patient is not nervous/anxious and is not hyperactive.      Objective:     Vital Signs (Most Recent):  Temp: 98.2 °F (36.8 °C) (08/20/24 0450)  Pulse: 81 (08/20/24 0450)  Resp: 18 (08/20/24 0450)  BP: 138/73 (08/20/24 0450)  SpO2: 98 % (08/20/24 0450) Vital Signs (24h Range):  Temp:  [98.1 °F (36.7 °C)-100.1 °F (37.8 °C)] 98.2 °F (36.8 °C)  Pulse:  [76-90] 81  Resp:  [18-19] 18  SpO2:  [93 %-98 %] 98 %  BP: (114-138)/(67-89) 138/73     Weight: 44.2 kg (97 lb 6.4 oz)  Body mass index is 16.72 kg/m².  Body surface area is 1.41 meters squared.      Intake/Output Summary (Last 24 hours) at 8/20/2024 0760  Last data filed at 8/19/2024 0708  Gross per 24 hour   Intake 0 ml   Output --   Net 0 ml        Physical Exam  Vitals reviewed.    Constitutional:       General: She is not in acute distress.     Appearance: She is well-developed. She is not diaphoretic.   HENT:      Head: Normocephalic and atraumatic.      Right Ear: External ear normal.      Left Ear: External ear normal.      Nose: Nose normal.      Right Sinus: No maxillary sinus tenderness or frontal sinus tenderness.      Left Sinus: No maxillary sinus tenderness or frontal sinus tenderness.      Mouth/Throat:      Pharynx: No oropharyngeal exudate.   Eyes:      General: Lids are normal. No scleral icterus.        Right eye: No discharge.         Left eye: No discharge.      Conjunctiva/sclera: Conjunctivae normal.      Right eye: Right conjunctiva is not injected. No hemorrhage.     Left eye: Left conjunctiva is not injected. No hemorrhage.     Pupils: Pupils are equal, round, and reactive to light.   Neck:      Thyroid: No thyromegaly.      Vascular: No JVD.      Trachea: No tracheal deviation.   Cardiovascular:      Rate and Rhythm: Normal rate.   Pulmonary:      Effort: Pulmonary effort is normal. No respiratory distress.      Breath sounds: No stridor.   Chest:      Chest wall: No tenderness.   Abdominal:      General: Bowel sounds are normal. There is no distension.      Palpations: Abdomen is soft. There is no hepatomegaly, splenomegaly or mass.      Tenderness: There is no abdominal tenderness. There is no rebound.   Musculoskeletal:         General: No tenderness. Normal range of motion.      Cervical back: Normal range of motion and neck supple.   Lymphadenopathy:      Cervical: No cervical adenopathy.      Upper Body:      Right upper body: No supraclavicular adenopathy.      Left upper body: No supraclavicular adenopathy.   Skin:     General: Skin is dry.      Findings: No erythema or rash.   Neurological:      Mental Status: She is alert and oriented to person, place, and time.      Cranial Nerves: No cranial nerve deficit.      Coordination: Coordination normal.  "  Psychiatric:         Behavior: Behavior normal.         Thought Content: Thought content normal.         Judgment: Judgment normal.          Significant Labs:   BMP:   Recent Labs   Lab 08/19/24 0452 08/20/24 0449   GLU 74 60*   * 137   K 4.7 4.6    108   CO2 17* 16*   BUN 24* 24*   CREATININE 2.0* 2.1*   CALCIUM 8.0* 8.6*   , CBC:   Recent Labs   Lab 08/19/24 0452   WBC 13.95*   HGB 9.0*   HCT 29.4*   *   , CMP:   Recent Labs   Lab 08/19/24 0452 08/20/24 0449   * 137   K 4.7 4.6    108   CO2 17* 16*   GLU 74 60*   BUN 24* 24*   CREATININE 2.0* 2.1*   CALCIUM 8.0* 8.6*   ALBUMIN 1.8* 1.8*   ANIONGAP 9 13   , Coagulation: No results for input(s): "PT", "INR", "APTT" in the last 48 hours., Haptoglobin: No results for input(s): "HAPTOGLOBIN" in the last 48 hours., Immunology: No results for input(s): "SPEP", "ABDELRAHMAN", "AZAM", "FREELAMBDALI" in the last 48 hours., LDH: No results for input(s): "LDHCSF", "BFSOURCE" in the last 48 hours., LFTs:   Recent Labs   Lab 08/19/24 0452 08/20/24 0449   ALBUMIN 1.8* 1.8*   , Reticulocytes: No results for input(s): "RETIC" in the last 48 hours., Tumor Markers: No results for input(s): "PSA", "CEA", "", "AFPTM", "HC4486", "" in the last 48 hours.    Invalid input(s): "ALGTM", and Uric Acid No results for input(s): "URICACID" in the last 48 hours.    Diagnostic Results:  I have reviewed all pertinent imaging results/findings within the past 24 hours.  Assessment/Plan:     Thrombocytosis  Thrombocytosis probably secondary to severe iron deficiency anemia reactive in nature    PNA (pneumonia)  Concern over metastatic disease.  Would strongly recommend that patient have imaging done of abdomen least ultrasound of liver.  Abdomen would also recommend that patient undergo EGD colonoscopies ASAP    Iron deficiency anemia  Documented severe iron deficiency anemia exacerbated by renal insufficiency.  Recommend 1 dose of intravenous iron ASAP.  " With documented iron deficiency 4 month duration weight loss.  High suspicion for metastatic disease would recommend that patient have ultrasound of abdomen.  Would recommend upper lower endoscopies ASAP to make sure see if she has a GI malignancy.  Discussed implications of answered questions with she and her mother  08/19/2024 spoke with GI Medicine this morning I spoke with mother in patient's bedside agree with upper lower endoscopies IV iron has been given should be given every 7 days to improve counts  08/20/2024 await results of EGD colonoscopies.  Discussed with GI Medicine yesterday rationale for doing so        Thank you for your consult. I will follow-up with patient. Please contact us if you have any additional questions.     Marcin Mercado MD  Hematology/Oncology  [facility]

## 2024-08-20 NOTE — PROGRESS NOTES
"Nephrology Progress Note     History of Present Illness     History of asthma presenting to the hospital with 4 week history of respiratory symptoms not improving with steroids and beta agonist inhaler.  On arrival here, creatinine 2.6 with a peak of 2.7 within improving with some IV fluids.  JAIME of uncertain etiology with active sediment on urinalysis in both blood and protein.  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  Thus far AZAM as well as complements unremarkable.  Hepatitis-C negative.  She did have an SPEP with to monoclonal spikes.  Hematology is following.  ANCA, Anti-GBM, FLC, UPEP pending at this time.      Interval History   Overnight/currently:  Creatinine remains stable this morning at 2.1.  Currently status post renal biopsy.  Denies any chest pain or shortness of breath at this time.  She has been given post biopsy instructions and we will check a repeat hemoglobin this evening.       Allergies:    has No Known Allergies.    Current medications:   Scheduled Meds:   cefTRIAXone (Rocephin) IV (PEDS and ADULTS)  1 g Intravenous Q24H    doxycycline  100 mg Oral BID    ferrous sulfate  1 tablet Oral Daily    iron sucrose  200 mg Intravenous Q7 Days     Continuous Infusions:   0.9% NaCl   Intravenous Continuous 75 mL/hr at 08/19/24 2319 New Bag at 08/19/24 2319     PRN Meds:.  Current Facility-Administered Medications:     0.9%  NaCl infusion (for blood administration), , Intravenous, Q24H PRN    acetaminophen, 650 mg, Oral, Q6H PRN    albuterol-ipratropium, 3 mL, Nebulization, Q6H PRN    hydrALAZINE, 10 mg, Intravenous, Q6H PRN    ondansetron, 4 mg, Oral, Q6H PRN     Physical Examination     VS/Measurements    /68   Pulse 83   Temp 98.4 °F (36.9 °C) (Oral)   Resp 16   Ht 5' 4" (1.626 m)   Wt 43.5 kg (96 lb)   LMP  (LMP Unknown)   SpO2 98%   Breastfeeding No   BMI 16.48 kg/m²         General:  Moderately built, not in any distress.  Neck:  Supple,   Respiratory: " Non-labored,  Lungs are clear to auscultation.    Cardiovascular:  Normal rate, Regular rhythm.   Abdomen:  Soft, Non-tender, Normal bowel sounds.   Muskuloskeletal:  No pedal edema          Laboratory Results   Today's Lab Results :    Recent Results (from the past 24 hour(s))   Urinalysis    Collection Time: 08/20/24  4:46 AM   Result Value Ref Range    Specimen UA Urine, Clean Catch     Color, UA Yellow Yellow, Straw, Roxy    Appearance, UA Clear Clear    pH, UA 6.0 5.0 - 8.0    Specific Gravity, UA 1.010 1.005 - 1.030    Protein, UA Trace (A) Negative    Glucose, UA Negative Negative    Ketones, UA 1+ (A) Negative    Bilirubin (UA) Negative Negative    Occult Blood UA 2+ (A) Negative    Nitrite, UA Negative Negative    Urobilinogen, UA Negative <2.0 EU/dL    Leukocytes, UA Negative Negative   Urinalysis Microscopic    Collection Time: 08/20/24  4:46 AM   Result Value Ref Range    RBC, UA 22 (H) 0 - 4 /hpf    WBC, UA 3 0 - 5 /hpf    Bacteria Rare None-Occ /hpf    Microscopic Comment SEE COMMENT    Renal Function Panel    Collection Time: 08/20/24  4:49 AM   Result Value Ref Range    Glucose 60 (L) 70 - 110 mg/dL    Sodium 137 136 - 145 mmol/L    Potassium 4.6 3.5 - 5.1 mmol/L    Chloride 108 95 - 110 mmol/L    CO2 16 (L) 23 - 29 mmol/L    BUN 24 (H) 6 - 20 mg/dL    Calcium 8.6 (L) 8.7 - 10.5 mg/dL    Creatinine 2.1 (H) 0.5 - 1.4 mg/dL    Albumin 1.8 (L) 3.5 - 5.2 g/dL    Phosphorus 4.9 (H) 2.7 - 4.5 mg/dL    eGFR 28 (A) >60 mL/min/1.73 m^2    Anion Gap 13 8 - 16 mmol/L   POCT urine pregnancy    Collection Time: 08/20/24  8:13 AM   Result Value Ref Range    POC Preg Test, Ur Negative Negative     Acceptable Yes    CBC Auto Differential    Collection Time: 08/20/24  1:19 PM   Result Value Ref Range    WBC 17.67 (H) 3.90 - 12.70 K/uL    RBC 3.59 (L) 4.00 - 5.40 M/uL    Hemoglobin 9.4 (L) 12.0 - 16.0 g/dL    Hematocrit 30.3 (L) 37.0 - 48.5 %    MCV 84 82 - 98 fL    MCH 26.2 (L) 27.0 - 31.0 pg    MCHC  31.0 (L) 32.0 - 36.0 g/dL    RDW 15.1 (H) 11.5 - 14.5 %    Platelets 633 (H) 150 - 450 K/uL    MPV 8.8 (L) 9.2 - 12.9 fL    Immature Granulocytes 0.6 (H) 0.0 - 0.5 %    Gran # (ANC) 14.2 (H) 1.8 - 7.7 K/uL    Immature Grans (Abs) 0.10 (H) 0.00 - 0.04 K/uL    Lymph # 2.0 1.0 - 4.8 K/uL    Mono # 1.0 0.3 - 1.0 K/uL    Eos # 0.2 0.0 - 0.5 K/uL    Baso # 0.16 0.00 - 0.20 K/uL    nRBC 0 0 /100 WBC    Gran % 80.2 (H) 38.0 - 73.0 %    Lymph % 11.5 (L) 18.0 - 48.0 %    Mono % 5.7 4.0 - 15.0 %    Eosinophil % 1.1 0.0 - 8.0 %    Basophil % 0.9 0.0 - 1.9 %    Differential Method Automated    Protime-INR    Collection Time: 08/20/24  1:19 PM   Result Value Ref Range    Prothrombin Time 12.4 9.0 - 12.5 sec    INR 1.1 0.8 - 1.2       LABS:  Reviewed Yes      Intake/Output Summary (Last 24 hours) at 8/20/2024 1633  Last data filed at 8/20/2024 1013  Gross per 24 hour   Intake 468 ml   Output --   Net 468 ml       Assessment and Plan     JAIME.  Uncertain etiology with active sediment on urinalysis.  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  Thus for AZAM and complements normal.  Hepatitis C was also negative.  Rheumatoid factor was positive and SPEP was positive for to monoclonal spikes.  This point we are still waiting on a UPEP as well as serum free light chain analysis, ANCA panel.  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  Given this, we will discuss plasma pheresis tomorrow with Hospital Medicine.  Cytoxan only if the biopsy is positive for such.    Microscopic hematuria.  As above.      Anemia.  SPEP as above.  Hematology following.  Status post PRBCs.  GI consulted for endoscopy.      Positive rheumatoid factor.  Rheumatology consult placed with the outpatient setting.  Scheduled for January of 2025.  May need that moved up sooner.      Proteinuria.  Approximately 1 g by  ratio.  Hold ACE-inhibitor or ARB at this time.    Metabolic acidosis.  Start sodium bicarbonate tablets twice a day.    Thrombocytosis.  Felt to be reactive from severe iron-deficiency anemia.  Hematology following.    ________________________________________________  Hermes Galeas

## 2024-08-20 NOTE — TRANSFER OF CARE
"Anesthesia Transfer of Care Note    Patient: Shayy Faust    Procedure(s) Performed: Procedure(s) (LRB):  EGD (ESOPHAGOGASTRODUODENOSCOPY) (N/A)  COLONOSCOPY (N/A)    Patient location: PACU    Anesthesia Type: MAC    Transport from OR: Transported from OR on room air with adequate spontaneous ventilation    Post pain: adequate analgesia    Post assessment: no apparent anesthetic complications and tolerated procedure well    Post vital signs: stable    Level of consciousness: sedated    Nausea/Vomiting: no nausea/vomiting    Complications: none    Transfer of care protocol was followed    Last vitals: Visit Vitals  /73 (BP Location: Left arm, Patient Position: Sitting)   Pulse 82   Temp 36.5 °C (97.7 °F) (Temporal)   Resp 18   Ht 5' 4" (1.626 m)   Wt 43.5 kg (96 lb)   LMP  (LMP Unknown)   SpO2 100%   Breastfeeding No   BMI 16.48 kg/m²     "

## 2024-08-20 NOTE — INTERVAL H&P NOTE
The patient has been examined and the H&P has been reviewed:    I concur with the findings and no changes have occurred since H&P was written.    Anesthesia/Surgery risks, benefits and alternative options discussed and understood by patient/family.          Active Hospital Problems    Diagnosis  POA    *JAIME (acute kidney injury) [N17.9]  Yes    Iron deficiency anemia [D50.9]  Yes     8/20/24: EGD/Colon: minor gastritis, otherwise negative/ Poor prep in right colon and fair prep in left colon. No masses seen. No polyps appreciated.       Thrombocytosis [D75.839]  Yes    PNA (pneumonia) [J18.9]  Yes      Resolved Hospital Problems   No resolved problems to display.

## 2024-08-20 NOTE — ANESTHESIA PREPROCEDURE EVALUATION
08/20/2024  Shayy Faust is a 49 y.o., female.      Pre-op Assessment    I have reviewed the Patient Summary Reports.     I have reviewed the Nursing Notes. I have reviewed the NPO Status.   I have reviewed the Medications.     Review of Systems  Anesthesia Hx:  No problems with previous Anesthesia             Denies Family Hx of Anesthesia complications.    Denies Personal Hx of Anesthesia complications.                    Social:  Non-Smoker, No Alcohol Use       Hematology/Oncology:    Oncology Normal    -- Anemia:               Hematology Comments: Thrombocytosis                    Cardiovascular:      Denies Hypertension.   Denies MI.     Denies CABG/stent.     Denies CHF.                                 Pulmonary:  Pneumonia  Asthma     Denies Sleep Apnea.                Renal/:  Renal/ Normal                 Hepatic/GI:      Denies GERD. Denies Liver Disease.  Denies Hepatitis.           Neurological:    Denies CVA.    Denies Seizures.    fatigue                            Endocrine:  Denies Diabetes. Denies Hypothyroidism.  Denies Hyperthyroidism.           Physical Exam  General: Well nourished    Airway:  Mallampati: II   Mouth Opening: Normal    Dental:    Chest/Lungs:  Clear to auscultation, Normal Respiratory Rate    Heart:  Rate: Normal  Rhythm: Regular Rhythm    Anesthesia Plan  Type of Anesthesia, risks & benefits discussed:    Anesthesia Type: MAC  Intra-op Monitoring Plan: Standard ASA Monitors  Induction:  IV  Informed Consent: Informed consent signed with the Patient and all parties understand the risks and agree with anesthesia plan.  All questions answered.   ASA Score: 2  Day of Surgery Review of History & Physical: H&P Update referred to the surgeon/provider.    Ready For Surgery From Anesthesia Perspective.   .

## 2024-08-20 NOTE — PLAN OF CARE
Pt arrived from procedure, AAO, connected to bs monitor, no pain at this time, will continue to monitor

## 2024-08-20 NOTE — PROVATION PATIENT INSTRUCTIONS
Discharge Summary/Instructions after an Endoscopic Procedure  Patient Name: Shayy Faust  Patient MRN: [id number]  Patient YOB: 1975 Tuesday, August 20, 2024 Gena Saxena MD  Dear patient,  As a result of recent federal legislation (The Federal Cures Act), you may   receive lab or pathology results from your procedure in your [facility]   account before your physician is able to contact you. Your physician or   their representative will relay the results to you with their   recommendations at their soonest availability.  Thank you,  RESTRICTIONS:  During your procedure today, you received medications for sedation.  These   medications may affect your judgment, balance and coordination.  Therefore,   for 24 hours, you have the following restrictions:   - DO NOT drive a car, operate machinery, make legal/financial decisions,   sign important papers or drink alcohol.    ACTIVITY:  Today: no heavy lifting, straining or running due to procedural   sedation/anesthesia.  The following day: return to full activity including work.  DIET:  Eat and drink normally unless instructed otherwise.     TREATMENT FOR COMMON SIDE EFFECTS:  - Mild abdominal pain, nausea, belching, bloating or excessive gas:  rest,   eat lightly and use a heating pad.  - Sore Throat: treat with throat lozenges and/or gargle with warm salt   water.  - Because air was used during the procedure, expelling large amounts of air   from your rectum or belching is normal.  - If a bowel prep was taken, you may not have a bowel movement for 1-3 days.    This is normal.  SYMPTOMS TO WATCH FOR AND REPORT TO YOUR PHYSICIAN:  1. Abdominal pain or bloating, other than gas cramps.  2. Chest pain.  3. Back pain.  4. Signs of infection such as: chills or fever occurring within 24 hours   after the procedure.  5. Rectal bleeding, which would show as bright red, maroon, or black stools.   (A tablespoon of blood from the rectum is not serious, especially if    hemorrhoids are present.)  6. Vomiting.  7. Weakness or dizziness.  GO DIRECTLY TO THE NEAREST EMERGENCY ROOM IF YOU HAVE ANY OF THE FOLLOWING:      Difficulty breathing              Chills and/or fever over 101 F   Persistent vomiting and/or vomiting blood   Severe abdominal pain   Severe chest pain   Black, tarry stools   Bleeding- more than one tablespoon   Any other symptom or condition that you feel may need urgent attention  Your doctor recommends these additional instructions:  If any biopsies were taken, your doctors clinic will contact you in 1 to 2   weeks with any results.  - Return patient to [facility] after colonoscopy.  - Resume regular diet.   - Continue present medications.   - Await pathology results.   - Proceed with colonoscopy.  For questions, problems or results please call your physician Gena Saxena MD at Work:  [phone]  If you have any questions about the above instructions, call the GI   department at [phone] or call the endoscopy unit at [phone]   from 7am until 3 pm.  [facility] [place], EMERGENCY ROOM PHONE NUMBER:   [phone]  IF A COMPLICATION OR EMERGENCY SITUATION ARISES AND YOU ARE UNABLE TO REACH   YOUR PHYSICIAN - GO DIRECTLY TO THE EMERGENCY ROOM.  I have read or have had read to me these discharge instructions for my   procedure and have received a written copy.  I understand these   instructions and will follow-up with my physician if I have any questions.     __________________________________       _____________________________________  Nurse Signature                                          Patient/Designated   Responsible Party Signature  MD Gena Arizmendi MD  8/20/2024 10:03:51 AM  This report has been verified and signed electronically.  Dear patient,  As a result of recent federal legislation (The Federal Cures Act), you may   receive lab or pathology results from your procedure in your [facility]    account before your physician is able to contact you. Your physician or   their representative will relay the results to you with their   recommendations at their soonest availability.  Thank you,  PROVATION   patient's belongings returned

## 2024-08-20 NOTE — PLAN OF CARE
Pt AAO, VSS, no pain. Pt transported to med surg. Report called to and handoff completed with med surg nurse

## 2024-08-20 NOTE — PROGRESS NOTES
[facility]  Progress Note    Patient Name: Shayy Faust  MRN: [id number]  Patient Class: IP- Inpatient   Admission Date: 8/15/2024  Length of Stay: 5 days  Attending Physician: Cm Lorenzo MD  Primary Care Provider: Karine, Primary Doctor        Subjective:     Principal Problem:JAIME (acute kidney injury)        HPI:  Patient is a 49 y.o.  female with a PMHx of asthma who presents to the Emergency Department due to abnormal blood work. Patient currently being seen by Dr. Jiménez for pulmonology. She reports not feeling well for several weeks. Endorses chills and sob. Denies any fever or congestion. Reports having good urinary output. Denies any home meds at this time.     In the ED, wbc: 15.1k, h/h 7.8/25.8, plt: 826, bun/cr: 43/2.7.     Overview/Hospital Course:  49 year-old female admitted with c/o abnormal blood work. She reports she hasn't been feeling well for a few weeks now. She is being seen and worked up by Dr. Jiménez with pulmonology for for SOB and chills. She started with an URI that progressed to SOB. She had Spirometry in pulm clinic that showed obstruction with very severe ventilatory impairment unimproved with post bronchodilator. She reports unintentional weight loss of 25 lbs. Pulm concerned for Wegener's disease.  CT chest showed numerous bilateral lung opacities; consider atypical infection/inflammation as well as metastatic neoplasm; recommend further evaluation with PET-CT scan. Tree in bud micronodularity in lower lobes consistent with chronic small airway disease.  CT a/p with punctate nonobstructing renal calculi, no hydronephrosis. Mild to moderate amount of stool in colon, with diverticulitis.     Worsening kidney function. Nephrology consulted. US Kidney showed increased echogenicity to kidneys concerning for medical renal disease. Recommends kidney biopsy, pending this next week, will try for Monday. Will make NPO after midnight.    Hem/onc  "consulted, recommends further metastatic workup with upper/lower endoscopy. Consult GI for evaluation.   Cont rocephin and doxycycline.     Hgb 6.7, unknown bleeding source. Transfuse 1 unit PRBC (8/18).     08/19/2024  H&H improved after 1UPRBC. Continue to remain stable since that time. Endoscopic procedure planned this hospitalization. Dr. 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 Consider high dose steroids after renal biopsy. Renal biopsy priority moved up to this hospitalization. Reached out to IR, they will facilitate as soon as schedule allows.     08/20/2024  EGD/Colon complete. Minor gastritis, biopsies taken. Small bowel normal, biopsied for MARIA ESTHER. Otherwise normal. Prep in right colon poor, left colon fair. No masses appreciated. Ileum intubated and normal. GI Recommend outpatient video capsule to complete work up for MARIA ESTHER. Discussed with IR, plans for renal biopsy sometime today. Will initiate high dose steroids thereafter per Dr. Jiménez' request.    Objective:   /64 (BP Location: Left arm, Patient Position: Lying)   Pulse 70   Temp 98.4 °F (36.9 °C) (Oral)   Resp 18   Ht 5' 4" (1.626 m)   Wt 43.5 kg (96 lb)   LMP  (LMP Unknown)   SpO2 98%   Breastfeeding No   BMI 16.48 kg/m²     Intake/Output Summary (Last 24 hours) at 8/20/2024 1230  Last data filed at 8/20/2024 1013  Gross per 24 hour   Intake 468 ml   Output --   Net 468 ml       PHYSICAL EXAM  Vitals reviewed  Vitals reviewed  Constitutional:       Appearance: Normal appearance.   HENT:      Head: Normocephalic.   Eyes:      Pupils: Pupils are equal, round, and reactive to light.   Cardiovascular:      Rate and Rhythm: Normal rate and regular rhythm.      Heart sounds: No murmur heard.  Pulmonary:      Effort: Pulmonary effort is normal.      Breath sounds: Normal breath sounds. No wheezing or rales.   Abdominal:      General: Bowel sounds are normal.      Palpations: Abdomen is " "soft.   Musculoskeletal:         General: Normal range of motion.   Skin:     General: Skin is warm and dry.   Neurological:      General: No focal deficit present.      Mental Status: She is alert and oriented to person, place, and time.   Psychiatric:         Mood and Affect: Mood normal.         Behavior: Behavior normal.     LABS  All labs from the past 24 hours were reviewed.     BMP:   Recent Labs   Lab 08/20/24 0449   GLU 60*      K 4.6      CO2 16*   BUN 24*   CREATININE 2.1*   CALCIUM 8.6*     CBC:   Recent Labs   Lab 08/19/24 0452   WBC 13.95*   HGB 9.0*   HCT 29.4*   *     CMP:   Recent Labs   Lab 08/19/24 0452 08/20/24 0449   * 137   K 4.7 4.6    108   CO2 17* 16*   GLU 74 60*   BUN 24* 24*   CREATININE 2.0* 2.1*   CALCIUM 8.0* 8.6*   ALBUMIN 1.8* 1.8*   ANIONGAP 9 13     Cardiac Markers: No results for input(s): "CKMB", "MYOGLOBIN", "BNP", "TROPISTAT" in the last 48 hours.  Coagulation: No results for input(s): "PT", "INR", "APTT" in the last 48 hours.  Lactic Acid: No results for input(s): "LACTATE" in the last 48 hours.  Magnesium: No results for input(s): "MG" in the last 48 hours.  Troponin: No results for input(s): "TROPONINI", "TROPONINIHS" in the last 48 hours.  TSH:   Recent Labs   Lab 04/01/24  1505   TSH 2.852     Urine Studies:   Recent Labs   Lab 08/20/24  0446   COLORU Yellow   APPEARANCEUA Clear   PHUR 6.0   SPECGRAV 1.010   PROTEINUA Trace*   GLUCUA Negative   KETONESU 1+*   BILIRUBINUA Negative   OCCULTUA 2+*   NITRITE Negative   UROBILINOGEN Negative   LEUKOCYTESUR Negative   RBCUA 22*   WBCUA 3   BACTERIA Rare       IMAGING  All imaging from the past 24 hours were reviewed.     Imaging Results              US Kidney (Final result)  Result time 08/15/24 15:43:06      Final result by Storm Boyce MD (08/15/24 15:43:06)                   Impression:      1.  Increased echogenicity to the kidneys concerning for medical renal disease.  Negative for " hydronephrosis.    2.  Findings most likely representing small bilateral nonobstructing renal stones measuring up to 3 mm.      Electronically signed by: Storm Boyce MD  Date:    08/15/2024  Time:    15:43               Narrative:    EXAMINATION:  US KIDNEY    CLINICAL HISTORY:  jaime;    TECHNIQUE:  Ultrasound of the kidneys and urinary bladder was performed including color flow and Doppler evaluation of the kidneys.    COMPARISON:  None.    FINDINGS:  Right kidney: The right kidney measures 12.0 cm. No cortical thinning. There is loss of corticomedullary distinction. Resistive index measures 0.65.  No mass. Possible tiny nonobstructing inferior pole stones noted.  No hydronephrosis.    Left kidney: The left kidney measures 12.2 cm. No cortical thinning. There is loss of corticomedullary distinction. Resistive index measures 0.63.  No mass. Possible 3 mm midpole stone.  No hydronephrosis.    The bladder is partially distended at the time of scanning and has an unremarkable appearance.                                        Assessment/Plan:      * JAIME (acute kidney injury)  Outpt pulm concerned for wegeners disease  Nephrology following, appreciate  Renal U/S negative for obstruction  Creatinine 2.1, trending down  Monitor labs daily    08/20/2024  Renal function seemingly back at baseline  Discussed with Pulmnology, suspects an element of vasculitis (possibly Wegener's)  Renal biopsy to be perfomed today   Will initiate high dose steroid therapy after biopsy    Thrombocytosis  Likely reactive due to inflammation     PNA (pneumonia)  Cont doxycyline and Rocephin IV   Duonebs as needed    Iron deficiency anemia  S/p Venofer given, cont ferrous sulfate  Hema/onc following, appreciate, recommended endoscopy  Consult GI in the morning for endoscopy  Hgb 6.7  Transfuse 1 unit PRBC (8/18)  Repeat labs in the morning    08/20/2024  --hgb promptly corrected to 9.0 after 1uPRBC  --no obvious signs of bleeding on  exam  --serial CBC to trend h&h  --transfuse for hgb < 7    08/20/2024  --s/p scope, no overt findings for MARIA ESTHER per GI report. Biopsies taken, recommend capsule study in the out patient setting. CBC ordered for today and tomorrow to trend          VTE Risk Mitigation (From admission, onward)      None            Discharge Planning   POOJA: 8/17/2024     Code Status: Full Code   Is the patient medically ready for discharge?:     Reason for patient still in hospital (select all that apply): Patient trending condition, Laboratory test, Treatment, and Consult recommendations  Discharge Plan A: Home with family   Discharge Delays: None known at this time              Cm Lorenzo MD  Department of Hospital Medicine   [facility] - Med Surg

## 2024-08-20 NOTE — SUBJECTIVE & OBJECTIVE
Interval History:  Patient have EGD colonoscopies today    Oncology Treatment Plan:   [No matching plan found]    Medications:  Continuous Infusions:   0.9% NaCl   Intravenous Continuous 75 mL/hr at 08/19/24 2319 New Bag at 08/19/24 2319     Scheduled Meds:   cefTRIAXone (Rocephin) IV (PEDS and ADULTS)  1 g Intravenous Q24H    doxycycline  100 mg Oral BID    ferrous sulfate  1 tablet Oral Daily    iron sucrose  200 mg Intravenous Q7 Days     PRN Meds:  Current Facility-Administered Medications:     0.9%  NaCl infusion (for blood administration), , Intravenous, Q24H PRN    acetaminophen, 650 mg, Oral, Q6H PRN    albuterol-ipratropium, 3 mL, Nebulization, Q6H PRN    hydrALAZINE, 10 mg, Intravenous, Q6H PRN    ondansetron, 4 mg, Oral, Q6H PRN     Review of Systems   Constitutional:  Negative for activity change, appetite change, chills, diaphoresis, fatigue, fever and unexpected weight change.   HENT:  Negative for congestion, dental problem, drooling, ear discharge, ear pain, facial swelling, hearing loss, mouth sores, nosebleeds, postnasal drip, rhinorrhea, sinus pressure, sneezing, sore throat, tinnitus, trouble swallowing and voice change.    Eyes:  Negative for photophobia, pain, discharge, redness, itching and visual disturbance.   Respiratory:  Negative for cough, choking, chest tightness, shortness of breath, wheezing and stridor.    Cardiovascular:  Negative for chest pain, palpitations and leg swelling.   Gastrointestinal:  Negative for abdominal distention, abdominal pain, anal bleeding, blood in stool, constipation, diarrhea, nausea, rectal pain and vomiting.   Endocrine: Negative for cold intolerance, heat intolerance, polydipsia, polyphagia and polyuria.   Genitourinary:  Negative for decreased urine volume, difficulty urinating, dyspareunia, dysuria, enuresis, flank pain, frequency, genital sores, hematuria, menstrual problem, pelvic pain, urgency, vaginal bleeding, vaginal discharge and vaginal pain.    Musculoskeletal:  Negative for arthralgias, back pain, gait problem, joint swelling, myalgias, neck pain and neck stiffness.   Skin:  Negative for color change, pallor and rash.   Allergic/Immunologic: Negative for environmental allergies, food allergies and immunocompromised state.   Neurological:  Negative for dizziness, tremors, seizures, syncope, facial asymmetry, speech difficulty, weakness, light-headedness, numbness and headaches.   Hematological:  Negative for adenopathy. Does not bruise/bleed easily.   Psychiatric/Behavioral:  Negative for agitation, behavioral problems, confusion, decreased concentration, dysphoric mood, hallucinations, self-injury, sleep disturbance and suicidal ideas. The patient is not nervous/anxious and is not hyperactive.      Objective:     Vital Signs (Most Recent):  Temp: 98.2 °F (36.8 °C) (08/20/24 0450)  Pulse: 81 (08/20/24 0450)  Resp: 18 (08/20/24 0450)  BP: 138/73 (08/20/24 0450)  SpO2: 98 % (08/20/24 0450) Vital Signs (24h Range):  Temp:  [98.1 °F (36.7 °C)-100.1 °F (37.8 °C)] 98.2 °F (36.8 °C)  Pulse:  [76-90] 81  Resp:  [18-19] 18  SpO2:  [93 %-98 %] 98 %  BP: (114-138)/(67-89) 138/73     Weight: 44.2 kg (97 lb 6.4 oz)  Body mass index is 16.72 kg/m².  Body surface area is 1.41 meters squared.      Intake/Output Summary (Last 24 hours) at 8/20/2024 0731  Last data filed at 8/19/2024 0745  Gross per 24 hour   Intake 0 ml   Output --   Net 0 ml        Physical Exam  Vitals reviewed.   Constitutional:       General: She is not in acute distress.     Appearance: She is well-developed. She is not diaphoretic.   HENT:      Head: Normocephalic and atraumatic.      Right Ear: External ear normal.      Left Ear: External ear normal.      Nose: Nose normal.      Right Sinus: No maxillary sinus tenderness or frontal sinus tenderness.      Left Sinus: No maxillary sinus tenderness or frontal sinus tenderness.      Mouth/Throat:      Pharynx: No oropharyngeal exudate.   Eyes:       General: Lids are normal. No scleral icterus.        Right eye: No discharge.         Left eye: No discharge.      Conjunctiva/sclera: Conjunctivae normal.      Right eye: Right conjunctiva is not injected. No hemorrhage.     Left eye: Left conjunctiva is not injected. No hemorrhage.     Pupils: Pupils are equal, round, and reactive to light.   Neck:      Thyroid: No thyromegaly.      Vascular: No JVD.      Trachea: No tracheal deviation.   Cardiovascular:      Rate and Rhythm: Normal rate.   Pulmonary:      Effort: Pulmonary effort is normal. No respiratory distress.      Breath sounds: No stridor.   Chest:      Chest wall: No tenderness.   Abdominal:      General: Bowel sounds are normal. There is no distension.      Palpations: Abdomen is soft. There is no hepatomegaly, splenomegaly or mass.      Tenderness: There is no abdominal tenderness. There is no rebound.   Musculoskeletal:         General: No tenderness. Normal range of motion.      Cervical back: Normal range of motion and neck supple.   Lymphadenopathy:      Cervical: No cervical adenopathy.      Upper Body:      Right upper body: No supraclavicular adenopathy.      Left upper body: No supraclavicular adenopathy.   Skin:     General: Skin is dry.      Findings: No erythema or rash.   Neurological:      Mental Status: She is alert and oriented to person, place, and time.      Cranial Nerves: No cranial nerve deficit.      Coordination: Coordination normal.   Psychiatric:         Behavior: Behavior normal.         Thought Content: Thought content normal.         Judgment: Judgment normal.          Significant Labs:   BMP:   Recent Labs   Lab 08/19/24 0452 08/20/24 0449   GLU 74 60*   * 137   K 4.7 4.6    108   CO2 17* 16*   BUN 24* 24*   CREATININE 2.0* 2.1*   CALCIUM 8.0* 8.6*   , CBC:   Recent Labs   Lab 08/19/24 0452   WBC 13.95*   HGB 9.0*   HCT 29.4*   *   , CMP:   Recent Labs   Lab 08/19/24 0452 08/20/24 0449   * 137  "  K 4.7 4.6    108   CO2 17* 16*   GLU 74 60*   BUN 24* 24*   CREATININE 2.0* 2.1*   CALCIUM 8.0* 8.6*   ALBUMIN 1.8* 1.8*   ANIONGAP 9 13   , Coagulation: No results for input(s): "PT", "INR", "APTT" in the last 48 hours., Haptoglobin: No results for input(s): "HAPTOGLOBIN" in the last 48 hours., Immunology: No results for input(s): "SPEP", "ABDELRAHMAN", "AZAM", "FREELAMBDALI" in the last 48 hours., LDH: No results for input(s): "LDHCSF", "BFSOURCE" in the last 48 hours., LFTs:   Recent Labs   Lab 08/19/24  0452 08/20/24  0449   ALBUMIN 1.8* 1.8*   , Reticulocytes: No results for input(s): "RETIC" in the last 48 hours., Tumor Markers: No results for input(s): "PSA", "CEA", "", "AFPTM", "OI1162", "" in the last 48 hours.    Invalid input(s): "ALGTM", and Uric Acid No results for input(s): "URICACID" in the last 48 hours.    Diagnostic Results:  I have reviewed all pertinent imaging results/findings within the past 24 hours.  "

## 2024-08-20 NOTE — PLAN OF CARE
Report called to Floor JEANETTE Fleming.  Patient resting quietly awaiting transfer to her room.

## 2024-08-20 NOTE — PLAN OF CARE
Plan of care reviewed with pt  Pt NPO for EGD/Colon  IV fluids infusing  Pt remains free from injury  Pt is able to make needs known through out shift  Bed in l;ow lock position with call bell in reach

## 2024-08-21 ENCOUNTER — ANESTHESIA (OUTPATIENT)
Dept: ENDOSCOPY | Facility: HOSPITAL | Age: 49
End: 2024-08-21
Payer: COMMERCIAL

## 2024-08-21 LAB
ABO + RH BLD: NORMAL
ALBUMIN SERPL BCP-MCNC: 1.9 G/DL (ref 3.5–5.2)
ANION GAP SERPL CALC-SCNC: 11 MMOL/L (ref 8–16)
ANION GAP SERPL CALC-SCNC: 11 MMOL/L (ref 8–16)
ANION GAP SERPL CALC-SCNC: 12 MMOL/L (ref 8–16)
BACTERIA BLD CULT: NORMAL
BASOPHILS # BLD AUTO: 0.01 K/UL (ref 0–0.2)
BASOPHILS # BLD AUTO: 0.02 K/UL (ref 0–0.2)
BASOPHILS NFR BLD: 0.1 % (ref 0–1.9)
BASOPHILS NFR BLD: 0.2 % (ref 0–1.9)
BLD GP AB SCN CELLS X3 SERPL QL: NORMAL
BUN SERPL-MCNC: 36 MG/DL (ref 6–20)
BUN SERPL-MCNC: 36 MG/DL (ref 6–20)
BUN SERPL-MCNC: 38 MG/DL (ref 6–20)
CALCIUM SERPL-MCNC: 8.7 MG/DL (ref 8.7–10.5)
CHLORIDE SERPL-SCNC: 108 MMOL/L (ref 95–110)
CHLORIDE SERPL-SCNC: 110 MMOL/L (ref 95–110)
CHLORIDE SERPL-SCNC: 110 MMOL/L (ref 95–110)
CO2 SERPL-SCNC: 17 MMOL/L (ref 23–29)
CREAT SERPL-MCNC: 2.1 MG/DL (ref 0.5–1.4)
DIFFERENTIAL METHOD BLD: ABNORMAL
DIFFERENTIAL METHOD BLD: ABNORMAL
EOSINOPHIL # BLD AUTO: 0 K/UL (ref 0–0.5)
EOSINOPHIL # BLD AUTO: 0 K/UL (ref 0–0.5)
EOSINOPHIL NFR BLD: 0 % (ref 0–8)
EOSINOPHIL NFR BLD: 0 % (ref 0–8)
ERYTHROCYTE [DISTWIDTH] IN BLOOD BY AUTOMATED COUNT: 15.2 % (ref 11.5–14.5)
ERYTHROCYTE [DISTWIDTH] IN BLOOD BY AUTOMATED COUNT: 15.3 % (ref 11.5–14.5)
EST. GFR  (NO RACE VARIABLE): 28 ML/MIN/1.73 M^2
FIBRINOGEN PPP-MCNC: 361 MG/DL (ref 182–400)
GLUCOSE SERPL-MCNC: 155 MG/DL (ref 70–110)
GLUCOSE SERPL-MCNC: 155 MG/DL (ref 70–110)
GLUCOSE SERPL-MCNC: 177 MG/DL (ref 70–110)
HCT VFR BLD AUTO: 31.9 % (ref 37–48.5)
HCT VFR BLD AUTO: 34.8 % (ref 37–48.5)
HGB BLD-MCNC: 10.6 G/DL (ref 12–16)
HGB BLD-MCNC: 9.7 G/DL (ref 12–16)
IMM GRANULOCYTES # BLD AUTO: 0.04 K/UL (ref 0–0.04)
IMM GRANULOCYTES # BLD AUTO: 0.04 K/UL (ref 0–0.04)
IMM GRANULOCYTES NFR BLD AUTO: 0.4 % (ref 0–0.5)
IMM GRANULOCYTES NFR BLD AUTO: 0.4 % (ref 0–0.5)
KAPPA LC SER QL IA: 24.48 MG/DL (ref 0.33–1.94)
KAPPA LC SER QL IA: 28.64 MG/DL (ref 0.33–1.94)
KAPPA LC/LAMBDA SER IA: 1.27 (ref 0.26–1.65)
KAPPA LC/LAMBDA SER IA: 1.28 (ref 0.26–1.65)
LAMBDA LC SER QL IA: 19.09 MG/DL (ref 0.57–2.63)
LAMBDA LC SER QL IA: 22.61 MG/DL (ref 0.57–2.63)
LYMPHOCYTES # BLD AUTO: 1.3 K/UL (ref 1–4.8)
LYMPHOCYTES # BLD AUTO: 1.6 K/UL (ref 1–4.8)
LYMPHOCYTES NFR BLD: 13.5 % (ref 18–48)
LYMPHOCYTES NFR BLD: 14.4 % (ref 18–48)
MAGNESIUM SERPL-MCNC: 1.4 MG/DL (ref 1.6–2.6)
MAGNESIUM SERPL-MCNC: 1.7 MG/DL (ref 1.6–2.6)
MCH RBC QN AUTO: 25.5 PG (ref 27–31)
MCH RBC QN AUTO: 25.9 PG (ref 27–31)
MCHC RBC AUTO-ENTMCNC: 30.4 G/DL (ref 32–36)
MCHC RBC AUTO-ENTMCNC: 30.5 G/DL (ref 32–36)
MCV RBC AUTO: 84 FL (ref 82–98)
MCV RBC AUTO: 85 FL (ref 82–98)
MONOCYTES # BLD AUTO: 0 K/UL (ref 0.3–1)
MONOCYTES # BLD AUTO: 0.1 K/UL (ref 0.3–1)
MONOCYTES NFR BLD: 0.4 % (ref 4–15)
MONOCYTES NFR BLD: 1.2 % (ref 4–15)
NEUTROPHILS # BLD AUTO: 8.4 K/UL (ref 1.8–7.7)
NEUTROPHILS # BLD AUTO: 9.2 K/UL (ref 1.8–7.7)
NEUTROPHILS NFR BLD: 83.9 % (ref 38–73)
NEUTROPHILS NFR BLD: 85.5 % (ref 38–73)
NRBC BLD-RTO: 0 /100 WBC
NRBC BLD-RTO: 0 /100 WBC
PATHOLOGIST INTERPRETATION UIFE: NORMAL
PHOSPHATE SERPL-MCNC: 5.9 MG/DL (ref 2.7–4.5)
PLATELET # BLD AUTO: 637 K/UL (ref 150–450)
PLATELET # BLD AUTO: 648 K/UL (ref 150–450)
PMV BLD AUTO: 9 FL (ref 9.2–12.9)
PMV BLD AUTO: 9.3 FL (ref 9.2–12.9)
POCT GLUCOSE: 210 MG/DL (ref 70–110)
POTASSIUM SERPL-SCNC: 4.4 MMOL/L (ref 3.5–5.1)
POTASSIUM SERPL-SCNC: 4.8 MMOL/L (ref 3.5–5.1)
POTASSIUM SERPL-SCNC: 4.8 MMOL/L (ref 3.5–5.1)
RBC # BLD AUTO: 3.8 M/UL (ref 4–5.4)
RBC # BLD AUTO: 4.1 M/UL (ref 4–5.4)
SODIUM SERPL-SCNC: 137 MMOL/L (ref 136–145)
SODIUM SERPL-SCNC: 138 MMOL/L (ref 136–145)
SODIUM SERPL-SCNC: 138 MMOL/L (ref 136–145)
SPECIMEN OUTDATE: NORMAL
WBC # BLD AUTO: 10.9 K/UL (ref 3.9–12.7)
WBC # BLD AUTO: 9.79 K/UL (ref 3.9–12.7)

## 2024-08-21 PROCEDURE — 63600175 PHARM REV CODE 636 W HCPCS: Performed by: INTERNAL MEDICINE

## 2024-08-21 PROCEDURE — 36415 COLL VENOUS BLD VENIPUNCTURE: CPT | Performed by: INTERNAL MEDICINE

## 2024-08-21 PROCEDURE — 99233 SBSQ HOSP IP/OBS HIGH 50: CPT | Mod: ,,, | Performed by: INTERNAL MEDICINE

## 2024-08-21 PROCEDURE — 80048 BASIC METABOLIC PNL TOTAL CA: CPT | Performed by: STUDENT IN AN ORGANIZED HEALTH CARE EDUCATION/TRAINING PROGRAM

## 2024-08-21 PROCEDURE — 99232 SBSQ HOSP IP/OBS MODERATE 35: CPT | Mod: ,,, | Performed by: INTERNAL MEDICINE

## 2024-08-21 PROCEDURE — 25000003 PHARM REV CODE 250: Performed by: INTERNAL MEDICINE

## 2024-08-21 PROCEDURE — 83735 ASSAY OF MAGNESIUM: CPT | Mod: 91 | Performed by: STUDENT IN AN ORGANIZED HEALTH CARE EDUCATION/TRAINING PROGRAM

## 2024-08-21 PROCEDURE — 85025 COMPLETE CBC W/AUTO DIFF WBC: CPT | Mod: 91 | Performed by: STUDENT IN AN ORGANIZED HEALTH CARE EDUCATION/TRAINING PROGRAM

## 2024-08-21 PROCEDURE — 85384 FIBRINOGEN ACTIVITY: CPT | Performed by: STUDENT IN AN ORGANIZED HEALTH CARE EDUCATION/TRAINING PROGRAM

## 2024-08-21 PROCEDURE — 80069 RENAL FUNCTION PANEL: CPT | Performed by: INTERNAL MEDICINE

## 2024-08-21 PROCEDURE — 86901 BLOOD TYPING SEROLOGIC RH(D): CPT | Performed by: STUDENT IN AN ORGANIZED HEALTH CARE EDUCATION/TRAINING PROGRAM

## 2024-08-21 PROCEDURE — 11000001 HC ACUTE MED/SURG PRIVATE ROOM

## 2024-08-21 PROCEDURE — 99900035 HC TECH TIME PER 15 MIN (STAT)

## 2024-08-21 RX ORDER — HEPARIN 100 UNIT/ML
500 SYRINGE INTRAVENOUS
Status: CANCELLED | OUTPATIENT
Start: 2024-08-28

## 2024-08-21 RX ORDER — SODIUM CHLORIDE 0.9 % (FLUSH) 0.9 %
10 SYRINGE (ML) INJECTION
Status: CANCELLED | OUTPATIENT
Start: 2024-08-28

## 2024-08-21 RX ORDER — SODIUM CHLORIDE 0.9 % (FLUSH) 0.9 %
10 SYRINGE (ML) INJECTION
OUTPATIENT
Start: 2024-08-21

## 2024-08-21 RX ORDER — DIPHENHYDRAMINE HYDROCHLORIDE 50 MG/ML
25 INJECTION INTRAMUSCULAR; INTRAVENOUS ONCE
Status: DISCONTINUED | OUTPATIENT
Start: 2024-08-21 | End: 2024-08-22

## 2024-08-21 RX ORDER — HEPARIN SODIUM 1000 [USP'U]/ML
3000 INJECTION, SOLUTION INTRAVENOUS; SUBCUTANEOUS ONCE
Status: DISCONTINUED | OUTPATIENT
Start: 2024-08-21 | End: 2024-08-22

## 2024-08-21 RX ORDER — SODIUM CHLORIDE 0.9 % (FLUSH) 0.9 %
10 SYRINGE (ML) INJECTION
Status: CANCELLED | OUTPATIENT
Start: 2024-08-21

## 2024-08-21 RX ORDER — ALBUMIN HUMAN 50 G/1000ML
50 SOLUTION INTRAVENOUS ONCE
Status: DISCONTINUED | OUTPATIENT
Start: 2024-08-21 | End: 2024-08-22

## 2024-08-21 RX ORDER — EPINEPHRINE 0.3 MG/.3ML
0.3 INJECTION SUBCUTANEOUS ONCE AS NEEDED
OUTPATIENT
Start: 2024-08-21

## 2024-08-21 RX ORDER — CALCIUM GLUCONATE 20 MG/ML
2 INJECTION, SOLUTION INTRAVENOUS ONCE
Status: DISCONTINUED | OUTPATIENT
Start: 2024-08-21 | End: 2024-08-22

## 2024-08-21 RX ORDER — HEPARIN 100 UNIT/ML
500 SYRINGE INTRAVENOUS
OUTPATIENT
Start: 2024-08-21

## 2024-08-21 RX ORDER — ALBUMIN HUMAN 50 G/1000ML
100 SOLUTION INTRAVENOUS ONCE
Status: DISCONTINUED | OUTPATIENT
Start: 2024-08-21 | End: 2024-08-21

## 2024-08-21 RX ORDER — DIPHENHYDRAMINE HYDROCHLORIDE 50 MG/ML
50 INJECTION INTRAMUSCULAR; INTRAVENOUS ONCE AS NEEDED
OUTPATIENT
Start: 2024-08-21

## 2024-08-21 RX ORDER — HEPARIN 100 UNIT/ML
500 SYRINGE INTRAVENOUS
Status: CANCELLED | OUTPATIENT
Start: 2024-08-21

## 2024-08-21 RX ADMIN — DOXYCYCLINE HYCLATE 100 MG: 100 TABLET, COATED ORAL at 09:08

## 2024-08-21 RX ADMIN — DOXYCYCLINE HYCLATE 100 MG: 100 TABLET, COATED ORAL at 08:08

## 2024-08-21 RX ADMIN — METHYLPREDNISOLONE SODIUM SUCCINATE 1000 MG: 1 INJECTION, POWDER, LYOPHILIZED, FOR SOLUTION INTRAMUSCULAR; INTRAVENOUS at 05:08

## 2024-08-21 RX ADMIN — SODIUM CHLORIDE: 9 INJECTION, SOLUTION INTRAVENOUS at 09:08

## 2024-08-21 RX ADMIN — CEFTRIAXONE 1 G: 1 INJECTION, POWDER, FOR SOLUTION INTRAMUSCULAR; INTRAVENOUS at 03:08

## 2024-08-21 RX ADMIN — SODIUM BICARBONATE 650 MG: 650 TABLET ORAL at 08:08

## 2024-08-21 RX ADMIN — SODIUM CHLORIDE: 9 INJECTION, SOLUTION INTRAVENOUS at 10:08

## 2024-08-21 RX ADMIN — SODIUM BICARBONATE 650 MG: 650 TABLET ORAL at 09:08

## 2024-08-21 RX ADMIN — FERROUS SULFATE TAB 325 MG (65 MG ELEMENTAL FE) 1 EACH: 325 (65 FE) TAB at 08:08

## 2024-08-21 NOTE — PROGRESS NOTES
[facility] Medicine  Progress Note    Patient Name: Shayy Faust  MRN: [id number]  Patient Class: IP- Inpatient   Admission Date: 8/15/2024  Length of Stay: 6 days  Attending Physician: Cm Lorenzo MD  Primary Care Provider: Karine, Primary Doctor        Subjective:     Principal Problem:JAIME (acute kidney injury)        HPI:  Patient is a 49 y.o.  female with a PMHx of asthma who presents to the Emergency Department due to abnormal blood work. Patient currently being seen by Dr. Jiménez for pulmonology. She reports not feeling well for several weeks. Endorses chills and sob. Denies any fever or congestion. Reports having good urinary output. Denies any home meds at this time.     In the ED, wbc: 15.1k, h/h 7.8/25.8, plt: 826, bun/cr: 43/2.7.     Overview/Hospital Course:  49 year-old female admitted with c/o abnormal blood work. She reports she hasn't been feeling well for a few weeks now. She is being seen and worked up by Dr. Jiménez with pulmonology for for SOB and chills. She started with an URI that progressed to SOB. She had Spirometry in pulm clinic that showed obstruction with very severe ventilatory impairment unimproved with post bronchodilator. She reports unintentional weight loss of 25 lbs. Pulm concerned for Wegener's disease.  CT chest showed numerous bilateral lung opacities; consider atypical infection/inflammation as well as metastatic neoplasm; recommend further evaluation with PET-CT scan. Tree in bud micronodularity in lower lobes consistent with chronic small airway disease.  CT a/p with punctate nonobstructing renal calculi, no hydronephrosis. Mild to moderate amount of stool in colon, with diverticulitis.     Worsening kidney function. Nephrology consulted. US Kidney showed increased echogenicity to kidneys concerning for medical renal disease. Recommends kidney biopsy, pending this next week, will try for Monday. Will make NPO after midnight.    Hem/onc  "consulted, recommends further metastatic workup with upper/lower endoscopy. Consult GI for evaluation.   Cont rocephin and doxycycline.     Hgb 6.7, unknown bleeding source. Transfuse 1 unit PRBC (8/18).     08/19/2024  H&H improved after 1UPRBC. Continue to remain stable since that time. Endoscopic procedure planned this hospitalization. Dr. 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 Consider high dose steroids after renal biopsy. Renal biopsy priority moved up to this hospitalization. Reached out to IR, they will facilitate as soon as schedule allows.     08/20/2024  EGD/Colon complete. Minor gastritis, biopsies taken. Small bowel normal, biopsied for MARIA ESTHER. Otherwise normal. Prep in right colon poor, left colon fair. No masses appreciated. Ileum intubated and normal. GI Recommend outpatient video capsule to complete work up for MARIA ESTHER. Discussed with IR, plans for renal biopsy sometime today. Will initiate high dose steroids thereafter per Dr. Jiménez' request.    08/21/2024  Discussed case with specialists, the decision for plasma apheresis/exchange to done at this location through [facility] was made. Dr. Galeas with nephrology is communicating with [place] team to assure this gets done. IR consulted for vascular access.       Objective:   /79 (BP Location: Left arm, Patient Position: Sitting)   Pulse 67   Temp 98.5 °F (36.9 °C) (Oral)   Resp 18   Ht 5' 4" (1.626 m)   Wt 43.5 kg (96 lb)   LMP  (LMP Unknown)   SpO2 97%   Breastfeeding No   BMI 16.48 kg/m²     Intake/Output Summary (Last 24 hours) at 8/21/2024 1417  Last data filed at 8/21/2024 0847  Gross per 24 hour   Intake 150 ml   Output --   Net 150 ml       PHYSICAL EXAM  Vitals reviewed  Constitutional:       Appearance: Normal appearance.   HENT:      Head: Normocephalic.   Eyes:      Pupils: Pupils are equal, round, and reactive to light.   Cardiovascular:      Rate and Rhythm: Normal " "rate and regular rhythm.      Heart sounds: No murmur heard.  Pulmonary:      Effort: Pulmonary effort is normal.      Breath sounds: Normal breath sounds. No wheezing or rales.   Abdominal:      General: Bowel sounds are normal.      Palpations: Abdomen is soft.   Musculoskeletal:         General: Normal range of motion.   Skin:     General: Skin is warm and dry.   Neurological:      General: No focal deficit present.      Mental Status: She is alert and oriented to person, place, and time.   Psychiatric:         Mood and Affect: Mood normal.         Behavior: Behavior normal.     LABS  All labs from the past 24 hours were reviewed.     BMP:   Recent Labs   Lab 08/21/24  1201   *      K 4.4      CO2 17*   BUN 38*   CREATININE 2.1*   CALCIUM 8.7   MG 1.7     CBC:   Recent Labs   Lab 08/20/24  1319 08/21/24  0638 08/21/24  1201   WBC 17.67* 9.79 10.90   HGB 9.4* 10.6* 9.7*   HCT 30.3* 34.8* 31.9*   * 637* 648*     CMP:   Recent Labs   Lab 08/20/24  0449 08/21/24  0638 08/21/24  1201    138  138 137   K 4.6 4.8  4.8 4.4    110  110 108   CO2 16* 17*  17* 17*   GLU 60* 155*  155* 177*   BUN 24* 36*  36* 38*   CREATININE 2.1* 2.1*  2.1* 2.1*   CALCIUM 8.6* 8.7  8.7 8.7   ALBUMIN 1.8* 1.9*  --    ANIONGAP 13 11  11 12     Cardiac Markers: No results for input(s): "CKMB", "MYOGLOBIN", "BNP", "TROPISTAT" in the last 48 hours.  Coagulation:   Recent Labs   Lab 08/20/24  1319   INR 1.1     Lactic Acid: No results for input(s): "LACTATE" in the last 48 hours.  Magnesium:   Recent Labs   Lab 08/21/24  1201   MG 1.7     Troponin: No results for input(s): "TROPONINI", "TROPONINIHS" in the last 48 hours.  TSH:   Recent Labs   Lab 04/01/24  1505   TSH 2.852     Urine Studies:   Recent Labs   Lab 08/20/24  0446   COLORU Yellow   APPEARANCEUA Clear   PHUR 6.0   SPECGRAV 1.010   PROTEINUA Trace*   GLUCUA Negative   KETONESU 1+*   BILIRUBINUA Negative   OCCULTUA 2+*   NITRITE Negative "   UROBILINOGEN Negative   LEUKOCYTESUR Negative   RBCUA 22*   WBCUA 3   BACTERIA Rare       IMAGING  All imaging from the past 24 hours were reviewed.     Imaging Results              US Kidney (Final result)  Result time 08/15/24 15:43:06      Final result by Storm Boyce MD (08/15/24 15:43:06)                   Impression:      1.  Increased echogenicity to the kidneys concerning for medical renal disease.  Negative for hydronephrosis.    2.  Findings most likely representing small bilateral nonobstructing renal stones measuring up to 3 mm.      Electronically signed by: Storm Boyce MD  Date:    08/15/2024  Time:    15:43               Narrative:    EXAMINATION:  US KIDNEY    CLINICAL HISTORY:  jaime;    TECHNIQUE:  Ultrasound of the kidneys and urinary bladder was performed including color flow and Doppler evaluation of the kidneys.    COMPARISON:  None.    FINDINGS:  Right kidney: The right kidney measures 12.0 cm. No cortical thinning. There is loss of corticomedullary distinction. Resistive index measures 0.65.  No mass. Possible tiny nonobstructing inferior pole stones noted.  No hydronephrosis.    Left kidney: The left kidney measures 12.2 cm. No cortical thinning. There is loss of corticomedullary distinction. Resistive index measures 0.63.  No mass. Possible 3 mm midpole stone.  No hydronephrosis.    The bladder is partially distended at the time of scanning and has an unremarkable appearance.                                        Assessment/Plan:      * JAIME (acute kidney injury)  Outpt pulm concerned for wegeners disease  Nephrology following, appreciate  Renal U/S negative for obstruction  Creatinine 2.1, trending down  Monitor labs daily    08/20/2024  Renal function seemingly back at baseline  Discussed with Pulmnology, suspects an element of vasculitis (possibly Wegener's)  Renal biopsy to be perfomed today   Will initiate high dose steroid therapy after biopsy    08/21/2024  Day 2 of high dose  steroids  Plasma apheresis ordered  Vasc cath to be placed today by IR  Nephrology facilitating the above, appreciate recs    Thrombocytosis  Likely reactive due to inflammation     PNA (pneumonia)  Cont doxycyline and Rocephin IV   Duonebs as needed    Iron deficiency anemia  S/p Venofer given, cont ferrous sulfate  Hema/onc following, appreciate, recommended endoscopy  Consult GI in the morning for endoscopy  Hgb 6.7  Transfuse 1 unit PRBC (8/18)  Repeat labs in the morning    08/20/2024  --hgb promptly corrected to 9.0 after 1uPRBC  --no obvious signs of bleeding on exam  --serial CBC to trend h&h  --transfuse for hgb < 7    08/21/2024  --s/p scope, no overt findings for MARIA ESTHER per GI report. Biopsies taken, recommend capsule study in the out patient setting. CBC ordered for today and tomorrow to trend          VTE Risk Mitigation (From admission, onward)           Ordered     heparin (porcine) injection 3,000 Units  Once         08/21/24 1141                    Discharge Planning   POOJA: 8/17/2024     Code Status: Full Code   Is the patient medically ready for discharge?:     Reason for patient still in hospital (select all that apply): Patient trending condition, Laboratory test, Treatment, and Consult recommendations  Discharge Plan A: Home with family   Discharge Delays: None known at this time              Cm Lorenzo MD  Department of Hospital Medicine   [facility] - Med Surg

## 2024-08-21 NOTE — PROGRESS NOTES
[facility] Surg  Hematology/Oncology  Progress Note    Patient Name: Shayy Faust  Admission Date: 8/15/2024  Hospital Length of Stay: 6 days  Code Status: Full Code     Subjective:     HPI:  49-year-old female with a 25 lb weight loss over the last several months.  Patient reports increasing fatigue and weakness patient found to be profoundly iron deficient.  CT chest demonstrated questionable infiltrative lung possible metastatic disease.  ECOG status 2 at bedside with mother    Interval History:  Resting comfortably after upper lower endoscopies    Oncology Treatment Plan:   [No matching plan found]    Medications:  Continuous Infusions:   0.9% NaCl   Intravenous Continuous 75 mL/hr at 08/19/24 2319 New Bag at 08/19/24 2319     Scheduled Meds:   cefTRIAXone (Rocephin) IV (PEDS and ADULTS)  1 g Intravenous Q24H    doxycycline  100 mg Oral BID    ferrous sulfate  1 tablet Oral Daily    iron sucrose  200 mg Intravenous Q7 Days    methylPREDNISolone injection (PEDS and ADULTS)  1,000 mg Intravenous Q24H    sodium bicarbonate  650 mg Oral BID     PRN Meds:  Current Facility-Administered Medications:     0.9%  NaCl infusion (for blood administration), , Intravenous, Q24H PRN    acetaminophen, 650 mg, Oral, Q6H PRN    albuterol-ipratropium, 3 mL, Nebulization, Q6H PRN    hydrALAZINE, 10 mg, Intravenous, Q6H PRN    ondansetron, 4 mg, Oral, Q6H PRN     Review of Systems   Constitutional:  Negative for activity change, appetite change, chills, diaphoresis, fatigue, fever and unexpected weight change.   HENT:  Negative for congestion, dental problem, drooling, ear discharge, ear pain, facial swelling, hearing loss, mouth sores, nosebleeds, postnasal drip, rhinorrhea, sinus pressure, sneezing, sore throat, tinnitus, trouble swallowing and voice change.    Eyes:  Negative for photophobia, pain, discharge, redness, itching and visual disturbance.   Respiratory:  Negative for cough, choking, chest tightness, shortness  of breath, wheezing and stridor.    Cardiovascular:  Negative for chest pain, palpitations and leg swelling.   Gastrointestinal:  Negative for abdominal distention, abdominal pain, anal bleeding, blood in stool, constipation, diarrhea, nausea, rectal pain and vomiting.   Endocrine: Negative for cold intolerance, heat intolerance, polydipsia, polyphagia and polyuria.   Genitourinary:  Negative for decreased urine volume, difficulty urinating, dyspareunia, dysuria, enuresis, flank pain, frequency, genital sores, hematuria, menstrual problem, pelvic pain, urgency, vaginal bleeding, vaginal discharge and vaginal pain.   Musculoskeletal:  Negative for arthralgias, back pain, gait problem, joint swelling, myalgias, neck pain and neck stiffness.   Skin:  Negative for color change, pallor and rash.   Allergic/Immunologic: Negative for environmental allergies, food allergies and immunocompromised state.   Neurological:  Negative for dizziness, tremors, seizures, syncope, facial asymmetry, speech difficulty, weakness, light-headedness, numbness and headaches.   Hematological:  Negative for adenopathy. Does not bruise/bleed easily.   Psychiatric/Behavioral:  Negative for agitation, behavioral problems, confusion, decreased concentration, dysphoric mood, hallucinations, self-injury, sleep disturbance and suicidal ideas. The patient is not nervous/anxious and is not hyperactive.      Objective:     Vital Signs (Most Recent):  Temp: 97.6 °F (36.4 °C) (08/21/24 0443)  Pulse: (!) 56 (08/21/24 0443)  Resp: 18 (08/21/24 0443)  BP: 126/75 (08/21/24 0443)  SpO2: 97 % (08/21/24 0443) Vital Signs (24h Range):  Temp:  [97.5 °F (36.4 °C)-99.2 °F (37.3 °C)] 97.6 °F (36.4 °C)  Pulse:  [56-84] 56  Resp:  [16-18] 18  SpO2:  [92 %-100 %] 97 %  BP: (105-143)/(54-91) 126/75     Weight: 43.5 kg (96 lb)  Body mass index is 16.48 kg/m².  Body surface area is 1.4 meters squared.      Intake/Output Summary (Last 24 hours) at 8/21/2024 0617  Last data  filed at 8/20/2024 1013  Gross per 24 hour   Intake 468 ml   Output --   Net 468 ml        Physical Exam  Vitals reviewed.   Constitutional:       General: She is not in acute distress.     Appearance: Normal appearance. She is well-developed. She is ill-appearing. She is not diaphoretic.   HENT:      Head: Normocephalic and atraumatic.      Right Ear: External ear normal.      Left Ear: External ear normal.      Nose: Nose normal.      Right Sinus: No maxillary sinus tenderness or frontal sinus tenderness.      Left Sinus: No maxillary sinus tenderness or frontal sinus tenderness.      Mouth/Throat:      Pharynx: No oropharyngeal exudate.   Eyes:      General: Lids are normal. No scleral icterus.        Right eye: No discharge.         Left eye: No discharge.      Conjunctiva/sclera: Conjunctivae normal.      Right eye: Right conjunctiva is not injected. No hemorrhage.     Left eye: Left conjunctiva is not injected. No hemorrhage.     Pupils: Pupils are equal, round, and reactive to light.   Neck:      Thyroid: No thyromegaly.      Vascular: No JVD.      Trachea: No tracheal deviation.   Cardiovascular:      Rate and Rhythm: Normal rate.   Pulmonary:      Effort: Pulmonary effort is normal. No respiratory distress.      Breath sounds: No stridor.   Chest:      Chest wall: No tenderness.   Abdominal:      General: Bowel sounds are normal. There is no distension.      Palpations: Abdomen is soft. There is no hepatomegaly, splenomegaly or mass.      Tenderness: There is no abdominal tenderness. There is no rebound.   Musculoskeletal:         General: No tenderness. Normal range of motion.      Cervical back: Normal range of motion and neck supple.   Lymphadenopathy:      Cervical: No cervical adenopathy.      Upper Body:      Right upper body: No supraclavicular adenopathy.      Left upper body: No supraclavicular adenopathy.   Skin:     General: Skin is dry.      Findings: No erythema or rash.   Neurological:       "Mental Status: She is alert and oriented to person, place, and time.      Cranial Nerves: No cranial nerve deficit.      Coordination: Coordination normal.   Psychiatric:         Behavior: Behavior normal.         Thought Content: Thought content normal.         Judgment: Judgment normal.          Significant Labs:   BMP:   Recent Labs   Lab 08/20/24  0449   GLU 60*      K 4.6      CO2 16*   BUN 24*   CREATININE 2.1*   CALCIUM 8.6*   , CBC:   Recent Labs   Lab 08/20/24  1319   WBC 17.67*   HGB 9.4*   HCT 30.3*   *   , CMP:   Recent Labs   Lab 08/20/24  0449      K 4.6      CO2 16*   GLU 60*   BUN 24*   CREATININE 2.1*   CALCIUM 8.6*   ALBUMIN 1.8*   ANIONGAP 13   , Coagulation:   Recent Labs   Lab 08/20/24  1319   INR 1.1   , Haptoglobin: No results for input(s): "HAPTOGLOBIN" in the last 48 hours., Immunology: No results for input(s): "SPEP", "ABDELRAHMAN", "AZAM", "FREELAMBDALI" in the last 48 hours., LDH: No results for input(s): "LDHCSF", "BFSOURCE" in the last 48 hours., LFTs:   Recent Labs   Lab 08/20/24  0449   ALBUMIN 1.8*   , Reticulocytes: No results for input(s): "RETIC" in the last 48 hours., Tumor Markers: No results for input(s): "PSA", "CEA", "", "AFPTM", "FE9223", "" in the last 48 hours.    Invalid input(s): "ALGTM", Uric Acid No results for input(s): "URICACID" in the last 48 hours., and Urine Studies:   Recent Labs   Lab 08/20/24  0446   COLORU Yellow   APPEARANCEUA Clear   PHUR 6.0   SPECGRAV 1.010   PROTEINUA Trace*   GLUCUA Negative   KETONESU 1+*   BILIRUBINUA Negative   OCCULTUA 2+*   NITRITE Negative   UROBILINOGEN Negative   LEUKOCYTESUR Negative   RBCUA 22*   WBCUA 3   BACTERIA Rare       Diagnostic Results:  I have reviewed all pertinent imaging results/findings within the past 24 hours.  Assessment/Plan:     Thrombocytosis  Thrombocytosis probably secondary to severe iron deficiency anemia reactive in nature    PNA (pneumonia)  Concern over metastatic " disease.  Would strongly recommend that patient have imaging done of abdomen least ultrasound of liver.  Abdomen would also recommend that patient undergo EGD colonoscopies ASAP    Iron deficiency anemia  Documented severe iron deficiency anemia exacerbated by renal insufficiency.  Recommend 1 dose of intravenous iron ASAP.  With documented iron deficiency 4 month duration weight loss.  High suspicion for metastatic disease would recommend that patient have ultrasound of abdomen.  Would recommend upper lower endoscopies ASAP to make sure see if she has a GI malignancy.  Discussed implications of answered questions with she and her mother  08/19/2024 spoke with GI Medicine this morning I spoke with mother in patient's bedside agree with upper lower endoscopies IV iron has been given should be given every 7 days to improve counts  08/20/2024 await results of EGD colonoscopies.  Discussed with GI Medicine yesterday rationale for doing so  08/21/2024.  Results of upper lower endoscopies demonstrates no significant pathology agree with IV iron in outpatient setting orders have been written.  For arrangements to be made as soon as discharge available        Thank you for your consult. I will follow-up with patient. Please contact us if you have any additional questions.     Marcin Mercado MD  Hematology/Oncology  [facility] - Med Surg

## 2024-08-21 NOTE — CONSULTS
Chart reviewed and patient is amenable to vascular catheter placement. Will place order for vascular catheter and will be placed on schedule for today    Impression/Plan  JAIME: plan for vascular catheter placement    Thank you for the consult

## 2024-08-21 NOTE — SUBJECTIVE & OBJECTIVE
Interval History:  Resting comfortably after upper lower endoscopies    Oncology Treatment Plan:   [No matching plan found]    Medications:  Continuous Infusions:   0.9% NaCl   Intravenous Continuous 75 mL/hr at 08/19/24 2319 New Bag at 08/19/24 2319     Scheduled Meds:   cefTRIAXone (Rocephin) IV (PEDS and ADULTS)  1 g Intravenous Q24H    doxycycline  100 mg Oral BID    ferrous sulfate  1 tablet Oral Daily    iron sucrose  200 mg Intravenous Q7 Days    methylPREDNISolone injection (PEDS and ADULTS)  1,000 mg Intravenous Q24H    sodium bicarbonate  650 mg Oral BID     PRN Meds:  Current Facility-Administered Medications:     0.9%  NaCl infusion (for blood administration), , Intravenous, Q24H PRN    acetaminophen, 650 mg, Oral, Q6H PRN    albuterol-ipratropium, 3 mL, Nebulization, Q6H PRN    hydrALAZINE, 10 mg, Intravenous, Q6H PRN    ondansetron, 4 mg, Oral, Q6H PRN     Review of Systems   Constitutional:  Negative for activity change, appetite change, chills, diaphoresis, fatigue, fever and unexpected weight change.   HENT:  Negative for congestion, dental problem, drooling, ear discharge, ear pain, facial swelling, hearing loss, mouth sores, nosebleeds, postnasal drip, rhinorrhea, sinus pressure, sneezing, sore throat, tinnitus, trouble swallowing and voice change.    Eyes:  Negative for photophobia, pain, discharge, redness, itching and visual disturbance.   Respiratory:  Negative for cough, choking, chest tightness, shortness of breath, wheezing and stridor.    Cardiovascular:  Negative for chest pain, palpitations and leg swelling.   Gastrointestinal:  Negative for abdominal distention, abdominal pain, anal bleeding, blood in stool, constipation, diarrhea, nausea, rectal pain and vomiting.   Endocrine: Negative for cold intolerance, heat intolerance, polydipsia, polyphagia and polyuria.   Genitourinary:  Negative for decreased urine volume, difficulty urinating, dyspareunia, dysuria, enuresis, flank pain,  frequency, genital sores, hematuria, menstrual problem, pelvic pain, urgency, vaginal bleeding, vaginal discharge and vaginal pain.   Musculoskeletal:  Negative for arthralgias, back pain, gait problem, joint swelling, myalgias, neck pain and neck stiffness.   Skin:  Negative for color change, pallor and rash.   Allergic/Immunologic: Negative for environmental allergies, food allergies and immunocompromised state.   Neurological:  Negative for dizziness, tremors, seizures, syncope, facial asymmetry, speech difficulty, weakness, light-headedness, numbness and headaches.   Hematological:  Negative for adenopathy. Does not bruise/bleed easily.   Psychiatric/Behavioral:  Negative for agitation, behavioral problems, confusion, decreased concentration, dysphoric mood, hallucinations, self-injury, sleep disturbance and suicidal ideas. The patient is not nervous/anxious and is not hyperactive.      Objective:     Vital Signs (Most Recent):  Temp: 97.6 °F (36.4 °C) (08/21/24 0443)  Pulse: (!) 56 (08/21/24 0443)  Resp: 18 (08/21/24 0443)  BP: 126/75 (08/21/24 0443)  SpO2: 97 % (08/21/24 0443) Vital Signs (24h Range):  Temp:  [97.5 °F (36.4 °C)-99.2 °F (37.3 °C)] 97.6 °F (36.4 °C)  Pulse:  [56-84] 56  Resp:  [16-18] 18  SpO2:  [92 %-100 %] 97 %  BP: (105-143)/(54-91) 126/75     Weight: 43.5 kg (96 lb)  Body mass index is 16.48 kg/m².  Body surface area is 1.4 meters squared.      Intake/Output Summary (Last 24 hours) at 8/21/2024 0617  Last data filed at 8/20/2024 1013  Gross per 24 hour   Intake 468 ml   Output --   Net 468 ml        Physical Exam  Vitals reviewed.   Constitutional:       General: She is not in acute distress.     Appearance: Normal appearance. She is well-developed. She is ill-appearing. She is not diaphoretic.   HENT:      Head: Normocephalic and atraumatic.      Right Ear: External ear normal.      Left Ear: External ear normal.      Nose: Nose normal.      Right Sinus: No maxillary sinus tenderness or  frontal sinus tenderness.      Left Sinus: No maxillary sinus tenderness or frontal sinus tenderness.      Mouth/Throat:      Pharynx: No oropharyngeal exudate.   Eyes:      General: Lids are normal. No scleral icterus.        Right eye: No discharge.         Left eye: No discharge.      Conjunctiva/sclera: Conjunctivae normal.      Right eye: Right conjunctiva is not injected. No hemorrhage.     Left eye: Left conjunctiva is not injected. No hemorrhage.     Pupils: Pupils are equal, round, and reactive to light.   Neck:      Thyroid: No thyromegaly.      Vascular: No JVD.      Trachea: No tracheal deviation.   Cardiovascular:      Rate and Rhythm: Normal rate.   Pulmonary:      Effort: Pulmonary effort is normal. No respiratory distress.      Breath sounds: No stridor.   Chest:      Chest wall: No tenderness.   Abdominal:      General: Bowel sounds are normal. There is no distension.      Palpations: Abdomen is soft. There is no hepatomegaly, splenomegaly or mass.      Tenderness: There is no abdominal tenderness. There is no rebound.   Musculoskeletal:         General: No tenderness. Normal range of motion.      Cervical back: Normal range of motion and neck supple.   Lymphadenopathy:      Cervical: No cervical adenopathy.      Upper Body:      Right upper body: No supraclavicular adenopathy.      Left upper body: No supraclavicular adenopathy.   Skin:     General: Skin is dry.      Findings: No erythema or rash.   Neurological:      Mental Status: She is alert and oriented to person, place, and time.      Cranial Nerves: No cranial nerve deficit.      Coordination: Coordination normal.   Psychiatric:         Behavior: Behavior normal.         Thought Content: Thought content normal.         Judgment: Judgment normal.          Significant Labs:   BMP:   Recent Labs   Lab 08/20/24  0449   GLU 60*      K 4.6      CO2 16*   BUN 24*   CREATININE 2.1*   CALCIUM 8.6*   , CBC:   Recent Labs   Lab  "08/20/24  1319   WBC 17.67*   HGB 9.4*   HCT 30.3*   *   , CMP:   Recent Labs   Lab 08/20/24  0449      K 4.6      CO2 16*   GLU 60*   BUN 24*   CREATININE 2.1*   CALCIUM 8.6*   ALBUMIN 1.8*   ANIONGAP 13   , Coagulation:   Recent Labs   Lab 08/20/24  1319   INR 1.1   , Haptoglobin: No results for input(s): "HAPTOGLOBIN" in the last 48 hours., Immunology: No results for input(s): "SPEP", "ABDELRAHMAN", "AZAM", "FREELAMBDALI" in the last 48 hours., LDH: No results for input(s): "LDHCSF", "BFSOURCE" in the last 48 hours., LFTs:   Recent Labs   Lab 08/20/24  0449   ALBUMIN 1.8*   , Reticulocytes: No results for input(s): "RETIC" in the last 48 hours., Tumor Markers: No results for input(s): "PSA", "CEA", "", "AFPTM", "[id number]", "" in the last 48 hours.    Invalid input(s): "ALGTM", Uric Acid No results for input(s): "URICACID" in the last 48 hours., and Urine Studies:   Recent Labs   Lab 08/20/24  0446   COLORU Yellow   APPEARANCEUA Clear   PHUR 6.0   SPECGRAV 1.010   PROTEINUA Trace*   GLUCUA Negative   KETONESU 1+*   BILIRUBINUA Negative   OCCULTUA 2+*   NITRITE Negative   UROBILINOGEN Negative   LEUKOCYTESUR Negative   RBCUA 22*   WBCUA 3   BACTERIA Rare       Diagnostic Results:  I have reviewed all pertinent imaging results/findings within the past 24 hours.  "

## 2024-08-21 NOTE — PROGRESS NOTES
Nephrology Progress Note     History of Present Illness     History of asthma presenting to the hospital with 4 week history of respiratory symptoms not improving with steroids and beta agonist inhaler. On arrival here, creatinine 2.6 with a peak of 2.7 within improving with some IV fluids. JAIME of uncertain etiology with active sediment on urinalysis in both blood and protein. 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 Thus far AZAM as well as complements unremarkable. Hepatitis-C negative. She did have an SPEP with to monoclonal spikes.  However, kappa lambda ratio fairly unremarkable with fairly similar elevation in both.  Hematology is following.  Ultimately, anti-GBM slightly elevated antibodies.  Also ANCA with p-ANCA positivity 1:80.    Renal Bx 8/20/2024.  Results pending.  Sent to [facility].    Started on Medrol 1g daily x 3 days 8/20/2024  Started on Apheresis 8/20/2024 x 5 treatmenst.  2 sequential and then QOD.    Interval History   Overnight/currently:  Status post renal biopsy yesterday with hemoglobin stable overnight.  Denies any shortness of breath or chest pain and creatinine is stable at 2.1.  Again, awaiting biopsy results.  Plans for Vas-Cath today with apheresis treatment #1 today.        Allergies:    has No Known Allergies.    Current medications:   Scheduled Meds:   albumin human 5%  100 g Intravenous Once    calcium gluconate IVPB  2 g Intravenous Once    cefTRIAXone (Rocephin) IV (PEDS and ADULTS)  1 g Intravenous Q24H    doxycycline  100 mg Oral BID    ferrous sulfate  1 tablet Oral Daily    heparin (porcine)  3,000 Units Intravenous Once    iron sucrose  200 mg Intravenous Q7 Days    methylPREDNISolone injection (PEDS and ADULTS)  1,000 mg Intravenous Q24H    sodium bicarbonate  650 mg Oral BID     Continuous Infusions:   0.9% NaCl   Intravenous Continuous 75 mL/hr at 08/21/24 0900 New Bag at 08/21/24 0900     PRN Meds:.  Current Facility-Administered Medications:      "0.9%  NaCl infusion (for blood administration), , Intravenous, Q24H PRN    acetaminophen, 650 mg, Oral, Q6H PRN    albuterol-ipratropium, 3 mL, Nebulization, Q6H PRN    hydrALAZINE, 10 mg, Intravenous, Q6H PRN    ondansetron, 4 mg, Oral, Q6H PRN     Physical Examination     VS/Measurements    /79 (BP Location: Left arm, Patient Position: Sitting)   Pulse 67   Temp 98.5 °F (36.9 °C) (Oral)   Resp 18   Ht 5' 4" (1.626 m)   Wt 43.5 kg (96 lb)   LMP  (LMP Unknown)   SpO2 97%   Breastfeeding No   BMI 16.48 kg/m²         General:  Moderately built, not in any distress.  Neck:  Supple,   Respiratory: Non-labored,  Lungs are clear to auscultation.    Cardiovascular:  Normal rate, Regular rhythm.   Abdomen:  Soft, Non-tender, Normal bowel sounds.   Muskuloskeletal:  No pedal edema          Laboratory Results   Today's Lab Results :    Recent Results (from the past 24 hour(s))   CBC Auto Differential    Collection Time: 08/21/24  6:38 AM   Result Value Ref Range    WBC 9.79 3.90 - 12.70 K/uL    RBC 4.10 4.00 - 5.40 M/uL    Hemoglobin 10.6 (L) 12.0 - 16.0 g/dL    Hematocrit 34.8 (L) 37.0 - 48.5 %    MCV 85 82 - 98 fL    MCH 25.9 (L) 27.0 - 31.0 pg    MCHC 30.5 (L) 32.0 - 36.0 g/dL    RDW 15.3 (H) 11.5 - 14.5 %    Platelets 637 (H) 150 - 450 K/uL    MPV 9.3 9.2 - 12.9 fL    Immature Granulocytes 0.4 0.0 - 0.5 %    Gran # (ANC) 8.4 (H) 1.8 - 7.7 K/uL    Immature Grans (Abs) 0.04 0.00 - 0.04 K/uL    Lymph # 1.3 1.0 - 4.8 K/uL    Mono # 0.0 (L) 0.3 - 1.0 K/uL    Eos # 0.0 0.0 - 0.5 K/uL    Baso # 0.02 0.00 - 0.20 K/uL    nRBC 0 0 /100 WBC    Gran % 85.5 (H) 38.0 - 73.0 %    Lymph % 13.5 (L) 18.0 - 48.0 %    Mono % 0.4 (L) 4.0 - 15.0 %    Eosinophil % 0.0 0.0 - 8.0 %    Basophil % 0.2 0.0 - 1.9 %    Differential Method Automated    Basic Metabolic Panel    Collection Time: 08/21/24  6:38 AM   Result Value Ref Range    Sodium 138 136 - 145 mmol/L    Potassium 4.8 3.5 - 5.1 mmol/L    Chloride 110 95 - 110 mmol/L    CO2 " 17 (L) 23 - 29 mmol/L    Glucose 155 (H) 70 - 110 mg/dL    BUN 36 (H) 6 - 20 mg/dL    Creatinine 2.1 (H) 0.5 - 1.4 mg/dL    Calcium 8.7 8.7 - 10.5 mg/dL    Anion Gap 11 8 - 16 mmol/L    eGFR 28 (A) >60 mL/min/1.73 m^2   Renal Function Panel    Collection Time: 08/21/24  6:38 AM   Result Value Ref Range    Glucose 155 (H) 70 - 110 mg/dL    Sodium 138 136 - 145 mmol/L    Potassium 4.8 3.5 - 5.1 mmol/L    Chloride 110 95 - 110 mmol/L    CO2 17 (L) 23 - 29 mmol/L    BUN 36 (H) 6 - 20 mg/dL    Calcium 8.7 8.7 - 10.5 mg/dL    Creatinine 2.1 (H) 0.5 - 1.4 mg/dL    Albumin 1.9 (L) 3.5 - 5.2 g/dL    Phosphorus 5.9 (H) 2.7 - 4.5 mg/dL    eGFR 28 (A) >60 mL/min/1.73 m^2    Anion Gap 11 8 - 16 mmol/L   CBC Auto Differential    Collection Time: 08/21/24 12:01 PM   Result Value Ref Range    WBC 10.90 3.90 - 12.70 K/uL    RBC 3.80 (L) 4.00 - 5.40 M/uL    Hemoglobin 9.7 (L) 12.0 - 16.0 g/dL    Hematocrit 31.9 (L) 37.0 - 48.5 %    MCV 84 82 - 98 fL    MCH 25.5 (L) 27.0 - 31.0 pg    MCHC 30.4 (L) 32.0 - 36.0 g/dL    RDW 15.2 (H) 11.5 - 14.5 %    Platelets 648 (H) 150 - 450 K/uL    MPV 9.0 (L) 9.2 - 12.9 fL    Immature Granulocytes 0.4 0.0 - 0.5 %    Gran # (ANC) 9.2 (H) 1.8 - 7.7 K/uL    Immature Grans (Abs) 0.04 0.00 - 0.04 K/uL    Lymph # 1.6 1.0 - 4.8 K/uL    Mono # 0.1 (L) 0.3 - 1.0 K/uL    Eos # 0.0 0.0 - 0.5 K/uL    Baso # 0.01 0.00 - 0.20 K/uL    nRBC 0 0 /100 WBC    Gran % 83.9 (H) 38.0 - 73.0 %    Lymph % 14.4 (L) 18.0 - 48.0 %    Mono % 1.2 (L) 4.0 - 15.0 %    Eosinophil % 0.0 0.0 - 8.0 %    Basophil % 0.1 0.0 - 1.9 %    Differential Method Automated    Fibrinogen    Collection Time: 08/21/24 12:01 PM   Result Value Ref Range    Fibrinogen 361 182 - 400 mg/dL       LABS:  Reviewed Yes      Intake/Output Summary (Last 24 hours) at 8/21/2024 1320  Last data filed at 8/21/2024 0847  Gross per 24 hour   Intake 150 ml   Output --   Net 150 ml       Assessment and Plan     JAIME.  Uncertain etiology with active sediment on  urinalysis.  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  Thus for AZAM and complements normal.  Hepatitis C was also negative.  Rheumatoid factor was strongly positive and SPEP was positive for to monoclonal spikes.  However, serum free light chain ratio appears to be okay.  ANCA panel with 1:80 pANCA and neg cANCA.  Anti-GBM antibodies did come back positive but only slightly elevated (hard to tell though if waning or increasing).  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  Given this, we will proceed with apheresis starting today for 5 treatments.  Serial x2 and then every other day for 3 more treatments after that..  Follow up on renal biopsy for definitive treatment.  However, this will need to be discussed with Hematology/Oncology as he has listed a concern for possible metastatic disease given some pulmonary nodules.  Abdominal imaging was unconcerning for any lymphadenopathy or liver masses.     Microscopic hematuria.  As above.       Anemia.  Hematology following.  Status post PRBCs.  GI consulted for endoscopy.      Pulmonary nodules.  This can be seen in vasculitis and I think this is more likely.  Nonetheless, we will need to touch base with heme/Onc before initiating Rituxan or Cytoxan as they have expressed a concern for possible metastatic disease process.  No other obvious lesions/masses/adenopathy on CT of the abdomen and pelvis.     Positive rheumatoid factor.  Rheumatology consult placed with the outpatient setting.  Scheduled for January of 2025.  May need that moved up sooner.       Proteinuria.  Approximately 1 g by ratio.  Hold ACE-inhibitor or ARB at this time.     Metabolic acidosis.  Continue sodium bicarbonate tablets twice a day.     Thrombocytosis.  Felt to be reactive from severe iron-deficiency anemia.  Hematology  following.    Disposition.  I have consented the patient for plasmapheresis after a long discussion as well as explaining the risks and benefits of treatment.  IR her has been consulted to place Vas-Cath.  Labs ordered for pheresis treatment.  Fibrinogen appears to be okay.  Hemoglobin is stable and she should not require a transfusion at this time.  Magnesium okay as well.  We will need to use at least 50% FFP for the 1st 2 treatments with the recent renal biopsy as we have encountered bleeding in the past when using all albumin.    ________________________________________________  Hermes Galeas

## 2024-08-21 NOTE — PLAN OF CARE
Discussed poc with pt, pt verbalized understanding    Purposeful rounding every 2hours    VS wnl    Fall precautions in place, remains injury free  Pain and nausea under control with PRN meds    IVFs as ordered  Accurate I&Os  Abx given as prescribed  Bed locked at lowest position  Call light within reach    Chart check complete  Will cont with POC

## 2024-08-21 NOTE — ASSESSMENT & PLAN NOTE
Documented severe iron deficiency anemia exacerbated by renal insufficiency.  Recommend 1 dose of intravenous iron ASAP.  With documented iron deficiency 4 month duration weight loss.  High suspicion for metastatic disease would recommend that patient have ultrasound of abdomen.  Would recommend upper lower endoscopies ASAP to make sure see if she has a GI malignancy.  Discussed implications of answered questions with she and her mother  08/19/2024 spoke with GI Medicine this morning I spoke with mother in patient's bedside agree with upper lower endoscopies IV iron has been given should be given every 7 days to improve counts  08/20/2024 await results of EGD colonoscopies.  Discussed with GI Medicine yesterday rationale for doing so  08/21/2024.  Results of upper lower endoscopies demonstrates no significant pathology agree with IV iron in outpatient setting orders have been written.  For arrangements to be made as soon as discharge available

## 2024-08-22 LAB
ALBUMIN SERPL BCP-MCNC: 1.8 G/DL (ref 3.5–5.2)
ALP SERPL-CCNC: 56 U/L (ref 55–135)
ALT SERPL W/O P-5'-P-CCNC: 9 U/L (ref 10–44)
ANION GAP SERPL CALC-SCNC: 9 MMOL/L (ref 8–16)
ANION GAP SERPL CALC-SCNC: 9 MMOL/L (ref 8–16)
AST SERPL-CCNC: 9 U/L (ref 10–40)
BASOPHILS # BLD AUTO: 0.02 K/UL (ref 0–0.2)
BASOPHILS NFR BLD: 0.1 % (ref 0–1.9)
BILIRUB SERPL-MCNC: 0.1 MG/DL (ref 0.1–1)
BLD PROD TYP BPU: NORMAL
BLOOD UNIT EXPIRATION DATE: NORMAL
BLOOD UNIT TYPE CODE: 5100
BLOOD UNIT TYPE CODE: 600
BLOOD UNIT TYPE CODE: 6200
BLOOD UNIT TYPE CODE: 9500
BLOOD UNIT TYPE: NORMAL
BUN SERPL-MCNC: 44 MG/DL (ref 6–20)
BUN SERPL-MCNC: 44 MG/DL (ref 6–20)
CALCIUM SERPL-MCNC: 8.3 MG/DL (ref 8.7–10.5)
CALCIUM SERPL-MCNC: 8.3 MG/DL (ref 8.7–10.5)
CHLORIDE SERPL-SCNC: 110 MMOL/L (ref 95–110)
CHLORIDE SERPL-SCNC: 110 MMOL/L (ref 95–110)
CO2 SERPL-SCNC: 17 MMOL/L (ref 23–29)
CO2 SERPL-SCNC: 17 MMOL/L (ref 23–29)
CODING SYSTEM: NORMAL
CREAT SERPL-MCNC: 2 MG/DL (ref 0.5–1.4)
CREAT SERPL-MCNC: 2 MG/DL (ref 0.5–1.4)
CROSSMATCH INTERPRETATION: NORMAL
DIFFERENTIAL METHOD BLD: ABNORMAL
DISPENSE STATUS: NORMAL
EOSINOPHIL # BLD AUTO: 0 K/UL (ref 0–0.5)
EOSINOPHIL NFR BLD: 0 % (ref 0–8)
ERYTHROCYTE [DISTWIDTH] IN BLOOD BY AUTOMATED COUNT: 15.2 % (ref 11.5–14.5)
EST. GFR  (NO RACE VARIABLE): 30 ML/MIN/1.73 M^2
EST. GFR  (NO RACE VARIABLE): 30 ML/MIN/1.73 M^2
FERRITIN SERPL-MCNC: 863 NG/ML (ref 20–300)
FIBRINOGEN PPP-MCNC: 234 MG/DL (ref 182–400)
GLUCOSE SERPL-MCNC: 149 MG/DL (ref 70–110)
GLUCOSE SERPL-MCNC: 149 MG/DL (ref 70–110)
HCT VFR BLD AUTO: 27.2 % (ref 37–48.5)
HGB BLD-MCNC: 8.5 G/DL (ref 12–16)
IMM GRANULOCYTES # BLD AUTO: 0.11 K/UL (ref 0–0.04)
IMM GRANULOCYTES NFR BLD AUTO: 0.7 % (ref 0–0.5)
IRON SERPL-MCNC: 69 UG/DL (ref 30–160)
LYMPHOCYTES # BLD AUTO: 1.8 K/UL (ref 1–4.8)
LYMPHOCYTES NFR BLD: 11.3 % (ref 18–48)
MAGNESIUM SERPL-MCNC: 1.7 MG/DL (ref 1.6–2.6)
MCH RBC QN AUTO: 26.2 PG (ref 27–31)
MCHC RBC AUTO-ENTMCNC: 31.3 G/DL (ref 32–36)
MCV RBC AUTO: 84 FL (ref 82–98)
MONOCYTES # BLD AUTO: 0.2 K/UL (ref 0.3–1)
MONOCYTES NFR BLD: 1.4 % (ref 4–15)
NEUTROPHILS # BLD AUTO: 13.6 K/UL (ref 1.8–7.7)
NEUTROPHILS NFR BLD: 86.5 % (ref 38–73)
NRBC BLD-RTO: 0 /100 WBC
NUM UNITS TRANS FFP: NORMAL
PLATELET # BLD AUTO: 551 K/UL (ref 150–450)
PMV BLD AUTO: 9.4 FL (ref 9.2–12.9)
POTASSIUM SERPL-SCNC: 4.4 MMOL/L (ref 3.5–5.1)
POTASSIUM SERPL-SCNC: 4.4 MMOL/L (ref 3.5–5.1)
PROT PATTERN UR ELPH-IMP: NORMAL
PROT SERPL-MCNC: 5.6 G/DL (ref 6–8.4)
RBC # BLD AUTO: 3.24 M/UL (ref 4–5.4)
SATURATED IRON: 52 % (ref 20–50)
SODIUM SERPL-SCNC: 136 MMOL/L (ref 136–145)
SODIUM SERPL-SCNC: 136 MMOL/L (ref 136–145)
TOTAL IRON BINDING CAPACITY: 132 UG/DL (ref 250–450)
TRANSFERRIN SERPL-MCNC: 89 MG/DL (ref 200–375)
WBC # BLD AUTO: 15.72 K/UL (ref 3.9–12.7)

## 2024-08-22 PROCEDURE — 0JH63XZ INSERTION OF TUNNELED VASCULAR ACCESS DEVICE INTO CHEST SUBCUTANEOUS TISSUE AND FASCIA, PERCUTANEOUS APPROACH: ICD-10-PCS | Performed by: RADIOLOGY

## 2024-08-22 PROCEDURE — 25000003 PHARM REV CODE 250: Mod: JZ,JG | Performed by: STUDENT IN AN ORGANIZED HEALTH CARE EDUCATION/TRAINING PROGRAM

## 2024-08-22 PROCEDURE — 25000003 PHARM REV CODE 250: Performed by: RADIOLOGY

## 2024-08-22 PROCEDURE — 25000003 PHARM REV CODE 250: Performed by: INTERNAL MEDICINE

## 2024-08-22 PROCEDURE — 36415 COLL VENOUS BLD VENIPUNCTURE: CPT | Performed by: INTERNAL MEDICINE

## 2024-08-22 PROCEDURE — 99232 SBSQ HOSP IP/OBS MODERATE 35: CPT | Mod: ,,, | Performed by: INTERNAL MEDICINE

## 2024-08-22 PROCEDURE — 63600175 PHARM REV CODE 636 W HCPCS: Performed by: RADIOLOGY

## 2024-08-22 PROCEDURE — P9017 PLASMA 1 DONOR FRZ W/IN 8 HR: HCPCS | Performed by: STUDENT IN AN ORGANIZED HEALTH CARE EDUCATION/TRAINING PROGRAM

## 2024-08-22 PROCEDURE — 85025 COMPLETE CBC W/AUTO DIFF WBC: CPT | Performed by: STUDENT IN AN ORGANIZED HEALTH CARE EDUCATION/TRAINING PROGRAM

## 2024-08-22 PROCEDURE — 63600175 PHARM REV CODE 636 W HCPCS: Performed by: INTERNAL MEDICINE

## 2024-08-22 PROCEDURE — 83735 ASSAY OF MAGNESIUM: CPT | Performed by: INTERNAL MEDICINE

## 2024-08-22 PROCEDURE — 80053 COMPREHEN METABOLIC PANEL: CPT | Performed by: INTERNAL MEDICINE

## 2024-08-22 PROCEDURE — 94761 N-INVAS EAR/PLS OXIMETRY MLT: CPT

## 2024-08-22 PROCEDURE — 6A551Z3 PHERESIS OF PLASMA, MULTIPLE: ICD-10-PCS | Performed by: PATHOLOGY

## 2024-08-22 PROCEDURE — P9045 ALBUMIN (HUMAN), 5%, 250 ML: HCPCS | Mod: JZ,JG | Performed by: STUDENT IN AN ORGANIZED HEALTH CARE EDUCATION/TRAINING PROGRAM

## 2024-08-22 PROCEDURE — 82728 ASSAY OF FERRITIN: CPT | Performed by: INTERNAL MEDICINE

## 2024-08-22 PROCEDURE — 63600175 PHARM REV CODE 636 W HCPCS: Mod: JZ,JG | Performed by: STUDENT IN AN ORGANIZED HEALTH CARE EDUCATION/TRAINING PROGRAM

## 2024-08-22 PROCEDURE — 84466 ASSAY OF TRANSFERRIN: CPT | Performed by: INTERNAL MEDICINE

## 2024-08-22 PROCEDURE — 85384 FIBRINOGEN ACTIVITY: CPT | Performed by: INTERNAL MEDICINE

## 2024-08-22 PROCEDURE — 11000001 HC ACUTE MED/SURG PRIVATE ROOM

## 2024-08-22 PROCEDURE — 02HV33Z INSERTION OF INFUSION DEVICE INTO SUPERIOR VENA CAVA, PERCUTANEOUS APPROACH: ICD-10-PCS | Performed by: RADIOLOGY

## 2024-08-22 RX ORDER — LIDOCAINE HYDROCHLORIDE 20 MG/ML
INJECTION, SOLUTION EPIDURAL; INFILTRATION; INTRACAUDAL; PERINEURAL CODE/TRAUMA/SEDATION MEDICATION
Status: COMPLETED | OUTPATIENT
Start: 2024-08-22 | End: 2024-08-22

## 2024-08-22 RX ORDER — ALBUMIN HUMAN 50 G/1000ML
50 SOLUTION INTRAVENOUS ONCE
Status: DISCONTINUED | OUTPATIENT
Start: 2024-08-22 | End: 2024-08-22 | Stop reason: SDUPTHER

## 2024-08-22 RX ORDER — FENTANYL CITRATE 50 UG/ML
INJECTION, SOLUTION INTRAMUSCULAR; INTRAVENOUS CODE/TRAUMA/SEDATION MEDICATION
Status: COMPLETED | OUTPATIENT
Start: 2024-08-22 | End: 2024-08-22

## 2024-08-22 RX ORDER — CALCIUM GLUCONATE 20 MG/ML
2 INJECTION, SOLUTION INTRAVENOUS ONCE
Status: DISCONTINUED | OUTPATIENT
Start: 2024-08-22 | End: 2024-08-22 | Stop reason: SDUPTHER

## 2024-08-22 RX ORDER — HEPARIN SODIUM 1000 [USP'U]/ML
INJECTION, SOLUTION INTRAVENOUS; SUBCUTANEOUS CODE/TRAUMA/SEDATION MEDICATION
Status: COMPLETED | OUTPATIENT
Start: 2024-08-22 | End: 2024-08-22

## 2024-08-22 RX ORDER — HEPARIN SODIUM 1000 [USP'U]/ML
3000 INJECTION, SOLUTION INTRAVENOUS; SUBCUTANEOUS ONCE
Status: DISCONTINUED | OUTPATIENT
Start: 2024-08-22 | End: 2024-08-22 | Stop reason: SDUPTHER

## 2024-08-22 RX ORDER — PANTOPRAZOLE SODIUM 40 MG/1
40 TABLET, DELAYED RELEASE ORAL DAILY
Status: DISCONTINUED | OUTPATIENT
Start: 2024-08-22 | End: 2024-08-26 | Stop reason: HOSPADM

## 2024-08-22 RX ORDER — ALBUMIN HUMAN 50 G/1000ML
50 SOLUTION INTRAVENOUS ONCE
Status: COMPLETED | OUTPATIENT
Start: 2024-08-22 | End: 2024-08-22

## 2024-08-22 RX ORDER — SULFAMETHOXAZOLE AND TRIMETHOPRIM 800; 160 MG/1; MG/1
1 TABLET ORAL
Status: DISCONTINUED | OUTPATIENT
Start: 2024-08-22 | End: 2024-08-23

## 2024-08-22 RX ORDER — ACETAMINOPHEN 500 MG
5000 TABLET ORAL DAILY
Status: DISCONTINUED | OUTPATIENT
Start: 2024-08-22 | End: 2024-08-26 | Stop reason: HOSPADM

## 2024-08-22 RX ORDER — HEPARIN SODIUM 1000 [USP'U]/ML
3000 INJECTION, SOLUTION INTRAVENOUS; SUBCUTANEOUS ONCE
Status: COMPLETED | OUTPATIENT
Start: 2024-08-22 | End: 2024-08-22

## 2024-08-22 RX ORDER — CALCIUM GLUCONATE 20 MG/ML
2 INJECTION, SOLUTION INTRAVENOUS ONCE
Status: COMPLETED | OUTPATIENT
Start: 2024-08-22 | End: 2024-08-22

## 2024-08-22 RX ORDER — DIPHENHYDRAMINE HYDROCHLORIDE 50 MG/ML
INJECTION INTRAMUSCULAR; INTRAVENOUS CODE/TRAUMA/SEDATION MEDICATION
Status: COMPLETED | OUTPATIENT
Start: 2024-08-22 | End: 2024-08-22

## 2024-08-22 RX ORDER — DIPHENHYDRAMINE HYDROCHLORIDE 50 MG/ML
25 INJECTION INTRAMUSCULAR; INTRAVENOUS EVERY 6 HOURS PRN
Status: DISCONTINUED | OUTPATIENT
Start: 2024-08-22 | End: 2024-08-25

## 2024-08-22 RX ORDER — DIPHENHYDRAMINE HYDROCHLORIDE 50 MG/ML
25 INJECTION INTRAMUSCULAR; INTRAVENOUS EVERY 6 HOURS PRN
Status: DISCONTINUED | OUTPATIENT
Start: 2024-08-22 | End: 2024-08-22 | Stop reason: SDUPTHER

## 2024-08-22 RX ADMIN — LIDOCAINE HYDROCHLORIDE 5 ML: 20 INJECTION, SOLUTION EPIDURAL; INFILTRATION; INTRACAUDAL; PERINEURAL at 11:08

## 2024-08-22 RX ADMIN — DIPHENHYDRAMINE HYDROCHLORIDE 25 MG: 50 INJECTION INTRAMUSCULAR; INTRAVENOUS at 11:08

## 2024-08-22 RX ADMIN — SODIUM BICARBONATE 650 MG: 650 TABLET ORAL at 09:08

## 2024-08-22 RX ADMIN — HEPARIN SODIUM 3200 UNITS: 1000 INJECTION, SOLUTION INTRAVENOUS; SUBCUTANEOUS at 11:08

## 2024-08-22 RX ADMIN — FERROUS SULFATE TAB 325 MG (65 MG ELEMENTAL FE) 1 EACH: 325 (65 FE) TAB at 09:08

## 2024-08-22 RX ADMIN — CHOLECALCIFEROL TAB 125 MCG (5000 UNIT) 5000 UNITS: 125 TAB at 11:08

## 2024-08-22 RX ADMIN — SODIUM CHLORIDE: 9 INJECTION, SOLUTION INTRAVENOUS at 10:08

## 2024-08-22 RX ADMIN — DOXYCYCLINE HYCLATE 100 MG: 100 TABLET, COATED ORAL at 09:08

## 2024-08-22 RX ADMIN — ALBUMIN (HUMAN) 50 G: 12.5 INJECTION, SOLUTION INTRAVENOUS at 02:08

## 2024-08-22 RX ADMIN — CEFTRIAXONE 1 G: 1 INJECTION, POWDER, FOR SOLUTION INTRAMUSCULAR; INTRAVENOUS at 05:08

## 2024-08-22 RX ADMIN — METHYLPREDNISOLONE SODIUM SUCCINATE 1000 MG: 1 INJECTION, POWDER, LYOPHILIZED, FOR SOLUTION INTRAMUSCULAR; INTRAVENOUS at 06:08

## 2024-08-22 RX ADMIN — PANTOPRAZOLE SODIUM 40 MG: 40 TABLET, DELAYED RELEASE ORAL at 11:08

## 2024-08-22 RX ADMIN — FENTANYL CITRATE 50 MCG: 0.05 INJECTION, SOLUTION INTRAMUSCULAR; INTRAVENOUS at 11:08

## 2024-08-22 RX ADMIN — HEPARIN SODIUM 3000 UNITS: 1000 INJECTION, SOLUTION INTRAVENOUS; SUBCUTANEOUS at 02:08

## 2024-08-22 RX ADMIN — CALCIUM GLUCONATE 2 G: 20 INJECTION, SOLUTION INTRAVENOUS at 03:08

## 2024-08-22 RX ADMIN — SULFAMETHOXAZOLE AND TRIMETHOPRIM 1 TABLET: 800; 160 TABLET ORAL at 11:08

## 2024-08-22 NOTE — PROCEDURES
[facility] - Med Surg  Transfusion Medicine  Procedure Note    SUMMARY   Therapeutic Plasma Exchange (Apheresis)    Date/Time: 8/22/2024 2:00 PM    Performed by: Shanda Christianson MD  Authorized by: Shanda Christianson MD        Date of Procedure: 8/22/2024     Procedure: Plasma Exchange    Provider: Shanda Christianson MD     Assisting Provider: None    Pre-Procedure Diagnosis: JAIME (acute kidney injury); Anti-GBM    Post-Procedure Diagnosis: JAIME (acute kidney injury); Anti-GBM    Follow-up Assessment:   Shayy Faust is a 49 year-old female with history of asthma with 4 week history of respiratory symptoms not improving with steroids and beta agonist inhaler. She also has abnormal blood work. CT chest showed numerous bilateral lung opacities; consider atypical infection/inflammation as well as metastatic neoplasm. Tree in bud micronodularity in lower lobes consistent with chronic small airway disease. Pulmonary is concerned for Wegener's disease due to P-ANCA antibody. She has worsening kidney function, with acute kidney injury (JAIME) of uncertain etiology with active sediment on urinalysis in both blood and protein. 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     Today's procedure (#1 of 5) was well tolerated and without complications. Next treatment scheduled for 8/23/2024.      Pertinent Laboratory Data: Complete Blood Count:   Lab Results   Component Value Date    HGB 8.5 (L) 08/22/2024    HCT 27.2 (L) 08/22/2024     (H) 08/22/2024    WBC 15.72 (H) 08/22/2024     Comprehensive Metabolic Panel:   Lab Results   Component Value Date     08/22/2024     08/22/2024    K 4.4 08/22/2024    K 4.4 08/22/2024    CL  110 08/22/2024     08/22/2024    CO2 17 (L) 08/22/2024    CO2 17 (L) 08/22/2024     (H) 08/22/2024     (H) 08/22/2024    BUN 44 (H) 08/22/2024    BUN 44 (H) 08/22/2024    CREATININE 2.0 (H) 08/22/2024    CREATININE 2.0 (H) 08/22/2024    CALCIUM 8.3 (L) 08/22/2024    CALCIUM 8.3 (L) 08/22/2024    PROT 5.6 (L) 08/22/2024    ALBUMIN 1.8 (L) 08/22/2024    BILITOT 0.1 08/22/2024    ALKPHOS 56 08/22/2024    AST 9 (L) 08/22/2024    ALT 9 (L) 08/22/2024    ANIONGAP 9 08/22/2024    ANIONGAP 9 08/22/2024     Pertinent Medications: None contraindicated in plasma exchange    Review of patient's allergies indicates:  No Known Allergies    Anesthesia: None     Technical Procedures Used: Plasma Exchange: Volume exchanged - 2.0 Liters; Replacement fluid - Albumin/Fresh Frozen Plasma; Number of procedures 1; Date of next procedure - 8/23/2024.  1 Liter albumin and 1 Liter plasma due to recent biopsy.     Description of the Findings of the Procedure:     Please see Apheresis Nurse flowsheet for details.    The patient was evaluated and all clinical and laboratory data relevant to the treatment was reviewed, and a decision was made to proceed with the Apheresis procedure.    I was available to the clinical staff throughout the procedure.    Significant Surgical Tasks Conducted by the Assistant(s): Not applicable    Complications: None    Estimated Blood Loss (EBL): None    Implants: None     Specimens: None

## 2024-08-22 NOTE — ASSESSMENT & PLAN NOTE
Documented severe iron deficiency anemia exacerbated by renal insufficiency.  Recommend 1 dose of intravenous iron ASAP.  With documented iron deficiency 4 month duration weight loss.  High suspicion for metastatic disease would recommend that patient have ultrasound of abdomen.  Would recommend upper lower endoscopies ASAP to make sure see if she has a GI malignancy.  Discussed implications of answered questions with she and her mother  08/19/2024 spoke with GI Medicine this morning I spoke with mother in patient's bedside agree with upper lower endoscopies IV iron has been given should be given every 7 days to improve counts  08/20/2024 await results of EGD colonoscopies.  Discussed with GI Medicine yesterday rationale for doing so  08/21/2024.  Results of upper lower endoscopies demonstrates no significant pathology agree with IV iron in outpatient setting orders have been written.  For arrangements to be made as soon as discharge available  08/22/2024.  Will check iron status today.  If iron repleted then will begin erythropoietin on a weekly basis.  Next dose of IV iron scheduled on 08/24.  If not repleted

## 2024-08-22 NOTE — PROGRESS NOTES
Nephrology Progress Note     History of Present Illness     History of asthma presenting to the hospital with 4 week history of respiratory symptoms not improving with steroids and beta agonist inhaler. On arrival here, creatinine 2.6 with a peak of 2.7 within improving with some IV fluids. JAIME of uncertain etiology with active sediment on urinalysis in both blood and protein. 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 Thus far AZAM as well as complements unremarkable. Hepatitis-C negative. She did have an SPEP with to monoclonal spikes.  However, kappa lambda ratio fairly unremarkable with fairly similar elevation in both.  Hematology is following.  Ultimately, anti-GBM slightly elevated antibodies.  Also ANCA with p-ANCA positivity 1:80.     Renal Bx 8/20/2024.  Results pending.  Sent to [facility].    Started on Medrol 1g daily x 3 days 8/20/2024  Started on Apheresis 8/20/2024 x 5 treatmenst.  2 sequential and then QOD.    Interval History     Overnight/currently:  Case reviewed.  Pleasant 49-year-old with history of recently establish intrinsic asthma starting for 5 years ago followed by Dr. Vazquez with recent worsening since April compounded by nausea weight loss fatigue admitted with JAIME active sediments and positive serology for anti-GBM and p-ANCA.  No history of any drug intake that can explain this positivity.  CT chest suggest multiple nodules.  Suspect GBM disease clinically agree with measures taken with empirical steroids and initiation of plasmapheresis until such time renal pathology confirmed so refused this clinical judgment discussed with hospitalist need for optimization of glycemic control nutrition and infection prophylaxis while on high-dose steroids.  We will avoid use of hydralazine in this patient given its propensity to cause ANCA positivity     Health Status   Allergies:    has No Known Allergies.    Current medications:     Current Facility-Administered Medications:      "0.9%  NaCl infusion (for blood administration), , Intravenous, Q24H PRN, Gena Saxena MD    0.9%  NaCl infusion, , Intravenous, Continuous, Gena Saxena MD, Last Rate: 75 mL/hr at 08/21/24 2247, New Bag at 08/21/24 2247    acetaminophen tablet 650 mg, 650 mg, Oral, Q6H PRN, Gena Saxena MD, 650 mg at 08/17/24 1248    albumin human 5% bottle 50 g, 50 g, Intravenous, Once, Shanda Christianson MD    albuterol-ipratropium 2.5 mg-0.5 mg/3 mL nebulizer solution 3 mL, 3 mL, Nebulization, Q6H PRN, Gena Saxena MD    calcium gluconate 1 g in NS IVPB (premixed), 2 g, Intravenous, Once, Shanda Christianson MD    cefTRIAXone (Rocephin) 1 g in D5W 100 mL IVPB (MB+), 1 g, Intravenous, Q24H, Gena Saxena MD, Stopped at 08/21/24 1629    diphenhydrAMINE injection 25 mg, 25 mg, Intravenous, Once, Shanda Christianson MD    doxycycline tablet 100 mg, 100 mg, Oral, BID, Gena Saxena MD, 100 mg at 08/22/24 0932    ferrous sulfate tablet 1 each, 1 tablet, Oral, Daily, Gena Saxena MD, 1 each at 08/22/24 0932    heparin (porcine) injection 3,000 Units, 3,000 Units, Intravenous, Once, Shanda Christianson MD    hydrALAZINE injection 10 mg, 10 mg, Intravenous, Q6H PRN, Gena Saxena MD    iron sucrose injection 200 mg, 200 mg, Intravenous, Q7 Days, Gena Saxena MD, 200 mg at 08/17/24 0919    methylPREDNISolone sodium succinate (SOLU-MEDROL) 1,000 mg in D5W 100 mL IVPB, 1,000 mg, Intravenous, Q24H, Hermes Galeas MD, Stopped at 08/21/24 1829    ondansetron disintegrating tablet 4 mg, 4 mg, Oral, Q6H PRN, Gena Saxena MD, 4 mg at 08/19/24 2318    sodium bicarbonate tablet 650 mg, 650 mg, Oral, BID, Hermes Galeas MD, 650 mg at 08/22/24 0932     Physical Examination   VS/Measurements   /70 (BP Location: Left arm, Patient Position: Lying)   Pulse (!) 44   Temp 97.8 °F (36.6 °C) (Oral)   Resp 18   Ht 5' 4" (1.626 m)   Wt 43.5 kg (96 lb)   LMP  (LMP Unknown)   " SpO2 98%   Breastfeeding No   BMI 16.48 kg/m²      General:  Pleasant  lady malnourished Alert and oriented X3, No acute distress.         Nutritional status:  Pallor noted    Neck:  Supple, No lymphadenopathy.   No obvious cervical lymphadenopathy  Respiratory:  Scattered wheeze   Cardiovascular:  Normal rate, Regular rhythm.   Gastrointestinal:  Soft, Non-tender, Normal bowel sounds.  No mass felt   Integumentary:  Warm, Dry.  No rash petechiae  Psychiatric:  Cooperative, Appropriate mood & affect.        Review / Management   Laboratory Results   Today's Lab Results :    Recent Results (from the past 24 hour(s))   CBC Auto Differential    Collection Time: 08/21/24 12:01 PM   Result Value Ref Range    WBC 10.90 3.90 - 12.70 K/uL    RBC 3.80 (L) 4.00 - 5.40 M/uL    Hemoglobin 9.7 (L) 12.0 - 16.0 g/dL    Hematocrit 31.9 (L) 37.0 - 48.5 %    MCV 84 82 - 98 fL    MCH 25.5 (L) 27.0 - 31.0 pg    MCHC 30.4 (L) 32.0 - 36.0 g/dL    RDW 15.2 (H) 11.5 - 14.5 %    Platelets 648 (H) 150 - 450 K/uL    MPV 9.0 (L) 9.2 - 12.9 fL    Immature Granulocytes 0.4 0.0 - 0.5 %    Gran # (ANC) 9.2 (H) 1.8 - 7.7 K/uL    Immature Grans (Abs) 0.04 0.00 - 0.04 K/uL    Lymph # 1.6 1.0 - 4.8 K/uL    Mono # 0.1 (L) 0.3 - 1.0 K/uL    Eos # 0.0 0.0 - 0.5 K/uL    Baso # 0.01 0.00 - 0.20 K/uL    nRBC 0 0 /100 WBC    Gran % 83.9 (H) 38.0 - 73.0 %    Lymph % 14.4 (L) 18.0 - 48.0 %    Mono % 1.2 (L) 4.0 - 15.0 %    Eosinophil % 0.0 0.0 - 8.0 %    Basophil % 0.1 0.0 - 1.9 %    Differential Method Automated    Basic Metabolic Panel    Collection Time: 08/21/24 12:01 PM   Result Value Ref Range    Sodium 137 136 - 145 mmol/L    Potassium 4.4 3.5 - 5.1 mmol/L    Chloride 108 95 - 110 mmol/L    CO2 17 (L) 23 - 29 mmol/L    Glucose 177 (H) 70 - 110 mg/dL    BUN 38 (H) 6 - 20 mg/dL    Creatinine 2.1 (H) 0.5 - 1.4 mg/dL    Calcium 8.7 8.7 - 10.5 mg/dL    Anion Gap 12 8 - 16 mmol/L    eGFR 28 (A) >60 mL/min/1.73 m^2   Fibrinogen    Collection Time:  08/21/24 12:01 PM   Result Value Ref Range    Fibrinogen 361 182 - 400 mg/dL   Magnesium    Collection Time: 08/21/24 12:01 PM   Result Value Ref Range    Magnesium 1.7 1.6 - 2.6 mg/dL   Type & Screen    Collection Time: 08/21/24  2:12 PM   Result Value Ref Range    Group & Rh O POS     Indirect Wali NEG     Specimen Outdate 08/24/2024 23:59    Magnesium    Collection Time: 08/21/24  2:12 PM   Result Value Ref Range    Magnesium 1.4 (L) 1.6 - 2.6 mg/dL   Prepare Fresh Frozen Plasma    Collection Time: 08/21/24  2:12 PM   Result Value Ref Range    UNIT NUMBER [id number]     Product Code H1942Q69     DISPENSE STATUS CROSSMATCHED     CODING SYSTEM EJBP410     Unit Blood Type Code 9500     Unit Blood Type O NEG     Unit Expiration 202408262359     CROSSMATCH INTERPRETATION Not Required     UNIT NUMBER [id number]     Product Code G2467EU8     DISPENSE STATUS CROSSMATCHED     CODING SYSTEM MGJR483     Unit Blood Type Code 5100     Unit Blood Type O POS     Unit Expiration 202408262359     CROSSMATCH INTERPRETATION Not Required     UNIT NUMBER [id number]     Product Code F7806S58     DISPENSE STATUS CROSSMATCHED     CODING SYSTEM NSYK613     Unit Blood Type Code 5100     Unit Blood Type O POS     Unit Expiration 202408262359     CROSSMATCH INTERPRETATION Not Required     UNIT NUMBER [id number]     Product Code L9281R28     DISPENSE STATUS RETURNED     CODING SYSTEM MCJE730     Unit Blood Type Code 0600     Unit Blood Type A NEG     Unit Expiration 202408212359     CROSSMATCH INTERPRETATION Not Required     UNIT NUMBER [id number]     Product Code N0892I94     DISPENSE STATUS RETURNED     CODING SYSTEM PKYN003     Unit Blood Type Code 6200     Unit Blood Type A POS     Unit Expiration 202408212359     CROSSMATCH INTERPRETATION Not Required     UNIT NUMBER [id number]     Product Code N6288X98     DISPENSE STATUS RETURNED     CODING SYSTEM JZXC696     Unit Blood Type Code 6200     Unit Blood Type A POS      Unit Expiration 202408212359     CROSSMATCH INTERPRETATION Not Required     UNIT NUMBER [id number]     Product Code D4966U85     DISPENSE STATUS RETURNED     CODING SYSTEM TNOB138     Unit Blood Type Code 6200     Unit Blood Type A POS     Unit Expiration 202408212359     CROSSMATCH INTERPRETATION Not Required     UNIT NUMBER [id number]     Product Code F2019X26     DISPENSE STATUS CROSSMATCHED     CODING SYSTEM KCHC887     Unit Blood Type Code 5100     Unit Blood Type O POS     Unit Expiration 202408262359     CROSSMATCH INTERPRETATION Not Required    POCT glucose    Collection Time: 08/21/24  9:36 PM   Result Value Ref Range    POCT Glucose 210 (H) 70 - 110 mg/dL   CBC Auto Differential    Collection Time: 08/22/24  5:43 AM   Result Value Ref Range    WBC 15.72 (H) 3.90 - 12.70 K/uL    RBC 3.24 (L) 4.00 - 5.40 M/uL    Hemoglobin 8.5 (L) 12.0 - 16.0 g/dL    Hematocrit 27.2 (L) 37.0 - 48.5 %    MCV 84 82 - 98 fL    MCH 26.2 (L) 27.0 - 31.0 pg    MCHC 31.3 (L) 32.0 - 36.0 g/dL    RDW 15.2 (H) 11.5 - 14.5 %    Platelets 551 (H) 150 - 450 K/uL    MPV 9.4 9.2 - 12.9 fL    Immature Granulocytes 0.7 (H) 0.0 - 0.5 %    Gran # (ANC) 13.6 (H) 1.8 - 7.7 K/uL    Immature Grans (Abs) 0.11 (H) 0.00 - 0.04 K/uL    Lymph # 1.8 1.0 - 4.8 K/uL    Mono # 0.2 (L) 0.3 - 1.0 K/uL    Eos # 0.0 0.0 - 0.5 K/uL    Baso # 0.02 0.00 - 0.20 K/uL    nRBC 0 0 /100 WBC    Gran % 86.5 (H) 38.0 - 73.0 %    Lymph % 11.3 (L) 18.0 - 48.0 %    Mono % 1.4 (L) 4.0 - 15.0 %    Eosinophil % 0.0 0.0 - 8.0 %    Basophil % 0.1 0.0 - 1.9 %    Differential Method Automated    Basic Metabolic Panel    Collection Time: 08/22/24  5:43 AM   Result Value Ref Range    Sodium 136 136 - 145 mmol/L    Potassium 4.4 3.5 - 5.1 mmol/L    Chloride 110 95 - 110 mmol/L    CO2 17 (L) 23 - 29 mmol/L    Glucose 149 (H) 70 - 110 mg/dL    BUN 44 (H) 6 - 20 mg/dL    Creatinine 2.0 (H) 0.5 - 1.4 mg/dL    Calcium 8.3 (L) 8.7 - 10.5 mg/dL    Anion Gap 9 8 - 16 mmol/L    eGFR 30  (A) >60 mL/min/1.73 m^2   Magnesium    Collection Time: 08/22/24  5:43 AM   Result Value Ref Range    Magnesium 1.7 1.6 - 2.6 mg/dL   Comprehensive metabolic panel    Collection Time: 08/22/24  5:43 AM   Result Value Ref Range    Sodium 136 136 - 145 mmol/L    Potassium 4.4 3.5 - 5.1 mmol/L    Chloride 110 95 - 110 mmol/L    CO2 17 (L) 23 - 29 mmol/L    Glucose 149 (H) 70 - 110 mg/dL    BUN 44 (H) 6 - 20 mg/dL    Creatinine 2.0 (H) 0.5 - 1.4 mg/dL    Calcium 8.3 (L) 8.7 - 10.5 mg/dL    Total Protein 5.6 (L) 6.0 - 8.4 g/dL    Albumin 1.8 (L) 3.5 - 5.2 g/dL    Total Bilirubin 0.1 0.1 - 1.0 mg/dL    Alkaline Phosphatase 56 55 - 135 U/L    AST 9 (L) 10 - 40 U/L    ALT 9 (L) 10 - 44 U/L    eGFR 30 (A) >60 mL/min/1.73 m^2    Anion Gap 9 8 - 16 mmol/L   Fibrinogen    Collection Time: 08/22/24  5:45 AM   Result Value Ref Range    Fibrinogen 234 182 - 400 mg/dL   Ferritin    Collection Time: 08/22/24  5:45 AM   Result Value Ref Range    Ferritin 863 (H) 20.0 - 300.0 ng/mL        Impression and Plan   Diagnosis   JAIME with active sediments subnephrotic proteinuria of 1 g positive serology for GBM 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  Explained to patient pros and cons of plasmapheresis steroids need for infection prophylaxis nutrition vitamin-D.  All questions answered.  First session of plasmapheresis postponed till today discussed with hospitalist start prednisone 40 mg from tomorrow    History of asthma pulmonary nodules per pulmonology    History of nausea weight loss unremarkable endoscopy per patient     severe malnutrition malnutrition inflammation syndrome                  ______________________________________________  Rohit Braun MD    This document was created using voice recognition software.  It is possible that there are errors which have persisted  after original proofreading.  If there is a question regarding contents of this document please contact me for clarification.

## 2024-08-22 NOTE — OP NOTE
Pre Op Diagnosis: HD     Post Op Diagnosis: same     Procedure:  Vas cath     Procedure performed by: Wilda ORTEZ, Danitza DE LA CRUZ     Written Informed Consent Obtained: Yes     Specimen Removed:  no     Estimated Blood Loss:  minimal    Sterile technique was performed in the right neck, lidocaine was used as a local anesthetic.  Vas cath placed.  Pt tolerated the procedure well without immediate complications.  Please see radiologist report for details. F/u with PCP and/or ordering physician.

## 2024-08-22 NOTE — PLAN OF CARE
Discussed poc with pt, pt verbalized understanding    Purposeful rounding every 2hours    VS wnl  Fall precautions in place, remains injury free  Pt denies c/o pain  IVFs  Accurate I&Os  Bed locked at lowest position  Call light within reach    Chart check complete  Will cont with POC

## 2024-08-22 NOTE — PROGRESS NOTES
[facility]  Hematology/Oncology  Progress Note    Patient Name: Shayy Faust  Admission Date: 8/15/2024  Hospital Length of Stay: 7 days  Code Status: Full Code     Subjective:     HPI:  49-year-old female with a 25 lb weight loss over the last several months.  Patient reports increasing fatigue and weakness patient found to be profoundly iron deficient.  CT chest demonstrated questionable infiltrative lung possible metastatic disease.  ECOG status 2 at bedside with mother    Interval History:  Resting comfortably    Oncology Treatment Plan:   [No matching plan found]    Medications:  Continuous Infusions:   0.9% NaCl   Intravenous Continuous 75 mL/hr at 08/21/24 2247 New Bag at 08/21/24 2247     Scheduled Meds:   albumin human 5%  50 g Intravenous Once    calcium gluconate IVPB  2 g Intravenous Once    cefTRIAXone (Rocephin) IV (PEDS and ADULTS)  1 g Intravenous Q24H    diphenhydrAMINE  25 mg Intravenous Once    doxycycline  100 mg Oral BID    ferrous sulfate  1 tablet Oral Daily    heparin (porcine)  3,000 Units Intravenous Once    iron sucrose  200 mg Intravenous Q7 Days    methylPREDNISolone injection (PEDS and ADULTS)  1,000 mg Intravenous Q24H    sodium bicarbonate  650 mg Oral BID     PRN Meds:  Current Facility-Administered Medications:     0.9%  NaCl infusion (for blood administration), , Intravenous, Q24H PRN    acetaminophen, 650 mg, Oral, Q6H PRN    albuterol-ipratropium, 3 mL, Nebulization, Q6H PRN    hydrALAZINE, 10 mg, Intravenous, Q6H PRN    ondansetron, 4 mg, Oral, Q6H PRN     Review of Systems   Constitutional:  Negative for activity change, appetite change, chills, diaphoresis, fatigue, fever and unexpected weight change.   HENT:  Negative for congestion, dental problem, drooling, ear discharge, ear pain, facial swelling, hearing loss, mouth sores, nosebleeds, postnasal drip, rhinorrhea, sinus pressure, sneezing, sore throat, tinnitus, trouble swallowing and voice change.    Eyes:   Negative for photophobia, pain, discharge, redness, itching and visual disturbance.   Respiratory:  Negative for cough, choking, chest tightness, shortness of breath, wheezing and stridor.    Cardiovascular:  Negative for chest pain, palpitations and leg swelling.   Gastrointestinal:  Negative for abdominal distention, abdominal pain, anal bleeding, blood in stool, constipation, diarrhea, nausea, rectal pain and vomiting.   Endocrine: Negative for cold intolerance, heat intolerance, polydipsia, polyphagia and polyuria.   Genitourinary:  Negative for decreased urine volume, difficulty urinating, dyspareunia, dysuria, enuresis, flank pain, frequency, genital sores, hematuria, menstrual problem, pelvic pain, urgency, vaginal bleeding, vaginal discharge and vaginal pain.   Musculoskeletal:  Negative for arthralgias, back pain, gait problem, joint swelling, myalgias, neck pain and neck stiffness.   Skin:  Negative for color change, pallor and rash.   Allergic/Immunologic: Negative for environmental allergies, food allergies and immunocompromised state.   Neurological:  Negative for dizziness, tremors, seizures, syncope, facial asymmetry, speech difficulty, weakness, light-headedness, numbness and headaches.   Hematological:  Negative for adenopathy. Does not bruise/bleed easily.   Psychiatric/Behavioral:  Negative for agitation, behavioral problems, confusion, decreased concentration, dysphoric mood, hallucinations, self-injury, sleep disturbance and suicidal ideas. The patient is not nervous/anxious and is not hyperactive.      Objective:     Vital Signs (Most Recent):  Temp: 97.8 °F (36.6 °C) (08/22/24 0403)  Pulse: (!) 52 (08/22/24 0403)  Resp: 18 (08/22/24 0403)  BP: 123/77 (08/22/24 0403)  SpO2: 96 % (08/22/24 0403) Vital Signs (24h Range):  Temp:  [97.6 °F (36.4 °C)-98.5 °F (36.9 °C)] 97.8 °F (36.6 °C)  Pulse:  [52-67] 52  Resp:  [18] 18  SpO2:  [96 %-98 %] 96 %  BP: (123-139)/(69-79) 123/77     Weight: 43.5 kg (96  lb)  Body mass index is 16.48 kg/m².  Body surface area is 1.4 meters squared.      Intake/Output Summary (Last 24 hours) at 8/22/2024 0719  Last data filed at 8/22/2024 0604  Gross per 24 hour   Intake 390 ml   Output --   Net 390 ml        Physical Exam  Vitals reviewed.   Constitutional:       General: She is not in acute distress.     Appearance: Normal appearance. She is well-developed. She is not diaphoretic.   HENT:      Head: Normocephalic and atraumatic.      Right Ear: External ear normal.      Left Ear: External ear normal.      Nose: Nose normal.      Right Sinus: No maxillary sinus tenderness or frontal sinus tenderness.      Left Sinus: No maxillary sinus tenderness or frontal sinus tenderness.      Mouth/Throat:      Pharynx: No oropharyngeal exudate.   Eyes:      General: Lids are normal. No scleral icterus.        Right eye: No discharge.         Left eye: No discharge.      Conjunctiva/sclera: Conjunctivae normal.      Right eye: Right conjunctiva is not injected. No hemorrhage.     Left eye: Left conjunctiva is not injected. No hemorrhage.     Pupils: Pupils are equal, round, and reactive to light.   Neck:      Thyroid: No thyromegaly.      Vascular: No JVD.      Trachea: No tracheal deviation.   Cardiovascular:      Rate and Rhythm: Normal rate.   Pulmonary:      Effort: Pulmonary effort is normal. No respiratory distress.      Breath sounds: No stridor.   Chest:      Chest wall: No tenderness.   Abdominal:      General: Bowel sounds are normal. There is no distension.      Palpations: Abdomen is soft. There is no hepatomegaly, splenomegaly or mass.      Tenderness: There is no abdominal tenderness. There is no rebound.   Musculoskeletal:         General: No tenderness. Normal range of motion.      Cervical back: Normal range of motion and neck supple.   Lymphadenopathy:      Cervical: No cervical adenopathy.      Upper Body:      Right upper body: No supraclavicular adenopathy.      Left upper  "body: No supraclavicular adenopathy.   Skin:     General: Skin is dry.      Findings: No erythema or rash.   Neurological:      Mental Status: She is alert and oriented to person, place, and time.      Cranial Nerves: No cranial nerve deficit.      Coordination: Coordination normal.   Psychiatric:         Behavior: Behavior normal.         Thought Content: Thought content normal.         Judgment: Judgment normal.          Significant Labs:   BMP:   Recent Labs   Lab 08/21/24  0638 08/21/24  1201 08/21/24  1412 08/22/24  0543   *  155* 177*  --  149*  149*     138 137  --  136  136   K 4.8  4.8 4.4  --  4.4  4.4     110 108  --  110  110   CO2 17*  17* 17*  --  17*  17*   BUN 36*  36* 38*  --  44*  44*   CREATININE 2.1*  2.1* 2.1*  --  2.0*  2.0*   CALCIUM 8.7  8.7 8.7  --  8.3*  8.3*   MG  --  1.7 1.4* 1.7   , CBC:   Recent Labs   Lab 08/21/24  0638 08/21/24  1201 08/22/24  0543   WBC 9.79 10.90 15.72*   HGB 10.6* 9.7* 8.5*   HCT 34.8* 31.9* 27.2*   * 648* 551*   , CMP:   Recent Labs   Lab 08/21/24  0638 08/21/24  1201 08/22/24  0543     138 137 136  136   K 4.8  4.8 4.4 4.4  4.4     110 108 110  110   CO2 17*  17* 17* 17*  17*   *  155* 177* 149*  149*   BUN 36*  36* 38* 44*  44*   CREATININE 2.1*  2.1* 2.1* 2.0*  2.0*   CALCIUM 8.7  8.7 8.7 8.3*  8.3*   PROT  --   --  5.6*   ALBUMIN 1.9*  --  1.8*   BILITOT  --   --  0.1   ALKPHOS  --   --  56   AST  --   --  9*   ALT  --   --  9*   ANIONGAP 11  11 12 9  9   , Coagulation:   Recent Labs   Lab 08/20/24  1319   INR 1.1   , Haptoglobin: No results for input(s): "HAPTOGLOBIN" in the last 48 hours., Immunology: No results for input(s): "SPEP", "ABDELRAHMAN", "AZAM", "FREELAMBDALI" in the last 48 hours., LDH: No results for input(s): "LDHCSF", "BFSOURCE" in the last 48 hours., LFTs:   Recent Labs   Lab 08/21/24  0638 08/22/24  0543   ALT  --  9*   AST  --  9*   ALKPHOS  --  56   BILITOT  --  0.1 " "  PROT  --  5.6*   ALBUMIN 1.9* 1.8*   , Reticulocytes: No results for input(s): "RETIC" in the last 48 hours., Tumor Markers: No results for input(s): "PSA", "CEA", "", "AFPTM", "[id number]", "" in the last 48 hours.    Invalid input(s): "ALGTM", Uric Acid No results for input(s): "URICACID" in the last 48 hours., and Urine Studies: No results for input(s): "COLORU", "APPEARANCEUA", "PHUR", "SPECGRAV", "PROTEINUA", "GLUCUA", "KETONESU", "BILIRUBINUA", "OCCULTUA", "NITRITE", "UROBILINOGEN", "LEUKOCYTESUR", "RBCUA", "WBCUA", "BACTERIA", "SQUAMEPITHEL", "HYALINECASTS" in the last 48 hours.    Invalid input(s): "WRIGHTSUR"    Diagnostic Results:  I have reviewed all pertinent imaging results/findings within the past 24 hours.  Assessment/Plan:     Thrombocytosis  Thrombocytosis probably secondary to severe iron deficiency anemia reactive in nature    PNA (pneumonia)  Concern over metastatic disease.  Would strongly recommend that patient have imaging done of abdomen least ultrasound of liver.  Abdomen would also recommend that patient undergo EGD colonoscopies ASAP    Iron deficiency anemia  Documented severe iron deficiency anemia exacerbated by renal insufficiency.  Recommend 1 dose of intravenous iron ASAP.  With documented iron deficiency 4 month duration weight loss.  High suspicion for metastatic disease would recommend that patient have ultrasound of abdomen.  Would recommend upper lower endoscopies ASAP to make sure see if she has a GI malignancy.  Discussed implications of answered questions with she and her mother  08/19/2024 spoke with GI Medicine this morning I spoke with mother in patient's bedside agree with upper lower endoscopies IV iron has been given should be given every 7 days to improve counts  08/20/2024 await results of EGD colonoscopies.  Discussed with GI Medicine yesterday rationale for doing so  08/21/2024.  Results of upper lower endoscopies demonstrates no significant pathology agree " with IV iron in outpatient setting orders have been written.  For arrangements to be made as soon as discharge available  08/22/2024.  Will check iron status today.  If iron repleted then will begin erythropoietin on a weekly basis.  Next dose of IV iron scheduled on 08/24.  If not repleted        Thank you for your consult. I will follow-up with patient. Please contact us if you have any additional questions.     Marcin Mercado MD  Hematology/Oncology  [facility]

## 2024-08-22 NOTE — PROGRESS NOTES
[facility] Medicine  Progress Note    Patient Name: Shayy Faust  MRN: [id number]  Patient Class: IP- Inpatient   Admission Date: 8/15/2024  Length of Stay: 7 days  Attending Physician: Cm Lorenzo MD  Primary Care Provider: Karine, Primary Doctor        Subjective:     Principal Problem:JAIME (acute kidney injury)        HPI:  Patient is a 49 y.o.  female with a PMHx of asthma who presents to the Emergency Department due to abnormal blood work. Patient currently being seen by Dr. Jiménez for pulmonology. She reports not feeling well for several weeks. Endorses chills and sob. Denies any fever or congestion. Reports having good urinary output. Denies any home meds at this time.     In the ED, wbc: 15.1k, h/h 7.8/25.8, plt: 826, bun/cr: 43/2.7.     Overview/Hospital Course:  49 year-old female admitted with c/o abnormal blood work. She reports she hasn't been feeling well for a few weeks now. She is being seen and worked up by Dr. Jiménez with pulmonology for for SOB and chills. She started with an URI that progressed to SOB. She had Spirometry in pulm clinic that showed obstruction with very severe ventilatory impairment unimproved with post bronchodilator. She reports unintentional weight loss of 25 lbs. Pulm concerned for Wegener's disease.  CT chest showed numerous bilateral lung opacities; consider atypical infection/inflammation as well as metastatic neoplasm; recommend further evaluation with PET-CT scan. Tree in bud micronodularity in lower lobes consistent with chronic small airway disease.  CT a/p with punctate nonobstructing renal calculi, no hydronephrosis. Mild to moderate amount of stool in colon, with diverticulitis.     Worsening kidney function. Nephrology consulted. US Kidney showed increased echogenicity to kidneys concerning for medical renal disease. Recommends kidney biopsy, pending this next week, will try for Monday. Will make NPO after midnight.    Hem/onc  "consulted, recommends further metastatic workup with upper/lower endoscopy. Consult GI for evaluation.   Cont rocephin and doxycycline.     Hgb 6.7, unknown bleeding source. Transfuse 1 unit PRBC (8/18).     08/19/2024  H&H improved after 1UPRBC. Continue to remain stable since that time. Endoscopic procedure planned this hospitalization. Dr. 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 Consider high dose steroids after renal biopsy. Renal biopsy priority moved up to this hospitalization. Reached out to IR, they will facilitate as soon as schedule allows.     08/20/2024  EGD/Colon complete. Minor gastritis, biopsies taken. Small bowel normal, biopsied for MARIA ESTHER. Otherwise normal. Prep in right colon poor, left colon fair. No masses appreciated. Ileum intubated and normal. GI Recommend outpatient video capsule to complete work up for MARIA ESTHER. Discussed with IR, plans for renal biopsy sometime today. Will initiate high dose steroids thereafter per Dr. Jiménez' request.    08/21/2024  Discussed case with specialists, the decision for plasma apheresis/exchange to done at this location through [facility] was made. Dr. Galeas with nephrology is communicating with [place] team to assure this gets done. IR consulted for vascular access.     08/22/2024  Unable to undergo vasc cath yesterday due to urgent case per IR. Vasc cath placed this morning. Plans to undergo first session of apheresis this evening. Discussed case with Nephrology, they are spearheading this treatment. Expect patient to be here for extended period of time.         Objective:   BP (!) 151/73 (BP Location: Left arm, Patient Position: Lying)   Pulse (!) 44   Temp 97.8 °F (36.6 °C) (Oral)   Resp 18   Ht 5' 4" (1.626 m)   Wt 43.5 kg (96 lb)   LMP  (LMP Unknown)   SpO2 97%   Breastfeeding No   BMI 16.48 kg/m²     Intake/Output Summary (Last 24 hours) at 8/22/2024 1325  Last data filed at 8/22/2024 " "0604  Gross per 24 hour   Intake 240 ml   Output --   Net 240 ml       PHYSICAL EXAM  Vitals reviewed  Constitutional:       Appearance: Normal appearance.   HENT:      Head: Normocephalic.   Eyes:      Pupils: Pupils are equal, round, and reactive to light.   Cardiovascular:      Rate and Rhythm: Normal rate and regular rhythm.      Heart sounds: No murmur heard.  Pulmonary:      Effort: Pulmonary effort is normal.      Breath sounds: Normal breath sounds. No wheezing or rales.   Abdominal:      General: Bowel sounds are normal.      Palpations: Abdomen is soft.   Musculoskeletal:         General: Normal range of motion.   Skin:     General: Skin is warm and dry.   Neurological:      General: No focal deficit present.      Mental Status: She is alert and oriented to person, place, and time.   Psychiatric:         Mood and Affect: Mood normal.         Behavior: Behavior normal.     LABS  All labs from the past 24 hours were reviewed.     BMP:   Recent Labs   Lab 08/22/24  0543   *  149*     136   K 4.4  4.4     110   CO2 17*  17*   BUN 44*  44*   CREATININE 2.0*  2.0*   CALCIUM 8.3*  8.3*   MG 1.7     CBC:   Recent Labs   Lab 08/21/24  0638 08/21/24  1201 08/22/24  0543   WBC 9.79 10.90 15.72*   HGB 10.6* 9.7* 8.5*   HCT 34.8* 31.9* 27.2*   * 648* 551*     CMP:   Recent Labs   Lab 08/21/24  0638 08/21/24  1201 08/22/24  0543     138 137 136  136   K 4.8  4.8 4.4 4.4  4.4     110 108 110  110   CO2 17*  17* 17* 17*  17*   *  155* 177* 149*  149*   BUN 36*  36* 38* 44*  44*   CREATININE 2.1*  2.1* 2.1* 2.0*  2.0*   CALCIUM 8.7  8.7 8.7 8.3*  8.3*   PROT  --   --  5.6*   ALBUMIN 1.9*  --  1.8*   BILITOT  --   --  0.1   ALKPHOS  --   --  56   AST  --   --  9*   ALT  --   --  9*   ANIONGAP 11  11 12 9  9     Cardiac Markers: No results for input(s): "CKMB", "MYOGLOBIN", "BNP", "TROPISTAT" in the last 48 hours.  Coagulation:   No results for input(s): " ""PT", "INR", "APTT" in the last 48 hours.    Lactic Acid: No results for input(s): "LACTATE" in the last 48 hours.  Magnesium:   Recent Labs   Lab 08/21/24  1201 08/21/24  1412 08/22/24  0543   MG 1.7 1.4* 1.7     Troponin: No results for input(s): "TROPONINI", "TROPONINIHS" in the last 48 hours.  TSH:   Recent Labs   Lab 04/01/24  1505   TSH 2.852     Urine Studies:   No results for input(s): "COLORU", "APPEARANCEUA", "PHUR", "SPECGRAV", "PROTEINUA", "GLUCUA", "KETONESU", "BILIRUBINUA", "OCCULTUA", "NITRITE", "UROBILINOGEN", "LEUKOCYTESUR", "RBCUA", "WBCUA", "BACTERIA", "SQUAMEPITHEL", "HYALINECASTS" in the last 48 hours.    Invalid input(s): "WRIGHTSUR"      IMAGING  All imaging from the past 24 hours were reviewed.     Imaging Results              US Kidney (Final result)  Result time 08/15/24 15:43:06      Final result by Storm Boyce MD (08/15/24 15:43:06)                   Impression:      1.  Increased echogenicity to the kidneys concerning for medical renal disease.  Negative for hydronephrosis.    2.  Findings most likely representing small bilateral nonobstructing renal stones measuring up to 3 mm.      Electronically signed by: Storm Boyce MD  Date:    08/15/2024  Time:    15:43               Narrative:    EXAMINATION:  US KIDNEY    CLINICAL HISTORY:  vinh;    TECHNIQUE:  Ultrasound of the kidneys and urinary bladder was performed including color flow and Doppler evaluation of the kidneys.    COMPARISON:  None.    FINDINGS:  Right kidney: The right kidney measures 12.0 cm. No cortical thinning. There is loss of corticomedullary distinction. Resistive index measures 0.65.  No mass. Possible tiny nonobstructing inferior pole stones noted.  No hydronephrosis.    Left kidney: The left kidney measures 12.2 cm. No cortical thinning. There is loss of corticomedullary distinction. Resistive index measures 0.63.  No mass. Possible 3 mm midpole stone.  No hydronephrosis.    The bladder is partially distended " at the time of scanning and has an unremarkable appearance.                                        Assessment/Plan:      * JAIME (acute kidney injury)  Outpt pulm concerned for wegeners disease  Nephrology following, appreciate  Renal U/S negative for obstruction  Creatinine 2.1, trending down  Monitor labs daily    08/20/2024  Renal function seemingly back at baseline  Discussed with Pulmnology, suspects an element of vasculitis (possibly Wegener's)  Renal biopsy to be perfomed today   Will initiate high dose steroid therapy after biopsy    08/21/2024  Day 2 of high dose steroids  Plasma apheresis ordered  Vasc cath to be placed today by IR  Nephrology facilitating the above, appreciate recs    08/22/2024  Day 3 of high dose IV steroids- transition to PO tomorrow  Vasc cath placed today  Plasma apheresis planned for this evening  Discussed with nephrology, anticipate duration of therapy to be done while     Thrombocytosis  Likely reactive due to inflammation     PNA (pneumonia)  Cont doxycyline and Rocephin IV   Duonebs as needed    Iron deficiency anemia  S/p Venofer given, cont ferrous sulfate  Hema/onc following, appreciate, recommended endoscopy  Consult GI in the morning for endoscopy  Hgb 6.7  Transfuse 1 unit PRBC (8/18)  Repeat labs in the morning    08/20/2024  --hgb promptly corrected to 9.0 after 1uPRBC  --no obvious signs of bleeding on exam  --serial CBC to trend h&h  --transfuse for hgb < 7    08/21/2024  --s/p scope, no overt findings for MARIA ESTHER per GI report. Biopsies taken, recommend capsule study in the out patient setting. CBC ordered for today and tomorrow to trend          VTE Risk Mitigation (From admission, onward)           Ordered     heparin (porcine) injection 3,000 Units  Once         08/22/24 1316                    Discharge Planning   POOJA: 8/17/2024     Code Status: Full Code   Is the patient medically ready for discharge?:     Reason for patient still in hospital (select all that apply):  Patient trending condition, Laboratory test, Treatment, and Consult recommendations  Discharge Plan A: Home with family   Discharge Delays: None known at this time              Cm Lorenzo MD  Department of Hospital Medicine   [facility]

## 2024-08-22 NOTE — SUBJECTIVE & OBJECTIVE
Interval History:  Resting comfortably    Oncology Treatment Plan:   [No matching plan found]    Medications:  Continuous Infusions:   0.9% NaCl   Intravenous Continuous 75 mL/hr at 08/21/24 2247 New Bag at 08/21/24 2247     Scheduled Meds:   albumin human 5%  50 g Intravenous Once    calcium gluconate IVPB  2 g Intravenous Once    cefTRIAXone (Rocephin) IV (PEDS and ADULTS)  1 g Intravenous Q24H    diphenhydrAMINE  25 mg Intravenous Once    doxycycline  100 mg Oral BID    ferrous sulfate  1 tablet Oral Daily    heparin (porcine)  3,000 Units Intravenous Once    iron sucrose  200 mg Intravenous Q7 Days    methylPREDNISolone injection (PEDS and ADULTS)  1,000 mg Intravenous Q24H    sodium bicarbonate  650 mg Oral BID     PRN Meds:  Current Facility-Administered Medications:     0.9%  NaCl infusion (for blood administration), , Intravenous, Q24H PRN    acetaminophen, 650 mg, Oral, Q6H PRN    albuterol-ipratropium, 3 mL, Nebulization, Q6H PRN    hydrALAZINE, 10 mg, Intravenous, Q6H PRN    ondansetron, 4 mg, Oral, Q6H PRN     Review of Systems   Constitutional:  Negative for activity change, appetite change, chills, diaphoresis, fatigue, fever and unexpected weight change.   HENT:  Negative for congestion, dental problem, drooling, ear discharge, ear pain, facial swelling, hearing loss, mouth sores, nosebleeds, postnasal drip, rhinorrhea, sinus pressure, sneezing, sore throat, tinnitus, trouble swallowing and voice change.    Eyes:  Negative for photophobia, pain, discharge, redness, itching and visual disturbance.   Respiratory:  Negative for cough, choking, chest tightness, shortness of breath, wheezing and stridor.    Cardiovascular:  Negative for chest pain, palpitations and leg swelling.   Gastrointestinal:  Negative for abdominal distention, abdominal pain, anal bleeding, blood in stool, constipation, diarrhea, nausea, rectal pain and vomiting.   Endocrine: Negative for cold intolerance, heat intolerance,  polydipsia, polyphagia and polyuria.   Genitourinary:  Negative for decreased urine volume, difficulty urinating, dyspareunia, dysuria, enuresis, flank pain, frequency, genital sores, hematuria, menstrual problem, pelvic pain, urgency, vaginal bleeding, vaginal discharge and vaginal pain.   Musculoskeletal:  Negative for arthralgias, back pain, gait problem, joint swelling, myalgias, neck pain and neck stiffness.   Skin:  Negative for color change, pallor and rash.   Allergic/Immunologic: Negative for environmental allergies, food allergies and immunocompromised state.   Neurological:  Negative for dizziness, tremors, seizures, syncope, facial asymmetry, speech difficulty, weakness, light-headedness, numbness and headaches.   Hematological:  Negative for adenopathy. Does not bruise/bleed easily.   Psychiatric/Behavioral:  Negative for agitation, behavioral problems, confusion, decreased concentration, dysphoric mood, hallucinations, self-injury, sleep disturbance and suicidal ideas. The patient is not nervous/anxious and is not hyperactive.      Objective:     Vital Signs (Most Recent):  Temp: 97.8 °F (36.6 °C) (08/22/24 0403)  Pulse: (!) 52 (08/22/24 0403)  Resp: 18 (08/22/24 0403)  BP: 123/77 (08/22/24 0403)  SpO2: 96 % (08/22/24 0403) Vital Signs (24h Range):  Temp:  [97.6 °F (36.4 °C)-98.5 °F (36.9 °C)] 97.8 °F (36.6 °C)  Pulse:  [52-67] 52  Resp:  [18] 18  SpO2:  [96 %-98 %] 96 %  BP: (123-139)/(69-79) 123/77     Weight: 43.5 kg (96 lb)  Body mass index is 16.48 kg/m².  Body surface area is 1.4 meters squared.      Intake/Output Summary (Last 24 hours) at 8/22/2024 0719  Last data filed at 8/22/2024 0604  Gross per 24 hour   Intake 390 ml   Output --   Net 390 ml        Physical Exam  Vitals reviewed.   Constitutional:       General: She is not in acute distress.     Appearance: Normal appearance. She is well-developed. She is not diaphoretic.   HENT:      Head: Normocephalic and atraumatic.      Right Ear:  External ear normal.      Left Ear: External ear normal.      Nose: Nose normal.      Right Sinus: No maxillary sinus tenderness or frontal sinus tenderness.      Left Sinus: No maxillary sinus tenderness or frontal sinus tenderness.      Mouth/Throat:      Pharynx: No oropharyngeal exudate.   Eyes:      General: Lids are normal. No scleral icterus.        Right eye: No discharge.         Left eye: No discharge.      Conjunctiva/sclera: Conjunctivae normal.      Right eye: Right conjunctiva is not injected. No hemorrhage.     Left eye: Left conjunctiva is not injected. No hemorrhage.     Pupils: Pupils are equal, round, and reactive to light.   Neck:      Thyroid: No thyromegaly.      Vascular: No JVD.      Trachea: No tracheal deviation.   Cardiovascular:      Rate and Rhythm: Normal rate.   Pulmonary:      Effort: Pulmonary effort is normal. No respiratory distress.      Breath sounds: No stridor.   Chest:      Chest wall: No tenderness.   Abdominal:      General: Bowel sounds are normal. There is no distension.      Palpations: Abdomen is soft. There is no hepatomegaly, splenomegaly or mass.      Tenderness: There is no abdominal tenderness. There is no rebound.   Musculoskeletal:         General: No tenderness. Normal range of motion.      Cervical back: Normal range of motion and neck supple.   Lymphadenopathy:      Cervical: No cervical adenopathy.      Upper Body:      Right upper body: No supraclavicular adenopathy.      Left upper body: No supraclavicular adenopathy.   Skin:     General: Skin is dry.      Findings: No erythema or rash.   Neurological:      Mental Status: She is alert and oriented to person, place, and time.      Cranial Nerves: No cranial nerve deficit.      Coordination: Coordination normal.   Psychiatric:         Behavior: Behavior normal.         Thought Content: Thought content normal.         Judgment: Judgment normal.          Significant Labs:   BMP:   Recent Labs   Lab  "08/21/24  0638 08/21/24  1201 08/21/24  1412 08/22/24  0543   *  155* 177*  --  149*  149*     138 137  --  136  136   K 4.8  4.8 4.4  --  4.4  4.4     110 108  --  110  110   CO2 17*  17* 17*  --  17*  17*   BUN 36*  36* 38*  --  44*  44*   CREATININE 2.1*  2.1* 2.1*  --  2.0*  2.0*   CALCIUM 8.7  8.7 8.7  --  8.3*  8.3*   MG  --  1.7 1.4* 1.7   , CBC:   Recent Labs   Lab 08/21/24  0638 08/21/24  1201 08/22/24  0543   WBC 9.79 10.90 15.72*   HGB 10.6* 9.7* 8.5*   HCT 34.8* 31.9* 27.2*   * 648* 551*   , CMP:   Recent Labs   Lab 08/21/24  0638 08/21/24  1201 08/22/24  0543     138 137 136  136   K 4.8  4.8 4.4 4.4  4.4     110 108 110  110   CO2 17*  17* 17* 17*  17*   *  155* 177* 149*  149*   BUN 36*  36* 38* 44*  44*   CREATININE 2.1*  2.1* 2.1* 2.0*  2.0*   CALCIUM 8.7  8.7 8.7 8.3*  8.3*   PROT  --   --  5.6*   ALBUMIN 1.9*  --  1.8*   BILITOT  --   --  0.1   ALKPHOS  --   --  56   AST  --   --  9*   ALT  --   --  9*   ANIONGAP 11  11 12 9  9   , Coagulation:   Recent Labs   Lab 08/20/24  1319   INR 1.1   , Haptoglobin: No results for input(s): "HAPTOGLOBIN" in the last 48 hours., Immunology: No results for input(s): "SPEP", "ABDELRAHMAN", "AZAM", "FREELAMBDALI" in the last 48 hours., LDH: No results for input(s): "LDHCSF", "BFSOURCE" in the last 48 hours., LFTs:   Recent Labs   Lab 08/21/24  0638 08/22/24  0543   ALT  --  9*   AST  --  9*   ALKPHOS  --  56   BILITOT  --  0.1   PROT  --  5.6*   ALBUMIN 1.9* 1.8*   , Reticulocytes: No results for input(s): "RETIC" in the last 48 hours., Tumor Markers: No results for input(s): "PSA", "CEA", "", "AFPTM", "[id number]", "" in the last 48 hours.    Invalid input(s): "ALGTM", Uric Acid No results for input(s): "URICACID" in the last 48 hours., and Urine Studies: No results for input(s): "COLORU", "APPEARANCEUA", "PHUR", "SPECGRAV", "PROTEINUA", "GLUCUA", "KETONESU", "BILIRUBINUA", "OCCULTUA", " ""NITRITE", "UROBILINOGEN", "LEUKOCYTESUR", "RBCUA", "WBCUA", "BACTERIA", "SQUAMEPITHEL", "HYALINECASTS" in the last 48 hours.    Invalid input(s): "LINDASUR"    Diagnostic Results:  I have reviewed all pertinent imaging results/findings within the past 24 hours.  "

## 2024-08-22 NOTE — INTERVAL H&P NOTE
The patient has been examined and the H&P has been reviewed:    I concur with the findings and no changes have occurred since H&P was written.    Anesthesia risks, benefits and alternative options discussed and understood by patient/family.          Active Hospital Problems    Diagnosis  POA    *JAIME (acute kidney injury) [N17.9]  Yes    Iron deficiency anemia [D50.9]  Yes     8/20/24: EGD/Colon: minor gastritis, otherwise negative/ Poor prep in right colon and fair prep in left colon. No masses seen. No polyps appreciated.       Thrombocytosis [D75.839]  Yes    PNA (pneumonia) [J18.9]  Yes      Resolved Hospital Problems   No resolved problems to display.

## 2024-08-22 NOTE — PROGRESS NOTES
[facility] - Med Surg  Transfusion Medicine  Consult Note    Patient Name: Shayy Fuast  MRN: [id number]  Admission Date: 8/15/2024  Hospital Length of Stay: 6 days  Attending Physician: Cm Lorenzo MD  Primary Care Provider: Karine, Primary Doctor     Consults  Subjective:     Principal Problem:JAIME (acute kidney injury), Anti-GBM, P-ANCA Disease    History of Present Illness:   Shayy Faust is a 49 year-old female with history of asthma with 4 week history of respiratory symptoms not improving with steroids and beta agonist inhaler. She also has abnormal blood work. She started with an URI that progressed to SOB. She had Spirometry in pulm clinic that showed obstruction with very severe ventilatory impairment unimproved with post bronchodilator. She reports unintentional weight loss of 25 lbs. Pulm concerned for Wegener's disease. CT chest showed numerous bilateral lung opacities; consider atypical infection/inflammation as well as metastatic neoplasm. Tree in bud micronodularity in lower lobes consistent with chronic small airway disease. She has worsening kidney function, with acute kidney injury (JAIME) of uncertain etiology with active sediment on urinalysis in both blood and protein. 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 Anti-GBM slightly elevated antibodies.  Also ANCA with p-ANCA positivity 1:80. 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     PMH and PSH reviewed 08/21/2024 and relevant items addressed in HPI.    Past Medical History:   Diagnosis Date    Mild intermittent asthma, uncomplicated      Past Surgical History:   Procedure Laterality Date    BILATERAL TUBAL LIGATION N/A     COLONOSCOPY N/A 8/20/2024    Procedure: COLONOSCOPY;  Surgeon: Gena Saxena MD;  Location: [facility];  Service: Endoscopy;  Laterality: N/A;    ESOPHAGOGASTRODUODENOSCOPY N/A  8/20/2024    Procedure: EGD (ESOPHAGOGASTRODUODENOSCOPY);  Surgeon: Gena Saxena MD;  Location[facility];  Service: Endoscopy;  Laterality: N/A;     Review of patient's allergies indicates:  No Known Allergies    All medications reviewed 08/21/2024 and ace inhibitors not identified.      Current Facility-Administered Medications:     0.9%  NaCl infusion (for blood administration), , Intravenous, Q24H PRN, Gena Saxena MD    0.9%  NaCl infusion, , Intravenous, Continuous, Gena Saxena MD, Last Rate: 75 mL/hr at 08/21/24 0900, New Bag at 08/21/24 0900    acetaminophen tablet 650 mg, 650 mg, Oral, Q6H PRN, Gena Saxena MD, 650 mg at 08/17/24 1248    albumin human 5% bottle 50 g, 50 g, Intravenous, Once, Shanda Christianson MD    albuterol-ipratropium 2.5 mg-0.5 mg/3 mL nebulizer solution 3 mL, 3 mL, Nebulization, Q6H PRN, Gena Saxena MD    calcium gluconate 1 g in NS IVPB (premixed), 2 g, Intravenous, Once, Shanda Christianson MD    cefTRIAXone (Rocephin) 1 g in D5W 100 mL IVPB (MB+), 1 g, Intravenous, Q24H, Gena Saxena MD, Stopped at 08/21/24 1629    diphenhydrAMINE injection 25 mg, 25 mg, Intravenous, Once, Shanda Christianson MD    doxycycline tablet 100 mg, 100 mg, Oral, BID, Gena Saxena MD, 100 mg at 08/21/24 0852    ferrous sulfate tablet 1 each, 1 tablet, Oral, Daily, Gena Saxena MD, 1 each at 08/21/24 0852    heparin (porcine) injection 3,000 Units, 3,000 Units, Intravenous, Once, Shanda Christianson MD    hydrALAZINE injection 10 mg, 10 mg, Intravenous, Q6H PRN, Gena Saxena MD    iron sucrose injection 200 mg, 200 mg, Intravenous, Q7 Days, Gena Saxena MD, 200 mg at 08/17/24 0919    methylPREDNISolone sodium succinate (SOLU-MEDROL) 1,000 mg in D5W 100 mL IVPB, 1,000 mg, Intravenous, Q24H, Hermes Galeas MD, Stopped at 08/21/24 1829    ondansetron disintegrating tablet 4 mg, 4 mg, Oral, Q6H PRN, Gena Saxena MD, 4 mg at 08/19/24  2318    sodium bicarbonate tablet 650 mg, 650 mg, Oral, BID, Hermes Galeas MD, 650 mg at 08/21/24 0852    Family History       Problem Relation (Age of Onset)    Hypertension Mother, Father          Tobacco Use    Smoking status: Never    Smokeless tobacco: Never   Substance and Sexual Activity    Alcohol use: Not Currently    Drug use: Never    Sexual activity: Not on file     Review of Systems: ROS as above in HPI.    Objective:     Vital Signs (Most Recent):  Temp: 98 °F (36.7 °C) (08/21/24 1915)  Pulse: 60 (08/21/24 1915)  Resp: 18 (08/21/24 1915)  BP: 138/76 (08/21/24 1915)  SpO2: 96 % (08/21/24 1915) Vital Signs (24h Range):  Temp:  [97.5 °F (36.4 °C)-98.5 °F (36.9 °C)] 98 °F (36.7 °C)  Pulse:  [56-67] 60  Resp:  [18] 18  SpO2:  [96 %-98 %] 96 %  BP: (126-139)/(69-91) 138/76     Weight: 43.5 kg (96 lb)    Physical Exam: Exam deferred to primary team.    Significant Labs: BMP:   Recent Labs   Lab 08/21/24  1201 08/21/24  1412   *  --      --    K 4.4  --      --    CO2 17*  --    BUN 38*  --    CREATININE 2.1*  --    CALCIUM 8.7  --    MG 1.7 1.4*     CBC:   Recent Labs   Lab 08/20/24  1319 08/21/24  0638 08/21/24  1201   WBC 17.67* 9.79 10.90   HGB 9.4* 10.6* 9.7*   HCT 30.3* 34.8* 31.9*   * 637* 648*     CMP:   Recent Labs   Lab 08/20/24  0449 08/21/24  0638 08/21/24  1201    138  138 137   K 4.6 4.8  4.8 4.4    110  110 108   CO2 16* 17*  17* 17*   GLU 60* 155*  155* 177*   BUN 24* 36*  36* 38*   CREATININE 2.1* 2.1*  2.1* 2.1*   CALCIUM 8.6* 8.7  8.7 8.7   ALBUMIN 1.8* 1.9*  --    ANIONGAP 13 11  11 12       Assessment/Plan:     Anti-Glomerular Basement Membrane Disease - Dialysis [place] carries a Category I Grade 1B indication for therapeutic apheresis via the 2023 Journal of Clinical Apheresis Guidelines (Abigail MEDINA et al. Journal of Clinical Apheresis 2023; 348:). The apheresis plan is as follows: Plasma exchange, 1x volume, 5% albumin  and plasma (if biopsy is within 48 hours). The 5 treatments will be serial x 2 days, and then every other day for 3 more treatments. Central venous catheter is required for the procedures.     Active Diagnoses:    Diagnosis Date Noted POA    PRINCIPAL PROBLEM:  JAIME (acute kidney injury) [N17.9] 08/14/2024 Yes    Iron deficiency anemia [D50.9] 08/14/2024 Yes    Thrombocytosis [D75.839] 08/14/2024 Yes    PNA (pneumonia) [J18.9] 08/14/2024 Yes      Problems Resolved During this Admission:     Shanda Christianson M.D., Ph.D.  Section of Transfusion Medicine & Blood Donor Services  Department of Pathology and Laboratory Medicine  [facility]  [phone] (Blood Bank)  08/21/2024    Shanda Christianson MD  Transfusion Medicine  [facility]

## 2024-08-22 NOTE — PLAN OF CARE
Discussed poc with pt, pt verbalized understanding    Purposeful rounding every 2hours    VS wnl    Fall precautions in place, remains injury free  Pain and nausea under control with PRN meds    IVFs  Accurate I&Os  Abx given as prescribed  Bed locked at lowest position  Call light within reach    Chart check complete  Will cont with POC

## 2024-08-23 ENCOUNTER — PATIENT MESSAGE (OUTPATIENT)
Dept: INFUSION THERAPY | Facility: HOSPITAL | Age: 49
End: 2024-08-23
Payer: COMMERCIAL

## 2024-08-23 PROBLEM — E44.0 MODERATE PROTEIN-CALORIE MALNUTRITION: Status: ACTIVE | Noted: 2024-08-23

## 2024-08-23 PROBLEM — D63.1 ANEMIA, CHRONIC RENAL FAILURE, STAGE 4 (SEVERE): Status: ACTIVE | Noted: 2024-08-23

## 2024-08-23 PROBLEM — N18.4 ANEMIA, CHRONIC RENAL FAILURE, STAGE 4 (SEVERE): Status: ACTIVE | Noted: 2024-08-23

## 2024-08-23 LAB
ALBUMIN SERPL BCP-MCNC: 2.7 G/DL (ref 3.5–5.2)
ANION GAP SERPL CALC-SCNC: 8 MMOL/L (ref 8–16)
ANION GAP SERPL CALC-SCNC: 8 MMOL/L (ref 8–16)
BASOPHILS # BLD AUTO: 0 K/UL (ref 0–0.2)
BASOPHILS NFR BLD: 0 % (ref 0–1.9)
BUN SERPL-MCNC: 46 MG/DL (ref 6–20)
BUN SERPL-MCNC: 46 MG/DL (ref 6–20)
CALCIUM SERPL-MCNC: 8.4 MG/DL (ref 8.7–10.5)
CALCIUM SERPL-MCNC: 8.4 MG/DL (ref 8.7–10.5)
CHLORIDE SERPL-SCNC: 112 MMOL/L (ref 95–110)
CHLORIDE SERPL-SCNC: 112 MMOL/L (ref 95–110)
CO2 SERPL-SCNC: 20 MMOL/L (ref 23–29)
CO2 SERPL-SCNC: 20 MMOL/L (ref 23–29)
CREAT SERPL-MCNC: 2 MG/DL (ref 0.5–1.4)
CREAT SERPL-MCNC: 2 MG/DL (ref 0.5–1.4)
DIFFERENTIAL METHOD BLD: ABNORMAL
EOSINOPHIL # BLD AUTO: 0 K/UL (ref 0–0.5)
EOSINOPHIL NFR BLD: 0 % (ref 0–8)
ERYTHROCYTE [DISTWIDTH] IN BLOOD BY AUTOMATED COUNT: 15.5 % (ref 11.5–14.5)
EST. GFR  (NO RACE VARIABLE): 30 ML/MIN/1.73 M^2
EST. GFR  (NO RACE VARIABLE): 30 ML/MIN/1.73 M^2
FIBRINOGEN PPP-MCNC: 152 MG/DL (ref 182–400)
GLUCOSE SERPL-MCNC: 146 MG/DL (ref 70–110)
GLUCOSE SERPL-MCNC: 146 MG/DL (ref 70–110)
HCT VFR BLD AUTO: 28.1 % (ref 37–48.5)
HGB BLD-MCNC: 8.7 G/DL (ref 12–16)
IMM GRANULOCYTES # BLD AUTO: 0.11 K/UL (ref 0–0.04)
IMM GRANULOCYTES NFR BLD AUTO: 0.9 % (ref 0–0.5)
LYMPHOCYTES # BLD AUTO: 1.6 K/UL (ref 1–4.8)
LYMPHOCYTES NFR BLD: 12.4 % (ref 18–48)
MAGNESIUM SERPL-MCNC: 1.6 MG/DL (ref 1.6–2.6)
MCH RBC QN AUTO: 26 PG (ref 27–31)
MCHC RBC AUTO-ENTMCNC: 31 G/DL (ref 32–36)
MCV RBC AUTO: 84 FL (ref 82–98)
MONOCYTES # BLD AUTO: 0.1 K/UL (ref 0.3–1)
MONOCYTES NFR BLD: 1 % (ref 4–15)
NEUTROPHILS # BLD AUTO: 10.9 K/UL (ref 1.8–7.7)
NEUTROPHILS NFR BLD: 85.7 % (ref 38–73)
NRBC BLD-RTO: 0 /100 WBC
PHOSPHATE SERPL-MCNC: 4.8 MG/DL (ref 2.7–4.5)
PLATELET # BLD AUTO: 450 K/UL (ref 150–450)
PMV BLD AUTO: 9.6 FL (ref 9.2–12.9)
POTASSIUM SERPL-SCNC: 4.2 MMOL/L (ref 3.5–5.1)
POTASSIUM SERPL-SCNC: 4.2 MMOL/L (ref 3.5–5.1)
RBC # BLD AUTO: 3.35 M/UL (ref 4–5.4)
SODIUM SERPL-SCNC: 140 MMOL/L (ref 136–145)
SODIUM SERPL-SCNC: 140 MMOL/L (ref 136–145)
WBC # BLD AUTO: 12.75 K/UL (ref 3.9–12.7)

## 2024-08-23 PROCEDURE — 25000003 PHARM REV CODE 250: Performed by: INTERNAL MEDICINE

## 2024-08-23 PROCEDURE — 85025 COMPLETE CBC W/AUTO DIFF WBC: CPT | Performed by: STUDENT IN AN ORGANIZED HEALTH CARE EDUCATION/TRAINING PROGRAM

## 2024-08-23 PROCEDURE — 85384 FIBRINOGEN ACTIVITY: CPT | Performed by: STUDENT IN AN ORGANIZED HEALTH CARE EDUCATION/TRAINING PROGRAM

## 2024-08-23 PROCEDURE — 36415 COLL VENOUS BLD VENIPUNCTURE: CPT | Performed by: STUDENT IN AN ORGANIZED HEALTH CARE EDUCATION/TRAINING PROGRAM

## 2024-08-23 PROCEDURE — P9045 ALBUMIN (HUMAN), 5%, 250 ML: HCPCS | Mod: JZ,JG | Performed by: PATHOLOGY

## 2024-08-23 PROCEDURE — 63600175 PHARM REV CODE 636 W HCPCS: Performed by: INTERNAL MEDICINE

## 2024-08-23 PROCEDURE — 83735 ASSAY OF MAGNESIUM: CPT | Performed by: INTERNAL MEDICINE

## 2024-08-23 PROCEDURE — 25000003 PHARM REV CODE 250: Mod: JZ,JG | Performed by: PATHOLOGY

## 2024-08-23 PROCEDURE — 99232 SBSQ HOSP IP/OBS MODERATE 35: CPT | Mod: ,,, | Performed by: INTERNAL MEDICINE

## 2024-08-23 PROCEDURE — 63600175 PHARM REV CODE 636 W HCPCS: Mod: JZ,EC,JG | Performed by: INTERNAL MEDICINE

## 2024-08-23 PROCEDURE — 11000001 HC ACUTE MED/SURG PRIVATE ROOM

## 2024-08-23 PROCEDURE — 80069 RENAL FUNCTION PANEL: CPT | Performed by: INTERNAL MEDICINE

## 2024-08-23 PROCEDURE — 25000003 PHARM REV CODE 250: Performed by: STUDENT IN AN ORGANIZED HEALTH CARE EDUCATION/TRAINING PROGRAM

## 2024-08-23 PROCEDURE — 63600175 PHARM REV CODE 636 W HCPCS: Mod: JZ,JG | Performed by: PATHOLOGY

## 2024-08-23 RX ORDER — CALCIUM GLUCONATE 20 MG/ML
2 INJECTION, SOLUTION INTRAVENOUS ONCE
Status: DISCONTINUED | OUTPATIENT
Start: 2024-08-26 | End: 2024-08-25

## 2024-08-23 RX ORDER — PREDNISONE 20 MG/1
20 TABLET ORAL 2 TIMES DAILY
Status: DISCONTINUED | OUTPATIENT
Start: 2024-08-23 | End: 2024-08-26 | Stop reason: HOSPADM

## 2024-08-23 RX ORDER — HEPARIN SODIUM 1000 [USP'U]/ML
3000 INJECTION, SOLUTION INTRAVENOUS; SUBCUTANEOUS ONCE
Status: COMPLETED | OUTPATIENT
Start: 2024-08-24 | End: 2024-08-24

## 2024-08-23 RX ORDER — MUPIROCIN 20 MG/G
OINTMENT TOPICAL 2 TIMES DAILY
Status: DISCONTINUED | OUTPATIENT
Start: 2024-08-23 | End: 2024-08-26 | Stop reason: HOSPADM

## 2024-08-23 RX ORDER — HEPARIN SODIUM 1000 [USP'U]/ML
3000 INJECTION, SOLUTION INTRAVENOUS; SUBCUTANEOUS ONCE
Status: COMPLETED | OUTPATIENT
Start: 2024-08-23 | End: 2024-08-23

## 2024-08-23 RX ORDER — SULFAMETHOXAZOLE AND TRIMETHOPRIM 400; 80 MG/1; MG/1
1 TABLET ORAL DAILY
Status: DISCONTINUED | OUTPATIENT
Start: 2024-08-23 | End: 2024-08-24 | Stop reason: DRUGHIGH

## 2024-08-23 RX ORDER — CALCIUM GLUCONATE 20 MG/ML
2 INJECTION, SOLUTION INTRAVENOUS ONCE
Status: COMPLETED | OUTPATIENT
Start: 2024-08-24 | End: 2024-08-24

## 2024-08-23 RX ORDER — HEPARIN SODIUM 1000 [USP'U]/ML
3000 INJECTION, SOLUTION INTRAVENOUS; SUBCUTANEOUS ONCE
Status: DISCONTINUED | OUTPATIENT
Start: 2024-08-26 | End: 2024-08-26

## 2024-08-23 RX ORDER — MAGNESIUM SULFATE HEPTAHYDRATE 40 MG/ML
2 INJECTION, SOLUTION INTRAVENOUS ONCE
Status: COMPLETED | OUTPATIENT
Start: 2024-08-23 | End: 2024-08-23

## 2024-08-23 RX ORDER — CALCIUM GLUCONATE 20 MG/ML
2 INJECTION, SOLUTION INTRAVENOUS ONCE
Status: COMPLETED | OUTPATIENT
Start: 2024-08-23 | End: 2024-08-23

## 2024-08-23 RX ORDER — ALBUMIN HUMAN 50 G/1000ML
25 SOLUTION INTRAVENOUS ONCE
Status: COMPLETED | OUTPATIENT
Start: 2024-08-23 | End: 2024-08-23

## 2024-08-23 RX ORDER — ALBUMIN HUMAN 50 G/1000ML
100 SOLUTION INTRAVENOUS ONCE
Status: DISCONTINUED | OUTPATIENT
Start: 2024-08-26 | End: 2024-08-25

## 2024-08-23 RX ORDER — ALBUMIN HUMAN 50 G/1000ML
100 SOLUTION INTRAVENOUS ONCE
Status: COMPLETED | OUTPATIENT
Start: 2024-08-24 | End: 2024-08-24

## 2024-08-23 RX ADMIN — HEPARIN SODIUM 3000 UNITS: 1000 INJECTION INTRAVENOUS; SUBCUTANEOUS at 02:08

## 2024-08-23 RX ADMIN — ALBUMIN (HUMAN) 25 G: 12.5 SOLUTION INTRAVENOUS at 02:08

## 2024-08-23 RX ADMIN — SULFAMETHOXAZOLE AND TRIMETHOPRIM 1 TABLET: 400; 80 TABLET ORAL at 10:08

## 2024-08-23 RX ADMIN — MAGNESIUM SULFATE HEPTAHYDRATE 2 G: 40 INJECTION, SOLUTION INTRAVENOUS at 02:08

## 2024-08-23 RX ADMIN — SODIUM BICARBONATE 650 MG: 650 TABLET ORAL at 09:08

## 2024-08-23 RX ADMIN — PREDNISONE 20 MG: 20 TABLET ORAL at 10:08

## 2024-08-23 RX ADMIN — EPOETIN ALFA-EPBX 2160 UNITS: 4000 INJECTION, SOLUTION INTRAVENOUS; SUBCUTANEOUS at 09:08

## 2024-08-23 RX ADMIN — MUPIROCIN: 20 OINTMENT TOPICAL at 10:08

## 2024-08-23 RX ADMIN — PANTOPRAZOLE SODIUM 40 MG: 40 TABLET, DELAYED RELEASE ORAL at 09:08

## 2024-08-23 RX ADMIN — FERROUS SULFATE TAB 325 MG (65 MG ELEMENTAL FE) 1 EACH: 325 (65 FE) TAB at 09:08

## 2024-08-23 RX ADMIN — MUPIROCIN: 20 OINTMENT TOPICAL at 09:08

## 2024-08-23 RX ADMIN — CEFTRIAXONE 1 G: 1 INJECTION, POWDER, FOR SOLUTION INTRAMUSCULAR; INTRAVENOUS at 04:08

## 2024-08-23 RX ADMIN — CHOLECALCIFEROL TAB 125 MCG (5000 UNIT) 5000 UNITS: 125 TAB at 09:08

## 2024-08-23 RX ADMIN — SODIUM BICARBONATE 650 MG: 650 TABLET ORAL at 10:08

## 2024-08-23 RX ADMIN — CALCIUM GLUCONATE 2 G: 20 INJECTION, SOLUTION INTRAVENOUS at 02:08

## 2024-08-23 NOTE — PROGRESS NOTES
Nephrology Progress Note     History of Present Illness   49 yr History of recent diagnosis of asthma presenting to the hospital with 4 week history of respiratory symptoms not improving with steroids and beta agonist inhaler. On arrival here, creatinine 2.6 with a peak of 2.7 within improving with some IV fluids. JAIME of uncertain etiology with active sediment on urinalysis. 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  In the context of losing weight poor appetite.  . She did have an SPEP with  monoclonal spikes.  However, kappa lambda ratio fairly unremarkable with fairly similar elevation in both.   anti-GBM slightly elevated antibodies.  Also ANCA with p-ANCA positivity 1:80.     Renal Bx 8/20/2024.  Results pending.  Sent to [place].    Started on Medrol 1g daily x 3 days 8/20/2024  Started on Apheresis 8/20/2024 x 5 treatmenst.  2 sequential and then QOD.      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  Renal biopsy still not reported to start on Cytoxan.  Continue plasmapheresis daily with albumin replacement fluid we will start oral steroids 1 milligram/kilogram body weight.  If biopsies confirm start Cytoxan at 1.2 milligrams/kilogram body weight discussed with patient complications including bone marrow suppression hemorrhagic cystitis opportunistic infections bone marrow depression aloepecia sterility.   I have called pathology to help with obtaining renal biopsy as soon as possible to institute definitive treatment with Cytoxan steroids and plasma pheresisl  Health Status   Allergies:    has No Known Allergies.    Current medications:     Current Facility-Administered Medications:     0.9%  NaCl infusion (for blood administration), , Intravenous, Q24H PRN, Gena Saxena MD    0.9%  NaCl infusion, , Intravenous, Continuous, Gena Saxena MD, Last Rate: 75 mL/hr at 08/22/24  "2238, New Bag at 08/22/24 2238    acetaminophen tablet 650 mg, 650 mg, Oral, Q6H PRN, Gena Saxena MD, 650 mg at 08/17/24 1248    albuterol-ipratropium 2.5 mg-0.5 mg/3 mL nebulizer solution 3 mL, 3 mL, Nebulization, Q6H PRN, Gena Saxena MD    cefTRIAXone (Rocephin) 1 g in D5W 100 mL IVPB (MB+), 1 g, Intravenous, Q24H, Gena Saxena MD, Stopped at 08/22/24 1818    cholecalciferol (vitamin D3) 125 mcg (5,000 unit) tablet 5,000 Units, 5,000 Units, Oral, Daily, Rohit Braun MD, 5,000 Units at 08/22/24 1154    diphenhydrAMINE injection 25 mg, 25 mg, Intravenous, Q6H PRN, Cm Lorenzo MD    epoetin natalie-epbx injection 2,160 Units, 50 Units/kg, Subcutaneous, Q7 Days, Marcin Mercado MD    ferrous sulfate tablet 1 each, 1 tablet, Oral, Daily, Gena Saxena MD, 1 each at 08/22/24 0932    iron sucrose injection 200 mg, 200 mg, Intravenous, Q7 Days, Gena Saxena MD, 200 mg at 08/17/24 0919    mupirocin 2 % ointment, , Nasal, BID, Cm Lorenzo MD    ondansetron disintegrating tablet 4 mg, 4 mg, Oral, Q6H PRN, Gena Saxena MD, 4 mg at 08/19/24 2318    pantoprazole EC tablet 40 mg, 40 mg, Oral, Daily, Rohit Braun MD, 40 mg at 08/22/24 1154    sodium bicarbonate tablet 650 mg, 650 mg, Oral, BID, Hermes Galeas MD, 650 mg at 08/22/24 2119    sulfamethoxazole-trimethoprim 800-160mg per tablet 1 tablet, 1 tablet, Oral, Once per day on Monday Wednesday Friday, Rohit Braun MD, 1 tablet at 08/22/24 1154     Physical Examination   VS/Measurements   BP (!) 161/69 (BP Location: Right arm, Patient Position: Lying)   Pulse (!) 46   Temp 98.4 °F (36.9 °C) (Oral)   Resp 18   Ht 5' 4" (1.626 m)   Wt 42.8 kg (94 lb 5.7 oz)   LMP  (LMP Unknown)   SpO2 98%   Breastfeeding No   BMI 16.20 kg/m²    General:  Pleasant  lady malnourished Alert and oriented X3, No acute distress.         Nutritional status:  Pallor noted    Neck:  Supple, No lymphadenopathy.   No " obvious cervical lymphadenopathy  Respiratory:  Scattered wheeze   Cardiovascular:  Normal rate, Regular rhythm.   Gastrointestinal:  Soft, Non-tender, Normal bowel sounds.  No mass felt   Integumentary:  Warm, Dry.  No rash petechiae  Psychiatric:  Cooperative, Appropriate mood & affect.             Review / Management   Laboratory Results   Today's Lab Results :    Recent Results (from the past 24 hour(s))   CBC Auto Differential    Collection Time: 08/23/24  4:36 AM   Result Value Ref Range    WBC 12.75 (H) 3.90 - 12.70 K/uL    RBC 3.35 (L) 4.00 - 5.40 M/uL    Hemoglobin 8.7 (L) 12.0 - 16.0 g/dL    Hematocrit 28.1 (L) 37.0 - 48.5 %    MCV 84 82 - 98 fL    MCH 26.0 (L) 27.0 - 31.0 pg    MCHC 31.0 (L) 32.0 - 36.0 g/dL    RDW 15.5 (H) 11.5 - 14.5 %    Platelets 450 150 - 450 K/uL    MPV 9.6 9.2 - 12.9 fL    Immature Granulocytes 0.9 (H) 0.0 - 0.5 %    Gran # (ANC) 10.9 (H) 1.8 - 7.7 K/uL    Immature Grans (Abs) 0.11 (H) 0.00 - 0.04 K/uL    Lymph # 1.6 1.0 - 4.8 K/uL    Mono # 0.1 (L) 0.3 - 1.0 K/uL    Eos # 0.0 0.0 - 0.5 K/uL    Baso # 0.00 0.00 - 0.20 K/uL    nRBC 0 0 /100 WBC    Gran % 85.7 (H) 38.0 - 73.0 %    Lymph % 12.4 (L) 18.0 - 48.0 %    Mono % 1.0 (L) 4.0 - 15.0 %    Eosinophil % 0.0 0.0 - 8.0 %    Basophil % 0.0 0.0 - 1.9 %    Differential Method Automated    Basic Metabolic Panel    Collection Time: 08/23/24  4:36 AM   Result Value Ref Range    Sodium 140 136 - 145 mmol/L    Potassium 4.2 3.5 - 5.1 mmol/L    Chloride 112 (H) 95 - 110 mmol/L    CO2 20 (L) 23 - 29 mmol/L    Glucose 146 (H) 70 - 110 mg/dL    BUN 46 (H) 6 - 20 mg/dL    Creatinine 2.0 (H) 0.5 - 1.4 mg/dL    Calcium 8.4 (L) 8.7 - 10.5 mg/dL    Anion Gap 8 8 - 16 mmol/L    eGFR 30 (A) >60 mL/min/1.73 m^2   Renal Function Panel    Collection Time: 08/23/24  4:36 AM   Result Value Ref Range    Glucose 146 (H) 70 - 110 mg/dL    Sodium 140 136 - 145 mmol/L    Potassium 4.2 3.5 - 5.1 mmol/L    Chloride 112 (H) 95 - 110 mmol/L    CO2 20 (L) 23 - 29  mmol/L    BUN 46 (H) 6 - 20 mg/dL    Calcium 8.4 (L) 8.7 - 10.5 mg/dL    Creatinine 2.0 (H) 0.5 - 1.4 mg/dL    Albumin 2.7 (L) 3.5 - 5.2 g/dL    Phosphorus 4.8 (H) 2.7 - 4.5 mg/dL    eGFR 30 (A) >60 mL/min/1.73 m^2    Anion Gap 8 8 - 16 mmol/L   Magnesium    Collection Time: 08/23/24  4:36 AM   Result Value Ref Range    Magnesium 1.6 1.6 - 2.6 mg/dL   Fibrinogen    Collection Time: 08/23/24  4:36 AM   Result Value Ref Range    Fibrinogen 152 (L) 182 - 400 mg/dL        Impression and Plan   Diagnosis   JAIME with active sediments subnephrotic proteinuria of 1 g positive serology for GBM 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  Explained to patient pros and cons of plasmapheresis steroids need for infection prophylaxis nutrition vitamin-D.  All questions answered.  First session of plasmapheresis postponed till today discussed with hospitalist start prednisone 40 mg from tomorrow     History of asthma pulmonary nodules per pulmonology     History of nausea weight loss unremarkable endoscopy per patient      severe malnutrition malnutrition inflammation syndrome      ______________________________________________  Rohit Braun MD    This document was created using voice recognition software.  It is possible that there are errors which have persisted after original proofreading.  If there is a question regarding contents of this document please contact me for clarification.

## 2024-08-23 NOTE — NURSING
Apheresis nurse unable to scan meds, Primary nurse JEANETTE Kaufman scanned all meds in MAR for administration by aphresis nurse, April     Talked to charge nurses Lorraine and Janny DUPREE to confirm that scanning is okay

## 2024-08-23 NOTE — PLAN OF CARE
Discussed poc with pt, pt verbalized understanding    Purposeful rounding every 2hours    VS wnl except for heart rate rhythm - bradycardic  Fall precautions in place, remains injury free  Pt denies c/o pain    IVFs  Accurate I&Os  Bed locked at lowest position  Call light within reach    Chart check complete  Will cont with POC

## 2024-08-23 NOTE — SUBJECTIVE & OBJECTIVE
Interval History:  Patient reports feeling better    Oncology Treatment Plan:   [No matching plan found]    Medications:  Continuous Infusions:   0.9% NaCl   Intravenous Continuous 75 mL/hr at 08/22/24 2238 New Bag at 08/22/24 2238     Scheduled Meds:   cefTRIAXone (Rocephin) IV (PEDS and ADULTS)  1 g Intravenous Q24H    cholecalciferol (vitamin D3)  5,000 Units Oral Daily    epoetin natalie-epbx  50 Units/kg Subcutaneous Q7 Days    ferrous sulfate  1 tablet Oral Daily    iron sucrose  200 mg Intravenous Q7 Days    pantoprazole  40 mg Oral Daily    sodium bicarbonate  650 mg Oral BID    sulfamethoxazole-trimethoprim 800-160mg  1 tablet Oral Once per day on Monday Wednesday Friday     PRN Meds:  Current Facility-Administered Medications:     0.9%  NaCl infusion (for blood administration), , Intravenous, Q24H PRN    acetaminophen, 650 mg, Oral, Q6H PRN    albuterol-ipratropium, 3 mL, Nebulization, Q6H PRN    diphenhydrAMINE, 25 mg, Intravenous, Q6H PRN    ondansetron, 4 mg, Oral, Q6H PRN     Review of Systems   Constitutional:  Positive for fatigue. Negative for activity change, appetite change, chills, diaphoresis, fever and unexpected weight change.   HENT:  Negative for congestion, dental problem, drooling, ear discharge, ear pain, facial swelling, hearing loss, mouth sores, nosebleeds, postnasal drip, rhinorrhea, sinus pressure, sneezing, sore throat, tinnitus, trouble swallowing and voice change.    Eyes:  Negative for photophobia, pain, discharge, redness, itching and visual disturbance.   Respiratory:  Negative for cough, choking, chest tightness, shortness of breath, wheezing and stridor.    Cardiovascular:  Negative for chest pain, palpitations and leg swelling.   Gastrointestinal:  Negative for abdominal distention, abdominal pain, anal bleeding, blood in stool, constipation, diarrhea, nausea, rectal pain and vomiting.   Endocrine: Negative for cold intolerance, heat intolerance, polydipsia, polyphagia and  polyuria.   Genitourinary:  Negative for decreased urine volume, difficulty urinating, dyspareunia, dysuria, enuresis, flank pain, frequency, genital sores, hematuria, menstrual problem, pelvic pain, urgency, vaginal bleeding, vaginal discharge and vaginal pain.   Musculoskeletal:  Negative for arthralgias, back pain, gait problem, joint swelling, myalgias, neck pain and neck stiffness.   Skin:  Negative for color change, pallor and rash.   Allergic/Immunologic: Negative for environmental allergies, food allergies and immunocompromised state.   Neurological:  Positive for weakness. Negative for dizziness, tremors, seizures, syncope, facial asymmetry, speech difficulty, light-headedness, numbness and headaches.   Hematological:  Negative for adenopathy. Does not bruise/bleed easily.   Psychiatric/Behavioral:  Negative for agitation, behavioral problems, confusion, decreased concentration, dysphoric mood, hallucinations, self-injury, sleep disturbance and suicidal ideas. The patient is not nervous/anxious and is not hyperactive.      Objective:     Vital Signs (Most Recent):  Temp: 98.2 °F (36.8 °C) (08/23/24 0455)  Pulse: 62 (08/23/24 0455)  Resp: 18 (08/23/24 0455)  BP: 139/70 (08/23/24 0455)  SpO2: 96 % (08/23/24 0455) Vital Signs (24h Range):  Temp:  [97.7 °F (36.5 °C)-98.2 °F (36.8 °C)] 98.2 °F (36.8 °C)  Pulse:  [44-62] 62  Resp:  [18] 18  SpO2:  [95 %-98 %] 96 %  BP: (130-151)/(70-75) 139/70     Weight: 42.8 kg (94 lb 5.7 oz)  Body mass index is 16.2 kg/m².  Body surface area is 1.39 meters squared.    No intake or output data in the 24 hours ending 08/23/24 0606     Physical Exam  Vitals reviewed.   Constitutional:       General: She is not in acute distress.     Appearance: Normal appearance. She is well-developed. She is ill-appearing. She is not diaphoretic.   HENT:      Head: Normocephalic and atraumatic.      Right Ear: External ear normal.      Left Ear: External ear normal.      Nose: Nose normal.       Right Sinus: No maxillary sinus tenderness or frontal sinus tenderness.      Left Sinus: No maxillary sinus tenderness or frontal sinus tenderness.      Mouth/Throat:      Pharynx: No oropharyngeal exudate.   Eyes:      General: Lids are normal. No scleral icterus.        Right eye: No discharge.         Left eye: No discharge.      Conjunctiva/sclera: Conjunctivae normal.      Right eye: Right conjunctiva is not injected. No hemorrhage.     Left eye: Left conjunctiva is not injected. No hemorrhage.     Pupils: Pupils are equal, round, and reactive to light.   Neck:      Thyroid: No thyromegaly.      Vascular: No JVD.      Trachea: No tracheal deviation.   Cardiovascular:      Rate and Rhythm: Normal rate.      Heart sounds: Normal heart sounds.   Pulmonary:      Effort: Pulmonary effort is normal. No respiratory distress.      Breath sounds: No stridor.   Chest:      Chest wall: No tenderness.   Abdominal:      General: Bowel sounds are normal. There is no distension.      Palpations: Abdomen is soft. There is no hepatomegaly, splenomegaly or mass.      Tenderness: There is no abdominal tenderness. There is no rebound.   Musculoskeletal:         General: No tenderness. Normal range of motion.      Cervical back: Normal range of motion and neck supple.   Lymphadenopathy:      Cervical: No cervical adenopathy.      Upper Body:      Right upper body: No supraclavicular adenopathy.      Left upper body: No supraclavicular adenopathy.   Skin:     General: Skin is dry.      Findings: No erythema or rash.   Neurological:      Mental Status: She is alert and oriented to person, place, and time.      Cranial Nerves: No cranial nerve deficit.      Coordination: Coordination normal.   Psychiatric:         Behavior: Behavior normal.         Thought Content: Thought content normal.         Judgment: Judgment normal.          Significant Labs:   BMP:   Recent Labs   Lab 08/21/24  1201 08/21/24  1412 08/22/24  0543 08/23/24  1654  "  *  --  149*  149* 146*  146*     --  136  136 140  140   K 4.4  --  4.4  4.4 4.2  4.2     --  110  110 112*  112*   CO2 17*  --  17*  17* 20*  20*   BUN 38*  --  44*  44* 46*  46*   CREATININE 2.1*  --  2.0*  2.0* 2.0*  2.0*   CALCIUM 8.7  --  8.3*  8.3* 8.4*  8.4*   MG 1.7 1.4* 1.7 1.6   , CBC:   Recent Labs   Lab 08/21/24  1201 08/22/24  0543 08/23/24  0436   WBC 10.90 15.72* 12.75*   HGB 9.7* 8.5* 8.7*   HCT 31.9* 27.2* 28.1*   * 551* 450   , CMP:   Recent Labs   Lab 08/21/24  0638 08/21/24  1201 08/22/24  0543 08/23/24  0436     138 137 136  136 140  140   K 4.8  4.8 4.4 4.4  4.4 4.2  4.2     110 108 110  110 112*  112*   CO2 17*  17* 17* 17*  17* 20*  20*   *  155* 177* 149*  149* 146*  146*   BUN 36*  36* 38* 44*  44* 46*  46*   CREATININE 2.1*  2.1* 2.1* 2.0*  2.0* 2.0*  2.0*   CALCIUM 8.7  8.7 8.7 8.3*  8.3* 8.4*  8.4*   PROT  --   --  5.6*  --    ALBUMIN 1.9*  --  1.8* 2.7*   BILITOT  --   --  0.1  --    ALKPHOS  --   --  56  --    AST  --   --  9*  --    ALT  --   --  9*  --    ANIONGAP 11  11 12 9  9 8  8   , Coagulation: No results for input(s): "PT", "INR", "APTT" in the last 48 hours., Haptoglobin: No results for input(s): "HAPTOGLOBIN" in the last 48 hours., Immunology: No results for input(s): "SPEP", "ABDELRAHMAN", "AZAM", "FREELAMBDALI" in the last 48 hours., LDH: No results for input(s): "LDHCSF", "BFSOURCE" in the last 48 hours., LFTs:   Recent Labs   Lab 08/21/24  0638 08/22/24  0543 08/23/24  0436   ALT  --  9*  --    AST  --  9*  --    ALKPHOS  --  56  --    BILITOT  --  0.1  --    PROT  --  5.6*  --    ALBUMIN 1.9* 1.8* 2.7*   , Reticulocytes: No results for input(s): "RETIC" in the last 48 hours., Tumor Markers: No results for input(s): "PSA", "CEA", "", "AFPTM", "[id number]", "" in the last 48 hours.    Invalid input(s): "ALGTM", Uric Acid No results for input(s): "URICACID" in the last 48 hours., and " "Urine Studies: No results for input(s): "COLORU", "APPEARANCEUA", "PHUR", "SPECGRAV", "PROTEINUA", "GLUCUA", "KETONESU", "BILIRUBINUA", "OCCULTUA", "NITRITE", "UROBILINOGEN", "LEUKOCYTESUR", "RBCUA", "WBCUA", "BACTERIA", "SQUAMEPITHEL", "HYALINECASTS" in the last 48 hours.    Invalid input(s): "WRIGHTSUR"    Diagnostic Results:  I have reviewed all pertinent imaging results/findings within the past 24 hours.  "

## 2024-08-23 NOTE — ASSESSMENT & PLAN NOTE
Malnutrition Type:  Context: acute illness or injury, chronic illness  Level: moderate    Related to (etiology):   Physiological causes increasing nutrient needs d/t illness  Alteration in GI tract structure/ function     Signs and Symptoms (as evidenced by):   BMI < 18.5  Unintentional weight loss, adult, 11% wt change x 4 months    Underweight with loss of fat and muscle  Unable or unwilling to eat sufficient energy/protein to maintain a healthy weight  Pneumonia    Malnutrition Characteristic Summary:  Subcutaneous Fat (Malnutrition): mild depletion  Muscle Mass (Malnutrition): mild depletion      Interventions/Recommendations (treatment strategy):  1. Mineral modified diet  2. Commercial beverage medical food supplement therapy  3. Meal setup management   4. Collaboration by nutrition professional with other providers    Nutrition Diagnosis Status:   New/ Improving

## 2024-08-23 NOTE — PROGRESS NOTES
[facility]  Adult Nutrition  Progress Note    SUMMARY       Recommendations    Recommendation/Intervention:   1. Recommend modify pt's diet to Renal diet   2. Recommend pt continues Novasource renal BID until PO intake > 75%   3. Encourage PO intake   4. Weigh twice weekly    Goals:   1. Pt's diet will be modified prior to RD follow up   2. Pt will tolerate and consume > 75% EEN and EPN prior to RD follow up  Nutrition Goal Status: new  Communication of RD Recs: other (comment) (POC, sticky note)    Assessment and Plan    Endocrine  Moderate protein-calorie malnutrition  Malnutrition Type:  Context: acute illness or injury, chronic illness  Level: moderate    Related to (etiology):   Physiological causes increasing nutrient needs d/t illness  Alteration in GI tract structure/ function     Signs and Symptoms (as evidenced by):   BMI < 18.5  Unintentional weight loss, adult, 11% wt change x 4 months    Underweight with loss of fat and muscle  Unable or unwilling to eat sufficient energy/protein to maintain a healthy weight  Pneumonia    Malnutrition Characteristic Summary:  Subcutaneous Fat (Malnutrition): mild depletion  Muscle Mass (Malnutrition): mild depletion      Interventions/Recommendations (treatment strategy):  1. Mineral modified diet  2. Commercial beverage medical food supplement therapy  3. Meal setup management   4. Collaboration by nutrition professional with other providers    Nutrition Diagnosis Status:   New/ Improving          Malnutrition Assessment (8/23/24):  Malnutrition Context: acute illness or injury, chronic illness  Malnutrition Level: moderate  Skin (Micronutrient): none (John score = 22 (no risk)       Subcutaneous Fat (Malnutrition): mild depletion  Muscle Mass (Malnutrition): mild depletion   Orbital Region (Subcutaneous Fat Loss): mild depletion (Slighty darkwe circles)  Upper Arm Region (Subcutaneous Fat Loss): mild depletion (Some depth pinch but not ample)   [place] Region  (Muscle Loss): mild depletion (Slight depression)  Clavicle Bone Region (Muscle Loss): mild depletion (Some protrusion in female)  Clavicle and Acromion Bone Region (Muscle Loss): mild depletion (Acromion process may slightly protrude)  Dorsal Hand (Muscle Loss): mild depletion (Slightly depressed)  Patellar Region (Muscle Loss): mild depletion (Knee cap looks prominent, more rounded)  Anterior Thigh Region (Muscle Loss): mild depletion (Mild depression on inner thigh)  Posterior Calf Region (Muscle Loss): mild depletion (Not well developed)                 Reason for Assessment    Reason For Assessment: length of stay  Diagnosis:  (JAIME (acute kidney injury))  Relevant Medical History: Iron deficiency anemia, PNA, Thrombocytosis, Anemia, JAIME stage 4 severe   Hx: Unintentional wt loss, fatigue, asthma  General Information Comments:   8/23/24: 49 y.o. Female admitted for JAIME (acute kidney injury). Pt currently on a Regular diet. H&P noted that the pt presented to the ED d/t abnormal blood work from her pulmonologist, concern for Wegener's disease, pt reported not feeling well for several weeks and endorsed chills, SOB, and unintentional weight loss of 25 lbs. EMR noted Vasc cath placed 8/22/24 , underwent first session of apheresis today (8/23) per Nephrology, noted pt tolerated it well, pt expected to be here for extended period of time. Visited pt at bedside, pt sitting up in bed, pt family memeber present. Pt reported her appetite is returning, confirmed 75% PO intake, stated that she was naseuous which caused decreased appetite of > 25% but has resolved, confirmed not experieincing any N/V/D, abd pain or difficulties chewing/swallowing. Pt reproted she has not received any of the ordered Boost, dicussed protein calorie benefits of Novasource renal, pt receptive to try, modified order and confirmed added to pt trays, pt family member stated she prepares the pt protein milkshakes at home. Provided pt with a menu to  "encourage pt preferred food choices. Pt stated she normally to eat 2 meals/day and confirmed she has access to adequate and healthy foods. Pt states her UBW is 120-125 lbs, last weighed in [facility] 2024 x 4-5 months ago. NFPE performed, mild malnutrition noted. Reviewed chart: LBM ; Skin: WDL; John score: 22 (no risk); Edema: None. Labs, meds, weight reviewed. Note ondansetron PRN.Weight charted 24 106 lbs, 24 94 lbs (BMI 16.20, protein-energy malnutrition grade II), -12 lb wt loss (11% wt change) x 4+ months, note 24 100 lbs, -6 lb wt loss (6% wt change) x 3 months. RD will continue to follow and monitor pt's nutritional status during admit.    Nutrition Discharge Planning: Renal diet + Novasource renal as warranted    Nutrition Risk Screen    Nutrition Risk Screen: reduced oral intake over the last month    Nutrition Related Social Determinants of Health: SDOH: Adequate food in home environment and None Identified    Nutrition/Diet History    Spiritual, Cultural Beliefs, Denominational Practices, Values that Affect Care: no  Food Allergies: NKFA  Factors Affecting Nutritional Intake: decreased appetite    Anthropometrics    Temp: 98.3 °F (36.8 °C)  Height Method: Stated  Height: 5' 4" (162.6 cm)  Height (inches): 64 in  Weight Method: Bed Scale  Weight: 42.8 kg (94 lb 5.7 oz)  Weight (lb): 94.36 lb  Ideal Body Weight (IBW), Female: 120 lb  % Ideal Body Weight, Female (lb): 78.63 %  % Ideal Body Weight Malnutrition: 70-79%: moderate deficit  BMI (Calculated): 16.2  BMI Grade: 16 - 16.9 protein-energy malnutrition grade II  Weight Loss: unintentional  Usual Body Weight (UBW), k.18 kg  Weight Change Amount: 12 lb  % Usual Body Weight: 89.02  % Weight Change From Usual Weight: -11.17 %     Wt Readings from Last 15 Encounters:   24 42.8 kg (94 lb 5.7 oz)   24 44.1 kg (97 lb 3.6 oz)   24 42.4 kg (93 lb 7.6 oz)   24 42.4 kg (93 lb 7.6 oz)   24 45.7 kg (100 lb 12 oz) " "  04/04/24 48 kg (105 lb 13.1 oz)   04/01/24 48.1 kg (106 lb 0.7 oz)     Lab/Procedures/Meds    Pertinent Labs Reviewed: reviewed  Pertinent Labs Comments: Calcium (L), Albumin (L), Cl (H), Glu (H), BUN (H), Cr (H), Phos (H), eGFR 30  Pertinent Medications Reviewed: reviewed  Pertinent Medications Comments: sulfamethoxazole-triethoprim, sodium bicarbonate, prednisone, pantoprazole, iron sucrose via IV, heaprin, ferrous sulfate, epoetin, vitamin D3, Abx in D5W, calcium gluconate, albumin human 5%    BMP  Lab Results   Component Value Date     08/23/2024     08/23/2024    K 4.2 08/23/2024    K 4.2 08/23/2024     (H) 08/23/2024     (H) 08/23/2024    CO2 20 (L) 08/23/2024    CO2 20 (L) 08/23/2024    BUN 46 (H) 08/23/2024    BUN 46 (H) 08/23/2024    CREATININE 2.0 (H) 08/23/2024    CREATININE 2.0 (H) 08/23/2024    CALCIUM 8.4 (L) 08/23/2024    CALCIUM 8.4 (L) 08/23/2024    ANIONGAP 8 08/23/2024    ANIONGAP 8 08/23/2024    EGFRNORACEVR 30 (A) 08/23/2024    EGFRNORACEVR 30 (A) 08/23/2024     Lab Results   Component Value Date    CALCIUM 8.4 (L) 08/23/2024    CALCIUM 8.4 (L) 08/23/2024    PHOS 4.8 (H) 08/23/2024     Lab Results   Component Value Date    ALBUMIN 2.7 (L) 08/23/2024     Lab Results   Component Value Date    ALT 9 (L) 08/22/2024    AST 9 (L) 08/22/2024    ALKPHOS 56 08/22/2024    BILITOT 0.1 08/22/2024     No results for input(s): "POCTGLUCOSE" in the last 24 hours.    Lab Results   Component Value Date    HGBA1C 5.6 04/01/2024     Lab Results   Component Value Date    WBC 12.75 (H) 08/23/2024    HGB 8.7 (L) 08/23/2024    HCT 28.1 (L) 08/23/2024    MCV 84 08/23/2024     08/23/2024       Scheduled Meds:   cefTRIAXone (Rocephin) IV (PEDS and ADULTS)  1 g Intravenous Q24H    cholecalciferol (vitamin D3)  5,000 Units Oral Daily    epoetin natalie-epbx  50 Units/kg Subcutaneous Q7 Days    ferrous sulfate  1 tablet Oral Daily    iron sucrose  200 mg Intravenous Q7 Days    mupirocin   Nasal " BID    pantoprazole  40 mg Oral Daily    predniSONE  20 mg Oral BID    sodium bicarbonate  650 mg Oral BID    sulfamethoxazole-trimethoprim 400-80mg  1 tablet Oral Daily     Continuous Infusions:  PRN Meds:.  Current Facility-Administered Medications:     0.9%  NaCl infusion (for blood administration), , Intravenous, Q24H PRN    acetaminophen, 650 mg, Oral, Q6H PRN    albuterol-ipratropium, 3 mL, Nebulization, Q6H PRN    diphenhydrAMINE, 25 mg, Intravenous, Q6H PRN    ondansetron, 4 mg, Oral, Q6H PRN      Physical Findings/Assessment         Estimated/Assessed Needs    Weight Used For Calorie Calculations: 42.8 kg (94 lb 5.7 oz)  Energy Calorie Requirements (kcal): 4127-9270 kcals (30-35 kcals/kg ABW (Underweight vs JAIME)  Energy Need Method: Kcal/kg  Protein Requirements: 34-42 g (0.8-1.0 g/kg ABW (JAIME, no dialysis vs Underweight)  Weight Used For Protein Calculations: 42.8 kg (94 lb 5.7 oz)  Fluid Requirements (mL): 500 mL + total output (JAIME)  Estimated Fluid Requirement Method: other (see comments)  RDA Method (mL): 1284  CHO Requirement: 160-187 g (2285-5801 kcals/8)      Nutrition Prescription Ordered    Current Diet Order: Regular diet  Oral Nutrition Supplement: Novasource renal BID    Evaluation of Received Nutrient/Fluid Intake  I/O: (Net since admit):  8/23/24: +2357.6 mL    Energy Calories Required: meeting needs  Protein Required: meeting needs  Fluid Required: exceeds needs  Total Fluid Intake (mL): 1056  Total Fluid Output (mL): None  Tolerance: tolerating  % Intake of Estimated Energy Needs: 75 - 100 %  % Meal Intake: 50 - 75 %    Nutrition Risk    Level of Risk/Frequency of Follow-up: low (F/u x 1 weekly)     Monitor and Evaluation    Food and Nutrient Intake: food and beverage intake, energy intake  Food and Nutrient Adminstration: diet order  Knowledge/Beliefs/Attitudes: food and nutrition knowledge/skill, beliefs and attitudes  Anthropometric Measurements: weight, weight change, body mass  index  Biochemical Data, Medical Tests and Procedures: electrolyte and renal panel, gastrointestinal profile  Nutrition-Focused Physical Findings: overall appearance     Nutrition Follow-Up    RD Follow-up?: Yes  SANIYA Hampton, RDN, LDN

## 2024-08-23 NOTE — NURSING
"Patient needs new IV old one has , pt refused stating that it works fine and she did not want to be "poked again"    Patient educated on risk for infection, etc  "

## 2024-08-23 NOTE — PROGRESS NOTES
[facility] Surg  Hematology/Oncology  Progress Note    Patient Name: Shayy Faust  Admission Date: 8/15/2024  Hospital Length of Stay: 8 days  Code Status: Full Code     Subjective:     HPI:  49-year-old female with a 25 lb weight loss over the last several months.  Patient reports increasing fatigue and weakness patient found to be profoundly iron deficient.  CT chest demonstrated questionable infiltrative lung possible metastatic disease.  ECOG status 2 at bedside with mother    Interval History:  Patient reports feeling better    Oncology Treatment Plan:   [No matching plan found]    Medications:  Continuous Infusions:   0.9% NaCl   Intravenous Continuous 75 mL/hr at 08/22/24 2238 New Bag at 08/22/24 2238     Scheduled Meds:   cefTRIAXone (Rocephin) IV (PEDS and ADULTS)  1 g Intravenous Q24H    cholecalciferol (vitamin D3)  5,000 Units Oral Daily    epoetin natalie-epbx  50 Units/kg Subcutaneous Q7 Days    ferrous sulfate  1 tablet Oral Daily    iron sucrose  200 mg Intravenous Q7 Days    pantoprazole  40 mg Oral Daily    sodium bicarbonate  650 mg Oral BID    sulfamethoxazole-trimethoprim 800-160mg  1 tablet Oral Once per day on Monday Wednesday Friday     PRN Meds:  Current Facility-Administered Medications:     0.9%  NaCl infusion (for blood administration), , Intravenous, Q24H PRN    acetaminophen, 650 mg, Oral, Q6H PRN    albuterol-ipratropium, 3 mL, Nebulization, Q6H PRN    diphenhydrAMINE, 25 mg, Intravenous, Q6H PRN    ondansetron, 4 mg, Oral, Q6H PRN     Review of Systems   Constitutional:  Positive for fatigue. Negative for activity change, appetite change, chills, diaphoresis, fever and unexpected weight change.   HENT:  Negative for congestion, dental problem, drooling, ear discharge, ear pain, facial swelling, hearing loss, mouth sores, nosebleeds, postnasal drip, rhinorrhea, sinus pressure, sneezing, sore throat, tinnitus, trouble swallowing and voice change.    Eyes:  Negative for  photophobia, pain, discharge, redness, itching and visual disturbance.   Respiratory:  Negative for cough, choking, chest tightness, shortness of breath, wheezing and stridor.    Cardiovascular:  Negative for chest pain, palpitations and leg swelling.   Gastrointestinal:  Negative for abdominal distention, abdominal pain, anal bleeding, blood in stool, constipation, diarrhea, nausea, rectal pain and vomiting.   Endocrine: Negative for cold intolerance, heat intolerance, polydipsia, polyphagia and polyuria.   Genitourinary:  Negative for decreased urine volume, difficulty urinating, dyspareunia, dysuria, enuresis, flank pain, frequency, genital sores, hematuria, menstrual problem, pelvic pain, urgency, vaginal bleeding, vaginal discharge and vaginal pain.   Musculoskeletal:  Negative for arthralgias, back pain, gait problem, joint swelling, myalgias, neck pain and neck stiffness.   Skin:  Negative for color change, pallor and rash.   Allergic/Immunologic: Negative for environmental allergies, food allergies and immunocompromised state.   Neurological:  Positive for weakness. Negative for dizziness, tremors, seizures, syncope, facial asymmetry, speech difficulty, light-headedness, numbness and headaches.   Hematological:  Negative for adenopathy. Does not bruise/bleed easily.   Psychiatric/Behavioral:  Negative for agitation, behavioral problems, confusion, decreased concentration, dysphoric mood, hallucinations, self-injury, sleep disturbance and suicidal ideas. The patient is not nervous/anxious and is not hyperactive.      Objective:     Vital Signs (Most Recent):  Temp: 98.2 °F (36.8 °C) (08/23/24 0455)  Pulse: 62 (08/23/24 0455)  Resp: 18 (08/23/24 0455)  BP: 139/70 (08/23/24 0455)  SpO2: 96 % (08/23/24 0455) Vital Signs (24h Range):  Temp:  [97.7 °F (36.5 °C)-98.2 °F (36.8 °C)] 98.2 °F (36.8 °C)  Pulse:  [44-62] 62  Resp:  [18] 18  SpO2:  [95 %-98 %] 96 %  BP: (130-151)/(70-75) 139/70     Weight: 42.8 kg (94 lb  5.7 oz)  Body mass index is 16.2 kg/m².  Body surface area is 1.39 meters squared.    No intake or output data in the 24 hours ending 08/23/24 0606     Physical Exam  Vitals reviewed.   Constitutional:       General: She is not in acute distress.     Appearance: Normal appearance. She is well-developed. She is ill-appearing. She is not diaphoretic.   HENT:      Head: Normocephalic and atraumatic.      Right Ear: External ear normal.      Left Ear: External ear normal.      Nose: Nose normal.      Right Sinus: No maxillary sinus tenderness or frontal sinus tenderness.      Left Sinus: No maxillary sinus tenderness or frontal sinus tenderness.      Mouth/Throat:      Pharynx: No oropharyngeal exudate.   Eyes:      General: Lids are normal. No scleral icterus.        Right eye: No discharge.         Left eye: No discharge.      Conjunctiva/sclera: Conjunctivae normal.      Right eye: Right conjunctiva is not injected. No hemorrhage.     Left eye: Left conjunctiva is not injected. No hemorrhage.     Pupils: Pupils are equal, round, and reactive to light.   Neck:      Thyroid: No thyromegaly.      Vascular: No JVD.      Trachea: No tracheal deviation.   Cardiovascular:      Rate and Rhythm: Normal rate.      Heart sounds: Normal heart sounds.   Pulmonary:      Effort: Pulmonary effort is normal. No respiratory distress.      Breath sounds: No stridor.   Chest:      Chest wall: No tenderness.   Abdominal:      General: Bowel sounds are normal. There is no distension.      Palpations: Abdomen is soft. There is no hepatomegaly, splenomegaly or mass.      Tenderness: There is no abdominal tenderness. There is no rebound.   Musculoskeletal:         General: No tenderness. Normal range of motion.      Cervical back: Normal range of motion and neck supple.   Lymphadenopathy:      Cervical: No cervical adenopathy.      Upper Body:      Right upper body: No supraclavicular adenopathy.      Left upper body: No supraclavicular  "adenopathy.   Skin:     General: Skin is dry.      Findings: No erythema or rash.   Neurological:      Mental Status: She is alert and oriented to person, place, and time.      Cranial Nerves: No cranial nerve deficit.      Coordination: Coordination normal.   Psychiatric:         Behavior: Behavior normal.         Thought Content: Thought content normal.         Judgment: Judgment normal.          Significant Labs:   BMP:   Recent Labs   Lab 08/21/24  1201 08/21/24  1412 08/22/24  0543 08/23/24  0436   *  --  149*  149* 146*  146*     --  136  136 140  140   K 4.4  --  4.4  4.4 4.2  4.2     --  110  110 112*  112*   CO2 17*  --  17*  17* 20*  20*   BUN 38*  --  44*  44* 46*  46*   CREATININE 2.1*  --  2.0*  2.0* 2.0*  2.0*   CALCIUM 8.7  --  8.3*  8.3* 8.4*  8.4*   MG 1.7 1.4* 1.7 1.6   , CBC:   Recent Labs   Lab 08/21/24  1201 08/22/24  0543 08/23/24  0436   WBC 10.90 15.72* 12.75*   HGB 9.7* 8.5* 8.7*   HCT 31.9* 27.2* 28.1*   * 551* 450   , CMP:   Recent Labs   Lab 08/21/24  0638 08/21/24  1201 08/22/24  0543 08/23/24  0436     138 137 136  136 140  140   K 4.8  4.8 4.4 4.4  4.4 4.2  4.2     110 108 110  110 112*  112*   CO2 17*  17* 17* 17*  17* 20*  20*   *  155* 177* 149*  149* 146*  146*   BUN 36*  36* 38* 44*  44* 46*  46*   CREATININE 2.1*  2.1* 2.1* 2.0*  2.0* 2.0*  2.0*   CALCIUM 8.7  8.7 8.7 8.3*  8.3* 8.4*  8.4*   PROT  --   --  5.6*  --    ALBUMIN 1.9*  --  1.8* 2.7*   BILITOT  --   --  0.1  --    ALKPHOS  --   --  56  --    AST  --   --  9*  --    ALT  --   --  9*  --    ANIONGAP 11  11 12 9  9 8  8   , Coagulation: No results for input(s): "PT", "INR", "APTT" in the last 48 hours., Haptoglobin: No results for input(s): "HAPTOGLOBIN" in the last 48 hours., Immunology: No results for input(s): "SPEP", "ABDLERAHMAN", "AZAM", "FREELAMBDALI" in the last 48 hours., LDH: No results for input(s): "LDHCSF", "BFSOURCE" in the last " "48 hours., LFTs:   Recent Labs   Lab 08/21/24  0638 08/22/24  0543 08/23/24  0436   ALT  --  9*  --    AST  --  9*  --    ALKPHOS  --  56  --    BILITOT  --  0.1  --    PROT  --  5.6*  --    ALBUMIN 1.9* 1.8* 2.7*   , Reticulocytes: No results for input(s): "RETIC" in the last 48 hours., Tumor Markers: No results for input(s): "PSA", "CEA", "", "AFPTM", "[id number]", "" in the last 48 hours.    Invalid input(s): "ALGTM", Uric Acid No results for input(s): "URICACID" in the last 48 hours., and Urine Studies: No results for input(s): "COLORU", "APPEARANCEUA", "PHUR", "SPECGRAV", "PROTEINUA", "GLUCUA", "KETONESU", "BILIRUBINUA", "OCCULTUA", "NITRITE", "UROBILINOGEN", "LEUKOCYTESUR", "RBCUA", "WBCUA", "BACTERIA", "SQUAMEPITHEL", "HYALINECASTS" in the last 48 hours.    Invalid input(s): "WRIGHTSUR"    Diagnostic Results:  I have reviewed all pertinent imaging results/findings within the past 24 hours.  Assessment/Plan:     Anemia, chronic renal failure, stage 4 (severe)  Patient with chronic renal failure repeat serum iron intravenous iron except for arrange will give 1 time dose of 50 micrograms/kilogram erythropoietin Q 7 days.  Can be followed in the outpatient setting to try to improve hemoglobin sense of well-being is evaluation for renal insufficiency proceed    Thrombocytosis  Thrombocytosis probably secondary to severe iron deficiency anemia reactive in nature    PNA (pneumonia)  Concern over metastatic disease.  Would strongly recommend that patient have imaging done of abdomen least ultrasound of liver.  Abdomen would also recommend that patient undergo EGD colonoscopies ASAP    Iron deficiency anemia  Documented severe iron deficiency anemia exacerbated by renal insufficiency.  Recommend 1 dose of intravenous iron ASAP.  With documented iron deficiency 4 month duration weight loss.  High suspicion for metastatic disease would recommend that patient have ultrasound of abdomen.  Would recommend upper lower " endoscopies ASAP to make sure see if she has a GI malignancy.  Discussed implications of answered questions with she and her mother  08/19/2024 spoke with GI Medicine this morning I spoke with mother in patient's bedside agree with upper lower endoscopies IV iron has been given should be given every 7 days to improve counts  08/20/2024 await results of EGD colonoscopies.  Discussed with GI Medicine yesterday rationale for doing so  08/21/2024.  Results of upper lower endoscopies demonstrates no significant pathology agree with IV iron in outpatient setting orders have been written.  For arrangements to be made as soon as discharge available  08/22/2024.  Will check iron status today.  If iron repleted then will begin erythropoietin on a weekly basis.  Next dose of IV iron scheduled on 08/24.  If not repleted        Thank you for your consult. I will follow-up with patient. Please contact us if you have any additional questions.     Marcin Mercado MD  Hematology/Oncology  [facility]

## 2024-08-23 NOTE — PLAN OF CARE
Problem: Adult Inpatient Plan of Care  Goal: Plan of Care Review  Outcome: Progressing  Goal: Patient-Specific Goal (Individualized)  Outcome: Progressing  Goal: Absence of Hospital-Acquired Illness or Injury  Outcome: Progressing  Goal: Optimal Comfort and Wellbeing  Outcome: Progressing  Goal: Readiness for Transition of Care  Outcome: Progressing     Problem: Acute Kidney Injury/Impairment  Goal: Fluid and Electrolyte Balance  Outcome: Progressing  Goal: Improved Oral Intake  Outcome: Progressing  Goal: Effective Renal Function  Outcome: Progressing     Problem: Pain Acute  Goal: Optimal Pain Control and Function  Outcome: Progressing     Problem: Fall Injury Risk  Goal: Absence of Fall and Fall-Related Injury  Outcome: Progressing     Problem: Infection  Goal: Absence of Infection Signs and Symptoms  Outcome: Progressing     Problem: Pneumonia  Goal: Fluid Balance  Outcome: Progressing  Goal: Resolution of Infection Signs and Symptoms  Outcome: Progressing  Goal: Effective Oxygenation and Ventilation  Outcome: Progressing   Discussed poc with pt, pt verbalized understanding    Purposeful rounding every 2hours    VS wnl  Fall precautions in place, remains injury free  Pt denies c/o pain and nausea  Abx given as prescribed  Bed locked at lowest position  Call light within reach    Chart check complete  Will cont with POC

## 2024-08-23 NOTE — PROCEDURES
[facility] - Med Surg  Transfusion Medicine  Procedure Note    SUMMARY   Therapeutic Plasma Exchange (Apheresis)    Date/Time: 8/23/2024 1:00 PM    Performed by: Shanda Christianson MD  Authorized by: Shanda Christianson MD        Date of Procedure: 8/23/2024     Procedure: Plasma Exchange    Provider: Shanda Christianson MD     Assisting Provider: None    Pre-Procedure Diagnosis: JAIME (acute kidney injury); Anti-GBM    Post-Procedure Diagnosis: JAIME (acute kidney injury); Anti-GBM    Follow-up Assessment:   Shayy Faust is a 49 year-old female with history of asthma with 4 week history of respiratory symptoms not improving with steroids and beta agonist inhaler. She also has abnormal blood work. CT chest showed numerous bilateral lung opacities; consider atypical infection/inflammation as well as metastatic neoplasm. Tree in bud micronodularity in lower lobes consistent with chronic small airway disease. Pulmonary is concerned for Wegener's disease due to P-ANCA antibody. She has worsening kidney function, with acute kidney injury (JAIME) of uncertain etiology with active sediment on urinalysis in both blood and protein. 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     Schedule change requested by Dr. Braun to near daily procedures x 6-7 days.  (Possible skip procedure on 8/25)    Today's procedure (#2 of 5) was well tolerated and without complications. Next treatment scheduled for 8/24/2024.      Pertinent Laboratory Data:   Complete Blood Count:   Lab Results   Component Value Date    HGB 8.7 (L) 08/23/2024    HCT 28.1 (L) 08/23/2024     08/23/2024    WBC 12.75 (H) 08/23/2024     Comprehensive Metabolic Panel:   Lab Results    Component Value Date     08/23/2024     08/23/2024    K 4.2 08/23/2024    K 4.2 08/23/2024     (H) 08/23/2024     (H) 08/23/2024    CO2 20 (L) 08/23/2024    CO2 20 (L) 08/23/2024     (H) 08/23/2024     (H) 08/23/2024    BUN 46 (H) 08/23/2024    BUN 46 (H) 08/23/2024    CREATININE 2.0 (H) 08/23/2024    CREATININE 2.0 (H) 08/23/2024    CALCIUM 8.4 (L) 08/23/2024    CALCIUM 8.4 (L) 08/23/2024    PROT 5.6 (L) 08/22/2024    ALBUMIN 2.7 (L) 08/23/2024    BILITOT 0.1 08/22/2024    ALKPHOS 56 08/22/2024    AST 9 (L) 08/22/2024    ALT 9 (L) 08/22/2024    ANIONGAP 8 08/23/2024    ANIONGAP 8 08/23/2024     Fibrinogen   Date Value Ref Range Status   08/23/2024 152 (L) 182 - 400 mg/dL Final     Pertinent Medications: None contraindicated in plasma exchange    Review of patient's allergies indicates:  No Known Allergies    Anesthesia: None     Technical Procedures Used: Plasma Exchange: Volume exchanged - 2.0 Liters; Replacement fluid - Albumin/Fresh Frozen Plasma; Number of procedures- 2; Date of next procedure - 8/24/2024.      Description of the Findings of the Procedure:     Please see Apheresis Nurse flowsheet for details.    The patient was evaluated and all clinical and laboratory data relevant to the treatment was reviewed, and a decision was made to proceed with the Apheresis procedure.    I was available to the clinical staff throughout the procedure.    Significant Surgical Tasks Conducted by the Assistant(s): Not applicable    Complications: None    Estimated Blood Loss (EBL): None    Implants: None     Specimens: None

## 2024-08-23 NOTE — NURSING
Pt's IV  on today. Previous nurse, Rosemary, tried starting a new one and was unsucessful. Pt refused to get stuck again. Pt stated she does not want to be stuck again since her old IV is working fine.

## 2024-08-23 NOTE — NURSING
"Attempted to start new IV and was unsuccessful. Pt decided to keep old IV because she doesn't want to get stuck repeatedly due to "small veins".  "

## 2024-08-23 NOTE — PROGRESS NOTES
[facility] Medicine  Progress Note    Patient Name: Shayy Faust  MRN: [id number]  Patient Class: IP- Inpatient   Admission Date: 8/15/2024  Length of Stay: 8 days  Attending Physician: Cm Lorenzo MD  Primary Care Provider: Karine, Primary Doctor        Subjective:     Principal Problem:JAIME (acute kidney injury)        HPI:  Patient is a 49 y.o.  female with a PMHx of asthma who presents to the Emergency Department due to abnormal blood work. Patient currently being seen by Dr. Jiménez for pulmonology. She reports not feeling well for several weeks. Endorses chills and sob. Denies any fever or congestion. Reports having good urinary output. Denies any home meds at this time.     In the ED, wbc: 15.1k, h/h 7.8/25.8, plt: 826, bun/cr: 43/2.7.     Overview/Hospital Course:  49 year-old female admitted with c/o abnormal blood work. She reports she hasn't been feeling well for a few weeks now. She is being seen and worked up by Dr. Jiménez with pulmonology for for SOB and chills. She started with an URI that progressed to SOB. She had Spirometry in pulm clinic that showed obstruction with very severe ventilatory impairment unimproved with post bronchodilator. She reports unintentional weight loss of 25 lbs. Pulm concerned for Wegener's disease.  CT chest showed numerous bilateral lung opacities; consider atypical infection/inflammation as well as metastatic neoplasm; recommend further evaluation with PET-CT scan. Tree in bud micronodularity in lower lobes consistent with chronic small airway disease.  CT a/p with punctate nonobstructing renal calculi, no hydronephrosis. Mild to moderate amount of stool in colon, with diverticulitis.     Worsening kidney function. Nephrology consulted. US Kidney showed increased echogenicity to kidneys concerning for medical renal disease. Recommends kidney biopsy, pending this next week, will try for Monday. Will make NPO after midnight.    Hem/onc  "consulted, recommends further metastatic workup with upper/lower endoscopy. Consult GI for evaluation.   Cont rocephin and doxycycline.     Hgb 6.7, unknown bleeding source. Transfuse 1 unit PRBC (8/18).     08/19/2024  H&H improved after 1UPRBC. Continue to remain stable since that time. Endoscopic procedure planned this hospitalization. Dr. 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 Consider high dose steroids after renal biopsy. Renal biopsy priority moved up to this hospitalization. Reached out to IR, they will facilitate as soon as schedule allows.     08/20/2024  EGD/Colon complete. Minor gastritis, biopsies taken. Small bowel normal, biopsied for MARIA ESTHER. Otherwise normal. Prep in right colon poor, left colon fair. No masses appreciated. Ileum intubated and normal. GI Recommend outpatient video capsule to complete work up for MARIA ESTHER. Discussed with IR, plans for renal biopsy sometime today. Will initiate high dose steroids thereafter per Dr. Jiménez' request.    08/21/2024  Discussed case with specialists, the decision for plasma apheresis/exchange to done at this location through [facility] was made. Dr. Galeas with nephrology is communicating with [facility] team to assure this gets done. IR consulted for vascular access.     08/22/2024  Unable to undergo vasc cath yesterday due to urgent case per IR. Vasc cath placed this morning. Plans to undergo first session of apheresis this evening. Discussed case with Nephrology, they are spearheading this treatment. Expect patient to be here for extended period of time.     08/23/2024  Vasc cath place, underwent first session of apheresis. Tolerated it well.       Objective:   BP (!) 144/77 (BP Location: Left arm, Patient Position: Lying)   Pulse (!) 45   Temp 98.4 °F (36.9 °C) (Oral)   Resp 19   Ht 5' 4" (1.626 m)   Wt 42.8 kg (94 lb 5.7 oz)   LMP  (LMP Unknown)   SpO2 98%   Breastfeeding No   BMI 16.20 kg/m² " "    Intake/Output Summary (Last 24 hours) at 8/23/2024 1444  Last data filed at 8/22/2024 1530  Gross per 24 hour   Intake 1056 ml   Output --   Net 1056 ml       PHYSICAL EXAM  Vitals reviewed  Constitutional:       Appearance: Normal appearance.   HENT:      Head: Normocephalic.   Eyes:      Pupils: Pupils are equal, round, and reactive to light.   Cardiovascular:      Rate and Rhythm: Normal rate and regular rhythm.      Heart sounds: No murmur heard.  Pulmonary:      Effort: Pulmonary effort is normal.      Breath sounds: Normal breath sounds. No wheezing or rales.   Abdominal:      General: Bowel sounds are normal.      Palpations: Abdomen is soft.   Musculoskeletal:         General: Normal range of motion.   Skin:     General: Skin is warm and dry.   Neurological:      General: No focal deficit present.      Mental Status: She is alert and oriented to person, place, and time.   Psychiatric:         Mood and Affect: Mood normal.         Behavior: Behavior normal.     LABS  All labs from the past 24 hours were reviewed.     BMP:   Recent Labs   Lab 08/23/24  0436   *  146*     140   K 4.2  4.2   *  112*   CO2 20*  20*   BUN 46*  46*   CREATININE 2.0*  2.0*   CALCIUM 8.4*  8.4*   MG 1.6     CBC:   Recent Labs   Lab 08/22/24  0543 08/23/24  0436   WBC 15.72* 12.75*   HGB 8.5* 8.7*   HCT 27.2* 28.1*   * 450     CMP:   Recent Labs   Lab 08/22/24  0543 08/23/24  0436     136 140  140   K 4.4  4.4 4.2  4.2     110 112*  112*   CO2 17*  17* 20*  20*   *  149* 146*  146*   BUN 44*  44* 46*  46*   CREATININE 2.0*  2.0* 2.0*  2.0*   CALCIUM 8.3*  8.3* 8.4*  8.4*   PROT 5.6*  --    ALBUMIN 1.8* 2.7*   BILITOT 0.1  --    ALKPHOS 56  --    AST 9*  --    ALT 9*  --    ANIONGAP 9  9 8  8     Cardiac Markers: No results for input(s): "CKMB", "MYOGLOBIN", "BNP", "TROPISTAT" in the last 48 hours.  Coagulation:   No results for input(s): "PT", "INR", "APTT" in " "the last 48 hours.    Lactic Acid: No results for input(s): "LACTATE" in the last 48 hours.  Magnesium:   Recent Labs   Lab 08/22/24  0543 08/23/24  0436   MG 1.7 1.6     Troponin: No results for input(s): "TROPONINI", "TROPONINIHS" in the last 48 hours.  TSH:   Recent Labs   Lab 04/01/24  1505   TSH 2.852     Urine Studies:   No results for input(s): "COLORU", "APPEARANCEUA", "PHUR", "SPECGRAV", "PROTEINUA", "GLUCUA", "KETONESU", "BILIRUBINUA", "OCCULTUA", "NITRITE", "UROBILINOGEN", "LEUKOCYTESUR", "RBCUA", "WBCUA", "BACTERIA", "SQUAMEPITHEL", "HYALINECASTS" in the last 48 hours.    Invalid input(s): "WRIGHTSUR"      IMAGING  All imaging from the past 24 hours were reviewed.     Imaging Results              US Kidney (Final result)  Result time 08/15/24 15:43:06      Final result by Storm Boyce MD (08/15/24 15:43:06)                   Impression:      1.  Increased echogenicity to the kidneys concerning for medical renal disease.  Negative for hydronephrosis.    2.  Findings most likely representing small bilateral nonobstructing renal stones measuring up to 3 mm.      Electronically signed by: Storm Boyce MD  Date:    08/15/2024  Time:    15:43               Narrative:    EXAMINATION:  US KIDNEY    CLINICAL HISTORY:  vinh;    TECHNIQUE:  Ultrasound of the kidneys and urinary bladder was performed including color flow and Doppler evaluation of the kidneys.    COMPARISON:  None.    FINDINGS:  Right kidney: The right kidney measures 12.0 cm. No cortical thinning. There is loss of corticomedullary distinction. Resistive index measures 0.65.  No mass. Possible tiny nonobstructing inferior pole stones noted.  No hydronephrosis.    Left kidney: The left kidney measures 12.2 cm. No cortical thinning. There is loss of corticomedullary distinction. Resistive index measures 0.63.  No mass. Possible 3 mm midpole stone.  No hydronephrosis.    The bladder is partially distended at the time of scanning and has an " unremarkable appearance.                                        Assessment/Plan:      * JAIME (acute kidney injury)  Outpt pulm concerned for wegeners disease  Nephrology following, appreciate  Renal U/S negative for obstruction  Creatinine 2.1, trending down  Monitor labs daily    08/20/2024  Renal function seemingly back at baseline  Discussed with Pulmnology, suspects an element of vasculitis (possibly Wegener's)  Renal biopsy to be perfomed today   Will initiate high dose steroid therapy after biopsy    08/21/2024  Day 2 of high dose steroids  Plasma apheresis ordered  Vasc cath to be placed today by IR  Nephrology facilitating the above, appreciate recs    08/22/2024  Day 3 of high dose IV steroids- transition to PO tomorrow  Vasc cath placed today  Plasma apheresis planned for this evening  Discussed with nephrology, anticipate duration of therapy to be done while     08/23/2024   -steroids transitioned to PO prednisone 20 mg BID  -continue plasma apheresis per nephrology recs    Thrombocytosis  Likely reactive due to inflammation     PNA (pneumonia)  Cont doxycyline and Rocephin IV   Duonebs as needed    Iron deficiency anemia  S/p Venofer given, cont ferrous sulfate  Hema/onc following, appreciate, recommended endoscopy  Consult GI in the morning for endoscopy  Hgb 6.7  Transfuse 1 unit PRBC (8/18)  Repeat labs in the morning    08/20/2024  --hgb promptly corrected to 9.0 after 1uPRBC  --no obvious signs of bleeding on exam  --serial CBC to trend h&h  --transfuse for hgb < 7    08/21/2024  --s/p scope, no overt findings for MARIA ESTHER per GI report. Biopsies taken, recommend capsule study in the out patient setting. CBC ordered for today and tomorrow to trend.     08/23/2024  Next iron dose scheduled for 8/24          VTE Risk Mitigation (From admission, onward)      None            Discharge Planning   POOJA: 8/17/2024     Code Status: Full Code   Is the patient medically ready for discharge?:     Reason for patient  still in hospital (select all that apply): Patient trending condition, Laboratory test, Treatment, and Consult recommendations  Discharge Plan A: Home with family   Discharge Delays: None known at this time              Cm Lorenzo MD  Department of Hospital Medicine   [facility] Surg

## 2024-08-24 LAB
ALBUMIN SERPL BCP-MCNC: 3.5 G/DL (ref 3.5–5.2)
ANION GAP SERPL CALC-SCNC: 9 MMOL/L (ref 8–16)
ANION GAP SERPL CALC-SCNC: 9 MMOL/L (ref 8–16)
BASOPHILS # BLD AUTO: 0.02 K/UL (ref 0–0.2)
BASOPHILS NFR BLD: 0.1 % (ref 0–1.9)
BLD PROD TYP BPU: NORMAL
BLOOD UNIT EXPIRATION DATE: NORMAL
BLOOD UNIT TYPE CODE: 5100
BLOOD UNIT TYPE CODE: 5100
BLOOD UNIT TYPE CODE: 9500
BLOOD UNIT TYPE CODE: 9500
BLOOD UNIT TYPE: NORMAL
BUN SERPL-MCNC: 53 MG/DL (ref 6–20)
BUN SERPL-MCNC: 53 MG/DL (ref 6–20)
CALCIUM SERPL-MCNC: 8.5 MG/DL (ref 8.7–10.5)
CALCIUM SERPL-MCNC: 8.5 MG/DL (ref 8.7–10.5)
CHLORIDE SERPL-SCNC: 112 MMOL/L (ref 95–110)
CHLORIDE SERPL-SCNC: 112 MMOL/L (ref 95–110)
CO2 SERPL-SCNC: 17 MMOL/L (ref 23–29)
CO2 SERPL-SCNC: 17 MMOL/L (ref 23–29)
CODING SYSTEM: NORMAL
CREAT SERPL-MCNC: 1.9 MG/DL (ref 0.5–1.4)
CREAT SERPL-MCNC: 1.9 MG/DL (ref 0.5–1.4)
CROSSMATCH INTERPRETATION: NORMAL
DIFFERENTIAL METHOD BLD: ABNORMAL
DISPENSE STATUS: NORMAL
EOSINOPHIL # BLD AUTO: 0 K/UL (ref 0–0.5)
EOSINOPHIL NFR BLD: 0 % (ref 0–8)
ERYTHROCYTE [DISTWIDTH] IN BLOOD BY AUTOMATED COUNT: 15.5 % (ref 11.5–14.5)
EST. GFR  (NO RACE VARIABLE): 32 ML/MIN/1.73 M^2
EST. GFR  (NO RACE VARIABLE): 32 ML/MIN/1.73 M^2
FIBRINOGEN PPP-MCNC: 73 MG/DL (ref 182–400)
GLUCOSE SERPL-MCNC: 142 MG/DL (ref 70–110)
GLUCOSE SERPL-MCNC: 142 MG/DL (ref 70–110)
HCT VFR BLD AUTO: 31 % (ref 37–48.5)
HGB BLD-MCNC: 9.6 G/DL (ref 12–16)
IMM GRANULOCYTES # BLD AUTO: 0.32 K/UL (ref 0–0.04)
IMM GRANULOCYTES NFR BLD AUTO: 2.3 % (ref 0–0.5)
LYMPHOCYTES # BLD AUTO: 2.3 K/UL (ref 1–4.8)
LYMPHOCYTES NFR BLD: 16.8 % (ref 18–48)
MAGNESIUM SERPL-MCNC: 2.2 MG/DL (ref 1.6–2.6)
MCH RBC QN AUTO: 26.2 PG (ref 27–31)
MCHC RBC AUTO-ENTMCNC: 31 G/DL (ref 32–36)
MCV RBC AUTO: 85 FL (ref 82–98)
MONOCYTES # BLD AUTO: 0.5 K/UL (ref 0.3–1)
MONOCYTES NFR BLD: 3.4 % (ref 4–15)
NEUTROPHILS # BLD AUTO: 10.5 K/UL (ref 1.8–7.7)
NEUTROPHILS NFR BLD: 77.4 % (ref 38–73)
NRBC BLD-RTO: 0 /100 WBC
NUM UNITS TRANS FFP: NORMAL
PHOSPHATE SERPL-MCNC: 3.2 MG/DL (ref 2.7–4.5)
PLATELET # BLD AUTO: 433 K/UL (ref 150–450)
PMV BLD AUTO: 9.8 FL (ref 9.2–12.9)
POTASSIUM SERPL-SCNC: 4.2 MMOL/L (ref 3.5–5.1)
POTASSIUM SERPL-SCNC: 4.2 MMOL/L (ref 3.5–5.1)
RBC # BLD AUTO: 3.66 M/UL (ref 4–5.4)
SODIUM SERPL-SCNC: 138 MMOL/L (ref 136–145)
SODIUM SERPL-SCNC: 138 MMOL/L (ref 136–145)
WBC # BLD AUTO: 13.64 K/UL (ref 3.9–12.7)

## 2024-08-24 PROCEDURE — 80069 RENAL FUNCTION PANEL: CPT | Performed by: INTERNAL MEDICINE

## 2024-08-24 PROCEDURE — 25000003 PHARM REV CODE 250: Mod: JZ,JG | Performed by: PATHOLOGY

## 2024-08-24 PROCEDURE — P9045 ALBUMIN (HUMAN), 5%, 250 ML: HCPCS | Mod: JZ,JG | Performed by: PATHOLOGY

## 2024-08-24 PROCEDURE — 25000003 PHARM REV CODE 250: Performed by: INTERNAL MEDICINE

## 2024-08-24 PROCEDURE — 85025 COMPLETE CBC W/AUTO DIFF WBC: CPT | Performed by: INTERNAL MEDICINE

## 2024-08-24 PROCEDURE — 99232 SBSQ HOSP IP/OBS MODERATE 35: CPT | Mod: ,,, | Performed by: INTERNAL MEDICINE

## 2024-08-24 PROCEDURE — 63600175 PHARM REV CODE 636 W HCPCS: Performed by: INTERNAL MEDICINE

## 2024-08-24 PROCEDURE — 85384 FIBRINOGEN ACTIVITY: CPT | Performed by: INTERNAL MEDICINE

## 2024-08-24 PROCEDURE — P9017 PLASMA 1 DONOR FRZ W/IN 8 HR: HCPCS | Performed by: PATHOLOGY

## 2024-08-24 PROCEDURE — 36415 COLL VENOUS BLD VENIPUNCTURE: CPT | Performed by: INTERNAL MEDICINE

## 2024-08-24 PROCEDURE — 25000003 PHARM REV CODE 250: Performed by: STUDENT IN AN ORGANIZED HEALTH CARE EDUCATION/TRAINING PROGRAM

## 2024-08-24 PROCEDURE — 11000001 HC ACUTE MED/SURG PRIVATE ROOM

## 2024-08-24 PROCEDURE — 83735 ASSAY OF MAGNESIUM: CPT | Performed by: INTERNAL MEDICINE

## 2024-08-24 PROCEDURE — 63600175 PHARM REV CODE 636 W HCPCS: Performed by: PATHOLOGY

## 2024-08-24 RX ORDER — HYDROCODONE BITARTRATE AND ACETAMINOPHEN 500; 5 MG/1; MG/1
TABLET ORAL
Status: DISCONTINUED | OUTPATIENT
Start: 2024-08-24 | End: 2024-08-26 | Stop reason: HOSPADM

## 2024-08-24 RX ORDER — SULFAMETHOXAZOLE AND TRIMETHOPRIM 800; 160 MG/1; MG/1
1 TABLET ORAL
Status: DISCONTINUED | OUTPATIENT
Start: 2024-08-26 | End: 2024-08-26 | Stop reason: HOSPADM

## 2024-08-24 RX ADMIN — CALCIUM GLUCONATE 2 G: 20 INJECTION, SOLUTION INTRAVENOUS at 02:08

## 2024-08-24 RX ADMIN — SULFAMETHOXAZOLE AND TRIMETHOPRIM 1 TABLET: 400; 80 TABLET ORAL at 09:08

## 2024-08-24 RX ADMIN — MUPIROCIN: 20 OINTMENT TOPICAL at 09:08

## 2024-08-24 RX ADMIN — ALBUMIN (HUMAN) 100 G: 12.5 SOLUTION INTRAVENOUS at 02:08

## 2024-08-24 RX ADMIN — FERROUS SULFATE TAB 325 MG (65 MG ELEMENTAL FE) 1 EACH: 325 (65 FE) TAB at 09:08

## 2024-08-24 RX ADMIN — HEPARIN SODIUM 3000 UNITS: 1000 INJECTION INTRAVENOUS; SUBCUTANEOUS at 02:08

## 2024-08-24 RX ADMIN — SODIUM BICARBONATE 650 MG: 650 TABLET ORAL at 09:08

## 2024-08-24 RX ADMIN — PREDNISONE 20 MG: 20 TABLET ORAL at 09:08

## 2024-08-24 RX ADMIN — IRON SUCROSE 200 MG: 20 INJECTION, SOLUTION INTRAVENOUS at 09:08

## 2024-08-24 RX ADMIN — CHOLECALCIFEROL TAB 125 MCG (5000 UNIT) 5000 UNITS: 125 TAB at 09:08

## 2024-08-24 RX ADMIN — PANTOPRAZOLE SODIUM 40 MG: 40 TABLET, DELAYED RELEASE ORAL at 09:08

## 2024-08-24 RX ADMIN — CEFTRIAXONE 1 G: 1 INJECTION, POWDER, FOR SOLUTION INTRAMUSCULAR; INTRAVENOUS at 04:08

## 2024-08-24 NOTE — NURSING
Three bags of plasma verified with JEANETTE Kaufman and charge nurse Peyton Moon, RN     All meds for apheresis nurse scanned in by me and verified by charge nurse Peyotn Moon

## 2024-08-24 NOTE — PLAN OF CARE
Problem: Acute Kidney Injury/Impairment  Goal: Fluid and Electrolyte Balance  Outcome: Progressing  Goal: Improved Oral Intake  Outcome: Progressing  Goal: Effective Renal Function  Outcome: Progressing   Discussed poc with pt, pt verbalized understanding    Purposeful rounding every 2hours    VS wnl  Fall remains injury free  Pt denies c/o_pain and nausea at this time   Abx given as prescribed  Bed locked at lowest position  Call light within reach    Chart check complete  Will cont with POC     Right arm; Left arm;

## 2024-08-24 NOTE — PROGRESS NOTES
[facility] Medicine  Progress Note    Patient Name: Shayy Faust  MRN: [id number]  Patient Class: IP- Inpatient   Admission Date: 8/15/2024  Length of Stay: 9 days  Attending Physician: Cm Lorenzo MD  Primary Care Provider: Karine, Primary Doctor        Subjective:     Principal Problem:JAIME (acute kidney injury)        HPI:  Patient is a 49 y.o.  female with a PMHx of asthma who presents to the Emergency Department due to abnormal blood work. Patient currently being seen by Dr. Jiménez for pulmonology. She reports not feeling well for several weeks. Endorses chills and sob. Denies any fever or congestion. Reports having good urinary output. Denies any home meds at this time.     In the ED, wbc: 15.1k, h/h 7.8/25.8, plt: 826, bun/cr: 43/2.7.     Overview/Hospital Course:  49 year-old female admitted with c/o abnormal blood work. She reports she hasn't been feeling well for a few weeks now. She is being seen and worked up by Dr. Jiménez with pulmonology for for SOB and chills. She started with an URI that progressed to SOB. She had Spirometry in pulm clinic that showed obstruction with very severe ventilatory impairment unimproved with post bronchodilator. She reports unintentional weight loss of 25 lbs. Pulm concerned for Wegener's disease.  CT chest showed numerous bilateral lung opacities; consider atypical infection/inflammation as well as metastatic neoplasm; recommend further evaluation with PET-CT scan. Tree in bud micronodularity in lower lobes consistent with chronic small airway disease.  CT a/p with punctate nonobstructing renal calculi, no hydronephrosis. Mild to moderate amount of stool in colon, with diverticulitis.     Worsening kidney function. Nephrology consulted. US Kidney showed increased echogenicity to kidneys concerning for medical renal disease. Recommends kidney biopsy, pending this next week, will try for Monday. Will make NPO after midnight.    Hem/onc  "consulted, recommends further metastatic workup with upper/lower endoscopy. Consult GI for evaluation.   Cont rocephin and doxycycline.     Hgb 6.7, unknown bleeding source. Transfuse 1 unit PRBC (8/18).     08/19/2024  H&H improved after 1UPRBC. Continue to remain stable since that time. Endoscopic procedure planned this hospitalization. Dr. 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 Consider high dose steroids after renal biopsy. Renal biopsy priority moved up to this hospitalization. Reached out to IR, they will facilitate as soon as schedule allows.     08/20/2024  EGD/Colon complete. Minor gastritis, biopsies taken. Small bowel normal, biopsied for MARIA ESTHER. Otherwise normal. Prep in right colon poor, left colon fair. No masses appreciated. Ileum intubated and normal. GI Recommend outpatient video capsule to complete work up for MARIA ESTHER. Discussed with IR, plans for renal biopsy sometime today. Will initiate high dose steroids thereafter per Dr. Jiménez' request.    08/21/2024  Discussed case with specialists, the decision for plasma apheresis/exchange to done at this location through [facility] was made. Dr. Galeas with nephrology is communicating with [facility] team to assure this gets done. IR consulted for vascular access.     08/22/2024  Unable to undergo vasc cath yesterday due to urgent case per IR. Vasc cath placed this morning. Plans to undergo first session of apheresis this evening. Discussed case with Nephrology, they are spearheading this treatment. Expect patient to be here for extended period of time.     08/24/2024-8/25/2024  Vasc cath place, underwent first two sessions of apheresis. Tolerated it well. Will push next session to Sunday    Interval Hx:   NAEON.       Objective:   /71 (Patient Position: Lying)   Pulse 64   Temp 98 °F (36.7 °C) (Oral)   Resp 18   Ht 5' 4" (1.626 m)   Wt 42.8 kg (94 lb 5.7 oz)   LMP  (LMP Unknown)   SpO2 97%  " " Breastfeeding No   BMI 16.20 kg/m²     Intake/Output Summary (Last 24 hours) at 8/24/2024 1013  Last data filed at 8/24/2024 0000  Gross per 24 hour   Intake 100 ml   Output --   Net 100 ml       PHYSICAL EXAM  Vitals reviewed  Constitutional:       Appearance: Normal appearance.   HENT:      Head: Normocephalic.   Eyes:      Pupils: Pupils are equal, round, and reactive to light.   Cardiovascular:      Rate and Rhythm: Normal rate and regular rhythm.      Heart sounds: No murmur heard.  Pulmonary:      Effort: Pulmonary effort is normal.      Breath sounds: Normal breath sounds. No wheezing or rales.   Abdominal:      General: Bowel sounds are normal.      Palpations: Abdomen is soft.   Musculoskeletal:         General: Normal range of motion.   Skin:     General: Skin is warm and dry.   Neurological:      General: No focal deficit present.      Mental Status: She is alert and oriented to person, place, and time.   Psychiatric:         Mood and Affect: Mood normal.         Behavior: Behavior normal.     LABS  All labs from the past 24 hours were reviewed.     BMP:   Recent Labs   Lab 08/23/24  0436 08/24/24  0534   *  146* 142*  142*     140 138  138   K 4.2  4.2 4.2  4.2   *  112* 112*  112*   CO2 20*  20* 17*  17*   BUN 46*  46* 53*  53*   CREATININE 2.0*  2.0* 1.9*  1.9*   CALCIUM 8.4*  8.4* 8.5*  8.5*   MG 1.6  --      CBC:   Recent Labs   Lab 08/23/24  0436 08/24/24  0534   WBC 12.75* 13.64*   HGB 8.7* 9.6*   HCT 28.1* 31.0*    433     CMP:   Recent Labs   Lab 08/23/24  0436 08/24/24  0534     140 138  138   K 4.2  4.2 4.2  4.2   *  112* 112*  112*   CO2 20*  20* 17*  17*   *  146* 142*  142*   BUN 46*  46* 53*  53*   CREATININE 2.0*  2.0* 1.9*  1.9*   CALCIUM 8.4*  8.4* 8.5*  8.5*   ALBUMIN 2.7* 3.5   ANIONGAP 8  8 9  9     Cardiac Markers: No results for input(s): "CKMB", "MYOGLOBIN", "BNP", "TROPISTAT" in the last 48 " "hours.  Coagulation:   No results for input(s): "PT", "INR", "APTT" in the last 48 hours.    Lactic Acid: No results for input(s): "LACTATE" in the last 48 hours.  Magnesium:   Recent Labs   Lab 08/23/24  0436   MG 1.6     Troponin: No results for input(s): "TROPONINI", "TROPONINIHS" in the last 48 hours.  TSH:   Recent Labs   Lab 04/01/24  1505   TSH 2.852     Urine Studies:   No results for input(s): "COLORU", "APPEARANCEUA", "PHUR", "SPECGRAV", "PROTEINUA", "GLUCUA", "KETONESU", "BILIRUBINUA", "OCCULTUA", "NITRITE", "UROBILINOGEN", "LEUKOCYTESUR", "RBCUA", "WBCUA", "BACTERIA", "SQUAMEPITHEL", "HYALINECASTS" in the last 48 hours.    Invalid input(s): "WRIGHTSUR"      IMAGING  All imaging from the past 24 hours were reviewed.     Imaging Results              US Kidney (Final result)  Result time 08/15/24 15:43:06      Final result by Storm Boyce MD (08/15/24 15:43:06)                   Impression:      1.  Increased echogenicity to the kidneys concerning for medical renal disease.  Negative for hydronephrosis.    2.  Findings most likely representing small bilateral nonobstructing renal stones measuring up to 3 mm.      Electronically signed by: Storm Boyce MD  Date:    08/15/2024  Time:    15:43               Narrative:    EXAMINATION:  US KIDNEY    CLINICAL HISTORY:  vinh;    TECHNIQUE:  Ultrasound of the kidneys and urinary bladder was performed including color flow and Doppler evaluation of the kidneys.    COMPARISON:  None.    FINDINGS:  Right kidney: The right kidney measures 12.0 cm. No cortical thinning. There is loss of corticomedullary distinction. Resistive index measures 0.65.  No mass. Possible tiny nonobstructing inferior pole stones noted.  No hydronephrosis.    Left kidney: The left kidney measures 12.2 cm. No cortical thinning. There is loss of corticomedullary distinction. Resistive index measures 0.63.  No mass. Possible 3 mm midpole stone.  No hydronephrosis.    The bladder is partially " distended at the time of scanning and has an unremarkable appearance.                                        Assessment/Plan:      * JAIME (acute kidney injury)  Outpt pulm concerned for wegeners disease  Nephrology following, appreciate  Renal U/S negative for obstruction  Creatinine 2.1, trending down  Monitor labs daily    08/20/2024  Renal function seemingly back at baseline  Discussed with Pulmnology, suspects an element of vasculitis (possibly Wegener's)  Renal biopsy to be perfomed today   Will initiate high dose steroid therapy after biopsy    08/21/2024  Day 2 of high dose steroids  Plasma apheresis ordered  Vasc cath to be placed today by IR  Nephrology facilitating the above, appreciate recs    08/22/2024  Day 3 of high dose IV steroids- transition to PO tomorrow  Vasc cath placed today  Plasma apheresis planned for this evening  Discussed with nephrology, anticipate duration of therapy to be done while     08/24/2024   -steroids transitioned to PO prednisone 20 mg BID  -continue plasma apheresis per nephrology recs    Thrombocytosis  Likely reactive due to inflammation     PNA (pneumonia)  Cont doxycyline and Rocephin IV   Duonebs as needed    Iron deficiency anemia  S/p Venofer given, cont ferrous sulfate  Hema/onc following, appreciate, recommended endoscopy  Consult GI in the morning for endoscopy  Hgb 6.7  Transfuse 1 unit PRBC (8/18)  Repeat labs in the morning    08/20/2024  --hgb promptly corrected to 9.0 after 1uPRBC  --no obvious signs of bleeding on exam  --serial CBC to trend h&h  --transfuse for hgb < 7    08/21/2024  --s/p scope, no overt findings for MARIA ESTHER per GI report. Biopsies taken, recommend capsule study in the out patient setting. CBC ordered for today and tomorrow to trend.     08/24/2024  Next iron dose scheduled for 8/24          VTE Risk Mitigation (From admission, onward)           Ordered     heparin (porcine) injection 3,000 Units  Once         08/23/24 1846     heparin (porcine)  injection 3,000 Units  Once         08/23/24 1843                    Discharge Planning   POOJA: 8/17/2024     Code Status: Full Code   Is the patient medically ready for discharge?:     Reason for patient still in hospital (select all that apply): Patient trending condition, Laboratory test, Treatment, and Consult recommendations  Discharge Plan A: Home with family   Discharge Delays: None known at this time              Cm Lorenzo MD  Department of Hospital Medicine   [facility] Surg

## 2024-08-24 NOTE — PROCEDURES
[facility] - Med Surg  Transfusion Medicine  Procedure Note    SUMMARY   Therapeutic Plasma Exchange (Apheresis)    Date/Time: 8/24/2024 12:00 PM    Performed by: Shanda Christianson MD  Authorized by: Shanda Christianson MD        Date of Procedure: 8/24/2024     Procedure: Plasma Exchange    Provider: Shanda Christianson MD     Assisting Provider: None    Pre-Procedure Diagnosis: JAIME (acute kidney injury); Anti-GBM    Post-Procedure Diagnosis: JAIME (acute kidney injury); Anti-GBM    Follow-up Assessment:   Shayy Faust is a 49 year-old female with history of asthma with 4 week history of respiratory symptoms not improving with steroids and beta agonist inhaler. She also has abnormal blood work. CT chest showed numerous bilateral lung opacities; consider atypical infection/inflammation as well as metastatic neoplasm. Tree in bud micronodularity in lower lobes consistent with chronic small airway disease. Pulmonary is concerned for Wegener's disease due to P-ANCA antibody. She has worsening kidney function, with acute kidney injury (JAIME) of uncertain etiology with active sediment on urinalysis in both blood and protein. 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     Schedule change requested by Dr. Braun to near daily procedures x 6-7 days.  (Possible skip procedure on 8/25)    Today's procedure (#3 of 5) was well tolerated and without complications. Next treatment scheduled for 8/26/2024.      Pertinent Laboratory Data:   Complete Blood Count:   Lab Results   Component Value Date    HGB 9.6 (L) 08/24/2024    HCT 31.0 (L) 08/24/2024     08/24/2024    WBC 13.64 (H) 08/24/2024     Comprehensive Metabolic Panel:   Lab Results    Component Value Date     08/24/2024     08/24/2024    K 4.2 08/24/2024    K 4.2 08/24/2024     (H) 08/24/2024     (H) 08/24/2024    CO2 17 (L) 08/24/2024    CO2 17 (L) 08/24/2024     (H) 08/24/2024     (H) 08/24/2024    BUN 53 (H) 08/24/2024    BUN 53 (H) 08/24/2024    CREATININE 1.9 (H) 08/24/2024    CREATININE 1.9 (H) 08/24/2024    CALCIUM 8.5 (L) 08/24/2024    CALCIUM 8.5 (L) 08/24/2024    PROT 5.6 (L) 08/22/2024    ALBUMIN 3.5 08/24/2024    BILITOT 0.1 08/22/2024    ALKPHOS 56 08/22/2024    AST 9 (L) 08/22/2024    ALT 9 (L) 08/22/2024    ANIONGAP 9 08/24/2024    ANIONGAP 9 08/24/2024     Fibrinogen   Date Value Ref Range Status   08/24/2024 73 (LL) 182 - 400 mg/dL Final     Comment:     FIBR critical result(s) called and verbal readback obtained from   ABBI BAILEY RN by CP5 08/24/2024 07:03       Pertinent Medications: None contraindicated in plasma exchange    Review of patient's allergies indicates:  No Known Allergies    Anesthesia: None     Technical Procedures Used: Plasma Exchange: Volume exchanged - 2.0 Liters; Replacement fluid - Albumin/Fresh Frozen Plasma- 1 Liter Albumin and 1 Liter Plasma.; Number of procedures- 3; Date of next procedure - 8/26/2024.      Description of the Findings of the Procedure:     Please see Apheresis Nurse flowsheet for details.    The patient was evaluated and all clinical and laboratory data relevant to the treatment was reviewed, and a decision was made to proceed with the Apheresis procedure.    I was available to the clinical staff throughout the procedure.    Significant Surgical Tasks Conducted by the Assistant(s): Not applicable    Complications: None    Estimated Blood Loss (EBL): None    Implants: None     Specimens: None

## 2024-08-24 NOTE — NURSING
Apheresis nurse here to admin plasma for patient     Orders put in per Rohit Braun MD      Meds to be verified by myself and another nurse before admin

## 2024-08-24 NOTE — PROGRESS NOTES
Pharmacist Renal Dose Adjustment Note    Shayy Faust is a 49 y.o. female being treated with the medication sulfamethoxazole-trimethoprim (Bactrim).     Patient Data:    Vital Signs (Most Recent):  Temp: 98 °F (36.7 °C) (08/24/24 0732)  Pulse: 64 (08/24/24 0732)  Resp: 18 (08/24/24 0732)  BP: 133/71 (08/24/24 0732)  SpO2: 97 % (08/24/24 0732) Vital Signs (72h Range):  Temp:  [97.6 °F (36.4 °C)-98.5 °F (36.9 °C)]   Pulse:  [44-67]   Resp:  [17-19]   BP: (115-161)/(66-79)   SpO2:  [95 %-98 %]      Recent Labs   Lab 08/22/24  0543 08/23/24  0436 08/24/24  0534   CREATININE 2.0*  2.0* 2.0*  2.0* 1.9*  1.9*     Serum creatinine: 1.9 mg/dL (H) 08/24/24 0534  Estimated creatinine clearance: 23.9 mL/min (A)    Sulfamethoxazole-trimethoprim (Bactrim) 400-80 mg by mouth every 24 hours will be changed to:     Sulfamethoxazole-trimethoprim (Bactrim) 800-160 mg by mouth three times weekly per renal dose protocol for opportunistic infection prophylaxis in CrCl 10-50 ml/min.      Thank you,  Pharmacist's Name: Faustino Segura

## 2024-08-24 NOTE — PROGRESS NOTES
Nephrology Progress Note     History of Present Illness   0 yr History of recent diagnosis of asthma presenting to the hospital with 4 week history of respiratory symptoms not improving with steroids and beta agonist inhaler. On arrival here, creatinine 2.6 with a peak of 2.7 within improving with some IV fluids. JAIME of uncertain etiology with active sediment on urinalysis. 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  In the context of losing weight poor appetite.  . She did have an SPEP with  monoclonal spikes.  However, kappa lambda ratio fairly unremarkable with fairly similar elevation in both.   anti-GBM slightly elevated antibodies.  Also ANCA with p-ANCA positivity 1:80.     Renal Bx 8/20/2024.  Results pending.  Sent to [facility].    Started on Medrol 1g daily x 3 days 8/20/2024  Started on Apheresis 8/20/2024 x 5 treatmenst.  2 sequential and then QOD.         Interval History     Overnight/currently:  For 3rd session of tPA E today.  Feeling well appetite improving no rash fever.  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  Biopsy done 820 still not reported in spite me calling the [phone] nos for it!!!!   I am unable to institute Cytoxan therapy without histopathological report.  Given double positivity recommend getting Rheumatology Dr. BUSH involved on Monday as she would need a task mouth therapy for ANCA vasculitis after 6 months of treatment with Cytoxan for anti GBM  disease .  Creatinine stable    Health Status   Allergies:    has No Known Allergies.    Current medications:     Current Facility-Administered Medications:     0.9%  NaCl infusion (for blood administration), , Intravenous, Q24H PRN, Gena Saxena MD    acetaminophen tablet 650 mg, 650 mg, Oral, Q6H PRN, Gena Saxena MD, 650 mg at 08/17/24 1248    albumin human 5% bottle 100 g, 100 g, Intravenous, Once, Shanda Christianson MD    [START ON 8/26/2024] albumin human 5%  bottle 100 g, 100 g, Intravenous, Once, Tu Jim MD    albuterol-ipratropium 2.5 mg-0.5 mg/3 mL nebulizer solution 3 mL, 3 mL, Nebulization, Q6H PRN, Gena Saxena MD    calcium gluconate 1 g in NS IVPB (premixed), 2 g, Intravenous, Once, Shanda Christianson MD    [START ON 8/26/2024] calcium gluconate 1 g in NS IVPB (premixed), 2 g, Intravenous, Once, Tu Jim MD    cefTRIAXone (Rocephin) 1 g in D5W 100 mL IVPB (MB+), 1 g, Intravenous, Q24H, Gena Saxena MD, Stopped at 08/23/24 1729    cholecalciferol (vitamin D3) 125 mcg (5,000 unit) tablet 5,000 Units, 5,000 Units, Oral, Daily, Rohit Braun MD, 5,000 Units at 08/24/24 0925    diphenhydrAMINE injection 25 mg, 25 mg, Intravenous, Q6H PRN, Cm Lorenzo MD    epoetin natalie-epbx injection 2,160 Units, 50 Units/kg, Subcutaneous, Q7 Days, Marcin Mercado MD, 2,160 Units at 08/23/24 0918    ferrous sulfate tablet 1 each, 1 tablet, Oral, Daily, Gena Saxena MD, 1 each at 08/24/24 0924    heparin (porcine) injection 3,000 Units, 3,000 Units, Intravenous, Once, Shanda Christianson MD    [START ON 8/26/2024] heparin (porcine) injection 3,000 Units, 3,000 Units, Intravenous, Once, Tu Jim MD    iron sucrose injection 200 mg, 200 mg, Intravenous, Q7 Days, Gena Saxena MD, 200 mg at 08/24/24 0925    mupirocin 2 % ointment, , Nasal, BID, Cm Lorenzo MD, Given at 08/24/24 0925    ondansetron disintegrating tablet 4 mg, 4 mg, Oral, Q6H PRN, Gena Saxena MD, 4 mg at 08/19/24 2318    pantoprazole EC tablet 40 mg, 40 mg, Oral, Daily, Rohit Braun MD, 40 mg at 08/24/24 0925    predniSONE tablet 20 mg, 20 mg, Oral, BID, Rohit Braun MD, 20 mg at 08/24/24 0925    sodium bicarbonate tablet 650 mg, 650 mg, Oral, BID, Hermes Galeas MD, 650 mg at 08/24/24 0924    sulfamethoxazole-trimethoprim 400-80mg per tablet 1 tablet, 1 tablet, Oral, Daily, Rohit Braun MD, 1 tablet at 08/24/24 0924     Physical  "Examination   VS/Measurements   /71 (Patient Position: Lying)   Pulse 64   Temp 98 °F (36.7 °C) (Oral)   Resp 18   Ht 5' 4" (1.626 m)   Wt 42.8 kg (94 lb 5.7 oz)   LMP  (LMP Unknown)   SpO2 97%   Breastfeeding No   BMI 16.20 kg/m²      General:  Alert and oriented X3, No acute distress.         Nutritional status: Obese.    Neck:  Supple, No lymphadenopathy.     Respiratory:  Lungs are clear to auscultation, Respirations are non-labored, Symmetrical chest wall expansion.    Cardiovascular:  Normal rate, Regular rhythm.   Gastrointestinal:  Soft, Non-tender, Normal bowel sounds.   Integumentary:  Warm, Dry.   Psychiatric:  Cooperative, Appropriate mood & affect.        Review / Management   Laboratory Results   Today's Lab Results :    Recent Results (from the past 24 hour(s))   Basic Metabolic Panel    Collection Time: 08/24/24  5:34 AM   Result Value Ref Range    Sodium 138 136 - 145 mmol/L    Potassium 4.2 3.5 - 5.1 mmol/L    Chloride 112 (H) 95 - 110 mmol/L    CO2 17 (L) 23 - 29 mmol/L    Glucose 142 (H) 70 - 110 mg/dL    BUN 53 (H) 6 - 20 mg/dL    Creatinine 1.9 (H) 0.5 - 1.4 mg/dL    Calcium 8.5 (L) 8.7 - 10.5 mg/dL    Anion Gap 9 8 - 16 mmol/L    eGFR 32 (A) >60 mL/min/1.73 m^2   Renal Function Panel    Collection Time: 08/24/24  5:34 AM   Result Value Ref Range    Glucose 142 (H) 70 - 110 mg/dL    Sodium 138 136 - 145 mmol/L    Potassium 4.2 3.5 - 5.1 mmol/L    Chloride 112 (H) 95 - 110 mmol/L    CO2 17 (L) 23 - 29 mmol/L    BUN 53 (H) 6 - 20 mg/dL    Calcium 8.5 (L) 8.7 - 10.5 mg/dL    Creatinine 1.9 (H) 0.5 - 1.4 mg/dL    Albumin 3.5 3.5 - 5.2 g/dL    Phosphorus 3.2 2.7 - 4.5 mg/dL    eGFR 32 (A) >60 mL/min/1.73 m^2    Anion Gap 9 8 - 16 mmol/L   Fibrinogen    Collection Time: 08/24/24  5:34 AM   Result Value Ref Range    Fibrinogen 73 (LL) 182 - 400 mg/dL   CBC Auto Differential    Collection Time: 08/24/24  5:34 AM   Result Value Ref Range    WBC 13.64 (H) 3.90 - 12.70 K/uL    RBC 3.66 (L) " 4.00 - 5.40 M/uL    Hemoglobin 9.6 (L) 12.0 - 16.0 g/dL    Hematocrit 31.0 (L) 37.0 - 48.5 %    MCV 85 82 - 98 fL    MCH 26.2 (L) 27.0 - 31.0 pg    MCHC 31.0 (L) 32.0 - 36.0 g/dL    RDW 15.5 (H) 11.5 - 14.5 %    Platelets 433 150 - 450 K/uL    MPV 9.8 9.2 - 12.9 fL    Immature Granulocytes 2.3 (H) 0.0 - 0.5 %    Gran # (ANC) 10.5 (H) 1.8 - 7.7 K/uL    Immature Grans (Abs) 0.32 (H) 0.00 - 0.04 K/uL    Lymph # 2.3 1.0 - 4.8 K/uL    Mono # 0.5 0.3 - 1.0 K/uL    Eos # 0.0 0.0 - 0.5 K/uL    Baso # 0.02 0.00 - 0.20 K/uL    nRBC 0 0 /100 WBC    Gran % 77.4 (H) 38.0 - 73.0 %    Lymph % 16.8 (L) 18.0 - 48.0 %    Mono % 3.4 (L) 4.0 - 15.0 %    Eosinophil % 0.0 0.0 - 8.0 %    Basophil % 0.1 0.0 - 1.9 %    Differential Method Automated         Impression and Plan   Diagnosis   JAIME with active sediments subnephrotic proteinuria of 1 g positive serology for GBM MPO 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  Explained to patient pros and cons of plasmapheresis steroids need for infection prophylaxis nutrition vitamin-D.  All questions answered.  For 3rd session of TB continue prednisone 20 b.i.d. to start Cytoxan 1.2 milligram/kilogram body weight 1 biopsy confirmed fibrinogen levels low will use FFP as replacement fluid    .    History of asthma pulmonary nodules per pulmonology     History of nausea weight loss unremarkable endoscopy per patient      severe malnutrition malnutrition inflammation syndrome      ______________________________________________  Rohit Braun MD    This document was created using voice recognition software.  It is possible that there are errors which have persisted after original proofreading.  If there is a question regarding contents of this document please contact me for clarification.

## 2024-08-25 LAB — PATHOLOGIST INTERPRETATION UPE: NORMAL

## 2024-08-25 PROCEDURE — 25000003 PHARM REV CODE 250: Performed by: INTERNAL MEDICINE

## 2024-08-25 PROCEDURE — 21400001 HC TELEMETRY ROOM

## 2024-08-25 PROCEDURE — 63600175 PHARM REV CODE 636 W HCPCS: Mod: JZ,JG | Performed by: INTERNAL MEDICINE

## 2024-08-25 PROCEDURE — 99232 SBSQ HOSP IP/OBS MODERATE 35: CPT | Mod: ,,, | Performed by: INTERNAL MEDICINE

## 2024-08-25 PROCEDURE — 11000001 HC ACUTE MED/SURG PRIVATE ROOM

## 2024-08-25 RX ORDER — DIPHENHYDRAMINE HCL 25 MG
25 CAPSULE ORAL EVERY 4 HOURS PRN
Status: DISCONTINUED | OUTPATIENT
Start: 2024-08-25 | End: 2024-08-26 | Stop reason: HOSPADM

## 2024-08-25 RX ORDER — AMLODIPINE BESYLATE 10 MG/1
10 TABLET ORAL DAILY
Status: DISCONTINUED | OUTPATIENT
Start: 2024-08-25 | End: 2024-08-26 | Stop reason: HOSPADM

## 2024-08-25 RX ORDER — DIPHENHYDRAMINE HCL 25 MG
25 CAPSULE ORAL ONCE
Status: DISCONTINUED | OUTPATIENT
Start: 2024-08-25 | End: 2024-08-25

## 2024-08-25 RX ADMIN — RITUXIMAB 1000 MG: 10 INJECTION, SOLUTION INTRAVENOUS at 12:08

## 2024-08-25 RX ADMIN — PREDNISONE 20 MG: 20 TABLET ORAL at 08:08

## 2024-08-25 RX ADMIN — SODIUM BICARBONATE 650 MG: 650 TABLET ORAL at 08:08

## 2024-08-25 RX ADMIN — AMLODIPINE BESYLATE 10 MG: 10 TABLET ORAL at 12:08

## 2024-08-25 RX ADMIN — PANTOPRAZOLE SODIUM 40 MG: 40 TABLET, DELAYED RELEASE ORAL at 08:08

## 2024-08-25 RX ADMIN — CHOLECALCIFEROL TAB 125 MCG (5000 UNIT) 5000 UNITS: 125 TAB at 08:08

## 2024-08-25 RX ADMIN — DIPHENHYDRAMINE HYDROCHLORIDE 25 MG: 25 CAPSULE ORAL at 12:08

## 2024-08-25 RX ADMIN — FERROUS SULFATE TAB 325 MG (65 MG ELEMENTAL FE) 1 EACH: 325 (65 FE) TAB at 08:08

## 2024-08-25 RX ADMIN — MUPIROCIN: 20 OINTMENT TOPICAL at 08:08

## 2024-08-25 RX ADMIN — ACETAMINOPHEN 650 MG: 325 TABLET ORAL at 12:08

## 2024-08-25 NOTE — PROGRESS NOTES
[facility] Medicine  Progress Note    Patient Name: Shayy Faust  MRN: [id number]  Patient Class: IP- Inpatient   Admission Date: 8/15/2024  Length of Stay: 10 days  Attending Physician: Cm Lorenzo MD  Primary Care Provider: Karine, Primary Doctor        Subjective:     Principal Problem:JAIME (acute kidney injury)        HPI:  Patient is a 49 y.o.  female with a PMHx of asthma who presents to the Emergency Department due to abnormal blood work. Patient currently being seen by Dr. Jiménez for pulmonology. She reports not feeling well for several weeks. Endorses chills and sob. Denies any fever or congestion. Reports having good urinary output. Denies any home meds at this time.     In the ED, wbc: 15.1k, h/h 7.8/25.8, plt: 826, bun/cr: 43/2.7.     Overview/Hospital Course:  49 year-old female admitted with c/o abnormal blood work. She reports she hasn't been feeling well for a few weeks now. She is being seen and worked up by Dr. Jiménez with pulmonology for for SOB and chills. She started with an URI that progressed to SOB. She had Spirometry in pulm clinic that showed obstruction with very severe ventilatory impairment unimproved with post bronchodilator. She reports unintentional weight loss of 25 lbs. Pulm concerned for Wegener's disease. CT chest showed numerous bilateral lung opacities; consider atypical infection/inflammation as well as metastatic neoplasm; recommend further evaluation with PET-CT scan. Tree in bud micronodularity in lower lobes consistent with chronic small airway disease. CT a/p with punctate nonobstructing renal calculi, no hydronephrosis. Mild to moderate amount of stool in colon, with diverticulitis.  Worsening kidney function. Nephrology consulted. US Kidney showed increased echogenicity to kidneys concerning for medical renal disease. Recommends kidney biopsy, pending this next week, will try for Monday. Will make NPO after midnight. Hem/onc consulted,  recommends further metastatic workup with upper/lower endoscopy. Consult GI for evaluation.  Cont rocephin and doxycycline. Hgb 6.7, unknown bleeding source. Transfuse 1 unit PRBC (8/18).      08/19/2024  H&H improved after 1UPRBC. Continue to remain stable since that time. Endoscopic procedure planned this hospitalization. Dr. 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 Consider high dose steroids after renal biopsy. Renal biopsy priority moved up to this hospitalization. Reached out to IR, they will facilitate as soon as schedule allows.      08/20/2024  EGD/Colon complete. Minor gastritis, biopsies taken. Small bowel normal, biopsied for MARIA ESTHER. Otherwise normal. Prep in right colon poor, left colon fair. No masses appreciated. Ileum intubated and normal. GI Recommend outpatient video capsule to complete work up for MARIA ESTHER. Discussed with IR, plans for renal biopsy sometime today. Will initiate high dose steroids thereafter per Dr. Jiménez' request.     08/21/2024  Discussed case with specialists, the decision for plasma apheresis/exchange to done at this location through [facility] was made. Dr. Galeas with nephrology is communicating with [place] team to assure this gets done. IR consulted for vascular access.      08/22/2024  Unable to undergo vasc cath yesterday due to urgent case per IR. Vasc cath placed this morning. Plans to undergo first session of apheresis this evening. Discussed case with Nephrology, they are spearheading this treatment. Expect patient to be here for extended period of time.      08/23/2024-8/24/2024  Vasc cath place, underwent first two sessions of apheresis. Tolerated it well. Will push next session to Sunday 08/25/2024  Renal biopsy report returned yesterday afternoon, verbally communicated to Dr. Braun. The immunofluorescence results were negative, notably showing no anti-GBM antibodies. 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 Plasmapheresis discontinued.  Nephrology initiated rituximab therapy, with the first dose of 1g administered today. A second dose is planned for two weeks later, with a follow-up plan to continue rituximab therapy for two years in accordance with maintenance trials.  Patient will need follow up with Rheumatology, Nephrology, and Pulmonology. A pulmonologist was recommended in six months to repeat PFTs. Per Nephrology, Vas-Cath may be removed in the morning if renal function remains stable.    Objective:   BP (!) 176/93 Comment: notified md no new orders  Pulse (!) 54 Comment: notified md no new orders  Temp 98.1 °F (36.7 °C)   Resp 16   Ht 5' 4" (1.626 m)   Wt 42.8 kg (94 lb 5.7 oz)   LMP  (LMP Unknown)   SpO2 98%   Breastfeeding No   BMI 16.20 kg/m²     Intake/Output Summary (Last 24 hours) at 8/25/2024 1226  Last data filed at 8/25/2024 0100  Gross per 24 hour   Intake 846 ml   Output --   Net 846 ml       PHYSICAL EXAM  Vitals reviewed  GEN: No acute distress, pleasant, body habitus underweight  HEENT: atraumatic and normocephalic  CARDS: regular rate and rhythm, no m/g, pulses palpable in LE  PULM: breathing comfortably on room aur, chest symmetric, nonlabored, no abnormal breath sounds on auscultation  ABD: nontender, nondistended, soft, no organomegaly, BS+  Neuro: Alert and oriented x3, CN's I-IX grossly intact, sensation and motor intact; follows directions and answers questions appropriately    LABS  All labs from the past 24 hours were reviewed.     BMP:   Recent Labs   Lab 08/24/24  0534   *  142*     138   K 4.2  4.2   *  112*   CO2 17*  17*   BUN 53*  53*   CREATININE 1.9*  1.9*   CALCIUM 8.5*  8.5*   MG 2.2     CBC:   Recent Labs   Lab 08/24/24  0534   WBC 13.64*   HGB 9.6*   HCT 31.0*        CMP:   Recent Labs   Lab 08/24/24  0534     138   K 4.2  4.2   *  112*   CO2 17*  17*   *  142*   BUN 53*  53* " "  CREATININE 1.9*  1.9*   CALCIUM 8.5*  8.5*   ALBUMIN 3.5   ANIONGAP 9  9     Cardiac Markers: No results for input(s): "CKMB", "MYOGLOBIN", "BNP", "TROPISTAT" in the last 48 hours.  Coagulation: No results for input(s): "PT", "INR", "APTT" in the last 48 hours.  Lactic Acid: No results for input(s): "LACTATE" in the last 48 hours.  Magnesium:   Recent Labs   Lab 08/24/24  0534   MG 2.2     Troponin: No results for input(s): "TROPONINI", "TROPONINIHS" in the last 48 hours.  TSH:   Recent Labs   Lab 04/01/24  1505   TSH 2.852     Urine Studies:   No results for input(s): "COLORU", "APPEARANCEUA", "PHUR", "SPECGRAV", "PROTEINUA", "GLUCUA", "KETONESU", "BILIRUBINUA", "OCCULTUA", "NITRITE", "UROBILINOGEN", "LEUKOCYTESUR", "RBCUA", "WBCUA", "BACTERIA", "SQUAMEPITHEL", "HYALINECASTS" in the last 48 hours.    Invalid input(s): "WRIGHTSUR"    IMAGING  All imaging from the past 24 hours were reviewed.     Imaging Results              US Kidney (Final result)  Result time 08/15/24 15:43:06      Final result by Storm Boyce MD (08/15/24 15:43:06)                   Impression:      1.  Increased echogenicity to the kidneys concerning for medical renal disease.  Negative for hydronephrosis.    2.  Findings most likely representing small bilateral nonobstructing renal stones measuring up to 3 mm.      Electronically signed by: Storm Boyce MD  Date:    08/15/2024  Time:    15:43               Narrative:    EXAMINATION:  US KIDNEY    CLINICAL HISTORY:  vinh;    TECHNIQUE:  Ultrasound of the kidneys and urinary bladder was performed including color flow and Doppler evaluation of the kidneys.    COMPARISON:  None.    FINDINGS:  Right kidney: The right kidney measures 12.0 cm. No cortical thinning. There is loss of corticomedullary distinction. Resistive index measures 0.65.  No mass. Possible tiny nonobstructing inferior pole stones noted.  No hydronephrosis.    Left kidney: The left kidney measures 12.2 cm. No cortical " thinning. There is loss of corticomedullary distinction. Resistive index measures 0.63.  No mass. Possible 3 mm midpole stone.  No hydronephrosis.    The bladder is partially distended at the time of scanning and has an unremarkable appearance.                                        Assessment/Plan:      * JAIME (acute kidney injury)  Outpt pulm concerned for wegeners disease  Nephrology following, appreciate  Renal U/S negative for obstruction  Creatinine 2.1, trending down  Monitor labs daily    08/20/2024  Renal function seemingly back at baseline  Discussed with Pulmnology, suspects an element of vasculitis (possibly Wegener's)  Renal biopsy to be perfomed today   Will initiate high dose steroid therapy after biopsy     08/21/2024  Day 2 of high dose steroids  Plasma apheresis ordered  Vasc cath to be placed today by IR  Nephrology facilitating the above, appreciate recs     08/22/2024  Day 3 of high dose IV steroids- transition to PO tomorrow  Vasc cath placed today  Plasma apheresis planned for this evening  Discussed with nephrology, anticipate duration of therapy to be done while      08/24/2024   -steroids transitioned to PO prednisone 20 mg BID  -continue plasma apheresis per nephrology recs    08/25/2024  --Renal biopsy immunofluorescence results were negative; no anti-GBM antibodies.   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   --Plasmapheresis discontinued.    --rituximab therapy initiated- first dose administered today, 2nd dose planned for two weeks later, with a follow-up plan to continue rituximab therapy for two years in accordance with maintenance trials.    --Per Nephrology, Vas-Cath may be removed in the morning if renal function remains stable.    Thrombocytosis  Likely reactive due to inflammation     PNA (pneumonia)  Cont doxycyline and Rocephin IV   Duonebs as needed    Iron deficiency anemia  S/p Venofer given, cont ferrous sulfate  Hema/onc following, appreciate,  recommended endoscopy  Consult GI in the morning for endoscopy  Hgb 6.7  Transfuse 1 unit PRBC (8/18)  Repeat labs in the morning    08/25/2024  --hgb promptly corrected to 9.0 after 1uPRBC  --no obvious signs of bleeding on exam  --serial CBC to trend h&h  --transfuse for hgb < 7    08/21/2024  --s/p scope, no overt findings for MARIA ESTHER per GI report. Biopsies taken, recommend capsule study in the out patient setting. CBC ordered for today and tomorrow to trend    08/24/2024  Next iron dose scheduled for today    08/25/2024  stable        VTE Risk Mitigation (From admission, onward)           Ordered     heparin (porcine) injection 3,000 Units  Once         08/23/24 1846                    Discharge Planning   OPOJA: 8/17/2024     Code Status: Full Code   Is the patient medically ready for discharge?:     Reason for patient still in hospital (select all that apply): Laboratory test   Discharge Plan A: Home with family   Discharge Delays: None known at this time              Cm Lorenzo MD  Department of Hospital Medicine   [facility] - Med Surg

## 2024-08-25 NOTE — NURSING
Rituximab infusion intiated  Consent signed and in chart  New PIV obtained, patent with blood return    Premeds of tylenol and benadryl given  Pt already on steroid regimen    Orders received for cardiac monitoring    Dual sign off and confirmation of infusion done    Education handout given to pt

## 2024-08-25 NOTE — PROGRESS NOTES
Nephrology Progress Note     History of Present Illness   49 yr History of recent diagnosis of asthma presenting to the hospital with 4 week history of respiratory symptoms not improving with steroids and beta agonist inhaler. On arrival here, creatinine 2.6 with a peak of 2.7 within improving with some IV fluids. JAIME of uncertain etiology with active sediment on urinalysis. 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  In the context of losing weight poor appetite.  . She did have an SPEP with  monoclonal spikes.  However, kappa lambda ratio fairly unremarkable with fairly similar elevation in both.   anti-GBM  elevated antibodies 3.8.(<1)  Also ANCA with p-ANCA positivity 1:80.     Renal Bx 8/20/2024.      Started on Medrol 1g daily x 3 days 8/20/2024  Started on Apheresis 8/20/2024 x 5 treatmenst.  2 sequential and then QOD.  8/24/24 renal biopsy report verbal  biopsy limited 3 g for LM 3 g for AF with no globe for EM.  I if was negative especially no anti GBM.  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  There was interstitial necrosis.  No significant interstitial fibrosis.    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  Suspect anti-GBM antibodies epi phenomenon.  Plan rituximab 1 g today and repeat in 2 weeks.  Discussed with hospitalist need to follow up with Dr. Carvalho  rheumatologist at [facility] and Nephrology when discharged.  Pros and cons of rituximab including immunosuppression opportunistic infection secondary lymphoma need for age appropriate vaccinations surveillance including in vaccination for herpes zoster in the future discussed with patient and needs to be emphasized on discharge papers.  Patient mother agreeable  with plan of action.  Patient feeling well no cough BUN creatinine stable.  Blood pressure elevated secondary steroids we will start amlodipine 10 mg daily.  Discussed with hospitalist if renal function stable the patient tolerated 1st dose of rituximab can remove Vas-Cath in a.m. and discharge to follow up with Rheumatology and Nephrology in 1 week with serial monitoring of anti GBM Anca levels.     Health Status   Allergies:    has No Known Allergies.    Current medications:     Current Facility-Administered Medications:     0.9%  NaCl infusion (for blood administration), , Intravenous, Q24H PRN, Gena Saxena MD    0.9%  NaCl infusion (for blood administration), , Intravenous, Q24H PRN, Rohit Braun MD    acetaminophen tablet 650 mg, 650 mg, Oral, Q6H PRN, Gena Saxena MD, 650 mg at 08/17/24 1248    [START ON 8/26/2024] albumin human 5% bottle 100 g, 100 g, Intravenous, Once, Tu Jim MD    albuterol-ipratropium 2.5 mg-0.5 mg/3 mL nebulizer solution 3 mL, 3 mL, Nebulization, Q6H PRN, Gena Saxena MD    [START ON 8/26/2024] calcium gluconate 1 g in NS IVPB (premixed), 2 g, Intravenous, Once, Tu Jim MD    cefTRIAXone (Rocephin) 1 g in D5W 100 mL IVPB (MB+), 1 g, Intravenous, Q24H, Gena Saxena MD, Stopped at 08/24/24 1721    cholecalciferol (vitamin D3) 125 mcg (5,000 unit) tablet 5,000 Units, 5,000 Units, Oral, Daily, Rohit Braun MD, 5,000 Units at 08/25/24 0832    diphenhydrAMINE injection 25 mg, 25 mg, Intravenous, Q6H PRN, Cm Lorenzo MD    epoetin natalie-epbx injection 2,160 Units, 50 Units/kg, Subcutaneous, Q7 Days, Marcin Mercado MD, 2,160 Units at 08/23/24 0918    ferrous sulfate tablet 1 each, 1 tablet, Oral, Daily, Gena Saxena MD, 1 each at 08/25/24 0832    [START ON 8/26/2024] heparin (porcine) injection 3,000 Units, 3,000 Units, Intravenous, Once, Tu Jim MD    iron sucrose injection 200 mg, 200 mg, Intravenous, Q7 Days,  "Gena Saxena MD, 200 mg at 08/24/24 0925    mupirocin 2 % ointment, , Nasal, BID, Cm Lorenzo MD, Given at 08/25/24 0832    ondansetron disintegrating tablet 4 mg, 4 mg, Oral, Q6H PRN, Gena Saxena MD, 4 mg at 08/19/24 2318    pantoprazole EC tablet 40 mg, 40 mg, Oral, Daily, Rohit Braun MD, 40 mg at 08/25/24 0832    predniSONE tablet 20 mg, 20 mg, Oral, BID, Rohit Braun MD, 20 mg at 08/25/24 0832    sodium bicarbonate tablet 650 mg, 650 mg, Oral, BID, Hermes Galeas MD, 650 mg at 08/25/24 0832    [START ON 8/26/2024] sulfamethoxazole-trimethoprim 800-160mg per tablet 1 tablet, 1 tablet, Oral, Once per day on Monday Wednesday Friday, Cm Lorenzo MD     Physical Examination   VS/Measurements   BP (!) 176/93 Comment: notified md no new orders  Pulse (!) 54 Comment: notified md no new orders  Temp 98.1 °F (36.7 °C)   Resp 16   Ht 5' 4" (1.626 m)   Wt 42.8 kg (94 lb 5.7 oz)   LMP  (LMP Unknown)   SpO2 98%   Breastfeeding No   BMI 16.20 kg/m²    General:  Alert and oriented X3, No acute distress.         Nutritional status: Obese.    Neck:  Supple, No lymphadenopathy.     Respiratory:  Lungs are clear to auscultation, Respirations are non-labored, Symmetrical chest wall expansion.    Cardiovascular:  Normal rate, Regular rhythm.   Gastrointestinal:  Soft, Non-tender, Normal bowel sounds.   Integumentary:  Warm, Dry.   Psychiatric:  Cooperative, Appropriate mood & affect.          Review / Management   Laboratory Results   Today's Lab Results :    No results found for this or any previous visit (from the past 24 hour(s)).     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  No evidence of GBM on renal biopsy.  Therefore we will stop plasmapheresis start rituximab 1 g today and repeat in 2 weeks with setting up to follow up with " Rheumatology to continue rituximab therapy for 2 years based on maintenance trials with close follow up with Nephrology regarding renal function.  She is also asked to follow up with a pulmonologist to repeat if PFTs in 6 months.  We discussed age-appropriate screenings vaccinations including zoster vaccination close monitoring for HPV.  Hepatitis-B was negative.  All questions answered and discussed with patient and consent obtained may remove Vas-Cath in a.m. if renal function stable.  Wean prednisone per Pexivus trial protocol  .    History of asthma pulmonary nodules per pulmonology     History of nausea weight loss unremarkable endoscopy per patient      severe malnutrition malnutrition inflammation syndrome     Hypertension start amlodipine    ______________________________________________  Rohit Braun MD    This document was created using voice recognition software.  It is possible that there are errors which have persisted after original proofreading.  If there is a question regarding contents of this document please contact me for clarification.

## 2024-08-25 NOTE — PLAN OF CARE
Discussed plan of care with patient, verbalized understanding.  Patient remains free from injury.  Safety and comfort   precautions maintained this shift.   Call light and personal belongings within reach, bed in low position with bed wheels locked.  No s/s of acute distress.   Purposeful rounding continued this shift.  Pain levels  controlled per MD order. IVF infusing.  Cardiac monitoring in place,.  Diet orders continued, patient diet .  Vital signs continued per order.  Q2 repositioning   Patient mobility status independent  Chart and orders review completed.   Patient education about care completed.     Problem: Acute Kidney Injury/Impairment  Goal: Fluid and Electrolyte Balance  Outcome: Progressing  Goal: Improved Oral Intake  Outcome: Progressing  Goal: Effective Renal Function  Outcome: Progressing

## 2024-08-25 NOTE — PLAN OF CARE
Discussed poc with pt, pt verbalized understanding    Purposeful rounding every 2hours      Cardiac monitoring in use, pt is SB, tele monitor # [id number]    Fall precautions in place, remains injury free  Pt denies c/o Pain and nausea       Accurate I&Os  Abx given as prescribed  Bed locked at lowest position  Call light within reach    Chart check complete  Will cont with POC    Vascath to be d/c'ed tmrw no more plans for plasma xchng   New regimen of rituximab initiated today

## 2024-08-25 NOTE — ASSESSMENT & PLAN NOTE
S/p Venofer given, cont ferrous sulfate  Hema/onc following, appreciate, recommended endoscopy  Consult GI in the morning for endoscopy  Hgb 6.7  Transfuse 1 unit PRBC (8/18)  Repeat labs in the morning    08/25/2024  --hgb promptly corrected to 9.0 after 1uPRBC  --no obvious signs of bleeding on exam  --serial CBC to trend h&h  --transfuse for hgb < 7    08/21/2024  --s/p scope, no overt findings for MARIA ESTHER per GI report. Biopsies taken, recommend capsule study in the out patient setting. CBC ordered for today and tomorrow to trend    08/24/2024  Next iron dose scheduled for today    08/25/2024  stable

## 2024-08-25 NOTE — PROGRESS NOTES
Hematology/Oncology  Progress Note   Admission Date: 8/15/2024  Hospital Length of Stay: 9  Code Status: FULL Code  Attending Provider:    Consulting Provider: Audrey Durbin MD  Primary Care Physician: No, Primary Doctor  Principal Problem: JAIME (acute kidney injury)    Subjective   The patient reports that she is feeling much better than she did on presentation.  I reviewed her clinical course with her.  Currently awaiting results of renal and GI biopsies.  She is possibly to start Cytoxan once path confirms anti-GBM disease.  I reviewed her labs with her and her mother which are improved/stable. Fibrinogen is low and she is already slated to receive FFP.    Review of Systems   Constitutional:  Negative for chills, fever and malaise/fatigue.   Respiratory:  Negative for shortness of breath.    Cardiovascular:  Negative for chest pain.   Gastrointestinal:  Negative for abdominal pain, constipation, diarrhea, nausea and vomiting.   Musculoskeletal:  Negative for myalgias.   Neurological:  Negative for headaches.     MATTHEW Faust is a 49 y.o. female who presented to the ED for further evaluation of abnormal blood work (Acute anemia and JAIME).  She has had a greater than 25 lbs weight loss, fatigue, chills and SOB over the last few weeks.  She was admitted for further workup of the aforementioned lab findings and symptoms.    Objective   Continuous Infusions:  Scheduled Meds:   [START ON 8/26/2024] albumin human 5%  100 g Intravenous Once    [START ON 8/26/2024] calcium gluconate IVPB  2 g Intravenous Once    cefTRIAXone (Rocephin) IV (PEDS and ADULTS)  1 g Intravenous Q24H    cholecalciferol (vitamin D3)  5,000 Units Oral Daily    epoetin natalie-epbx  50 Units/kg Subcutaneous Q7 Days    ferrous sulfate  1 tablet Oral Daily    [START ON 8/26/2024] heparin (porcine)  3,000 Units Intravenous Once    iron sucrose  200 mg Intravenous Q7 Days    mupirocin   Nasal BID    pantoprazole  40 mg Oral Daily     predniSONE  20 mg Oral BID    sodium bicarbonate  650 mg Oral BID    [START ON 8/26/2024] sulfamethoxazole-trimethoprim 800-160mg  1 tablet Oral Once per day on Monday Wednesday Friday     PRN Meds:  Current Facility-Administered Medications:     0.9%  NaCl infusion (for blood administration), , Intravenous, Q24H PRN    0.9%  NaCl infusion (for blood administration), , Intravenous, Q24H PRN    acetaminophen, 650 mg, Oral, Q6H PRN    albuterol-ipratropium, 3 mL, Nebulization, Q6H PRN    diphenhydrAMINE, 25 mg, Intravenous, Q6H PRN    ondansetron, 4 mg, Oral, Q6H PRN      Vital Signs Range (Last 24H):  Temp:  [97 °F (36.1 °C)-98.3 °F (36.8 °C)]   Pulse:  [53-64]   Resp:  [17-19]   BP: (128-163)/(66-84)   SpO2:  [95 %-97 %]     Physical Exam  Constitutional:       General: She is not in acute distress.     Appearance: She is not ill-appearing, toxic-appearing or diaphoretic.   HENT:      Head: Normocephalic and atraumatic.   Eyes:      Extraocular Movements: Extraocular movements intact.      Conjunctiva/sclera: Conjunctivae normal.   Cardiovascular:      Rate and Rhythm: Normal rate.   Pulmonary:      Effort: Pulmonary effort is normal.   Neurological:      General: No focal deficit present.      Mental Status: She is alert and oriented to person, place, and time.   Psychiatric:         Mood and Affect: Mood normal.       Laboratory   CBC with Differential:  Recent Labs   Lab 08/24/24  0534   WBC 13.64*   LYMPH 16.8*  2.3   BASOPHIL 0.1   RBC 3.66*   HCT 31.0*   HGB 9.6*   MCV 85   MCH 26.2*   RDW 15.5*      MPV 9.8     CMP:  Recent Labs   Lab 08/24/24  0534   *  142*   CALCIUM 8.5*  8.5*   ALBUMIN 3.5     138   K 4.2  4.2   CO2 17*  17*   *  112*   BUN 53*  53*   CREATININE 1.9*  1.9*     BMP:   Recent Labs   Lab 08/24/24  0534   *  142*   CALCIUM 8.5*  8.5*     138   K 4.2  4.2   CO2 17*  17*   *  112*   BUN 53*  53*   CREATININE 1.9*  1.9*     LFTs:  "  Recent Labs   Lab 08/24/24  0534   ALBUMIN 3.5     Haptoglobin: No results for input(s): "HAPTOGLOBIN" in the last 24 hours.  Tumor Markers: No results for input(s): "PSA", "CEA", "", "AFPTM", "OB3484", "" in the last 24 hours.    Invalid input(s): "ALGTM"  Immunology: No results for input(s): "SPEP", "ABDELRAHMAN", "AZAM", "FREELAMBDALI" in the last 24 hours.  Coagulation: No results for input(s): "PT", "INR", "APTT" in the last 24 hours.    Microbiology Results (last 7 days)       Procedure Component Value Units Date/Time    Blood culture [[id number]] Collected: 08/15/24 1704    Order Status: Completed Specimen: Blood from Peripheral, Antecubital, Right Updated: 08/21/24 0612     Blood Culture, Routine No growth after 5 days.            Imaging     Imaging Results              US Kidney (Final result)  Result time 08/15/24 15:43:06      Final result by Storm Boyce MD (08/15/24 15:43:06)                   Impression:      1.  Increased echogenicity to the kidneys concerning for medical renal disease.  Negative for hydronephrosis.    2.  Findings most likely representing small bilateral nonobstructing renal stones measuring up to 3 mm.      Electronically signed by: Storm Boyce MD  Date:    08/15/2024  Time:    15:43               Narrative:    EXAMINATION:  US KIDNEY    CLINICAL HISTORY:  vinh;    TECHNIQUE:  Ultrasound of the kidneys and urinary bladder was performed including color flow and Doppler evaluation of the kidneys.    COMPARISON:  None.    FINDINGS:  Right kidney: The right kidney measures 12.0 cm. No cortical thinning. There is loss of corticomedullary distinction. Resistive index measures 0.65.  No mass. Possible tiny nonobstructing inferior pole stones noted.  No hydronephrosis.    Left kidney: The left kidney measures 12.2 cm. No cortical thinning. There is loss of corticomedullary distinction. Resistive index measures 0.63.  No mass. Possible 3 mm midpole stone.  No hydronephrosis.    The " bladder is partially distended at the time of scanning and has an unremarkable appearance.                                    Pathology     Specimen (24h ago, onward)      None            Assessment and Plan:   Severe MARIA ESTHER, AOCD  Patient has undergone GI workup to assess for metastatic disease given persistent MARIA ESTHER and weight loss over a 4 month period  S/p 400 mg of IV Iron Sucrose (200 mg on 08/17 and 08/24)  Colonoscopy 08/20/24: normal with no specimens collected  EGD 08/20/2024: gastritis; biopsy results pending  Patient does not require further IV iron as iron has been adequately repleted  Patient currently receiving EPO with plan for weekly administration       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 She has also received 3 doses of Medrol. She has been noted to be positive for MPO and P-ANCA.  There is also concern for Wegener's disease given positive P-ANCA antibody and abnormal lung findings on CT Chest  S/p Renal Biopsy 08/20/24 - pathology pending  Plan is to start Cytoxan once pathology confirms disease, continue steroids and consult Rheumatology       I appreciate the opportunity to participate in this patient's care. Please do not hesitate to contact me with any questions or concerns.     Audrey Durbin MD  Hematology/Oncology

## 2024-08-25 NOTE — ASSESSMENT & PLAN NOTE
Outpt pulm concerned for wegeners disease  Nephrology following, appreciate  Renal U/S negative for obstruction  Creatinine 2.1, trending down  Monitor labs daily    08/20/2024  Renal function seemingly back at baseline  Discussed with Pulmnology, suspects an element of vasculitis (possibly Wegener's)  Renal biopsy to be perfomed today   Will initiate high dose steroid therapy after biopsy     08/21/2024  Day 2 of high dose steroids  Plasma apheresis ordered  Vasc cath to be placed today by IR  Nephrology facilitating the above, appreciate recs     08/22/2024  Day 3 of high dose IV steroids- transition to PO tomorrow  Vasc cath placed today  Plasma apheresis planned for this evening  Discussed with nephrology, anticipate duration of therapy to be done while      08/24/2024   -steroids transitioned to PO prednisone 20 mg BID  -continue plasma apheresis per nephrology recs    08/25/2024  --Renal biopsy immunofluorescence results were negative; no anti-GBM antibodies.   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   --Plasmapheresis discontinued.    --rituximab therapy initiated- first dose administered today, 2nd dose planned for two weeks later, with a follow-up plan to continue rituximab therapy for two years in accordance with maintenance trials.    --Per Nephrology, Vas-Cath may be removed in the morning if renal function remains stable.

## 2024-08-26 VITALS
DIASTOLIC BLOOD PRESSURE: 76 MMHG | OXYGEN SATURATION: 99 % | HEIGHT: 64 IN | RESPIRATION RATE: 18 BRPM | WEIGHT: 94.38 LBS | BODY MASS INDEX: 16.11 KG/M2 | SYSTOLIC BLOOD PRESSURE: 127 MMHG | TEMPERATURE: 98 F | HEART RATE: 70 BPM

## 2024-08-26 LAB
ALBUMIN SERPL BCP-MCNC: 3.1 G/DL (ref 3.5–5.2)
ANION GAP SERPL CALC-SCNC: 9 MMOL/L (ref 8–16)
BASOPHILS # BLD AUTO: 0.02 K/UL (ref 0–0.2)
BASOPHILS NFR BLD: 0.1 % (ref 0–1.9)
BUN SERPL-MCNC: 41 MG/DL (ref 6–20)
CALCIUM SERPL-MCNC: 8.2 MG/DL (ref 8.7–10.5)
CHLORIDE SERPL-SCNC: 111 MMOL/L (ref 95–110)
CO2 SERPL-SCNC: 18 MMOL/L (ref 23–29)
CREAT SERPL-MCNC: 1.6 MG/DL (ref 0.5–1.4)
DIFFERENTIAL METHOD BLD: ABNORMAL
EOSINOPHIL # BLD AUTO: 0 K/UL (ref 0–0.5)
EOSINOPHIL NFR BLD: 0 % (ref 0–8)
ERYTHROCYTE [DISTWIDTH] IN BLOOD BY AUTOMATED COUNT: 15.8 % (ref 11.5–14.5)
EST. GFR  (NO RACE VARIABLE): 39 ML/MIN/1.73 M^2
GLUCOSE SERPL-MCNC: 122 MG/DL (ref 70–110)
HCT VFR BLD AUTO: 30.4 % (ref 37–48.5)
HGB BLD-MCNC: 9.3 G/DL (ref 12–16)
IMM GRANULOCYTES # BLD AUTO: 0.22 K/UL (ref 0–0.04)
IMM GRANULOCYTES NFR BLD AUTO: 1.5 % (ref 0–0.5)
LYMPHOCYTES # BLD AUTO: 1 K/UL (ref 1–4.8)
LYMPHOCYTES NFR BLD: 6.7 % (ref 18–48)
MCH RBC QN AUTO: 25.9 PG (ref 27–31)
MCHC RBC AUTO-ENTMCNC: 30.6 G/DL (ref 32–36)
MCV RBC AUTO: 85 FL (ref 82–98)
MONOCYTES # BLD AUTO: 0.3 K/UL (ref 0.3–1)
MONOCYTES NFR BLD: 2 % (ref 4–15)
NEUTROPHILS # BLD AUTO: 13.1 K/UL (ref 1.8–7.7)
NEUTROPHILS NFR BLD: 89.7 % (ref 38–73)
NRBC BLD-RTO: 0 /100 WBC
PHOSPHATE SERPL-MCNC: 3.4 MG/DL (ref 2.7–4.5)
PLATELET # BLD AUTO: 375 K/UL (ref 150–450)
PMV BLD AUTO: 10.2 FL (ref 9.2–12.9)
POTASSIUM SERPL-SCNC: 4.7 MMOL/L (ref 3.5–5.1)
RBC # BLD AUTO: 3.59 M/UL (ref 4–5.4)
SODIUM SERPL-SCNC: 138 MMOL/L (ref 136–145)
WBC # BLD AUTO: 14.59 K/UL (ref 3.9–12.7)

## 2024-08-26 PROCEDURE — 25000003 PHARM REV CODE 250: Performed by: STUDENT IN AN ORGANIZED HEALTH CARE EDUCATION/TRAINING PROGRAM

## 2024-08-26 PROCEDURE — 25000003 PHARM REV CODE 250: Performed by: INTERNAL MEDICINE

## 2024-08-26 PROCEDURE — 63600175 PHARM REV CODE 636 W HCPCS: Performed by: INTERNAL MEDICINE

## 2024-08-26 PROCEDURE — 85025 COMPLETE CBC W/AUTO DIFF WBC: CPT | Performed by: INTERNAL MEDICINE

## 2024-08-26 PROCEDURE — 99233 SBSQ HOSP IP/OBS HIGH 50: CPT | Mod: ,,, | Performed by: INTERNAL MEDICINE

## 2024-08-26 PROCEDURE — 80069 RENAL FUNCTION PANEL: CPT | Performed by: INTERNAL MEDICINE

## 2024-08-26 PROCEDURE — 36415 COLL VENOUS BLD VENIPUNCTURE: CPT | Performed by: INTERNAL MEDICINE

## 2024-08-26 RX ORDER — AMLODIPINE BESYLATE 10 MG/1
10 TABLET ORAL DAILY
Qty: 30 TABLET | Refills: 0 | Status: SHIPPED | OUTPATIENT
Start: 2024-08-27 | End: 2024-08-30

## 2024-08-26 RX ORDER — ACETAMINOPHEN 500 MG
5000 TABLET ORAL DAILY
Qty: 30 TABLET | Refills: 0 | Status: SHIPPED | OUTPATIENT
Start: 2024-08-27 | End: 2024-09-26

## 2024-08-26 RX ORDER — FERROUS SULFATE 325(65) MG
325 TABLET, DELAYED RELEASE (ENTERIC COATED) ORAL DAILY
Qty: 30 TABLET | Refills: 0 | Status: SHIPPED | OUTPATIENT
Start: 2024-08-26 | End: 2024-09-25

## 2024-08-26 RX ORDER — PANTOPRAZOLE SODIUM 40 MG/1
40 TABLET, DELAYED RELEASE ORAL DAILY
Qty: 30 TABLET | Refills: 0 | Status: SHIPPED | OUTPATIENT
Start: 2024-08-27 | End: 2024-08-29 | Stop reason: SDUPTHER

## 2024-08-26 RX ORDER — SULFAMETHOXAZOLE AND TRIMETHOPRIM 800; 160 MG/1; MG/1
1 TABLET ORAL
Qty: 12 TABLET | Refills: 0 | Status: SHIPPED | OUTPATIENT
Start: 2024-08-28 | End: 2024-09-27

## 2024-08-26 RX ORDER — SODIUM BICARBONATE 650 MG/1
650 TABLET ORAL 2 TIMES DAILY
Qty: 14 TABLET | Refills: 0 | Status: SHIPPED | OUTPATIENT
Start: 2024-08-26 | End: 2024-09-02

## 2024-08-26 RX ORDER — PREDNISONE 20 MG/1
TABLET ORAL
Qty: 31 TABLET | Refills: 0 | Status: SHIPPED | OUTPATIENT
Start: 2024-08-26 | End: 2024-08-29

## 2024-08-26 RX ORDER — CALCIUM GLUCONATE 20 MG/ML
2 INJECTION, SOLUTION INTRAVENOUS ONCE
Status: DISCONTINUED | OUTPATIENT
Start: 2024-08-26 | End: 2024-08-26

## 2024-08-26 RX ORDER — HEPARIN SODIUM 1000 [USP'U]/ML
3000 INJECTION, SOLUTION INTRAVENOUS; SUBCUTANEOUS ONCE
Status: DISCONTINUED | OUTPATIENT
Start: 2024-08-26 | End: 2024-08-26

## 2024-08-26 RX ORDER — ALBUMIN HUMAN 50 G/1000ML
100 SOLUTION INTRAVENOUS ONCE
Status: DISCONTINUED | OUTPATIENT
Start: 2024-08-26 | End: 2024-08-26

## 2024-08-26 RX ADMIN — PANTOPRAZOLE SODIUM 40 MG: 40 TABLET, DELAYED RELEASE ORAL at 09:08

## 2024-08-26 RX ADMIN — SODIUM BICARBONATE 650 MG: 650 TABLET ORAL at 09:08

## 2024-08-26 RX ADMIN — MUPIROCIN: 20 OINTMENT TOPICAL at 09:08

## 2024-08-26 RX ADMIN — SULFAMETHOXAZOLE AND TRIMETHOPRIM 1 TABLET: 800; 160 TABLET ORAL at 11:08

## 2024-08-26 RX ADMIN — AMLODIPINE BESYLATE 10 MG: 10 TABLET ORAL at 09:08

## 2024-08-26 RX ADMIN — FERROUS SULFATE TAB 325 MG (65 MG ELEMENTAL FE) 1 EACH: 325 (65 FE) TAB at 09:08

## 2024-08-26 RX ADMIN — PREDNISONE 20 MG: 20 TABLET ORAL at 09:08

## 2024-08-26 RX ADMIN — CHOLECALCIFEROL TAB 125 MCG (5000 UNIT) 5000 UNITS: 125 TAB at 09:08

## 2024-08-26 NOTE — PROGRESS NOTES
Nephrology Progress Note     History of Present Illness     49 yr History of recent diagnosis of asthma presenting to the hospital with 4 week history of respiratory symptoms not improving with steroids and beta agonist inhaler. On arrival here, creatinine 2.6 with a peak of 2.7 within improving with some IV fluids. JAIME of uncertain etiology with active sediment on urinalysis. 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  In the context of losing weight poor appetite.  . She did have an SPEP with  monoclonal spikes.  However, kappa lambda ratio fairly unremarkable with fairly similar elevation in both.   anti-GBM  elevated antibodies 3.8.(<1)  Also ANCA with p-ANCA positivity 1:80.     Renal Bx 8/20/2024.      Started on Medrol 1g daily x 3 days 8/20/2024  Started on Apheresis 8/20/2024 x 5 treatmenst.  2 sequential and then QOD.  8/24/24 renal biopsy report verbal  biopsy limited 3 glom for LM 3 glom for AF with no globe for EM.  I if was negative especially no anti GBM.  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  There was interstitial necrosis.  No significant interstitial fibrosis.      Interval History   Overnight/currently:  Patient denies any chest pain, cough, short of breath, nausea or vomiting.        Allergies:    has No Known Allergies.    Current medications:   Scheduled Meds:   amLODIPine  10 mg Oral Daily    cholecalciferol (vitamin D3)  5,000 Units Oral Daily    epoetin natalie-epbx  50 Units/kg Subcutaneous Q7 Days    ferrous sulfate  1 tablet Oral Daily    iron sucrose  200 mg Intravenous Q7 Days    mupirocin   Nasal BID    pantoprazole  40 mg Oral Daily    predniSONE  20 mg Oral BID    sodium bicarbonate  650 mg Oral BID    sulfamethoxazole-trimethoprim 800-160mg  1 tablet Oral Once per day on Monday Wednesday Friday     Continuous Infusions:  PRN Meds:.  Current Facility-Administered  "Medications:     0.9%  NaCl infusion (for blood administration), , Intravenous, Q24H PRN    0.9%  NaCl infusion (for blood administration), , Intravenous, Q24H PRN    acetaminophen, 650 mg, Oral, Q6H PRN    albuterol-ipratropium, 3 mL, Nebulization, Q6H PRN    diphenhydrAMINE, 25 mg, Oral, Q4H PRN    ondansetron, 4 mg, Oral, Q6H PRN     Physical Examination     VS/Measurements    BP (!) 157/82 (BP Location: Right arm, Patient Position: Lying)   Pulse (!) 58   Temp 98.3 °F (36.8 °C) (Oral)   Resp 18   Ht 5' 4" (1.626 m)   Wt 42.8 kg (94 lb 5.7 oz)   LMP  (LMP Unknown)   SpO2 100%   Breastfeeding No   BMI 16.20 kg/m²         General:  Moderately built, not in any distress.  Neck:  Supple,   Respiratory: Non-labored,  Lungs are clear to auscultation.    Cardiovascular:  Normal rate, Regular rhythm.   Abdomen:  Soft, Non-tender, Normal bowel sounds.   Muskuloskeletal:  No pedal edema          Laboratory Results   Today's Lab Results :    Recent Results (from the past 24 hour(s))   Renal Function Panel    Collection Time: 08/26/24  5:31 AM   Result Value Ref Range    Glucose 122 (H) 70 - 110 mg/dL    Sodium 138 136 - 145 mmol/L    Potassium 4.7 3.5 - 5.1 mmol/L    Chloride 111 (H) 95 - 110 mmol/L    CO2 18 (L) 23 - 29 mmol/L    BUN 41 (H) 6 - 20 mg/dL    Calcium 8.2 (L) 8.7 - 10.5 mg/dL    Creatinine 1.6 (H) 0.5 - 1.4 mg/dL    Albumin 3.1 (L) 3.5 - 5.2 g/dL    Phosphorus 3.4 2.7 - 4.5 mg/dL    eGFR 39 (A) >60 mL/min/1.73 m^2    Anion Gap 9 8 - 16 mmol/L   CBC Auto Differential    Collection Time: 08/26/24  5:31 AM   Result Value Ref Range    WBC 14.59 (H) 3.90 - 12.70 K/uL    RBC 3.59 (L) 4.00 - 5.40 M/uL    Hemoglobin 9.3 (L) 12.0 - 16.0 g/dL    Hematocrit 30.4 (L) 37.0 - 48.5 %    MCV 85 82 - 98 fL    MCH 25.9 (L) 27.0 - 31.0 pg    MCHC 30.6 (L) 32.0 - 36.0 g/dL    RDW 15.8 (H) 11.5 - 14.5 %    Platelets 375 150 - 450 K/uL    MPV 10.2 9.2 - 12.9 fL    Immature Granulocytes 1.5 (H) 0.0 - 0.5 %    Gran # (ANC) " 13.1 (H) 1.8 - 7.7 K/uL    Immature Grans (Abs) 0.22 (H) 0.00 - 0.04 K/uL    Lymph # 1.0 1.0 - 4.8 K/uL    Mono # 0.3 0.3 - 1.0 K/uL    Eos # 0.0 0.0 - 0.5 K/uL    Baso # 0.02 0.00 - 0.20 K/uL    nRBC 0 0 /100 WBC    Gran % 89.7 (H) 38.0 - 73.0 %    Lymph % 6.7 (L) 18.0 - 48.0 %    Mono % 2.0 (L) 4.0 - 15.0 %    Eosinophil % 0.0 0.0 - 8.0 %    Basophil % 0.1 0.0 - 1.9 %    Differential Method Automated        LABS:  Reviewed Yes      Intake/Output Summary (Last 24 hours) at 8/26/2024 1248  Last data filed at 8/26/2024 0230  Gross per 24 hour   Intake 2052.86 ml   Output --   Net 2052.86 ml       Assessment and Plan     JAIME with active sediments subnephrotic proteinuria of 1 g positive serology for GBM/ MPO 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  No evidence of GBM on renal biopsy.  Received Rituxan 1 g on 08/25 and will need 2nd dose with rheumatologist in 2 weeks..  She is off plasmapheresis.  She will follow up with [facility] nephrology as well as Rheumatology.  Start tapering prednisone from 08/28/2024 to 30 mg daily and taper off by 10 mg Q weekly until she is on prednisone 10 mg daily.  We will defer further tapering of prednisone to a rheumatologist as an outpatient.  Repeat anti-GBM antibody as an outpatient.  She is also asked to follow up with a pulmonologist to repeat if PFTs in 6 months.  Discontinue Vas-Cath.  .    History of asthma pulmonary nodules per pulmonology     History of nausea weight loss unremarkable endoscopy per patient       Hypertension.  Her blood pressure is stable.        ________________________________________________  Idris Rose

## 2024-08-26 NOTE — PLAN OF CARE
Pt to be d/lisa. D/c instructions reviewed w/ pt, verbalized understanding. IV and cardiac monitor removed. Meds sent to pharmacy of choice. Questions answered and chart check complete

## 2024-08-26 NOTE — PLAN OF CARE
[facility] - Med Surg  Discharge Final Note    Primary Care Provider: No, Primary Doctor    Expected Discharge Date: 8/26/2024    Final Discharge Note (most recent)       Final Note - 08/26/24 1340          Final Note    Assessment Type Final Discharge Note     Anticipated Discharge Disposition Home or Self Care     Hospital Resources/Appts/Education Provided Appointments scheduled and added to AVS        Post-Acute Status    Discharge Delays None known at this time                     Contact Chad Broderick MD   Specialty: Rheumatology    [address]  [place] [address]   Phone: [phone]       Next Steps: Follow up    Instructions: followup as scheduled on 08/29/2024 at 2pm    Moe Montero MD   Specialty: Nephrology    [address]  [place] [address]   Phone: [phone]       Next Steps: Follow up on 9/24/2024    Colleen Blackmon, FNP-C   Specialty: Family Medicine   Relationship: Nurse Practitioner    [address] [place] [id number]   Phone: [phone]       Next Steps: Schedule an appointment as soon as possible for a visit in 3 day(s)          Patient has no d/c needs at this time. Sw to follow up, as needed, for d/c planning purposes.

## 2024-08-26 NOTE — PLAN OF CARE
Discussed plan of care with patient, verbalized understanding.  Patient remains free from injury.  Safety and comfort precautions maintained this shift.   Call light and personal belongings within reach, bed in low position with bed wheels locked.  No s/s of acute distress.   Purposeful rounding continued this shift.  Pain leves are controlled per MD order. IVF infusing.  Cardiac monitoring in place.  Diet orders continued,   Vital signs continued per order.  Patient mobility status remains independent   Chart and orders review completed.   Patient education about care completed.     Problem: Adult Inpatient Plan of Care  Goal: Plan of Care Review  Outcome: Progressing  Goal: Patient-Specific Goal (Individualized)  Outcome: Progressing  Goal: Absence of Hospital-Acquired Illness or Injury  Outcome: Progressing  Goal: Optimal Comfort and Wellbeing  Outcome: Progressing  Goal: Readiness for Transition of Care  Outcome: Progressing     Problem: Acute Kidney Injury/Impairment  Goal: Fluid and Electrolyte Balance  Outcome: Progressing  Goal: Improved Oral Intake  Outcome: Progressing  Goal: Effective Renal Function  Outcome: Progressing     Problem: Pain Acute  Goal: Optimal Pain Control and Function  Outcome: Progressing     Problem: Fall Injury Risk  Goal: Absence of Fall and Fall-Related Injury  Outcome: Progressing     Problem: Infection  Goal: Absence of Infection Signs and Symptoms  Outcome: Progressing     Problem: Acute Kidney Injury/Impairment  Goal: Fluid and Electrolyte Balance  Outcome: Progressing  Goal: Improved Oral Intake  Outcome: Progressing  Goal: Effective Renal Function  Outcome: Progressing

## 2024-08-27 ENCOUNTER — PATIENT MESSAGE (OUTPATIENT)
Dept: ENDOSCOPY | Facility: HOSPITAL | Age: 49
End: 2024-08-27
Payer: COMMERCIAL

## 2024-08-27 ENCOUNTER — TELEPHONE (OUTPATIENT)
Dept: INFUSION THERAPY | Facility: HOSPITAL | Age: 49
End: 2024-08-27
Payer: COMMERCIAL

## 2024-08-27 NOTE — DISCHARGE SUMMARY
[facility] Medicine  Discharge Summary      Patient Name: Shayy Faust  MRN: [id number]  FANI: [id number]  Patient Class: IP- Inpatient  Admission Date: 8/15/2024  Hospital Length of Stay: 11 days  Discharge Date and Time: 8/26/2024  3:52 PM  Attending Physician: Karine att. providers found   Discharging Provider: Glendy Gibbs MD  Primary Care Provider: Karine, Primary Doctor    Primary Care Team: Networked reference to record PCT     HPI:   Patient is a 49 y.o.  female with a PMHx of asthma who presents to the Emergency Department due to abnormal blood work. Patient currently being seen by Dr. Jiménez for pulmonology. She reports not feeling well for several weeks. Endorses chills and sob. Denies any fever or congestion. Reports having good urinary output. Denies any home meds at this time.     In the ED, wbc: 15.1k, h/h 7.8/25.8, plt: 826, bun/cr: 43/2.7.     Procedure(s) (LRB):  EGD (ESOPHAGOGASTRODUODENOSCOPY) (N/A)  COLONOSCOPY (N/A)      Hospital Course:   49 year-old female admitted with c/o abnormal blood work. She reports she hasn't been feeling well for a few weeks now. She is being seen and worked up by Dr. Jiménez with pulmonology for for SOB and chills. She started with an URI that progressed to SOB. She had Spirometry in pulm clinic that showed obstruction with very severe ventilatory impairment unimproved with post bronchodilator. She reports unintentional weight loss of 25 lbs. Pulm concerned for Wegener's disease. CT chest showed numerous bilateral lung opacities; consider atypical infection/inflammation as well as metastatic neoplasm; recommend further evaluation with PET-CT scan. Tree in bud micronodularity in lower lobes consistent with chronic small airway disease. CT a/p with punctate nonobstructing renal calculi, no hydronephrosis. Mild to moderate amount of stool in colon, with diverticulitis.  Worsening kidney function. Nephrology consulted. US Kidney showed increased  echogenicity to kidneys concerning for medical renal disease. Recommends kidney biopsy, pending this next week, will try for Monday. Hem/onc consulted, recommends further metastatic workup with upper/lower endoscopy. Consult GI for evaluation.  Cont rocephin and doxycycline. Hgb 6.7, unknown bleeding source. Transfuse 1 unit PRBC (8/18).      08/19/2024  H&H improved after 1UPRBC. Continue to remain stable since that time. Endoscopic procedure planned this hospitalization. Pulm Dr. 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 Consider high dose steroids after renal biopsy. Renal biopsy priority moved up to this hospitalization. Reached out to IR, they will facilitate as soon as schedule allows.      08/20/2024  EGD/Colon complete. Minor gastritis, biopsies taken. Small bowel normal, biopsied for MARIA ESTHER. Otherwise normal. Prep in right colon poor, left colon fair. No masses appreciated. Ileum intubated and normal. GI Recommend outpatient video capsule to complete work up for MARIA ESTHER. Continued on PPI. Discussed with IR, plans for renal biopsy sometime today. Will initiate high dose steroids thereafter per Dr. Jiménez' request.     08/21/2024  Discussed case with specialists, the decision for plasma apheresis/exchange to done at this location through [facility] was made. Dr. Galeas with nephrology is communicating with [facility] team to assure this gets done. IR consulted for vascular access.      08/22/2024  Unable to undergo vasc cath yesterday due to urgent case per IR. Vasc cath placed this morning. Plans to undergo first session of apheresis this evening. Discussed case with Nephrology, they are spearheading this treatment. Expect patient to be here for extended period of time.      08/23/2024-8/24/2024  Vasc cath place, underwent first two sessions of apheresis. Tolerated it well. Completed course of Rocephin + Doxy inpatient.     08/25/2024  Renal biopsy report  returned yesterday afternoon, verbally communicated to Dr. Braun. The immunofluorescence results were negative, notably showing no anti-GBM antibodies. 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 Plasmapheresis discontinued.  Nephrology initiated rituximab therapy, with the first dose of 1g administered today. A second dose is planned for two weeks later, with a follow-up plan to continue rituximab therapy for two years in accordance with maintenance trials.  Patient will need follow up with Rheumatology, Nephrology, and Pulmonology. A pulmonologist was recommended in six months to repeat PFTs. Per Nephrology, Vas-Cath may be removed in the morning if renal function remains stable.    08/26/2024:   NAEON. Cr improved to 1.6, pt tolerated Rituxan infusion without issues. Reports good UOP. Denies abd pain, CP, SOB, n/v. Sats stable on room air. After discussion with Nephrology Dr. Rose, vas-cath was removed by IR. She will continue Prednisone taper as recommended by nephrology (decrease Prednisone by 10 mg weekly until pt is on 10 mg daily and can followup with rheumatology for further management). Pt seen and examined on day of discharge and discharged home in stable condition. Followup scheduled with Rheumatology Dr. Douglass on 08/29 for further management and further Rituxan infusions.  Will followup with [facility] Dr. Montero [id number]. Followup with PCP within 3-5 days, GI for video capsule endoscopy and Pulm as prev scheduled      Goals of Care Treatment Preferences:  Code Status: Full Code         Consults:   Consults (From admission, onward)          Status Ordering Provider     Inpatient consult to Interventional Radiology  Once        Provider:  Pratik Wallace PA-C    Completed DARLYN LORENZO     Inpatient consult to Gastroenterology  Once        Provider:  Darlyn Lorenzo MD    Completed CONTRERAS JULIAN     Inpatient consult to Nephrology  Once         Provider:  Srinivas Collado MD    Completed SOREN SIERRA            No new Assessment & Plan notes have been filed under this hospital service since the last note was generated.  Service: Hospital Medicine    Final Active Diagnoses:    Diagnosis Date Noted POA    PRINCIPAL PROBLEM:  JAIME (acute kidney injury) [N17.9] 08/14/2024 Yes    Anemia, chronic renal failure, stage 4 (severe) [N18.4, D63.1] 08/23/2024 Unknown    Moderate protein-calorie malnutrition [E44.0] 08/23/2024 Yes    Iron deficiency anemia [D50.9] 08/14/2024 Yes    Thrombocytosis [D75.839] 08/14/2024 Yes    PNA (pneumonia) [J18.9] 08/14/2024 Yes      Problems Resolved During this Admission:       Discharged Condition: good    Disposition: Home or Self Care    Follow Up:   Follow-up Information       Chad Douglass MD Follow up.    Specialty: Rheumatology  Why: followup as scheduled on 08/29/2024 at 2pm  Contact information:  [address]  [place] [address] [phone]               Moe Montero MD Follow up on 9/24/2024.    Specialty: Nephrology  Contact information:  [address]  [place] [address] [phone]               Colleen Blackmon, FNP-C. Schedule an appointment as soon as possible for a visit in 3 day(s).    Specialty: Family Medicine  Contact information:  [address] [address] [phone]                           Patient Instructions:      Diet Adult Regular     Order Specific Question Answer Comments   Additional restrictions: Renal      Notify your health care provider if you experience any of the following:  temperature >100.4     Notify your health care provider if you experience any of the following:  difficulty breathing or increased cough     Activity as tolerated       Significant Diagnostic Studies: See Hospital Course     Pending Diagnostic Studies:       Procedure Component Value Units Date/Time    Specimen to Pathology, Radiology Kidney, [place] biopsy [[id number]]  Collected: 08/20/24 1446    Order Status: Sent Lab Status: In process Updated: 08/20/24 1458    Specimen to Pathology, Surgery Gastrointestinal tract [[id number]] Collected: 08/20/24 0952    Order Status: Sent Lab Status: In process Updated: 08/20/24 1240    Specimen: Tissue            Medications:  Reconciled Home Medications:      Medication List        START taking these medications      amLODIPine 10 MG tablet  Commonly known as: NORVASC  Take 1 tablet (10 mg total) by mouth once daily.     cholecalciferol (vitamin D3) 125 mcg (5,000 unit) Tab  Take 1 tablet (5,000 Units total) by mouth once daily.     ferrous sulfate 325 (65 FE) MG EC tablet  Take 1 tablet (325 mg total) by mouth once daily.     pantoprazole 40 MG tablet  Commonly known as: PROTONIX  Take 1 tablet (40 mg total) by mouth once daily.     predniSONE 20 MG tablet  Commonly known as: DELTASONE  Take 1 tablet (20 mg total) by mouth 2 (two) times daily for 3 days, THEN 1.5 tablets (30 mg total) once daily for 7 days, THEN 1 tablet (20 mg total) once daily for 7 days, THEN 0.5 tablets (10 mg total) once daily for 14 days.  Start taking on: August 26, 2024     sodium bicarbonate 650 MG tablet  Take 1 tablet (650 mg total) by mouth 2 (two) times daily. for 7 days     sulfamethoxazole-trimethoprim 800-160mg 800-160 mg Tab  Commonly known as: BACTRIM DS  Take 1 tablet by mouth 3 (three) times a week.  Start taking on: August 28, 2024            CONTINUE taking these medications      albuterol 90 mcg/actuation inhaler  Commonly known as: PROVENTIL/VENTOLIN HFA  Inhale 2 puffs into the lungs every 4 (four) hours as needed for Wheezing or Shortness of Breath.            STOP taking these medications      doxycycline 100 MG Cap  Commonly known as: VIBRAMYCIN              Indwelling Lines/Drains at time of discharge:   Lines/Drains/Airways       None                   Time spent on the discharge of patient: 50 minutes         Glendy Gibbs MD  Department of  [facility] Medicine  '[facility]

## 2024-08-28 ENCOUNTER — INFUSION (OUTPATIENT)
Dept: INFUSION THERAPY | Facility: HOSPITAL | Age: 49
End: 2024-08-28
Attending: INTERNAL MEDICINE
Payer: COMMERCIAL

## 2024-08-28 ENCOUNTER — PATIENT OUTREACH (OUTPATIENT)
Dept: ADMINISTRATIVE | Facility: CLINIC | Age: 49
End: 2024-08-28
Payer: COMMERCIAL

## 2024-08-28 ENCOUNTER — LAB VISIT (OUTPATIENT)
Dept: LAB | Facility: HOSPITAL | Age: 49
End: 2024-08-28
Attending: INTERNAL MEDICINE
Payer: COMMERCIAL

## 2024-08-28 VITALS
DIASTOLIC BLOOD PRESSURE: 71 MMHG | OXYGEN SATURATION: 99 % | RESPIRATION RATE: 16 BRPM | TEMPERATURE: 98 F | SYSTOLIC BLOOD PRESSURE: 121 MMHG | HEART RATE: 66 BPM

## 2024-08-28 DIAGNOSIS — D50.8 OTHER IRON DEFICIENCY ANEMIA: Primary | ICD-10-CM

## 2024-08-28 DIAGNOSIS — R80.9 PROTEINURIA: ICD-10-CM

## 2024-08-28 DIAGNOSIS — N17.9 ACUTE KIDNEY FAILURE, UNSPECIFIED: ICD-10-CM

## 2024-08-28 DIAGNOSIS — D50.0 IRON DEFICIENCY ANEMIA DUE TO CHRONIC BLOOD LOSS: ICD-10-CM

## 2024-08-28 LAB
ALBUMIN SERPL BCP-MCNC: 3.6 G/DL (ref 3.5–5.2)
ALP SERPL-CCNC: 55 U/L (ref 55–135)
ALT SERPL W/O P-5'-P-CCNC: 16 U/L (ref 10–44)
ANION GAP SERPL CALC-SCNC: 11 MMOL/L (ref 8–16)
AST SERPL-CCNC: 12 U/L (ref 10–40)
BASOPHILS # BLD AUTO: 0.03 K/UL (ref 0–0.2)
BASOPHILS NFR BLD: 0.2 % (ref 0–1.9)
BILIRUB SERPL-MCNC: 0.2 MG/DL (ref 0.1–1)
BUN SERPL-MCNC: 47 MG/DL (ref 6–20)
CALCIUM SERPL-MCNC: 8.7 MG/DL (ref 8.7–10.5)
CHLORIDE SERPL-SCNC: 112 MMOL/L (ref 95–110)
CO2 SERPL-SCNC: 20 MMOL/L (ref 23–29)
CREAT SERPL-MCNC: 1.8 MG/DL (ref 0.5–1.4)
DIFFERENTIAL METHOD BLD: ABNORMAL
EOSINOPHIL # BLD AUTO: 0 K/UL (ref 0–0.5)
EOSINOPHIL NFR BLD: 0 % (ref 0–8)
ERYTHROCYTE [DISTWIDTH] IN BLOOD BY AUTOMATED COUNT: 17.3 % (ref 11.5–14.5)
EST. GFR  (NO RACE VARIABLE): 34 ML/MIN/1.73 M^2
FERRITIN SERPL-MCNC: 1130 NG/ML (ref 20–300)
FINAL PATHOLOGIC DIAGNOSIS: NORMAL
GLUCOSE SERPL-MCNC: 124 MG/DL (ref 70–110)
HCT VFR BLD AUTO: 33.6 % (ref 37–48.5)
HGB BLD-MCNC: 10.3 G/DL (ref 12–16)
IMM GRANULOCYTES # BLD AUTO: 0.48 K/UL (ref 0–0.04)
IMM GRANULOCYTES NFR BLD AUTO: 2.8 % (ref 0–0.5)
IRON SERPL-MCNC: 86 UG/DL (ref 30–160)
LYMPHOCYTES # BLD AUTO: 1.5 K/UL (ref 1–4.8)
LYMPHOCYTES NFR BLD: 8.6 % (ref 18–48)
Lab: NORMAL
MCH RBC QN AUTO: 26.4 PG (ref 27–31)
MCHC RBC AUTO-ENTMCNC: 30.7 G/DL (ref 32–36)
MCV RBC AUTO: 86 FL (ref 82–98)
MONOCYTES # BLD AUTO: 0.9 K/UL (ref 0.3–1)
MONOCYTES NFR BLD: 5.2 % (ref 4–15)
NEUTROPHILS # BLD AUTO: 14.3 K/UL (ref 1.8–7.7)
NEUTROPHILS NFR BLD: 83.2 % (ref 38–73)
NRBC BLD-RTO: 0 /100 WBC
PLATELET # BLD AUTO: 468 K/UL (ref 150–450)
PMV BLD AUTO: 10.1 FL (ref 9.2–12.9)
POTASSIUM SERPL-SCNC: 4.2 MMOL/L (ref 3.5–5.1)
PROT SERPL-MCNC: 5.9 G/DL (ref 6–8.4)
RBC # BLD AUTO: 3.9 M/UL (ref 4–5.4)
SATURATED IRON: 36 % (ref 20–50)
SODIUM SERPL-SCNC: 143 MMOL/L (ref 136–145)
TOTAL IRON BINDING CAPACITY: 237 UG/DL (ref 250–450)
TRANSFERRIN SERPL-MCNC: 160 MG/DL (ref 200–375)
WBC # BLD AUTO: 17.13 K/UL (ref 3.9–12.7)

## 2024-08-28 PROCEDURE — 36415 COLL VENOUS BLD VENIPUNCTURE: CPT | Performed by: INTERNAL MEDICINE

## 2024-08-28 PROCEDURE — 63600175 PHARM REV CODE 636 W HCPCS: Performed by: INTERNAL MEDICINE

## 2024-08-28 PROCEDURE — 25000003 PHARM REV CODE 250: Performed by: INTERNAL MEDICINE

## 2024-08-28 PROCEDURE — 82728 ASSAY OF FERRITIN: CPT | Performed by: INTERNAL MEDICINE

## 2024-08-28 PROCEDURE — 83540 ASSAY OF IRON: CPT | Performed by: INTERNAL MEDICINE

## 2024-08-28 PROCEDURE — 80053 COMPREHEN METABOLIC PANEL: CPT | Performed by: INTERNAL MEDICINE

## 2024-08-28 PROCEDURE — 85025 COMPLETE CBC W/AUTO DIFF WBC: CPT | Performed by: INTERNAL MEDICINE

## 2024-08-28 PROCEDURE — 96365 THER/PROPH/DIAG IV INF INIT: CPT

## 2024-08-28 RX ORDER — DIPHENHYDRAMINE HYDROCHLORIDE 50 MG/ML
50 INJECTION INTRAMUSCULAR; INTRAVENOUS ONCE AS NEEDED
OUTPATIENT
Start: 2024-09-04

## 2024-08-28 RX ORDER — SODIUM CHLORIDE 0.9 % (FLUSH) 0.9 %
10 SYRINGE (ML) INJECTION
OUTPATIENT
Start: 2024-09-04

## 2024-08-28 RX ORDER — HEPARIN 100 UNIT/ML
500 SYRINGE INTRAVENOUS
OUTPATIENT
Start: 2024-09-04

## 2024-08-28 RX ORDER — EPINEPHRINE 0.3 MG/.3ML
0.3 INJECTION SUBCUTANEOUS ONCE AS NEEDED
OUTPATIENT
Start: 2024-09-04

## 2024-08-28 RX ADMIN — SODIUM CHLORIDE 125 MG: 9 INJECTION, SOLUTION INTRAVENOUS at 01:08

## 2024-08-28 NOTE — PLAN OF CARE
Problem: Adult Inpatient Plan of Care  Goal: Plan of Care Review  Outcome: Progressing  Flowsheets (Taken 8/28/2024 1255)  Plan of Care Reviewed With: patient  Goal: Patient-Specific Goal (Individualized)  Outcome: Progressing  Flowsheets (Taken 8/28/2024 1255)  Individualized Care Needs: warm blanket, legs elevated during infusion  Anxieties, Fears or Concerns: none today  Goal: Absence of Hospital-Acquired Illness or Injury  Outcome: Progressing  Intervention: Prevent Infection  Flowsheets (Taken 8/28/2024 1255)  Infection Prevention:   equipment surfaces disinfected   hand hygiene promoted   personal protective equipment utilized  Goal: Optimal Comfort and Wellbeing  Outcome: Progressing  Intervention: Provide Person-Centered Care  Flowsheets (Taken 8/28/2024 1255)  Trust Relationship/Rapport:   thoughts/feelings acknowledged   care explained   choices provided   emotional support provided   empathic listening provided   questions answered   questions encouraged   reassurance provided     Problem: Anemia  Goal: Anemia Symptom Improvement  Outcome: Progressing  Intervention: Monitor and Manage Anemia  Flowsheets (Taken 8/28/2024 1255)  Safety Promotion/Fall Prevention:   assistive device/personal item within reach   Fall Risk reviewed with patient/family   nonskid shoes/socks when out of bed   patient expresses understanding of fall risk and prevention   room near unit station   instructed to call staff for mobility   in recliner, wheels locked  Fatigue Management:   fatigue-related activity identified   frequent rest breaks encouraged   paced activity encouraged     Problem: Fatigue  Goal: Improved Activity Tolerance  Outcome: Progressing  Intervention: Promote Improved Energy  Flowsheets (Taken 8/28/2024 1255)  Fatigue Management:   fatigue-related activity identified   frequent rest breaks encouraged   paced activity encouraged     Problem: Fall Injury Risk  Goal: Absence of Fall and Fall-Related  Injury  Outcome: Progressing  Intervention: Promote Injury-Free Environment  Flowsheets (Taken 8/28/2024 7763)  Safety Promotion/Fall Prevention:   assistive device/personal item within reach   Fall Risk reviewed with patient/family   nonskid shoes/socks when out of bed   patient expresses understanding of fall risk and prevention   room near unit station   instructed to call staff for mobility   in recliner, wheels locked

## 2024-08-28 NOTE — NURSING
Pt tolerated Ferrlecit #1 infusion well. No adverse reaction noted. Pt education reinforced on possible side effects, what to expect, and when to call . Pt verbalized understanding. I reviewed pt calendar w/ pt and understanding verbalized.  After one hour wait post Ferrlecit infusion, IV flushed w/ NS and D/C per protocol.

## 2024-08-28 NOTE — PROGRESS NOTES
C3 nurse attempted to contact Shayy Faust  for a TCC post hospital discharge follow up call. No answer. Left voicemail with callback information. The patient has a scheduled HOSFU appointment with Colleen Blackmon on 8/30/24 @ 0800.

## 2024-08-29 ENCOUNTER — PATIENT MESSAGE (OUTPATIENT)
Dept: RHEUMATOLOGY | Facility: CLINIC | Age: 49
End: 2024-08-29
Payer: COMMERCIAL

## 2024-08-29 ENCOUNTER — OFFICE VISIT (OUTPATIENT)
Dept: RHEUMATOLOGY | Facility: CLINIC | Age: 49
End: 2024-08-29
Payer: COMMERCIAL

## 2024-08-29 VITALS
SYSTOLIC BLOOD PRESSURE: 132 MMHG | HEART RATE: 67 BPM | WEIGHT: 106.69 LBS | BODY MASS INDEX: 18.21 KG/M2 | DIASTOLIC BLOOD PRESSURE: 79 MMHG | HEIGHT: 64 IN

## 2024-08-29 DIAGNOSIS — Z79.899 IMMUNODEFICIENCY DUE TO DRUG THERAPY: ICD-10-CM

## 2024-08-29 DIAGNOSIS — Z79.899 HIGH RISK MEDICATION USE: Primary | ICD-10-CM

## 2024-08-29 DIAGNOSIS — Z51.81 ENCOUNTER FOR MONITORING IMMUNOSUPPRESSIVE MEDICATION THERAPY CAUSING IMMUNODEFICIENCY: ICD-10-CM

## 2024-08-29 DIAGNOSIS — D84.821 IMMUNODEFICIENCY DUE TO DRUG THERAPY: ICD-10-CM

## 2024-08-29 DIAGNOSIS — R76.8 RHEUMATOID FACTOR POSITIVE: ICD-10-CM

## 2024-08-29 DIAGNOSIS — I77.82 ANCA-ASSOCIATED VASCULITIS: Primary | ICD-10-CM

## 2024-08-29 DIAGNOSIS — Z79.899 HIGH RISK MEDICATION USE: ICD-10-CM

## 2024-08-29 DIAGNOSIS — D84.821 ENCOUNTER FOR MONITORING IMMUNOSUPPRESSIVE MEDICATION THERAPY CAUSING IMMUNODEFICIENCY: ICD-10-CM

## 2024-08-29 DIAGNOSIS — Z79.60 ENCOUNTER FOR MONITORING IMMUNOSUPPRESSIVE MEDICATION THERAPY CAUSING IMMUNODEFICIENCY: ICD-10-CM

## 2024-08-29 PROCEDURE — 99999 PR PBB SHADOW E&M-EST. PATIENT-LVL IV: CPT | Mod: PBBFAC,,, | Performed by: STUDENT IN AN ORGANIZED HEALTH CARE EDUCATION/TRAINING PROGRAM

## 2024-08-29 PROCEDURE — 1160F RVW MEDS BY RX/DR IN RCRD: CPT | Mod: CPTII,S$GLB,, | Performed by: STUDENT IN AN ORGANIZED HEALTH CARE EDUCATION/TRAINING PROGRAM

## 2024-08-29 PROCEDURE — 3075F SYST BP GE 130 - 139MM HG: CPT | Mod: CPTII,S$GLB,, | Performed by: STUDENT IN AN ORGANIZED HEALTH CARE EDUCATION/TRAINING PROGRAM

## 2024-08-29 PROCEDURE — 3044F HG A1C LEVEL LT 7.0%: CPT | Mod: CPTII,S$GLB,, | Performed by: STUDENT IN AN ORGANIZED HEALTH CARE EDUCATION/TRAINING PROGRAM

## 2024-08-29 PROCEDURE — 1159F MED LIST DOCD IN RCRD: CPT | Mod: CPTII,S$GLB,, | Performed by: STUDENT IN AN ORGANIZED HEALTH CARE EDUCATION/TRAINING PROGRAM

## 2024-08-29 PROCEDURE — 99205 OFFICE O/P NEW HI 60 MIN: CPT | Mod: S$GLB,,, | Performed by: STUDENT IN AN ORGANIZED HEALTH CARE EDUCATION/TRAINING PROGRAM

## 2024-08-29 PROCEDURE — 3078F DIAST BP <80 MM HG: CPT | Mod: CPTII,S$GLB,, | Performed by: STUDENT IN AN ORGANIZED HEALTH CARE EDUCATION/TRAINING PROGRAM

## 2024-08-29 PROCEDURE — 3008F BODY MASS INDEX DOCD: CPT | Mod: CPTII,S$GLB,, | Performed by: STUDENT IN AN ORGANIZED HEALTH CARE EDUCATION/TRAINING PROGRAM

## 2024-08-29 RX ORDER — EPINEPHRINE 0.3 MG/.3ML
0.3 INJECTION SUBCUTANEOUS ONCE AS NEEDED
OUTPATIENT
Start: 2024-09-11

## 2024-08-29 RX ORDER — MEPERIDINE HYDROCHLORIDE 50 MG/ML
25 INJECTION INTRAMUSCULAR; INTRAVENOUS; SUBCUTANEOUS
OUTPATIENT
Start: 2024-09-11 | End: 2024-09-12

## 2024-08-29 RX ORDER — METHYLPREDNISOLONE SOD SUCC 125 MG
100 VIAL (EA) INJECTION
OUTPATIENT
Start: 2024-09-11

## 2024-08-29 RX ORDER — PREDNISONE 10 MG/1
TABLET ORAL
Qty: 102 TABLET | Refills: 0 | Status: SHIPPED | OUTPATIENT
Start: 2024-08-29 | End: 2024-10-10

## 2024-08-29 RX ORDER — ACETAMINOPHEN 325 MG/1
650 TABLET ORAL
OUTPATIENT
Start: 2024-09-11

## 2024-08-29 RX ORDER — HEPARIN 100 UNIT/ML
500 SYRINGE INTRAVENOUS
OUTPATIENT
Start: 2024-09-11

## 2024-08-29 RX ORDER — FAMOTIDINE 10 MG/ML
20 INJECTION INTRAVENOUS
OUTPATIENT
Start: 2024-09-11

## 2024-08-29 RX ORDER — SODIUM CHLORIDE 0.9 % (FLUSH) 0.9 %
10 SYRINGE (ML) INJECTION
OUTPATIENT
Start: 2024-09-11

## 2024-08-29 RX ORDER — DIPHENHYDRAMINE HYDROCHLORIDE 50 MG/ML
50 INJECTION INTRAMUSCULAR; INTRAVENOUS ONCE AS NEEDED
OUTPATIENT
Start: 2024-09-11

## 2024-08-29 RX ORDER — PANTOPRAZOLE SODIUM 40 MG/1
40 TABLET, DELAYED RELEASE ORAL DAILY
Qty: 90 TABLET | Refills: 3 | Status: SHIPPED | OUTPATIENT
Start: 2024-08-29

## 2024-08-29 NOTE — PROGRESS NOTES
Subjective:      Patient ID: Shayy Faust is a 49 y.o. female.    Chief Complaint: ANCA vasculitis    HPI:   Patient presents for Rheumatology evaluation after hospital discharge this week for a new diagnosis of ANCA vasculitis. Symptoms started in April 2024 with dyspnea, URI symptoms, sinus congestion, generally not feeling well. She was following with Pulmonologist Dr. Jiménez. Her symptoms progressed by August 2025 along with weight loss 25 lbs, anemia, renal disease on labs. CT chest showed numerous bilateral lung opacities; consider atypical infection/inflammation as well as metastatic neoplasm; recommend further evaluation with PET-CT scan. Tree in bud micronodularity in lower lobes consistent with chronic small airway disease. Dr. Jiménez was concerned for GPA so he had her admitted to [facility] for workup/treatment. She was hospitalized 08/16/2024-08/26/2024. She had kidney biopsy notably showing no anti-GBM antibodies. 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 She was noted to have Hb 6.7, unknown bleeding source. Transfused 1 unit PRBC. EGD/Colon showing minor gastritis, otherwise normal. Plans for outpatient video capsule to complete work up for MARIA ESTHER. Given pulse steroids x3 days inpatient. Nephrology initiated rituximab 1000 mg first dose given 08/25/2024. Patient discharged with prednisone taper, Bactrim-DS, Protonix, vitamin D. Presents with me for further treatment for the ANCA vasculitis. Reports feeling better than prior to hospitalization. Endorses an episode of mild epistaxis a few weeks ago. Denies hemoptysis, hematemesis, melena, hematochezia, hematuria, or dark urine. Denies arthralgias, myalgias, petechiae, joint swelling, significant stiffness, skin rashes, oral ulcers, patchy alopecia, sicca symptoms, eye inflammation, IBD, fevers, weight loss, Raynaud's. Rheumatology review of systems is otherwise negative.    Social Hx: Never smoker. Occasional alcohol  "use.  Family Hx: Maternal aunt and great-grandmother have lupus.      Objective:   /79   Pulse 67   Ht 5' 4" (1.626 m)   Wt 48.4 kg (106 lb 11.2 oz)   LMP  (LMP Unknown)   BMI 18.32 kg/m²   Physical Exam  Constitutional:       General: She is not in acute distress.     Appearance: Normal appearance.   HENT:      Head: Normocephalic and atraumatic.      Mouth/Throat:      Mouth: Mucous membranes are moist.      Pharynx: Oropharynx is clear.   Cardiovascular:      Rate and Rhythm: Normal rate and regular rhythm.   Pulmonary:      Effort: Pulmonary effort is normal.      Breath sounds: Normal breath sounds.   Abdominal:      Palpations: Abdomen is soft.      Tenderness: There is no abdominal tenderness.   Musculoskeletal:         General: No swelling or tenderness.      Cervical back: Normal range of motion. No tenderness.   Skin:     General: Skin is warm and dry.      Findings: No rash.   Neurological:      Mental Status: She is alert and oriented to person, place, and time. Mental status is at baseline.          Assessment and Plan:     Problem List Items Addressed This Visit          Unprioritized    ANCA-associated vasculitis - Primary    Relevant Orders    Anti-Neutrophilic Cytoplasmic Antibody    Proteinase 3 Autoantibodies    Myeloperoxidase Antibody (MPO)    Cryoglobulin    Cyclic Citrullinated Peptide Antibody, IgG    Hepatitis B Core Antibody, Total    Hepatitis B Surface Antigen    T-SPOT TB Screening Test    Urinalysis    Sedimentation rate    Protein/Creatinine Ratio, Urine    C-Reactive Protein    Comprehensive Metabolic Panel    CBC Auto Differential    Rheumatoid factor positive     Other Visit Diagnoses       High risk medication use        Immunodeficiency due to drug therapy        Encounter for monitoring immunosuppressive medication therapy causing immunodeficiency                Patient presents for Rheumatology evaluation after hospital discharge this week for a new diagnosis of ANCA " vasculitis. Manifestations of lung and kidney involvement and I suspect sinus involvement as well.    08/13/2024 CT chest showed numerous bilateral lung opacities; consider atypical infection/inflammation as well as metastatic neoplasm; recommend further evaluation with PET-CT scan. Tree in bud micronodularity in lower lobes consistent with chronic small airway disease.   08/20/2024 Kidney biopsy: PAUCI-IMMUNE NECROTIZING CRESCENTIC GLOMERULONEPHRITIS     Labs:  Severe anemia mixed MARIA ESTHER and inflammation. Recovered after transfusion 1U PRBCs.  Initial ESR 97,  prior to pulse steroids.  Elevated platelets, ferritin, IL-2R indicating inflammation.    C-ANCA negative  P-ANCA+ 1:80  RF+ 412  AZAM negative  GBM Ab+ 3.8  A1AT elevated    Treatment so far:  Pulse IV Solumedrol 1000 mg daily x3 doses started 08/22/2024.  Got IV Rituximab 1000 mg on 08/25/2024. Next dose due 09/08/2024 (That's a Sunday, so will plan for Mon-Wed following).    Plan for repeat IV Rituximab 1000 mg x2 doses 14 days apart every 6 months for 2 years. Can switch to oral DMARD sooner if she achieves remission and if no objection from Nephrology or Pulmonology.    When renal function is better, needs MRA or CTA of chest, abdomen, pelvis to monitor for systemic disease.      Plan:  Labs today: ANCA, MPO, PR3, CBC, CMP, ESR, CRP, cryoglobulins, CCP, Hep B, T-spot  Get 2nd dose of IV Rituximab on September 11 at the [facility].  Increase prednisone to 50 mg daily for another week, then continue with taper from the PEXIVAS trial as listed below.  Bactrim-DS three times weekly until prednisone dose is <20 mg daily.  Take Protonix daily, vitamin D 1000 units daily.  Follow up in 4 weeks with labs CBC, CMP, ESR, CRP, UA, UPCR              High Risk Medication Monitoring encounter  Drug therapy requiring intensive monitoring for toxicity  No current medication related issues, no evidence of toxicity  Appropriate labs ordered for toxicity  monitoring    Compromised immune system secondary to autoimmune disease and/or use of immunosuppressive drugs.  Monitor carefully for infections.  Advised patient to get immediate medical care if any infection arises.  Also advised strict adherence age-appropriate vaccinations and cancer screenings with PCP.    Patient advised to hold DMARD and/or biologic therapy for signs of infection or for surgery. If you are unsure what to do please call our office for instruction.[facility] [phone]          Follow up in about 4 weeks (around 9/26/2024).         I spent a total of 74 minutes on the day of the visit.  This includes face to face time and non-face to face time preparing to see the patient (eg, review of tests), obtaining and/or reviewing separately obtained history, documenting clinical information in the electronic or other health record, independently interpreting results and communicating results to the patient/family/caregiver, or care coordinator.

## 2024-08-29 NOTE — Clinical Note
Please have her do labs on Friday 8/30. All the standing labs plus Hep B surface antigen, hep B core antibody, cryoglobulins, CCP. The most important are the standing labs including ANCA, MPO, PR3. Those must get done on 8/30.  Then please add T-spot to any next lab she's doing at the [facility] or [facility]. I don't think they can do T-spot in [facility].  Thank you!

## 2024-08-29 NOTE — Clinical Note
Patient got 1st dose of urgent rituximab inpatient for ANCA vasculitis on 08/25/2024. Needs second dose at the [facility] on 09/11/2024 preferably (can do 09/09 or 09/10 if no availability). Needs urgently for active vasculitis please.

## 2024-08-29 NOTE — PROGRESS NOTES
C3 nurse spoke with Shayy Faust  for a TCC post hospital discharge follow up call. The patient has a scheduled HOSFU appointment with Colleen Blackmon on 8/30/24 @ 0800.

## 2024-08-29 NOTE — PATIENT INSTRUCTIONS
Take prednisone:  Week 1: 50 mg daily  Week 2: 25 mg daily  Week 3-4: 20 mg daily  Week 5-6: 15 mg daily    Continue Bactrim three times weekly.  Continue vitamin D supplements and Protonix daily.    Plan for rituximab around 09/11.

## 2024-08-30 ENCOUNTER — OFFICE VISIT (OUTPATIENT)
Dept: NEPHROLOGY | Facility: CLINIC | Age: 49
End: 2024-08-30
Payer: COMMERCIAL

## 2024-08-30 ENCOUNTER — LAB VISIT (OUTPATIENT)
Dept: LAB | Facility: HOSPITAL | Age: 49
End: 2024-08-30
Attending: STUDENT IN AN ORGANIZED HEALTH CARE EDUCATION/TRAINING PROGRAM
Payer: COMMERCIAL

## 2024-08-30 ENCOUNTER — TELEPHONE (OUTPATIENT)
Dept: RHEUMATOLOGY | Facility: CLINIC | Age: 49
End: 2024-08-30
Payer: COMMERCIAL

## 2024-08-30 ENCOUNTER — OFFICE VISIT (OUTPATIENT)
Dept: INTERNAL MEDICINE | Facility: CLINIC | Age: 49
End: 2024-08-30
Payer: COMMERCIAL

## 2024-08-30 VITALS
HEIGHT: 64 IN | SYSTOLIC BLOOD PRESSURE: 122 MMHG | DIASTOLIC BLOOD PRESSURE: 78 MMHG | HEART RATE: 68 BPM | WEIGHT: 292.13 LBS | BODY MASS INDEX: 49.87 KG/M2

## 2024-08-30 VITALS
HEART RATE: 64 BPM | SYSTOLIC BLOOD PRESSURE: 138 MMHG | RESPIRATION RATE: 18 BRPM | WEIGHT: 107.38 LBS | TEMPERATURE: 97 F | BODY MASS INDEX: 18.33 KG/M2 | OXYGEN SATURATION: 97 % | DIASTOLIC BLOOD PRESSURE: 72 MMHG | HEIGHT: 64 IN

## 2024-08-30 DIAGNOSIS — I77.82 ANCA-ASSOCIATED VASCULITIS: Primary | ICD-10-CM

## 2024-08-30 DIAGNOSIS — Z79.899 IMMUNODEFICIENCY DUE TO DRUG THERAPY: ICD-10-CM

## 2024-08-30 DIAGNOSIS — E44.0 MODERATE PROTEIN-CALORIE MALNUTRITION: ICD-10-CM

## 2024-08-30 DIAGNOSIS — D84.821 IMMUNODEFICIENCY DUE TO DRUG THERAPY: ICD-10-CM

## 2024-08-30 DIAGNOSIS — N18.4 ANEMIA, CHRONIC RENAL FAILURE, STAGE 4 (SEVERE): ICD-10-CM

## 2024-08-30 DIAGNOSIS — N17.9 AKI (ACUTE KIDNEY INJURY): ICD-10-CM

## 2024-08-30 DIAGNOSIS — Z79.899 HIGH RISK MEDICATION USE: ICD-10-CM

## 2024-08-30 DIAGNOSIS — E66.01 CLASS 3 OBESITY: ICD-10-CM

## 2024-08-30 DIAGNOSIS — I77.82 ANCA-ASSOCIATED VASCULITIS: ICD-10-CM

## 2024-08-30 DIAGNOSIS — D75.839 THROMBOCYTOSIS: ICD-10-CM

## 2024-08-30 DIAGNOSIS — D50.9 IRON DEFICIENCY ANEMIA, UNSPECIFIED IRON DEFICIENCY ANEMIA TYPE: ICD-10-CM

## 2024-08-30 DIAGNOSIS — Z09 HOSPITAL DISCHARGE FOLLOW-UP: Primary | ICD-10-CM

## 2024-08-30 DIAGNOSIS — D63.1 ANEMIA, CHRONIC RENAL FAILURE, STAGE 4 (SEVERE): ICD-10-CM

## 2024-08-30 PROBLEM — E66.813 CLASS 3 OBESITY: Status: ACTIVE | Noted: 2024-08-30

## 2024-08-30 LAB
ALBUMIN SERPL BCP-MCNC: 3.9 G/DL (ref 3.5–5.2)
ALP SERPL-CCNC: 55 U/L (ref 55–135)
ALT SERPL W/O P-5'-P-CCNC: 15 U/L (ref 10–44)
ANION GAP SERPL CALC-SCNC: 10 MMOL/L (ref 8–16)
AST SERPL-CCNC: 13 U/L (ref 10–40)
B-HCG UR QL: NEGATIVE
BACTERIA #/AREA URNS AUTO: ABNORMAL /HPF
BASOPHILS # BLD AUTO: 0.02 K/UL (ref 0–0.2)
BASOPHILS NFR BLD: 0.1 % (ref 0–1.9)
BILIRUB SERPL-MCNC: 0.2 MG/DL (ref 0.1–1)
BILIRUB UR QL STRIP: NEGATIVE
BUN SERPL-MCNC: 49 MG/DL (ref 6–20)
CALCIUM SERPL-MCNC: 9.2 MG/DL (ref 8.7–10.5)
CAOX CRY UR QL COMP ASSIST: ABNORMAL
CHLORIDE SERPL-SCNC: 110 MMOL/L (ref 95–110)
CLARITY UR REFRACT.AUTO: CLEAR
CO2 SERPL-SCNC: 20 MMOL/L (ref 23–29)
COLOR UR AUTO: YELLOW
CREAT SERPL-MCNC: 1.8 MG/DL (ref 0.5–1.4)
CREAT UR-MCNC: 45 MG/DL (ref 15–325)
CRP SERPL-MCNC: 4.9 MG/L (ref 0–8.2)
DIFFERENTIAL METHOD BLD: ABNORMAL
EOSINOPHIL # BLD AUTO: 0 K/UL (ref 0–0.5)
EOSINOPHIL NFR BLD: 0 % (ref 0–8)
ERYTHROCYTE [DISTWIDTH] IN BLOOD BY AUTOMATED COUNT: 19.3 % (ref 11.5–14.5)
ERYTHROCYTE [SEDIMENTATION RATE] IN BLOOD BY WESTERGREN METHOD: 1 MM/HR (ref 0–20)
EST. GFR  (NO RACE VARIABLE): 33.9 ML/MIN/1.73 M^2
GLUCOSE SERPL-MCNC: 127 MG/DL (ref 70–110)
GLUCOSE UR QL STRIP: ABNORMAL
HBV CORE AB SERPL QL IA: NORMAL
HBV SURFACE AG SERPL QL IA: NORMAL
HCT VFR BLD AUTO: 35.2 % (ref 37–48.5)
HGB BLD-MCNC: 10.7 G/DL (ref 12–16)
HGB UR QL STRIP: ABNORMAL
HYALINE CASTS UR QL AUTO: 3 /LPF
IMM GRANULOCYTES # BLD AUTO: 0.84 K/UL (ref 0–0.04)
IMM GRANULOCYTES NFR BLD AUTO: 4.8 % (ref 0–0.5)
KETONES UR QL STRIP: NEGATIVE
LEUKOCYTE ESTERASE UR QL STRIP: NEGATIVE
LYMPHOCYTES # BLD AUTO: 1.4 K/UL (ref 1–4.8)
LYMPHOCYTES NFR BLD: 7.9 % (ref 18–48)
MCH RBC QN AUTO: 27.4 PG (ref 27–31)
MCHC RBC AUTO-ENTMCNC: 30.4 G/DL (ref 32–36)
MCV RBC AUTO: 90 FL (ref 82–98)
MICROSCOPIC COMMENT: ABNORMAL
MONOCYTES # BLD AUTO: 1.1 K/UL (ref 0.3–1)
MONOCYTES NFR BLD: 6.5 % (ref 4–15)
NEUTROPHILS # BLD AUTO: 14.1 K/UL (ref 1.8–7.7)
NEUTROPHILS NFR BLD: 80.7 % (ref 38–73)
NITRITE UR QL STRIP: NEGATIVE
NRBC BLD-RTO: 0 /100 WBC
PH UR STRIP: 6 [PH] (ref 5–8)
PLATELET # BLD AUTO: 518 K/UL (ref 150–450)
PMV BLD AUTO: 10.5 FL (ref 9.2–12.9)
POTASSIUM SERPL-SCNC: 4.3 MMOL/L (ref 3.5–5.1)
PROT SERPL-MCNC: 6.3 G/DL (ref 6–8.4)
PROT UR QL STRIP: ABNORMAL
PROT UR-MCNC: 17 MG/DL (ref 0–15)
PROT/CREAT UR: 0.38 MG/G{CREAT} (ref 0–0.2)
RBC # BLD AUTO: 3.9 M/UL (ref 4–5.4)
RBC #/AREA URNS AUTO: 20 /HPF (ref 0–4)
SODIUM SERPL-SCNC: 140 MMOL/L (ref 136–145)
SP GR UR STRIP: 1.01 (ref 1–1.03)
SQUAMOUS #/AREA URNS AUTO: 1 /HPF
URN SPEC COLLECT METH UR: ABNORMAL
WBC # BLD AUTO: 17.49 K/UL (ref 3.9–12.7)
WBC #/AREA URNS AUTO: 4 /HPF (ref 0–5)

## 2024-08-30 PROCEDURE — 86200 CCP ANTIBODY: CPT | Performed by: STUDENT IN AN ORGANIZED HEALTH CARE EDUCATION/TRAINING PROGRAM

## 2024-08-30 PROCEDURE — 99999 PR PBB SHADOW E&M-EST. PATIENT-LVL IV: CPT | Mod: PBBFAC,,, | Performed by: NURSE PRACTITIONER

## 2024-08-30 PROCEDURE — 85651 RBC SED RATE NONAUTOMATED: CPT | Performed by: STUDENT IN AN ORGANIZED HEALTH CARE EDUCATION/TRAINING PROGRAM

## 2024-08-30 PROCEDURE — 86140 C-REACTIVE PROTEIN: CPT | Performed by: STUDENT IN AN ORGANIZED HEALTH CARE EDUCATION/TRAINING PROGRAM

## 2024-08-30 PROCEDURE — 87340 HEPATITIS B SURFACE AG IA: CPT | Performed by: STUDENT IN AN ORGANIZED HEALTH CARE EDUCATION/TRAINING PROGRAM

## 2024-08-30 PROCEDURE — 86481 TB AG RESPONSE T-CELL SUSP: CPT | Performed by: STUDENT IN AN ORGANIZED HEALTH CARE EDUCATION/TRAINING PROGRAM

## 2024-08-30 PROCEDURE — 86704 HEP B CORE ANTIBODY TOTAL: CPT | Performed by: STUDENT IN AN ORGANIZED HEALTH CARE EDUCATION/TRAINING PROGRAM

## 2024-08-30 PROCEDURE — 36415 COLL VENOUS BLD VENIPUNCTURE: CPT | Performed by: STUDENT IN AN ORGANIZED HEALTH CARE EDUCATION/TRAINING PROGRAM

## 2024-08-30 PROCEDURE — 99999 PR PBB SHADOW E&M-EST. PATIENT-LVL IV: CPT | Mod: PBBFAC,,, | Performed by: INTERNAL MEDICINE

## 2024-08-30 PROCEDURE — 82595 ASSAY OF CRYOGLOBULIN: CPT | Performed by: STUDENT IN AN ORGANIZED HEALTH CARE EDUCATION/TRAINING PROGRAM

## 2024-08-30 PROCEDURE — 81001 URINALYSIS AUTO W/SCOPE: CPT | Performed by: STUDENT IN AN ORGANIZED HEALTH CARE EDUCATION/TRAINING PROGRAM

## 2024-08-30 PROCEDURE — 81025 URINE PREGNANCY TEST: CPT | Performed by: STUDENT IN AN ORGANIZED HEALTH CARE EDUCATION/TRAINING PROGRAM

## 2024-08-30 PROCEDURE — 80053 COMPREHEN METABOLIC PANEL: CPT | Performed by: STUDENT IN AN ORGANIZED HEALTH CARE EDUCATION/TRAINING PROGRAM

## 2024-08-30 PROCEDURE — 84156 ASSAY OF PROTEIN URINE: CPT | Performed by: STUDENT IN AN ORGANIZED HEALTH CARE EDUCATION/TRAINING PROGRAM

## 2024-08-30 PROCEDURE — 85025 COMPLETE CBC W/AUTO DIFF WBC: CPT | Performed by: STUDENT IN AN ORGANIZED HEALTH CARE EDUCATION/TRAINING PROGRAM

## 2024-08-30 RX ORDER — AMLODIPINE BESYLATE 5 MG/1
5 TABLET ORAL DAILY
Qty: 30 TABLET | Refills: 0 | Status: SHIPPED | OUTPATIENT
Start: 2024-08-30 | End: 2024-09-29

## 2024-08-30 RX ORDER — LISINOPRIL 10 MG/1
10 TABLET ORAL DAILY
Qty: 30 TABLET | Refills: 11 | Status: SHIPPED | OUTPATIENT
Start: 2024-08-30 | End: 2025-08-30

## 2024-08-30 NOTE — TELEPHONE ENCOUNTER
Discussed RTX infusion protocol with pt. She will have labs done today. Verbalized understanding. Pt scheduled on 9/11 pending insurance approval

## 2024-08-30 NOTE — ASSESSMENT & PLAN NOTE
Lab Results   Component Value Date    CREATININE 1.8 (H) 08/28/2024    BUN 47 (H) 08/28/2024     08/28/2024    K 4.2 08/28/2024     (H) 08/28/2024    CO2 20 (L) 08/28/2024   Stable. Continue to monitor. Followed by nephrology. Appt today

## 2024-08-30 NOTE — PROGRESS NOTES
"Shayy Faust is a 49 y.o. female for whom nephrology consult has been requested to evaluate and give opinion.   Renal clinic consult note:  Date of pt visit: 8/30/24  Reason for consult: post hospital f/u for new diagnosis of ANCA vasculitis  Referring physician: Hospitalist, Dr. Gibbs    HPI: Thank you for referring the pt to us. H/o and chart were reviewed. Pt was seen and examined. 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 Presentation, hospital records, treatment, and plans were fully reviewed and discussed with pt and her mother. Pt was in her usual state of health until about 5 months prior to the presentation. She reports having "asthma-like" symptoms that would not improve with conventional treatment. Pt was being seen by pulmonary, and ws then referred to nephrology. Renal failure was noted with s Cr 2.7 mg/dl. Based on initial positive serology for anti-GBP antibody (3.8) and the renal-pulmonary presentation, Goodpasture disease was highly suspected and pt immediately received 3 treatments with plasmapheresis, pule steroids IV x 3, and retuximab 1 g IV on 8/25/24. Serologies were also remarkable for positive P-ANCA, but negative C-ANCA. 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 Patient tolerated above treatments well.    Pt presented for post hospital f/u. Pt has no new c/o's, no new issues, feels better, no blood in urine or sputum reported.      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    PAST SURGICAL HISTORY:  " She  has a past surgical history that includes Bilateral tubal ligation (N/A); Esophagogastroduodenoscopy (N/A, 8/20/2024); and Colonoscopy (N/A, 8/20/2024).    SOCIAL HISTORY:  She  reports that she has never smoked. She has never used smokeless tobacco. She reports that she does not currently use alcohol. She reports that she does not use drugs.    FAMILY MEDICAL HISTORY:  Her family history includes Hypertension in her father and mother.    Review of patient's allergies indicates:  No Known Allergies        Prior to Admission medications    Medication Sig Start Date End Date Taking? Authorizing Provider   albuterol (PROVENTIL/VENTOLIN HFA) 90 mcg/actuation inhaler Inhale 2 puffs into the lungs every 4 (four) hours as needed for Wheezing or Shortness of Breath. 4/4/24  Yes Ulysses Jiménez MD   cholecalciferol, vitamin D3, 125 mcg (5,000 unit) Tab Take 1 tablet (5,000 Units total) by mouth once daily. 8/27/24 9/26/24 Yes Glendy Gibbs MD   ferrous sulfate 325 (65 FE) MG EC tablet Take 1 tablet (325 mg total) by mouth once daily. 8/26/24 9/25/24 Yes Glendy Gibbs MD   pantoprazole (PROTONIX) 40 MG tablet Take 1 tablet (40 mg total) by mouth once daily. 8/29/24  Yes Chad Douglass MD   predniSONE (DELTASONE) 10 MG tablet Take 5 tablets (50 mg total) by mouth once daily for 7 days, THEN 2.5 tablets (25 mg total) once daily for 7 days, THEN 2 tablets (20 mg total) once daily for 14 days, THEN 1.5 tablets (15 mg total) once daily for 14 days. 8/29/24 10/10/24 Yes Chad Douglass MD   sodium bicarbonate 650 MG tablet Take 1 tablet (650 mg total) by mouth 2 (two) times daily. for 7 days 8/26/24 9/2/24 Yes Glendy Gibbs MD   sulfamethoxazole-trimethoprim 800-160mg (BACTRIM DS) 800-160 mg Tab Take 1 tablet by mouth 3 (three) times a week. 8/28/24 9/27/24 Yes Glendy Gibbs MD   amLODIPine (NORVASC) 10 MG tablet Take 1 tablet (10 mg total) by mouth once daily. 8/27/24 8/30/24 Yes Glendy Gibbs MD  "  amLODIPine (NORVASC) 10 MG tablet Take 1 tablet (5 mg total) by mouth once daily. 8/30/24 9/29/24  Moe Montero MD                REVIEW OF SYSTEMS:  Patient has no fever, fatigue, visual changes, chest pain, edema, cough, dyspnea, nausea, vomiting, constipation, diarrhea, arthralgias, pruritis, dizziness, weakness, depression, confusion.    PHYSICAL EXAM:   height is 5' 4" (1.626 m) and weight is 132.5 kg (292 lb 1.8 oz). Her blood pressure is 122/78 and her pulse is 68.   Gen: WDWN female in no apparent distress  Psych: Normal mood and affect  Skin: No rashes or ulcers  Neck: No JVD  Chest: Clear with no rales, rhonchi, wheezing with normal effort  CV: Regular with no murmurs, gallops or rubs  Abd: Soft, nontender, no distension  Ext: No edema    Labs reviewed  BMP  Lab Results   Component Value Date     08/28/2024    K 4.2 08/28/2024     (H) 08/28/2024    CO2 20 (L) 08/28/2024    BUN 47 (H) 08/28/2024    CREATININE 1.8 (H) 08/28/2024    CALCIUM 8.7 08/28/2024    ANIONGAP 11 08/28/2024    EGFRNORACEVR 34 (A) 08/28/2024     Lab Results   Component Value Date    WBC 17.13 (H) 08/28/2024    HGB 10.3 (L) 08/28/2024    HCT 33.6 (L) 08/28/2024    MCV 86 08/28/2024     (H) 08/28/2024     U/a: trace protein (was 1+), 2+blood, 17 RBC 3 casts  Urine p/cr 1.0 g    Anti-GBM antibody 3.8  C-ANCA neg  P-ANCA: 1:80  Myeloperoxidase and proteinase 3 antibodies were not done, were ordered today.    ESR 1, was >60    Kidney biopsy results reviewed and discussed by phone with Dr. 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 renal failure is stable/JAIME has " improved  Kidney failure and pulmonary symptoms due to P-ANCA vasculitis.  Positive anti-GBM antibody, but kidney biopsy showed no sign of anti-GBM linear staining and IF was negative for Goodpasture disease  S/p acute treatment with the following:  Plasmapheresis x3  Solumedrol pulse dose x 3  Retuximab 1 g IV on 8/25/24    All markers of disease have improved  Pt's symptoms have improved  Pt tolerated the treatment well  Proteinuria has improved  S Cr lower  ESR normal    Recommend:  Continue retuximab 1 g IV q 2 weeks x 4 doses total, next dose was ordered and arranged to be given on 9/11/24 by the hospital group at   Continue prednisone taper by 10 mg per week, currently on 50 mg  Continue bactrim x 6 months  Will need hep B vaccination  Will benefit from ID consult, will refer (at risk of infections)  Will benefit from RAAS blockade, will start lisinopril 10 mg po qd    Plans and recommendations:  As discussed above  Total time spent 60 minutes including time needed to review the records, the   patient evaluation, documentation, face-to-face discussion with the patient,   more than 50% of the time was spent on coordination of care and counseling.    Level V visit.  RTC 2-3 week, OK to rosemary Montero MD

## 2024-08-30 NOTE — TELEPHONE ENCOUNTER
----- Message from Chad Douglass MD sent at 8/29/2024  2:38 PM CDT -----  Patient got 1st dose of urgent rituximab inpatient for ANCA vasculitis on 08/25/2024. Needs second dose at the [facility] on 09/11/2024 preferably (can do 09/09 or 09/10 if no availability). Needs urgently for active vasculitis please.

## 2024-08-30 NOTE — ASSESSMENT & PLAN NOTE
Lab Results   Component Value Date    WBC 17.13 (H) 08/28/2024    HGB 10.3 (L) 08/28/2024    HCT 33.6 (L) 08/28/2024    MCV 86 08/28/2024     (H) 08/28/2024       Lab Results   Component Value Date    IRON 86 08/28/2024    TRANSFERRIN 160 (L) 08/28/2024    TIBC 237 (L) 08/28/2024    FESATURATED 36 08/28/2024      Improved. Continue iron infusions. Followed by hem/onc

## 2024-08-30 NOTE — ASSESSMENT & PLAN NOTE
Plan for repeat IV Rituximab 1000 mg x2 doses 14 days apart every 6 months for 2 years. Continue prednisone taper and Bactrim. Followed by rheumatology and nephrology   Consent: The risks of atrophy were reviewed with the patient.

## 2024-08-30 NOTE — PROGRESS NOTES
Subjective:       Patient ID: Shayy Faust is a 49 y.o. female.    Chief Complaint: Hospital Follow Up    Ms. Faust returns to clinic with her mother for hospital follow up. See copied HPI and hospital course below.     Today reports she is doing well. Symptoms have improved since hospitalization. She was seen by rheumatology yesterday. Plans to completed second Rituxan dosing on 9/11, then every 6 months x 2 years. Labs to be completed today. Appointment with nephrology today. She has already seen hem/onc and completed iron infusion on 8/28. Plans to repeat infusion next week and on 9/11. She is scheduled for video capsule endo on 9/4 to complete work up for iron def. She needs a 6 month pulmonology follow up with PFTs scheduled. She has brought FMLA forms to be completed.         HPI and discharge summary copied from hospital encounter:    Patient Name: Shayy Faust  MRN: [id number]  FANI: [id number]  Patient Class: IP- Inpatient  Admission Date: 8/15/2024  Hospital Length of Stay: 11 days  Discharge Date and Time: 8/26/2024  3:52 PM  Attending Physician: Karine att. providers found   Discharging Provider: Glendy Gibbs MD  Primary Care Provider: Karine, Primary Doctor     Primary Care Team: Networked reference to record PCT      HPI:   Patient is a 49 y.o.  female with a PMHx of asthma who presents to the Emergency Department due to abnormal blood work. Patient currently being seen by Dr. Jiménez for pulmonology. She reports not feeling well for several weeks. Endorses chills and sob. Denies any fever or congestion. Reports having good urinary output. Denies any home meds at this time.      In the ED, wbc: 15.1k, h/h 7.8/25.8, plt: 826, bun/cr: 43/2.7.      Procedure(s) (LRB):  EGD (ESOPHAGOGASTRODUODENOSCOPY) (N/A)  COLONOSCOPY (N/A)       Hospital Course:   49 year-old female admitted with c/o abnormal blood work. She reports she hasn't been feeling well for a few weeks now. She is being seen and  worked up by Dr. Jiménez with pulmonology for for SOB and chills. She started with an URI that progressed to SOB. She had Spirometry in pulm clinic that showed obstruction with very severe ventilatory impairment unimproved with post bronchodilator. She reports unintentional weight loss of 25 lbs. Pulm concerned for Wegener's disease. CT chest showed numerous bilateral lung opacities; consider atypical infection/inflammation as well as metastatic neoplasm; recommend further evaluation with PET-CT scan. Tree in bud micronodularity in lower lobes consistent with chronic small airway disease. CT a/p with punctate nonobstructing renal calculi, no hydronephrosis. Mild to moderate amount of stool in colon, with diverticulitis.  Worsening kidney function. Nephrology consulted. US Kidney showed increased echogenicity to kidneys concerning for medical renal disease. Recommends kidney biopsy, pending this next week, will try for Monday. Hem/onc consulted, recommends further metastatic workup with upper/lower endoscopy. Consult GI for evaluation.  Cont rocephin and doxycycline. Hgb 6.7, unknown bleeding source. Transfuse 1 unit PRBC (8/18).      08/19/2024  H&H improved after 1UPRBC. Continue to remain stable since that time. Endoscopic procedure planned this hospitalization. Pulm Dr. 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 Consider high dose steroids after renal biopsy. Renal biopsy priority moved up to this hospitalization. Reached out to IR, they will facilitate as soon as schedule allows.      08/20/2024  EGD/Colon complete. Minor gastritis, biopsies taken. Small bowel normal, biopsied for MARIA ESTHER. Otherwise normal. Prep in right colon poor, left colon fair. No masses appreciated. Ileum intubated and normal. GI Recommend outpatient video capsule to complete work up for MARIA ESTHER. Continued on PPI. Discussed with IR, plans for renal biopsy sometime today. Will initiate high dose  steroids thereafter per Dr. Jiménez' request.     08/21/2024  Discussed case with specialists, the decision for plasma apheresis/exchange to done at this location through [facility] was made. Dr. Galeas with nephrology is communicating with [facility] team to assure this gets done. IR consulted for vascular access.      08/22/2024  Unable to undergo vasc cath yesterday due to urgent case per IR. Vasc cath placed this morning. Plans to undergo first session of apheresis this evening. Discussed case with Nephrology, they are spearheading this treatment. Expect patient to be here for extended period of time.      08/23/2024-8/24/2024  Vasc cath place, underwent first two sessions of apheresis. Tolerated it well. Completed course of Rocephin + Doxy inpatient.      08/25/2024  Renal biopsy report returned yesterday afternoon, verbally communicated to Dr. Braun. The immunofluorescence results were negative, notably showing no anti-GBM antibodies. 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 Plasmapheresis discontinued.  Nephrology initiated rituximab therapy, with the first dose of 1g administered today. A second dose is planned for two weeks later, with a follow-up plan to continue rituximab therapy for two years in accordance with maintenance trials.  Patient will need follow up with Rheumatology, Nephrology, and Pulmonology. A pulmonologist was recommended in six months to repeat PFTs. Per Nephrology, Vas-Cath may be removed in the morning if renal function remains stable.     08/26/2024:   NAEON. Cr improved to 1.6, pt tolerated Rituxan infusion without issues. Reports good UOP. Denies abd pain, CP, SOB, n/v. Sats stable on room air. After discussion with Nephrology Dr. Rose, vas-cath was removed by IR. She will continue Prednisone taper as recommended by nephrology (decrease Prednisone by 10 mg weekly until pt is on 10 mg daily and can followup with rheumatology for further management). Pt  seen and examined on day of discharge and discharged home in stable condition. Followup scheduled with Rheumatology Dr. Douglass on 08/29 for further management and further Rituxan infusions.  Will followup with [facility] Dr. Montero 08/302024. Followup with PCP within 3-5 days, GI for video capsule endoscopy and Pulm as prev scheduled        Transitional Care Note    Family and/or Caretaker present at visit?  Yes.  Diagnostic tests reviewed/disposition: I have reviewed all completed as well as pending diagnostic tests at the time of discharge.  Disease/illness education: Yes  Home health/community services discussion/referrals: Patient does not have home health established from hospital visit.  They do not need home health.  If needed, we will set up home health for the patient.   Establishment or re-establishment of referral orders for community resources: No other necessary community resources.   Discussion with other health care providers: No discussion with other health care providers necessary.         Patient Active Problem List   Diagnosis    Moderate persistent asthma with acute exacerbation    Rheumatoid factor positive    Mild persistent asthma with acute exacerbation    Iron deficiency anemia    JAIME (acute kidney injury)    PNA (pneumonia)    Thrombocytosis    Unintentional weight loss    Fatigue    Anemia, chronic renal failure, stage 4 (severe)    Moderate protein-calorie malnutrition    ANCA-associated vasculitis    Immunodeficiency due to drug therapy       Family History   Problem Relation Name Age of Onset    Hypertension Mother      Hypertension Father       Past Surgical History:   Procedure Laterality Date    BILATERAL TUBAL LIGATION N/A     COLONOSCOPY N/A 8/20/2024    Procedure: COLONOSCOPY;  Surgeon: Gena Saxena MD;  Location: [facility];  Service: Endoscopy;  Laterality: N/A;    ESOPHAGOGASTRODUODENOSCOPY N/A 8/20/2024    Procedure: EGD (ESOPHAGOGASTRODUODENOSCOPY);  Surgeon:  Gena Saxena MD;  Location: [facility];  Service: Endoscopy;  Laterality: N/A;         Current Outpatient Medications:     albuterol (PROVENTIL/VENTOLIN HFA) 90 mcg/actuation inhaler, Inhale 2 puffs into the lungs every 4 (four) hours as needed for Wheezing or Shortness of Breath., Disp: 18 g, Rfl: 11    cholecalciferol, vitamin D3, 125 mcg (5,000 unit) Tab, Take 1 tablet (5,000 Units total) by mouth once daily., Disp: 30 tablet, Rfl: 0    ferrous sulfate 325 (65 FE) MG EC tablet, Take 1 tablet (325 mg total) by mouth once daily., Disp: 30 tablet, Rfl: 0    pantoprazole (PROTONIX) 40 MG tablet, Take 1 tablet (40 mg total) by mouth once daily., Disp: 90 tablet, Rfl: 3    predniSONE (DELTASONE) 10 MG tablet, Take 5 tablets (50 mg total) by mouth once daily for 7 days, THEN 2.5 tablets (25 mg total) once daily for 7 days, THEN 2 tablets (20 mg total) once daily for 14 days, THEN 1.5 tablets (15 mg total) once daily for 14 days., Disp: 102 tablet, Rfl: 0    sodium bicarbonate 650 MG tablet, Take 1 tablet (650 mg total) by mouth 2 (two) times daily. for 7 days, Disp: 14 tablet, Rfl: 0    sulfamethoxazole-trimethoprim 800-160mg (BACTRIM DS) 800-160 mg Tab, Take 1 tablet by mouth 3 (three) times a week., Disp: 12 tablet, Rfl: 0    amLODIPine (NORVASC) 5 MG tablet, Take 1 tablet (5 mg total) by mouth once daily., Disp: 30 tablet, Rfl: 0    lisinopriL 10 MG tablet, Take 1 tablet (10 mg total) by mouth once daily., Disp: 30 tablet, Rfl: 11    Review of Systems   Constitutional:  Positive for activity change (improved), appetite change (improved), fatigue (improved) and unexpected weight change. Negative for chills, diaphoresis and fever.   HENT:  Negative for congestion, postnasal drip, sinus pressure, sore throat and trouble swallowing.    Eyes:  Negative for visual disturbance.   Respiratory:  Negative for cough, chest tightness, shortness of breath and wheezing.    Cardiovascular:  Negative for chest pain,  "palpitations and leg swelling.   Gastrointestinal:  Negative for abdominal pain, blood in stool, constipation, diarrhea, nausea and vomiting.   Genitourinary:  Negative for difficulty urinating and menstrual problem.   Musculoskeletal:  Negative for arthralgias, joint swelling, myalgias and neck pain.   Skin:  Negative for rash.   Neurological:  Positive for weakness (generalized, improving). Negative for dizziness, syncope, light-headedness, numbness and headaches.   Psychiatric/Behavioral:  Negative for agitation, confusion and dysphoric mood. The patient is not nervous/anxious.        Objective:   /72 (BP Location: Left arm, Patient Position: Sitting, BP Method: Large (Manual))   Pulse 64   Temp 97 °F (36.1 °C)   Resp 18   Ht 5' 4" (1.626 m)   Wt 48.7 kg (107 lb 5.8 oz)   LMP  (LMP Unknown)   SpO2 97%   BMI 18.43 kg/m²      Physical Exam  Vitals reviewed.   Constitutional:       General: She is not in acute distress.     Appearance: Normal appearance. She is not ill-appearing, toxic-appearing or diaphoretic.   Eyes:      Conjunctiva/sclera: Conjunctivae normal.   Cardiovascular:      Rate and Rhythm: Normal rate and regular rhythm.      Heart sounds: Normal heart sounds. No murmur heard.  Pulmonary:      Effort: Pulmonary effort is normal. No respiratory distress.      Breath sounds: No wheezing, rhonchi or rales.   Musculoskeletal:      Right lower leg: No edema.      Left lower leg: No edema.   Skin:     General: Skin is warm and dry.      Coloration: Skin is not pale.      Findings: No erythema or rash.   Neurological:      Mental Status: She is alert and oriented to person, place, and time.      Gait: Gait normal.   Psychiatric:         Mood and Affect: Mood normal.         Behavior: Behavior normal.         Assessment & Plan     1. Hospital discharge follow-up  Comments:  Reviewed hospital course. Appropriate follow ups scheduled. FMLA forms to be completed    2. ANCA-associated " vasculitis  Overview:  08/20/2024 Kidney biopsy: PAUCI-IMMUNE NECROTIZING CRESCENTIC GLOMERULONEPHRITIS     Assessment & Plan:  Plan for repeat IV Rituximab 1000 mg x2 doses 14 days apart every 6 months for 2 years. Continue prednisone taper and Bactrim. Followed by rheumatology and nephrology      3. JAIME (acute kidney injury)  Overview:  necrotizing pauci-immune glomerulonephritis.     Assessment & Plan:  Lab Results   Component Value Date    CREATININE 1.8 (H) 08/28/2024    BUN 47 (H) 08/28/2024     08/28/2024    K 4.2 08/28/2024     (H) 08/28/2024    CO2 20 (L) 08/28/2024   Stable. Continue to monitor. Followed by nephrology. Appt today      4. Anemia, chronic renal failure, stage 4 (severe)  Assessment & Plan:  Lab Results   Component Value Date    WBC 17.13 (H) 08/28/2024    HGB 10.3 (L) 08/28/2024    HCT 33.6 (L) 08/28/2024    MCV 86 08/28/2024     (H) 08/28/2024       Lab Results   Component Value Date    IRON 86 08/28/2024    TRANSFERRIN 160 (L) 08/28/2024    TIBC 237 (L) 08/28/2024    FESATURATED 36 08/28/2024      Improved. Continue iron infusions. Followed by hem/onc      5. Iron deficiency anemia, unspecified iron deficiency anemia type  Overview:  8/20/24: EGD/Colon: minor gastritis, otherwise negative/ Poor prep in right colon and fair prep in left colon. No masses seen. No polyps appreciated.     Assessment & Plan:  Continue iron infusions. Scheduled for video capsule endo on 9/4/ Followed by hem/onc      6. Thrombocytosis  Assessment & Plan:  Lab Results   Component Value Date     (H) 08/28/2024     2/2 MARIA ESTHER. Continue to monitor. Followed hem/onc      7. Moderate protein-calorie malnutrition  Assessment & Plan:  2/2 acute illness. Improving. Increase in appetite. Continue to monitor.      8. Immunodeficiency due to drug therapy  Assessment & Plan:  On Rituxan. Reviewed labs. Followed by rheumatology            MICHELE Kline      Portions of this note may have been  "created with voice recognition software. Occasional "wrong-word" or "sound-a-like" substitutions may have occurred due to the inherent limitations of voice recognition software. Please, read the note carefully and recognize, using context, where substitutions have occurred.     "

## 2024-08-30 NOTE — TELEPHONE ENCOUNTER
Contacted pt to let her know that her labs were schedule for today 8/30 at the [facility] location pt stated that she understood.

## 2024-08-30 NOTE — TELEPHONE ENCOUNTER
----- Message from Chad Douglass MD sent at 8/29/2024  7:40 PM CDT -----  Yes that's fine. Thank you so much!  ----- Message -----  From: Blanche Willard, RN  Sent: 8/29/2024   3:30 PM CDT  To: MD Dr Rafat Frazier,   Pt is scheduled for 9/11 pending insurance approval. I have contacted per service to start working on this. Pt is also scheduled for an Iron infusion at 1:00 the same day. Looks like it will be her 3 rd dose. Is this OK with you if she get these meds on the same day? thanks  ----- Message -----  From: Chad Douglass MD  Sent: 8/29/2024   2:38 PM CDT  To: Blanche Willard, RN; #    Patient got 1st dose of urgent rituximab inpatient for ANCA vasculitis on 08/25/2024. Needs second dose at the [facility] on 09/11/2024 preferably (can do 09/09 or 09/10 if no availability). Needs urgently for active vasculitis please.

## 2024-08-30 NOTE — ASSESSMENT & PLAN NOTE
Lab Results   Component Value Date     (H) 08/28/2024 2/2 MARIA ESTHER. Continue to monitor. Followed hem/onc

## 2024-08-31 LAB — CCP AB SER IA-ACNC: <0.5 U/ML

## 2024-09-01 LAB
FINAL PATHOLOGIC DIAGNOSIS: NORMAL
GROSS: NORMAL
Lab: NORMAL
MICROSCOPIC EXAM: NORMAL

## 2024-09-03 ENCOUNTER — PATIENT MESSAGE (OUTPATIENT)
Dept: ENDOSCOPY | Facility: HOSPITAL | Age: 49
End: 2024-09-03
Payer: COMMERCIAL

## 2024-09-04 ENCOUNTER — LAB VISIT (OUTPATIENT)
Dept: LAB | Facility: HOSPITAL | Age: 49
End: 2024-09-04
Attending: INTERNAL MEDICINE
Payer: COMMERCIAL

## 2024-09-04 ENCOUNTER — INFUSION (OUTPATIENT)
Dept: INFUSION THERAPY | Facility: HOSPITAL | Age: 49
End: 2024-09-04
Attending: INTERNAL MEDICINE
Payer: COMMERCIAL

## 2024-09-04 VITALS
TEMPERATURE: 98 F | OXYGEN SATURATION: 98 % | HEART RATE: 68 BPM | DIASTOLIC BLOOD PRESSURE: 76 MMHG | SYSTOLIC BLOOD PRESSURE: 111 MMHG | RESPIRATION RATE: 16 BRPM

## 2024-09-04 DIAGNOSIS — D50.8 OTHER IRON DEFICIENCY ANEMIA: Primary | ICD-10-CM

## 2024-09-04 DIAGNOSIS — I77.82 ANCA-ASSOCIATED VASCULITIS: ICD-10-CM

## 2024-09-04 LAB
CREAT UR-MCNC: 51 MG/DL (ref 15–325)
PROT UR-MCNC: 15 MG/DL (ref 0–15)
PROT/CREAT UR: 0.29 MG/G{CREAT} (ref 0–0.2)

## 2024-09-04 PROCEDURE — 96365 THER/PROPH/DIAG IV INF INIT: CPT

## 2024-09-04 PROCEDURE — 25000003 PHARM REV CODE 250: Performed by: INTERNAL MEDICINE

## 2024-09-04 PROCEDURE — 84156 ASSAY OF PROTEIN URINE: CPT | Performed by: INTERNAL MEDICINE

## 2024-09-04 PROCEDURE — 63600175 PHARM REV CODE 636 W HCPCS: Performed by: INTERNAL MEDICINE

## 2024-09-04 RX ORDER — DIPHENHYDRAMINE HYDROCHLORIDE 50 MG/ML
50 INJECTION INTRAMUSCULAR; INTRAVENOUS ONCE AS NEEDED
OUTPATIENT
Start: 2024-09-11

## 2024-09-04 RX ORDER — SODIUM CHLORIDE 0.9 % (FLUSH) 0.9 %
10 SYRINGE (ML) INJECTION
OUTPATIENT
Start: 2024-09-11

## 2024-09-04 RX ORDER — EPINEPHRINE 0.3 MG/.3ML
0.3 INJECTION SUBCUTANEOUS ONCE AS NEEDED
OUTPATIENT
Start: 2024-09-11

## 2024-09-04 RX ORDER — HEPARIN 100 UNIT/ML
500 SYRINGE INTRAVENOUS
OUTPATIENT
Start: 2024-09-11

## 2024-09-04 RX ADMIN — SODIUM CHLORIDE 125 MG: 9 INJECTION, SOLUTION INTRAVENOUS at 01:09

## 2024-09-04 NOTE — PLAN OF CARE
Problem: Adult Inpatient Plan of Care  Goal: Plan of Care Review  Outcome: Progressing  Flowsheets (Taken 9/4/2024 1359)  Plan of Care Reviewed With:   patient   parent  Goal: Patient-Specific Goal (Individualized)  Outcome: Progressing  Flowsheets (Taken 9/4/2024 1359)  Individualized Care Needs: warm blanket, legs elevated during infusion  Anxieties, Fears or Concerns: none today  Goal: Absence of Hospital-Acquired Illness or Injury  Outcome: Progressing  Intervention: Prevent Infection  Flowsheets (Taken 9/4/2024 1359)  Infection Prevention:   equipment surfaces disinfected   hand hygiene promoted   personal protective equipment utilized  Goal: Optimal Comfort and Wellbeing  Outcome: Progressing  Intervention: Provide Person-Centered Care  Flowsheets (Taken 9/4/2024 1359)  Trust Relationship/Rapport:   care explained   thoughts/feelings acknowledged   choices provided   emotional support provided   empathic listening provided   questions answered   questions encouraged   reassurance provided     Problem: Anemia  Goal: Anemia Symptom Improvement  Outcome: Progressing  Intervention: Monitor and Manage Anemia  Flowsheets (Taken 9/4/2024 1359)  Fatigue Management:   fatigue-related activity identified   paced activity encouraged   frequent rest breaks encouraged

## 2024-09-06 ENCOUNTER — PATIENT MESSAGE (OUTPATIENT)
Dept: NEPHROLOGY | Facility: CLINIC | Age: 49
End: 2024-09-06
Payer: COMMERCIAL

## 2024-09-06 ENCOUNTER — PATIENT MESSAGE (OUTPATIENT)
Dept: PULMONOLOGY | Facility: CLINIC | Age: 49
End: 2024-09-06
Payer: COMMERCIAL

## 2024-09-10 ENCOUNTER — TELEPHONE (OUTPATIENT)
Dept: PULMONOLOGY | Facility: CLINIC | Age: 49
End: 2024-09-10
Payer: COMMERCIAL

## 2024-09-10 ENCOUNTER — TELEPHONE (OUTPATIENT)
Dept: INFUSION THERAPY | Facility: HOSPITAL | Age: 49
End: 2024-09-10
Payer: COMMERCIAL

## 2024-09-10 ENCOUNTER — OFFICE VISIT (OUTPATIENT)
Dept: NEPHROLOGY | Facility: CLINIC | Age: 49
End: 2024-09-10
Payer: COMMERCIAL

## 2024-09-10 VITALS
HEART RATE: 70 BPM | WEIGHT: 102.5 LBS | DIASTOLIC BLOOD PRESSURE: 60 MMHG | HEIGHT: 64 IN | SYSTOLIC BLOOD PRESSURE: 112 MMHG | BODY MASS INDEX: 17.5 KG/M2

## 2024-09-10 DIAGNOSIS — I77.82 ANCA-ASSOCIATED VASCULITIS: Primary | ICD-10-CM

## 2024-09-10 PROCEDURE — 3008F BODY MASS INDEX DOCD: CPT | Mod: CPTII,S$GLB,, | Performed by: INTERNAL MEDICINE

## 2024-09-10 PROCEDURE — 3066F NEPHROPATHY DOC TX: CPT | Mod: CPTII,S$GLB,, | Performed by: INTERNAL MEDICINE

## 2024-09-10 PROCEDURE — 3078F DIAST BP <80 MM HG: CPT | Mod: CPTII,S$GLB,, | Performed by: INTERNAL MEDICINE

## 2024-09-10 PROCEDURE — 1159F MED LIST DOCD IN RCRD: CPT | Mod: CPTII,S$GLB,, | Performed by: INTERNAL MEDICINE

## 2024-09-10 PROCEDURE — 4010F ACE/ARB THERAPY RXD/TAKEN: CPT | Mod: CPTII,S$GLB,, | Performed by: INTERNAL MEDICINE

## 2024-09-10 PROCEDURE — 3074F SYST BP LT 130 MM HG: CPT | Mod: CPTII,S$GLB,, | Performed by: INTERNAL MEDICINE

## 2024-09-10 PROCEDURE — 99999 PR PBB SHADOW E&M-EST. PATIENT-LVL III: CPT | Mod: PBBFAC,,, | Performed by: INTERNAL MEDICINE

## 2024-09-10 PROCEDURE — 99215 OFFICE O/P EST HI 40 MIN: CPT | Mod: S$GLB,,, | Performed by: INTERNAL MEDICINE

## 2024-09-10 PROCEDURE — 1111F DSCHRG MED/CURRENT MED MERGE: CPT | Mod: CPTII,S$GLB,, | Performed by: INTERNAL MEDICINE

## 2024-09-10 PROCEDURE — 3044F HG A1C LEVEL LT 7.0%: CPT | Mod: CPTII,S$GLB,, | Performed by: INTERNAL MEDICINE

## 2024-09-10 NOTE — PROGRESS NOTES
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 Pt received first dose of retuximab and is currently on high dose tapered dose. Pt was last seen in renal clinic about 2 weeks ago. Pt reports no c/o's, no rash, no ulcers. Pt was started on lisinopril last visit.    To review pt's initial presentation, pt presented with "asthma-like" symptoms  x 5 months that would not improve with conventional treatment. Pt was being seen by pulmonary, and was referred to nephrology. Renal failure was noted with s Cr 2.7 mg/dl. Based on initial positive serology for anti-GBP antibody (3.8) and the renal-pulmonary presentation, Goodpasture disease was suspected and pt immediately received 3 treatments with plasmapheresis, pule steroids IV x 3, and retuximab 1 g IV on 8/25/24. Serologies were also remarkable for positive P-ANCA, but negative C-ANCA. 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 Patient tolerated above treatments well.         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     PAST SURGICAL HISTORY:  She  has a past surgical history that includes Bilateral tubal ligation (N/A); Esophagogastroduodenoscopy (N/A, 8/20/2024); and Colonoscopy (N/A, 8/20/2024).     SOCIAL HISTORY:  She  reports that she has never smoked. She has never used smokeless tobacco. She reports that she does not currently use alcohol. She reports that she does not use drugs.     FAMILY " "MEDICAL HISTORY:  Her family history includes Hypertension in her father and mother.     Review of patient's allergies indicates:  No Known Allergies     Meds reviewed    Current Outpatient Medications:     albuterol (PROVENTIL/VENTOLIN HFA) 90 mcg/actuation inhaler, Inhale 2 puffs into the lungs every 4 (four) hours as needed for Wheezing or Shortness of Breath., Disp: 18 g, Rfl: 11    amLODIPine (NORVASC) 5 MG tablet, Take 1 tablet (5 mg total) by mouth once daily., Disp: 30 tablet, Rfl: 0    cholecalciferol, vitamin D3, 125 mcg (5,000 unit) Tab, Take 1 tablet (5,000 Units total) by mouth once daily., Disp: 30 tablet, Rfl: 0    ferrous sulfate 325 (65 FE) MG EC tablet, Take 1 tablet (325 mg total) by mouth once daily., Disp: 30 tablet, Rfl: 0    lisinopriL 10 MG tablet, Take 1 tablet (10 mg total) by mouth once daily., Disp: 30 tablet, Rfl: 11    pantoprazole (PROTONIX) 40 MG tablet, Take 1 tablet (40 mg total) by mouth once daily., Disp: 90 tablet, Rfl: 3    predniSONE (DELTASONE) 10 MG tablet, Take 5 tablets (50 mg total) by mouth once daily for 7 days, THEN 2.5 tablets (25 mg total) once daily for 7 days, THEN 2 tablets (20 mg total) once daily for 14 days, THEN 1.5 tablets (15 mg total) once daily for 14 days., Disp: 102 tablet, Rfl: 0    sulfamethoxazole-trimethoprim 800-160mg (BACTRIM DS) 800-160 mg Tab, Take 1 tablet by mouth 3 (three) times a week., Disp: 12 tablet, Rfl: 0    sodium bicarbonate 650 MG tablet, Take 1 tablet (650 mg total) by mouth 2 (two) times daily. for 7 days, Disp: 14 tablet, Rfl: 0           REVIEW OF SYSTEMS:  Patient has no fever, fatigue, visual changes, chest pain, edema, cough, dyspnea, nausea, vomiting, constipation, diarrhea, arthralgias, pruritis, dizziness, weakness, depression, confusion.     PHYSICAL EXAM:  Blood pressure 112/60, pulse 70, height 5' 4" (1.626 m), weight 46.5 kg (102 lb 8.2 oz).  Gen:WDWN female in no apparent distress  Psych:Normal mood and affect  Skin:No " rashes or ulcers  Neck:No JVD  Chest:Clear with no rales, rhonchi, wheezing with normal effort  CV:Regular with no murmurs, gallops or rubs  Abd:Soft, nontender, no distension  Ext:No edema     Labs reviewed  BMP  Lab Results   Component Value Date     09/04/2024    K 4.6 09/04/2024     09/04/2024    CO2 19 (L) 09/04/2024    BUN 50 (H) 09/04/2024    CREATININE 2.3 (H) 09/04/2024    CALCIUM 9.0 09/04/2024    ANIONGAP 10 09/04/2024    EGFRNORACEVR 25.3 (A) 09/04/2024     Lab Results   Component Value Date    WBC 15.42 (H) 09/04/2024    HGB 10.6 (L) 09/04/2024    HCT 36.2 (L) 09/04/2024    MCV 91 09/04/2024     09/04/2024          U/a: trace protein (was 1+), 2+blood, 17 RBC 3 casts  Urine p/cr 0.29 g, from 1.0 g     Anti-GBM antibody 0.5 ( normal), from 3.8  C-ANCA neg  P-ANCA: 1:20, from 1:80  Myeloperoxidase 43  Proteinase 3 antibodies neg  Cryo nege     ESR 1, was >60     Kidney biopsy results reviewed and discussed by phone with Dr. 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 The increase in Cr is hemodynamic  There are other signs that point out to pt's condition being stable or has improved:  Asymptomatic today   BP is well controlled  Proteinuria much improved  Hematuria has improved  P-ANCA's have improved  C-ANCA is negative  Anti-GBM antibody is negative  ESR normal  Good response to first dose of retuximab and prednisone high dose taper    To review:   Kidney failure and pulmonary symptoms due to P-ANCA vasculitis.  Positive anti-GBM antibody, but kidney  biopsy showed no sign of anti-GBM linear staining and IF was negative for Goodpasture disease  S/p acute treatment with the following:  Plasmapheresis x3  Solumedrol pulse dose x 3  Retuximab 1 g IV on 8/25/24      Recommend:  Continue retuximab 1 g IV q 2 weeks x 4 doses total, next dose was ordered and arranged to be given on 9/12/24 by the hospital group at   Continue prednisone taper by 10 mg per week, currently on 50 mg  Continue bactrim x 6 months  Will need hep B vaccination  Will benefit from ID consult, will refer (at risk of infections)  Will benefit from RAAS blockade, will start lisinopril 10 mg po qd     Plans and recommendations:  As discussed above  Total time spent 40 minutes including time needed to review the records, the   patient evaluation, documentation, face-to-face discussion with the patient,   more than 50% of the time was spent on coordination of care and counseling.    Level V visit.  RTC 3 week, OK to rosemary Montero MD

## 2024-09-12 ENCOUNTER — LAB VISIT (OUTPATIENT)
Dept: LAB | Facility: HOSPITAL | Age: 49
End: 2024-09-12
Attending: STUDENT IN AN ORGANIZED HEALTH CARE EDUCATION/TRAINING PROGRAM
Payer: COMMERCIAL

## 2024-09-12 ENCOUNTER — INFUSION (OUTPATIENT)
Dept: INFUSION THERAPY | Facility: HOSPITAL | Age: 49
End: 2024-09-12
Attending: INTERNAL MEDICINE
Payer: COMMERCIAL

## 2024-09-12 ENCOUNTER — PATIENT MESSAGE (OUTPATIENT)
Dept: RHEUMATOLOGY | Facility: CLINIC | Age: 49
End: 2024-09-12
Payer: COMMERCIAL

## 2024-09-12 VITALS
HEIGHT: 64 IN | WEIGHT: 101.63 LBS | OXYGEN SATURATION: 98 % | HEART RATE: 79 BPM | TEMPERATURE: 98 F | BODY MASS INDEX: 17.35 KG/M2 | RESPIRATION RATE: 16 BRPM | SYSTOLIC BLOOD PRESSURE: 128 MMHG | DIASTOLIC BLOOD PRESSURE: 75 MMHG

## 2024-09-12 DIAGNOSIS — D50.8 OTHER IRON DEFICIENCY ANEMIA: Primary | ICD-10-CM

## 2024-09-12 DIAGNOSIS — I77.82 ANCA-ASSOCIATED VASCULITIS: ICD-10-CM

## 2024-09-12 DIAGNOSIS — Z79.899 HIGH RISK MEDICATION USE: ICD-10-CM

## 2024-09-12 LAB — B-HCG UR QL: NEGATIVE

## 2024-09-12 PROCEDURE — 96375 TX/PRO/DX INJ NEW DRUG ADDON: CPT

## 2024-09-12 PROCEDURE — 96365 THER/PROPH/DIAG IV INF INIT: CPT

## 2024-09-12 PROCEDURE — 63600175 PHARM REV CODE 636 W HCPCS: Performed by: STUDENT IN AN ORGANIZED HEALTH CARE EDUCATION/TRAINING PROGRAM

## 2024-09-12 PROCEDURE — 25000003 PHARM REV CODE 250: Performed by: STUDENT IN AN ORGANIZED HEALTH CARE EDUCATION/TRAINING PROGRAM

## 2024-09-12 PROCEDURE — 96366 THER/PROPH/DIAG IV INF ADDON: CPT

## 2024-09-12 PROCEDURE — 96367 TX/PROPH/DG ADDL SEQ IV INF: CPT

## 2024-09-12 PROCEDURE — 25000003 PHARM REV CODE 250: Performed by: INTERNAL MEDICINE

## 2024-09-12 PROCEDURE — 81025 URINE PREGNANCY TEST: CPT | Performed by: STUDENT IN AN ORGANIZED HEALTH CARE EDUCATION/TRAINING PROGRAM

## 2024-09-12 PROCEDURE — 63600175 PHARM REV CODE 636 W HCPCS: Performed by: INTERNAL MEDICINE

## 2024-09-12 RX ORDER — FAMOTIDINE 10 MG/ML
20 INJECTION INTRAVENOUS
OUTPATIENT
Start: 2024-09-26

## 2024-09-12 RX ORDER — EPINEPHRINE 0.3 MG/.3ML
0.3 INJECTION SUBCUTANEOUS ONCE AS NEEDED
OUTPATIENT
Start: 2024-09-18

## 2024-09-12 RX ORDER — DIPHENHYDRAMINE HYDROCHLORIDE 50 MG/ML
50 INJECTION INTRAMUSCULAR; INTRAVENOUS ONCE AS NEEDED
OUTPATIENT
Start: 2024-09-18

## 2024-09-12 RX ORDER — ACETAMINOPHEN 325 MG/1
650 TABLET ORAL
OUTPATIENT
Start: 2024-09-26

## 2024-09-12 RX ORDER — METHYLPREDNISOLONE SOD SUCC 125 MG
100 VIAL (EA) INJECTION
OUTPATIENT
Start: 2024-09-26

## 2024-09-12 RX ORDER — SODIUM CHLORIDE 0.9 % (FLUSH) 0.9 %
10 SYRINGE (ML) INJECTION
OUTPATIENT
Start: 2024-09-18

## 2024-09-12 RX ORDER — FAMOTIDINE 10 MG/ML
20 INJECTION INTRAVENOUS
Status: COMPLETED | OUTPATIENT
Start: 2024-09-12 | End: 2024-09-12

## 2024-09-12 RX ORDER — EPINEPHRINE 0.3 MG/.3ML
0.3 INJECTION SUBCUTANEOUS ONCE AS NEEDED
OUTPATIENT
Start: 2024-09-26

## 2024-09-12 RX ORDER — DIPHENHYDRAMINE HYDROCHLORIDE 50 MG/ML
50 INJECTION INTRAMUSCULAR; INTRAVENOUS ONCE AS NEEDED
OUTPATIENT
Start: 2024-09-26

## 2024-09-12 RX ORDER — HEPARIN 100 UNIT/ML
500 SYRINGE INTRAVENOUS
OUTPATIENT
Start: 2024-09-26

## 2024-09-12 RX ORDER — MEPERIDINE HYDROCHLORIDE 50 MG/ML
25 INJECTION INTRAMUSCULAR; INTRAVENOUS; SUBCUTANEOUS
OUTPATIENT
Start: 2024-09-26 | End: 2024-09-27

## 2024-09-12 RX ORDER — ACETAMINOPHEN 325 MG/1
650 TABLET ORAL
Status: COMPLETED | OUTPATIENT
Start: 2024-09-12 | End: 2024-09-12

## 2024-09-12 RX ORDER — HEPARIN 100 UNIT/ML
500 SYRINGE INTRAVENOUS
OUTPATIENT
Start: 2024-09-18

## 2024-09-12 RX ORDER — METHYLPREDNISOLONE SOD SUCC 125 MG
100 VIAL (EA) INJECTION
Status: COMPLETED | OUTPATIENT
Start: 2024-09-12 | End: 2024-09-12

## 2024-09-12 RX ORDER — SODIUM CHLORIDE 0.9 % (FLUSH) 0.9 %
10 SYRINGE (ML) INJECTION
OUTPATIENT
Start: 2024-09-26

## 2024-09-12 RX ADMIN — ACETAMINOPHEN 650 MG: 325 TABLET ORAL at 10:09

## 2024-09-12 RX ADMIN — SODIUM CHLORIDE 125 MG: 9 INJECTION, SOLUTION INTRAVENOUS at 09:09

## 2024-09-12 RX ADMIN — FAMOTIDINE 20 MG: 10 INJECTION, SOLUTION INTRAVENOUS at 10:09

## 2024-09-12 RX ADMIN — SODIUM CHLORIDE 1000 MG: 9 INJECTION, SOLUTION INTRAVENOUS at 11:09

## 2024-09-12 RX ADMIN — METHYLPREDNISOLONE SODIUM SUCCINATE 100 MG: 125 INJECTION, POWDER, FOR SOLUTION INTRAMUSCULAR; INTRAVENOUS at 10:09

## 2024-09-12 RX ADMIN — DIPHENHYDRAMINE HYDROCHLORIDE 50 MG: 50 INJECTION, SOLUTION INTRAMUSCULAR; INTRAVENOUS at 10:09

## 2024-09-12 NOTE — PLAN OF CARE
Patient tolerated Rituxan/Ferrlecit well today; no adverse reaction noted.  POC reviewed with pt.  NAD noted upon discharge.   Has f/u appt(s) scheduled per MD request.    Problem: Adult Inpatient Plan of Care  Goal: Plan of Care Review  9/12/2024 1454 by Sarah Banda RN  Outcome: Progressing  9/12/2024 1454 by Sarah Banda RN  Outcome: Progressing  Goal: Patient-Specific Goal (Individualized)  9/12/2024 1454 by Sarah Banda RN  Outcome: Progressing  9/12/2024 1454 by Sarah Banda RN  Outcome: Progressing  Goal: Absence of Hospital-Acquired Illness or Injury  9/12/2024 1454 by Sarah Banda RN  Outcome: Progressing  9/12/2024 1454 by Sarah Banda RN  Outcome: Progressing  Intervention: Identify and Manage Fall Risk  Flowsheets (Taken 9/12/2024 1454)  Safety Promotion/Fall Prevention: in recliner, wheels locked  Goal: Optimal Comfort and Wellbeing  9/12/2024 1454 by Sarah Banda RN  Outcome: Progressing  9/12/2024 1454 by Sarah Banda RN  Outcome: Progressing  Intervention: Provide Person-Centered Care  Flowsheets (Taken 9/12/2024 1454)  Trust Relationship/Rapport:   care explained   reassurance provided   choices provided   thoughts/feelings acknowledged   emotional support provided   empathic listening provided   questions answered   questions encouraged

## 2024-09-17 ENCOUNTER — OFFICE VISIT (OUTPATIENT)
Dept: RHEUMATOLOGY | Facility: CLINIC | Age: 49
End: 2024-09-17
Payer: COMMERCIAL

## 2024-09-17 DIAGNOSIS — Z79.899 IMMUNODEFICIENCY DUE TO DRUG THERAPY: ICD-10-CM

## 2024-09-17 DIAGNOSIS — Z79.60 ENCOUNTER FOR MONITORING IMMUNOSUPPRESSIVE MEDICATION THERAPY CAUSING IMMUNODEFICIENCY: ICD-10-CM

## 2024-09-17 DIAGNOSIS — I77.82 ANCA-ASSOCIATED VASCULITIS: Primary | ICD-10-CM

## 2024-09-17 DIAGNOSIS — Z79.899 HIGH RISK MEDICATION USE: ICD-10-CM

## 2024-09-17 DIAGNOSIS — Z51.81 ENCOUNTER FOR MONITORING IMMUNOSUPPRESSIVE MEDICATION THERAPY CAUSING IMMUNODEFICIENCY: ICD-10-CM

## 2024-09-17 DIAGNOSIS — R76.8 RHEUMATOID FACTOR POSITIVE: ICD-10-CM

## 2024-09-17 DIAGNOSIS — D84.821 ENCOUNTER FOR MONITORING IMMUNOSUPPRESSIVE MEDICATION THERAPY CAUSING IMMUNODEFICIENCY: ICD-10-CM

## 2024-09-17 DIAGNOSIS — D84.821 IMMUNODEFICIENCY DUE TO DRUG THERAPY: ICD-10-CM

## 2024-09-17 PROCEDURE — 3066F NEPHROPATHY DOC TX: CPT | Mod: CPTII,95,, | Performed by: STUDENT IN AN ORGANIZED HEALTH CARE EDUCATION/TRAINING PROGRAM

## 2024-09-17 PROCEDURE — 3044F HG A1C LEVEL LT 7.0%: CPT | Mod: CPTII,95,, | Performed by: STUDENT IN AN ORGANIZED HEALTH CARE EDUCATION/TRAINING PROGRAM

## 2024-09-17 PROCEDURE — 1160F RVW MEDS BY RX/DR IN RCRD: CPT | Mod: CPTII,95,, | Performed by: STUDENT IN AN ORGANIZED HEALTH CARE EDUCATION/TRAINING PROGRAM

## 2024-09-17 PROCEDURE — 99215 OFFICE O/P EST HI 40 MIN: CPT | Mod: 95,,, | Performed by: STUDENT IN AN ORGANIZED HEALTH CARE EDUCATION/TRAINING PROGRAM

## 2024-09-17 PROCEDURE — 1159F MED LIST DOCD IN RCRD: CPT | Mod: CPTII,95,, | Performed by: STUDENT IN AN ORGANIZED HEALTH CARE EDUCATION/TRAINING PROGRAM

## 2024-09-17 PROCEDURE — 4010F ACE/ARB THERAPY RXD/TAKEN: CPT | Mod: CPTII,95,, | Performed by: STUDENT IN AN ORGANIZED HEALTH CARE EDUCATION/TRAINING PROGRAM

## 2024-09-17 RX ORDER — PREDNISONE 5 MG/1
TABLET ORAL
Qty: 237 TABLET | Refills: 0 | Status: SHIPPED | OUTPATIENT
Start: 2024-09-17 | End: 2025-02-24

## 2024-09-17 RX ORDER — SULFAMETHOXAZOLE AND TRIMETHOPRIM 800; 160 MG/1; MG/1
1 TABLET ORAL
Qty: 12 TABLET | Refills: 11 | Status: SHIPPED | OUTPATIENT
Start: 2024-09-18

## 2024-09-17 NOTE — PROGRESS NOTES
The patient location is: [place]  The chief complaint leading to consultation is: ANCA vasculitis    Visit type: audiovisual    Face to Face time with patient: 20 minutes  40 minutes of total time spent on the encounter, which includes face to face time and non-face to face time preparing to see the patient (eg, review of tests), Obtaining and/or reviewing separately obtained history, Documenting clinical information in the electronic or other health record, Independently interpreting results (not separately reported) and communicating results to the patient/family/caregiver, or Care coordination (not separately reported).         Each patient to whom he or she provides medical services by telemedicine is:  (1) informed of the relationship between the physician and patient and the respective role of any other health care provider with respect to management of the patient; and (2) notified that he or she may decline to receive medical services by telemedicine and may withdraw from such care at any time.    Notes:       Subjective:      Patient ID: Shayy Faust is a 49 y.o. female.    Chief Complaint: ANCA vasculitis    HPI:   Patient presents for Rheumatology follow up for P-ANCA vasculitis. Completed first course of induction rituximab and is on prednisone taper. Continues with prophylactic Bactrim-DS, Protonix, and vitamin D. She reports she's doing well. Got back to work. Endorses hair shedding and intermittent right foot numbness. No foot drop. Had non-bloody diarrhea for one day on Saturday. Denies epistaxis, hemoptysis, hematemesis, melena, hematochezia, hematuria, or dark urine. Denies arthralgias, myalgias, petechiae, joint swelling, significant stiffness, skin rashes, oral ulcers, patchy alopecia, sicca symptoms, eye inflammation, IBD, fevers, weight loss, Raynaud's. Rheumatology review of systems is otherwise negative.    Initial Hx:  Symptoms started in April 2024 with dyspnea, URI symptoms, sinus  congestion, generally not feeling well. She was following with Pulmonologist Dr. Jiménez. Her symptoms progressed by August 2025 along with weight loss 25 lbs, anemia, renal disease on labs. CT chest showed numerous bilateral lung opacities; consider atypical infection/inflammation as well as metastatic neoplasm; recommend further evaluation with PET-CT scan. Tree in bud micronodularity in lower lobes consistent with chronic small airway disease. Dr. Jiménez was concerned for GPA so he had her admitted to [facility] for workup/treatment. She was hospitalized 08/16/2024-08/26/2024. She had kidney biopsy notably showing no anti-GBM antibodies. 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 She was noted to have Hb 6.7, unknown bleeding source. Transfused 1 unit PRBC. EGD/Colon showing minor gastritis, otherwise normal. Plans for outpatient video capsule to complete work up for MARIA ESTHER. Given pulse steroids x3 days inpatient. Nephrology initiated rituximab 1000 mg first dose given 08/25/2024. Patient discharged with prednisone taper, Bactrim-DS, Protonix, vitamin D. Presents with me for further treatment for the ANCA vasculitis. Reports feeling better than prior to hospitalization. Endorses an episode of mild epistaxis a few weeks ago. Denies hemoptysis, hematemesis, melena, hematochezia, hematuria, or dark urine. Denies arthralgias, myalgias, petechiae, joint swelling, significant stiffness, skin rashes, oral ulcers, patchy alopecia, sicca symptoms, eye inflammation, IBD, fevers, weight loss, Raynaud's. Rheumatology review of systems is otherwise negative.    Social Hx: Never smoker. Occasional alcohol use.  Family Hx: Maternal aunt and great-grandmother have lupus. Paternal aunt has RA.      Objective:   LMP  (LMP Unknown)   Physical Exam     Assessment and Plan:     Problem List Items Addressed This Visit          Unprioritized    ANCA-associated vasculitis - Primary    Immunodeficiency due to  drug therapy    Rheumatoid factor positive     Other Visit Diagnoses       High risk medication use        Encounter for monitoring immunosuppressive medication therapy causing immunodeficiency                  Patient presents for Rheumatology follow up for P-ANCA vasculitis. Manifestations of lung and kidney involvement and I suspect sinus involvement as well.    08/13/2024 CT chest showed numerous bilateral lung opacities; consider atypical infection/inflammation as well as metastatic neoplasm; recommend further evaluation with PET-CT scan. Tree in bud micronodularity in lower lobes consistent with chronic small airway disease.   08/20/2024 Kidney biopsy: PAUCI-IMMUNE NECROTIZING CRESCENTIC GLOMERULONEPHRITIS     Labs:  Severe anemia mixed MARIA ESTHER and inflammation. Recovered after transfusion 1U PRBCs.  Initial ESR 97,  prior to pulse steroids.  Elevated platelets, ferritin, IL-2R indicating inflammation.    C-ANCA negative  P-ANCA+ 1:80  MPO+ 43 (after 1 dose of rituximab)  PR3 negative  RF+ 412  AZAM negative  GBM Ab+ 3.8  A1AT elevated  Cryoglobulins negative    Treatment:  Pulse IV Solumedrol 1000 mg daily x3 doses started 08/22/2024. Now on prednisone taper.  IV Rituximab 1000 mg for 2 doses 2 weeks apart (08/25/2024 and 09/12/2024) for induction.    Plan for repeat IV Rituximab 1000 mg x2 doses 14 days apart every 6 months for 2 years. Can switch to oral DMARD sooner if she achieves remission and if no objection from Nephrology or Pulmonology.    When renal function is better, needs MRA or CTA of chest, abdomen, pelvis to monitor for systemic disease.      Plan:  Continue with taper from the PEXIVAS trial as listed below (with exception of changing the 6 mg dose of week 13-14 for dosing convenience).  Continue PJP ppx with Bactrim-DS three times weekly.  Continue Protonix daily, vitamin D 1000 units daily.  Safety and disease monitoring labs scheduled for next month. Repeat in 3 months with follow  up.  Rituximab round 2 scheduled for February 2025.  She will see ID for vaccine update which I agree with. If appropriate, would suggest getting vaccines 2 weeks prior to next rituximab dose.            High Risk Medication Monitoring encounter  Drug therapy requiring intensive monitoring for toxicity  No current medication related issues, no evidence of toxicity  Appropriate labs ordered for toxicity monitoring    Compromised immune system secondary to autoimmune disease and/or use of immunosuppressive drugs.  Monitor carefully for infections.  Advised patient to get immediate medical care if any infection arises.  Also advised strict adherence age-appropriate vaccinations and cancer screenings with PCP.    Patient advised to hold DMARD and/or biologic therapy for signs of infection or for surgery. If you are unsure what to do please call our office for instruction.[facility] [phone]          Follow up in about 3 months (around 12/17/2024).

## 2024-09-17 NOTE — PATIENT INSTRUCTIONS
Prednisone taper.  Week 3-4: 20 mg daily.  Week 5-6: 15 mg daily.  Week 7-8: 12.5 mg daily.  Week 9-10: 10 mg daily.  Week 11-14: 7.5 mg daily.  Week 15-52: 5 mg daily.    Continue Bactrim three times weekly.  Continue Protonix daily.  Take vitamin D 1000 units daily.    Infectious Disease appointment coming up for vaccine update, preferably in about 5 months.  Labs and follow up in 3 months.

## 2024-09-18 ENCOUNTER — LAB VISIT (OUTPATIENT)
Dept: LAB | Facility: HOSPITAL | Age: 49
End: 2024-09-18
Attending: STUDENT IN AN ORGANIZED HEALTH CARE EDUCATION/TRAINING PROGRAM
Payer: COMMERCIAL

## 2024-09-18 DIAGNOSIS — I77.82 ANCA-ASSOCIATED VASCULITIS: ICD-10-CM

## 2024-09-18 LAB
ALBUMIN SERPL BCP-MCNC: 4 G/DL (ref 3.5–5.2)
ALP SERPL-CCNC: 63 U/L (ref 55–135)
ALT SERPL W/O P-5'-P-CCNC: 18 U/L (ref 10–44)
ANION GAP SERPL CALC-SCNC: 8 MMOL/L (ref 8–16)
ANISOCYTOSIS BLD QL SMEAR: SLIGHT
AST SERPL-CCNC: 12 U/L (ref 10–40)
BACTERIA #/AREA URNS AUTO: NORMAL /HPF
BASOPHILS # BLD AUTO: 0.02 K/UL (ref 0–0.2)
BASOPHILS NFR BLD: 0.2 % (ref 0–1.9)
BILIRUB SERPL-MCNC: 0.3 MG/DL (ref 0.1–1)
BILIRUB UR QL STRIP: NEGATIVE
BUN SERPL-MCNC: 44 MG/DL (ref 6–20)
CALCIUM SERPL-MCNC: 9.1 MG/DL (ref 8.7–10.5)
CHLORIDE SERPL-SCNC: 108 MMOL/L (ref 95–110)
CLARITY UR REFRACT.AUTO: CLEAR
CO2 SERPL-SCNC: 23 MMOL/L (ref 23–29)
COLOR UR AUTO: YELLOW
CREAT SERPL-MCNC: 2.1 MG/DL (ref 0.5–1.4)
CREAT UR-MCNC: 37.2 MG/DL (ref 15–325)
CRP SERPL-MCNC: 2.3 MG/L (ref 0–8.2)
DACRYOCYTES BLD QL SMEAR: ABNORMAL
DIFFERENTIAL METHOD BLD: ABNORMAL
EOSINOPHIL # BLD AUTO: 0.1 K/UL (ref 0–0.5)
EOSINOPHIL NFR BLD: 1 % (ref 0–8)
ERYTHROCYTE [DISTWIDTH] IN BLOOD BY AUTOMATED COUNT: 23 % (ref 11.5–14.5)
ERYTHROCYTE [SEDIMENTATION RATE] IN BLOOD BY PHOTOMETRIC METHOD: <2 MM/HR (ref 0–36)
EST. GFR  (NO RACE VARIABLE): 28.2 ML/MIN/1.73 M^2
GLUCOSE SERPL-MCNC: 90 MG/DL (ref 70–110)
GLUCOSE UR QL STRIP: NEGATIVE
HCT VFR BLD AUTO: 35.7 % (ref 37–48.5)
HGB BLD-MCNC: 10.9 G/DL (ref 12–16)
HGB UR QL STRIP: ABNORMAL
IMM GRANULOCYTES # BLD AUTO: 0.09 K/UL (ref 0–0.04)
IMM GRANULOCYTES NFR BLD AUTO: 1 % (ref 0–0.5)
KETONES UR QL STRIP: NEGATIVE
LEUKOCYTE ESTERASE UR QL STRIP: ABNORMAL
LYMPHOCYTES # BLD AUTO: 0.6 K/UL (ref 1–4.8)
LYMPHOCYTES NFR BLD: 6.7 % (ref 18–48)
MCH RBC QN AUTO: 28.5 PG (ref 27–31)
MCHC RBC AUTO-ENTMCNC: 30.5 G/DL (ref 32–36)
MCV RBC AUTO: 94 FL (ref 82–98)
MICROSCOPIC COMMENT: NORMAL
MONOCYTES # BLD AUTO: 0.3 K/UL (ref 0.3–1)
MONOCYTES NFR BLD: 3.1 % (ref 4–15)
NEUTROPHILS # BLD AUTO: 8 K/UL (ref 1.8–7.7)
NEUTROPHILS NFR BLD: 88 % (ref 38–73)
NITRITE UR QL STRIP: NEGATIVE
NRBC BLD-RTO: 0 /100 WBC
OVALOCYTES BLD QL SMEAR: ABNORMAL
PH UR STRIP: 7 [PH] (ref 5–8)
PLATELET # BLD AUTO: 300 K/UL (ref 150–450)
PMV BLD AUTO: 9.2 FL (ref 9.2–12.9)
POIKILOCYTOSIS BLD QL SMEAR: SLIGHT
POTASSIUM SERPL-SCNC: 5.5 MMOL/L (ref 3.5–5.1)
PROT SERPL-MCNC: 6.6 G/DL (ref 6–8.4)
PROT UR QL STRIP: NEGATIVE
PROT UR-MCNC: 18 MG/DL (ref 0–15)
PROT/CREAT UR: 0.48 MG/G{CREAT} (ref 0–0.2)
RBC # BLD AUTO: 3.82 M/UL (ref 4–5.4)
RBC #/AREA URNS AUTO: 1 /HPF (ref 0–4)
SODIUM SERPL-SCNC: 139 MMOL/L (ref 136–145)
SP GR UR STRIP: 1.01 (ref 1–1.03)
TARGETS BLD QL SMEAR: ABNORMAL
URN SPEC COLLECT METH UR: ABNORMAL
UROBILINOGEN UR STRIP-ACNC: NEGATIVE EU/DL
WBC # BLD AUTO: 9.1 K/UL (ref 3.9–12.7)
WBC #/AREA URNS AUTO: 2 /HPF (ref 0–5)

## 2024-09-18 PROCEDURE — 80053 COMPREHEN METABOLIC PANEL: CPT | Mod: PO | Performed by: STUDENT IN AN ORGANIZED HEALTH CARE EDUCATION/TRAINING PROGRAM

## 2024-09-18 PROCEDURE — 85025 COMPLETE CBC W/AUTO DIFF WBC: CPT | Mod: PO | Performed by: STUDENT IN AN ORGANIZED HEALTH CARE EDUCATION/TRAINING PROGRAM

## 2024-09-18 PROCEDURE — 81000 URINALYSIS NONAUTO W/SCOPE: CPT | Mod: PO | Performed by: STUDENT IN AN ORGANIZED HEALTH CARE EDUCATION/TRAINING PROGRAM

## 2024-09-18 PROCEDURE — 86036 ANCA SCREEN EACH ANTIBODY: CPT | Performed by: STUDENT IN AN ORGANIZED HEALTH CARE EDUCATION/TRAINING PROGRAM

## 2024-09-18 PROCEDURE — 85652 RBC SED RATE AUTOMATED: CPT | Performed by: STUDENT IN AN ORGANIZED HEALTH CARE EDUCATION/TRAINING PROGRAM

## 2024-09-18 PROCEDURE — 86140 C-REACTIVE PROTEIN: CPT | Mod: PO | Performed by: STUDENT IN AN ORGANIZED HEALTH CARE EDUCATION/TRAINING PROGRAM

## 2024-09-18 PROCEDURE — 83516 IMMUNOASSAY NONANTIBODY: CPT | Mod: 59 | Performed by: STUDENT IN AN ORGANIZED HEALTH CARE EDUCATION/TRAINING PROGRAM

## 2024-09-18 PROCEDURE — 84156 ASSAY OF PROTEIN URINE: CPT | Mod: PO | Performed by: STUDENT IN AN ORGANIZED HEALTH CARE EDUCATION/TRAINING PROGRAM

## 2024-09-18 PROCEDURE — 36415 COLL VENOUS BLD VENIPUNCTURE: CPT | Mod: PO | Performed by: STUDENT IN AN ORGANIZED HEALTH CARE EDUCATION/TRAINING PROGRAM

## 2024-09-18 PROCEDURE — 83516 IMMUNOASSAY NONANTIBODY: CPT | Performed by: STUDENT IN AN ORGANIZED HEALTH CARE EDUCATION/TRAINING PROGRAM

## 2024-09-19 ENCOUNTER — OFFICE VISIT (OUTPATIENT)
Dept: PULMONOLOGY | Facility: CLINIC | Age: 49
End: 2024-09-19
Payer: COMMERCIAL

## 2024-09-19 ENCOUNTER — TELEPHONE (OUTPATIENT)
Dept: RHEUMATOLOGY | Facility: CLINIC | Age: 49
End: 2024-09-19
Payer: COMMERCIAL

## 2024-09-19 ENCOUNTER — HOSPITAL ENCOUNTER (OUTPATIENT)
Facility: HOSPITAL | Age: 49
Discharge: HOME | End: 2024-09-19
Attending: INTERNAL MEDICINE | Admitting: INTERNAL MEDICINE
Payer: COMMERCIAL

## 2024-09-19 VITALS
DIASTOLIC BLOOD PRESSURE: 70 MMHG | SYSTOLIC BLOOD PRESSURE: 123 MMHG | HEART RATE: 74 BPM | HEIGHT: 64 IN | BODY MASS INDEX: 17.01 KG/M2 | RESPIRATION RATE: 16 BRPM | WEIGHT: 99.63 LBS | OXYGEN SATURATION: 97 %

## 2024-09-19 DIAGNOSIS — J45.31 MILD PERSISTENT ASTHMA WITH ACUTE EXACERBATION: Primary | ICD-10-CM

## 2024-09-19 DIAGNOSIS — I77.82 ANCA-ASSOCIATED VASCULITIS: ICD-10-CM

## 2024-09-19 DIAGNOSIS — Z79.899 IMMUNODEFICIENCY DUE TO DRUG THERAPY: ICD-10-CM

## 2024-09-19 DIAGNOSIS — D84.821 IMMUNODEFICIENCY DUE TO DRUG THERAPY: ICD-10-CM

## 2024-09-19 DIAGNOSIS — J45.991 COUGH VARIANT ASTHMA: ICD-10-CM

## 2024-09-19 PROCEDURE — 99999 PR PBB SHADOW E&M-EST. PATIENT-LVL IV: CPT | Mod: PBBFAC,,, | Performed by: INTERNAL MEDICINE

## 2024-09-19 PROCEDURE — 3066F NEPHROPATHY DOC TX: CPT | Mod: CPTII,S$GLB,, | Performed by: INTERNAL MEDICINE

## 2024-09-19 PROCEDURE — 99214 OFFICE O/P EST MOD 30 MIN: CPT | Mod: 25,S$GLB,, | Performed by: INTERNAL MEDICINE

## 2024-09-19 PROCEDURE — 3074F SYST BP LT 130 MM HG: CPT | Mod: CPTII,S$GLB,, | Performed by: INTERNAL MEDICINE

## 2024-09-19 PROCEDURE — 1111F DSCHRG MED/CURRENT MED MERGE: CPT | Mod: CPTII,S$GLB,, | Performed by: INTERNAL MEDICINE

## 2024-09-19 PROCEDURE — 1159F MED LIST DOCD IN RCRD: CPT | Mod: CPTII,S$GLB,, | Performed by: INTERNAL MEDICINE

## 2024-09-19 PROCEDURE — 3044F HG A1C LEVEL LT 7.0%: CPT | Mod: CPTII,S$GLB,, | Performed by: INTERNAL MEDICINE

## 2024-09-19 PROCEDURE — 4010F ACE/ARB THERAPY RXD/TAKEN: CPT | Mod: CPTII,S$GLB,, | Performed by: INTERNAL MEDICINE

## 2024-09-19 PROCEDURE — 91110 GI TRC IMG INTRAL ESOPH-ILE: CPT | Mod: TC | Performed by: INTERNAL MEDICINE

## 2024-09-19 PROCEDURE — 3008F BODY MASS INDEX DOCD: CPT | Mod: CPTII,S$GLB,, | Performed by: INTERNAL MEDICINE

## 2024-09-19 PROCEDURE — 3078F DIAST BP <80 MM HG: CPT | Mod: CPTII,S$GLB,, | Performed by: INTERNAL MEDICINE

## 2024-09-19 PROCEDURE — 27201489 HC PILLCAM CAPSULE

## 2024-09-19 RX ORDER — FAMOTIDINE 10 MG/ML
20 INJECTION INTRAVENOUS
OUTPATIENT
Start: 2024-09-26

## 2024-09-19 RX ORDER — METHYLPREDNISOLONE SOD SUCC 125 MG
100 VIAL (EA) INJECTION
OUTPATIENT
Start: 2024-09-26

## 2024-09-19 RX ORDER — MEPERIDINE HYDROCHLORIDE 50 MG/ML
25 INJECTION INTRAMUSCULAR; INTRAVENOUS; SUBCUTANEOUS
OUTPATIENT
Start: 2024-09-26 | End: 2024-09-27

## 2024-09-19 RX ORDER — FLUTICASONE PROPIONATE AND SALMETEROL 100; 50 UG/1; UG/1
1 POWDER RESPIRATORY (INHALATION) 2 TIMES DAILY
Qty: 60 EACH | Refills: 11 | Status: SHIPPED | OUTPATIENT
Start: 2024-09-19 | End: 2025-09-19

## 2024-09-19 RX ORDER — ALBUTEROL SULFATE 90 UG/1
2 INHALANT RESPIRATORY (INHALATION) EVERY 4 HOURS PRN
Qty: 18 G | Refills: 11 | Status: SHIPPED | OUTPATIENT
Start: 2024-09-19

## 2024-09-19 RX ORDER — ACETAMINOPHEN 325 MG/1
650 TABLET ORAL
OUTPATIENT
Start: 2024-09-26

## 2024-09-19 NOTE — LETTER
September 19, 2024    Shayy Faust  [address]  [place] [address]     The [address]  [address] [address] [facility] [place] [address]  Phone: [phone]  Fax: [phone] September 19, 2024     Patient: Shayy Faust   YOB: 1975   Date of Visit: 9/19/2024       To Whom It May Concern:    It is my medical opinion that Shayy Faust may return to full duty immediately with no restrictions.    Patient was seen in my office today     If you have any questions or concerns, please don't hesitate to call.    Sincerely,          Ulysses Jiménez MD

## 2024-09-19 NOTE — PROGRESS NOTES
Subjective:     Patient ID: Shayy Faust is a 49 y.o. female.    Chief Complaint:  Abnormal CT of chest    HPI   49 y.o. 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  ANCA - presently on a tapering dose of prednisone  No wheezing or shortness of breath   She has retruned to work       Past Medical History:   Diagnosis Date    Mild intermittent asthma, uncomplicated      Past Surgical History:   Procedure Laterality Date    BILATERAL TUBAL LIGATION N/A     COLONOSCOPY N/A 8/20/2024    Procedure: COLONOSCOPY;  Surgeon: Gena Saxena MD;  Location: [facility];  Service: Endoscopy;  Laterality: N/A;    ESOPHAGOGASTRODUODENOSCOPY N/A 8/20/2024    Procedure: EGD (ESOPHAGOGASTRODUODENOSCOPY);  Surgeon: Gena Saxena MD;  Location: [facility];  Service: Endoscopy;  Laterality: N/A;     Review of patient's allergies indicates:  No Known Allergies  Current Outpatient Medications on File Prior to Visit   Medication Sig Dispense Refill    amLODIPine (NORVASC) 5 MG tablet Take 1 tablet (5 mg total) by mouth once daily. 30 tablet 0    cholecalciferol, vitamin D3, 125 mcg (5,000 unit) Tab Take 1 tablet (5,000 Units total) by mouth once daily. 30 tablet 0    ferrous sulfate 325 (65 FE) MG EC tablet Take 1 tablet (325 mg total) by mouth once daily. 30 tablet 0    lisinopriL 10 MG tablet Take 1 tablet (10 mg total) by mouth once daily. 30 tablet 11    pantoprazole (PROTONIX) 40 MG tablet Take 1 tablet (40 mg total) by mouth once daily. 90 tablet 3    predniSONE (DELTASONE) 5 MG tablet Take 3 tablets (15 mg total) by mouth once daily for 14 days, THEN 2.5 tablets (12.5 mg total) once daily for 14 days, THEN 2 tablets (10 mg total) once daily for 14 days, THEN 1.5 tablets (7.5 mg total) once daily for 28 days, THEN 1 tablet (5 mg total) once daily.  "Start after completing week 4 (20 mg daily).. 237 tablet 0    sodium bicarbonate 650 MG tablet Take 1 tablet (650 mg total) by mouth 2 (two) times daily. for 7 days 14 tablet 0    sulfamethoxazole-trimethoprim 800-160mg (BACTRIM DS) 800-160 mg Tab Take 1 tablet by mouth 3 (three) times a week. 12 tablet 11    [DISCONTINUED] albuterol (PROVENTIL/VENTOLIN HFA) 90 mcg/actuation inhaler Inhale 2 puffs into the lungs every 4 (four) hours as needed for Wheezing or Shortness of Breath. 18 g 11     No current facility-administered medications on file prior to visit.     Social History     Socioeconomic History    Marital status: Single   Tobacco Use    Smoking status: Never    Smokeless tobacco: Never   Substance and Sexual Activity    Alcohol use: Not Currently    Drug use: Never     Family History   Problem Relation Name Age of Onset    Hypertension Mother      Hypertension Father         Review of Systems   Constitutional:  Positive for fatigue.   Respiratory:  Positive for wheezing and dyspnea on extertion.      Objective:      /70   Pulse 74   Resp 16   Ht 5' 4" (1.626 m)   Wt 45.2 kg (99 lb 10.4 oz)   LMP  (LMP Unknown)   SpO2 97%   BMI 17.10 kg/m²   Physical Exam  Vitals and nursing note reviewed.   Constitutional:       Appearance: Normal appearance. She is well-developed.   HENT:      Head: Normocephalic and atraumatic.      Nose: Nose normal.   Eyes:      Conjunctiva/sclera: Conjunctivae normal.      Pupils: Pupils are equal, round, and reactive to light.   Neck:      Thyroid: No thyromegaly.      Vascular: No JVD.      Trachea: No tracheal deviation.   Cardiovascular:      Rate and Rhythm: Normal rate and regular rhythm.      Heart sounds: Normal heart sounds.   Pulmonary:      Effort: Pulmonary effort is normal. No respiratory distress.      Breath sounds: Wheezing present. No rales.   Chest:      Chest wall: No tenderness.   Abdominal:      General: Bowel sounds are normal.      Palpations: Abdomen " is soft.   Musculoskeletal:         General: Normal range of motion.      Cervical back: Neck supple.   Lymphadenopathy:      Cervical: No cervical adenopathy.   Skin:     General: Skin is warm and dry.   Neurological:      Mental Status: She is alert and oriented to person, place, and time.     Personal Diagnostic Review  CT of chest reviewed  CT Chest Without Contrast  Order: [id number]  Status: Final result       Visible to patient: Yes (seen)       Next appt: 10/02/2024 at 10:00 AM in Lab ([facility])       Dx: Chronic cough; Moderate persistent as...    0 Result Notes  Details    Reading Physician Reading Date Result Priority   Bon Mckeon MD  [phone] 8/13/2024 Routine     Narrative & Impression  EXAMINATION:  CT CHEST WITHOUT CONTRAST     CLINICAL HISTORY:  Cough, persistent;Dyspnea, chronic, unclear etiology;Report of Pleural effusion on CXR; Moderate persistent asthma with (acute) exacerbation     TECHNIQUE:  Low dose axial images, sagittal and coronal reformations were obtained from the thoracic inlet to the lung bases. Contrast was not administered.     COMPARISON:  None     FINDINGS:  Base of Neck: No significant abnormality.     Thoracic soft tissues: Normal.     Aorta: Left-sided aortic arch.  No aneurysm and no significant atherosclerosis     Heart: Normal size. No effusion.     Pulmonary vasculature: Pulmonary arteries distribute normally.  There are four pulmonary veins.     Meg/Mediastinum: No pathologic bernabe enlargement.     Airways: Patent.     Lungs/Pleura: Numerous bilateral mostly irregular and/or spiculated nodular opacities.  Largest representative lesions are 10 mm in the right lower lobe on series 4, image 277 and 13 mm in the left upper lobe on series 4, image 155.  Tree in bud pattern of micro nodularity in the right greater than left lower lobe suggesting chronic small airways disease.  Mild bilateral pleuroparenchymal lung scarring.  No consolidation or pleural  "effusion.     Esophagus: Normal.     Upper Abdomen: Punctate 1-2 mm nonobstructing left renal calculus.  Otherwise unremarkable.     Bones: No acute fracture. No suspicious lytic or sclerotic lesions.     Impression:     1. Numerous bilateral lung opacities as detailed.  Considerations include atypical infection/inflammation as well as metastatic neoplasm.  Recommend further evaluation with consideration of PET-CT scan.  2. Tree in bud micronodularity in the lower lobes consistent with chronic small airways disease.  3. Remainder as above.        Electronically signed by:NINI Mckeon MD  Date:                                            08/13/2024  Time:                                           11:06           9/19/2024    10:05 AM   Pulmonary Studies Review   SpO2 97 %   Height 5' 4" (1.626 m)   Weight 45.2 kg (99 lb 10.4 oz)   BMI (Calculated) 17.1   Predicted Distance 479.8   Predicted Distance Meters (Calculated) 617.58 meters       IR Tunneled Catheter Insert w/o Port  Narrative: EXAMINATION:  Vas-Cath placement    Procedural Personnel    Attending physician(s): Bon Astudillo MD    Fellow physician(s): None    Resident physician(s): None    Advanced practice provider(s): None    Pre-procedure diagnosis:    Post-procedure diagnosis: Same    Indication: ACUTE RENAL FAILURE REQUIRING DIALYSIS    Additional clinical history: None    Complications: No immediate complications.    PROCEDURAL SUMMARY:  - Venous access with ultrasound guidance    - Tunneled insertion under fluoroscopic guidance    PROCEDURE:  FINDINGS:    Pre-procedure    History and imaging of central venous access reviewed (QCDR): None    Consent: Informed consent for the procedure was obtained and time-out was performed prior to the procedure.    Prophylactic antibiotic administered: None    Preparation (MIPS): The site was prepared and draped using all elements of maximal sterile barrier technique including sterile gloves, sterile gown, cap, " mask, large sterile sheet, sterile ultrasound probe cover, hand hygiene and cutaneous antisepsis    with 2% chlorhexidine.    Medical reason for site preparation exception (MIPS): Not applicable    Anesthesia/sedation    Level of anesthesia/sedation: No sedation    Anesthesia/sedation administered by: No sedation    Total intra-service sedation time (minutes): No sedation    Access    Local anesthesia was administered. The vessel was sonographically evaluated and determined to be patent. Real time ultrasound was used to visualize needle entry into the vessel and a permanent image right jugular vein.    Vein accessed: Internal jugular vein    Access technique: Micropuncture needle with ultrasound guidance    Venography    Indication for venography: Not performed    Catheter tip position for venography: Not applicable    Venous segment imaged: Not applicable    Not applicable    Catheter placement    An incision was made near the venous access site and the catheter was tunneled subcutaneously to the venous access site right jugular vein the catheter was advanced via a peel-away sheath into the vein under fluoroscopic guidance. Catheter tip location    was fluoroscopically verified and a permanent image was stored.    Catheter placed: Bard glide path    Catheter size (Maltese): 14.5    Catheter cuff-to-tip or trimmed length (cm): 19    Catheter tip position: 2.5 VBUs below gissel.    Unique Device Identifier (RON): Not available    Catheter flush: Yes    Closure    A sterile dressing was applied.    Access site closure technique: Tissue adhesive    Catheter securement technique: Non-absorbable suture    Contrast    Contrast agent: None    Contrast volume (mL): None    Radiation Dose    Fluoroscopy time (min): 0.3    1 DSA run.    Additional Details    Additional description of procedure: None    Equipment details: None    Specimens removed: None    Estimated blood loss (mL): Less than 10    Standardized report:  "[email or web address]    Attestation    Signer name: Bon Astudillo    I attest that I was present for the entire procedure. I reviewed the stored images and agree with the report as written.  Impression: Insertion of right-sided supradiaphragmatic  lumen tunneled Vas-Cath catheter, with tip in the expected location of the cavoatrial junction.    Plan:    The catheter may be used immediately.    Electronically signed by: Bon Astudillo MD  Date:    08/22/2024  Time:    15:46      Office Spirometry Results:         9/19/2024    10:05 AM 9/12/2024     2:23 PM 9/12/2024    12:38 PM 9/12/2024    12:08 PM 9/12/2024    11:37 AM 9/12/2024    10:26 AM 9/12/2024     9:18 AM   Pulmonary Function Tests   SpO2 97 % 98 % 99 % 97 % 99 % 99 % 100 %   Height 5' 4" (1.626 m)      5' 4" (1.626 m)   Weight 45.2 kg (99 lb 10.4 oz)      46.1 kg (101 lb 10.1 oz)   BMI (Calculated) 17.1      17.4         9/19/2024    10:05 AM   Pulmonary Studies Review   SpO2 97 %   Height 5' 4" (1.626 m)   Weight 45.2 kg (99 lb 10.4 oz)   BMI (Calculated) 17.1   Predicted Distance 479.8   Predicted Distance Meters (Calculated) 617.58 meters           Recent Results (from the past 336 hour(s))   CBC Auto Differential    Collection Time: 09/18/24 11:12 AM   Result Value Ref Range    WBC 9.10 3.90 - 12.70 K/uL    Hemoglobin 10.9 (L) 12.0 - 16.0 g/dL    Hematocrit 35.7 (L) 37.0 - 48.5 %    Platelets 300 150 - 450 K/uL       Assessment:            Mild persistent asthma with acute exacerbation  -     fluticasone-salmeterol diskus inhaler 100-50 mcg; Inhale 1 puff into the lungs 2 (two) times daily. Controller  Dispense: 60 each; Refill: 11  -     albuterol (PROVENTIL/VENTOLIN HFA) 90 mcg/actuation inhaler; Inhale 2 puffs into the lungs every 4 (four) hours as needed for Wheezing or Shortness of Breath.  Dispense: 18 g; Refill: 11  -     Spirometry with/without bronchodilator; Future; Expected date: 03/22/2025    ANCA-associated vasculitis  -     CT Chest " Without Contrast; Future; Expected date: 09/19/2024    Immunodeficiency due to drug therapy  -     CT Chest Without Contrast; Future; Expected date: 09/19/2024    Cough variant asthma  -     CT Chest Without Contrast; Future; Expected date: 09/19/2024  -     albuterol (PROVENTIL/VENTOLIN HFA) 90 mcg/actuation inhaler; Inhale 2 puffs into the lungs every 4 (four) hours as needed for Wheezing or Shortness of Breath.  Dispense: 18 g; Refill: 11  -     Spirometry with/without bronchodilator; Future; Expected date: 03/22/2025          Outpatient Encounter Medications as of 9/19/2024   Medication Sig Dispense Refill    amLODIPine (NORVASC) 5 MG tablet Take 1 tablet (5 mg total) by mouth once daily. 30 tablet 0    cholecalciferol, vitamin D3, 125 mcg (5,000 unit) Tab Take 1 tablet (5,000 Units total) by mouth once daily. 30 tablet 0    ferrous sulfate 325 (65 FE) MG EC tablet Take 1 tablet (325 mg total) by mouth once daily. 30 tablet 0    lisinopriL 10 MG tablet Take 1 tablet (10 mg total) by mouth once daily. 30 tablet 11    pantoprazole (PROTONIX) 40 MG tablet Take 1 tablet (40 mg total) by mouth once daily. 90 tablet 3    predniSONE (DELTASONE) 5 MG tablet Take 3 tablets (15 mg total) by mouth once daily for 14 days, THEN 2.5 tablets (12.5 mg total) once daily for 14 days, THEN 2 tablets (10 mg total) once daily for 14 days, THEN 1.5 tablets (7.5 mg total) once daily for 28 days, THEN 1 tablet (5 mg total) once daily. Start after completing week 4 (20 mg daily).. 237 tablet 0    sodium bicarbonate 650 MG tablet Take 1 tablet (650 mg total) by mouth 2 (two) times daily. for 7 days 14 tablet 0    sulfamethoxazole-trimethoprim 800-160mg (BACTRIM DS) 800-160 mg Tab Take 1 tablet by mouth 3 (three) times a week. 12 tablet 11    [DISCONTINUED] albuterol (PROVENTIL/VENTOLIN HFA) 90 mcg/actuation inhaler Inhale 2 puffs into the lungs every 4 (four) hours as needed for Wheezing or Shortness of Breath. 18 g 11    albuterol  (PROVENTIL/VENTOLIN HFA) 90 mcg/actuation inhaler Inhale 2 puffs into the lungs every 4 (four) hours as needed for Wheezing or Shortness of Breath. 18 g 11    fluticasone-salmeterol diskus inhaler 100-50 mcg Inhale 1 puff into the lungs 2 (two) times daily. Controller 60 each 11    [DISCONTINUED] predniSONE (DELTASONE) 10 MG tablet Take 5 tablets (50 mg total) by mouth once daily for 7 days, THEN 2.5 tablets (25 mg total) once daily for 7 days, THEN 2 tablets (20 mg total) once daily for 14 days, THEN 1.5 tablets (15 mg total) once daily for 14 days. 102 tablet 0    [DISCONTINUED] sulfamethoxazole-trimethoprim 800-160mg (BACTRIM DS) 800-160 mg Tab Take 1 tablet by mouth 3 (three) times a week. 12 tablet 0     No facility-administered encounter medications on file as of 9/19/2024.     Plan:       Requested Prescriptions     Signed Prescriptions Disp Refills    fluticasone-salmeterol diskus inhaler 100-50 mcg 60 each 11     Sig: Inhale 1 puff into the lungs 2 (two) times daily. Controller    albuterol (PROVENTIL/VENTOLIN HFA) 90 mcg/actuation inhaler 18 g 11     Sig: Inhale 2 puffs into the lungs every 4 (four) hours as needed for Wheezing or Shortness of Breath.     Problem List Items Addressed This Visit       ANCA-associated vasculitis    Overview     08/20/2024 Kidney biopsy: PAUCI-IMMUNE NECROTIZING CRESCENTIC GLOMERULONEPHRITIS          Relevant Orders    CT Chest Without Contrast    Immunodeficiency due to drug therapy    Relevant Orders    CT Chest Without Contrast    Mild persistent asthma with acute exacerbation - Primary    Relevant Medications    fluticasone-salmeterol diskus inhaler 100-50 mcg    albuterol (PROVENTIL/VENTOLIN HFA) 90 mcg/actuation inhaler    Other Relevant Orders    Spirometry with/without bronchodilator     Other Visit Diagnoses       Cough variant asthma        Relevant Medications    albuterol (PROVENTIL/VENTOLIN HFA) 90 mcg/actuation inhaler    Other Relevant Orders    CT Chest  Without Contrast    Spirometry with/without bronchodilator               Follow up in about 11 weeks (around 12/5/2024) for CT - on return visit, gayle - on return.    MEDICAL DECISION MAKING: Moderate to high complexity.  Overall, the multiple problems listed are of moderate to high severity that may impact quality of life and activities of daily living. Side effects of medications, treatment plan as well as options and alternatives reviewed and discussed with patient. There was counseling of patient concerning these issues.    Total time spent in counseling and coordination of care - 35  minutes of total time spent on the encounter, which includes face to face time and non-face to face time preparing to see the patient (eg, review of tests), Obtaining and/or reviewing separately obtained history, Documenting clinical information in the electronic or other health record, Independently interpreting results (not separately reported) and communicating results to the patient/family/caregiver, or Care coordination (not separately reported).    Time was used in discussion of prognosis, risks, benefits of treatment, instructions and compliance with regimen . Discussion with other physicians and/or health care providers - home health or for use of durable medical equipment (oxygen, nebulizers, CPAP, BiPAP) occurred.

## 2024-09-19 NOTE — TELEPHONE ENCOUNTER
"----- Message from Chad Douglass MD sent at 9/19/2024  8:59 AM CDT -----  Regarding: RE: follow visits and labs  Alicia, can you please update the rituximab plan? She needs 1000 mg x2 doses 14 days apart. Blanche, I will have a visit with her prior to deciding on labs. Also she has had tubal ligation so she doesn't need to keep getting pregnancy tests. Thank you!  ----- Message -----  From: Blanche Willard RN  Sent: 9/17/2024   3:10 PM CDT  To: Chad Douglass MD  Subject: RE: follow visits and labs                       Hi Dr Douglass,   Please update the RTX therapy plan to reflect 2 doses of rituxan. Now it only has "1 of 2" left on it. If you like I can edit it to 2 doses every 26 weeks that way it will not fall off the therapy plan. Then you can sign it.  I will schedule her for UPT, cbc, cmp and Hep B labs 1 week prior to next cycle. Do you want an IgG level or any other labs that day? Thanks Blanche  ----- Message -----  From: Chad Douglass MD  Sent: 9/12/2024  11:02 AM CDT  To: Blanche Willard RN; Raf[facility]illa Staff  Subject: RE: follow visits and labs                       Good morning! Thank you so much. Please schedule her for the 6 month rituximab. Staff, please make sure she sees me in the next month, virtual is fine. After a visit with her, I will evaluate and place rituximab orders. Thank you.  ----- Message -----  From: Blanche Willard RN  Sent: 9/12/2024   8:56 AM CDT  To: Chad Douglass MD  Subject: follow visits and labs                           GM :)    This pt will have her 2nd dose of rituximab today 9/12. I see  that she is scheduled for labs but no follow up for you. Last progress note states she will be getting RTX x 2 q 6 mo for 2  yrs. Do you want any labs prior to the next dose. I know we will need a cbc, cmp, and hep B screening serologies per IV pharmacy protocol. Do you want any other labs such as an IgG level?  Thanks, Blanche"

## 2024-09-20 LAB — PROTEINASE3 IGG SER-ACNC: <0.2 U

## 2024-09-23 LAB
ANCA AB TITR SER IF: ABNORMAL TITER
MYELOPEROXIDASE AB SER-ACNC: 27 U/ML
P-ANCA TITR SER IF: ABNORMAL TITER

## 2024-09-26 ENCOUNTER — PATIENT MESSAGE (OUTPATIENT)
Dept: RHEUMATOLOGY | Facility: CLINIC | Age: 49
End: 2024-09-26
Payer: COMMERCIAL

## 2024-09-27 NOTE — [employer] PATIENT INSTRUCTIONS
Discharge Summary/Instructions for after Video Capsule Endoscopy  Patient Name: Shayy Faust  Patient MRN: [id number]  Patient YOB: 1975 Thursday, September 19, 2024  Maryellen Nathan MD  1.  Do Not eat or drink anything for 1 hour.  Try sips of water first.  If   tolerated, resume your regular diet or one recommended by your physician.  2.  Do not drive, operate machinery, make critical decisions, or do   activities that require coordination or balance for 24 hours.  3.   You may experience a sore throat for 24 to 48 hours.  You may use   throat lozenges or gargle with warm salt water to relieve the discomfort.  4.  Because air was put into your stomach during the procedure, you may   experience some belching.  5.  Do not use any medication containing aspirin for 10 days.  6.  Go directly to the emergency room if you notice any of the following:   Chills and/or fever over 101 F   Persistent vomiting or vomiting with blood/nasal regurgitation   Severe abdominal pain, other than gas cramps   Severe chest pain   Black, tarry stools     Your doctor recommends these additional instructions:  - Discharge patient to home.   - Return to referring physician.   - Follow-up in hematology clinic.   - Please evaluate for other causes of anemia.  For questions, problems or results please call your physician Maryellen Nathan MD at Work:  [phone]  If you have any questions about the above instructions, call the GI   department at [phone] or call the endoscopy unit at [phone]   from 7am until 3 pm.  [facility] [place], EMERGENCY ROOM PHONE NUMBER:   [phone]  IF A COMPLICATION OR EMERGENCY SITUATION ARISES AND YOU ARE UNABLE TO REACH   YOUR PHYSICIAN - GO DIRECTLY TO THE EMERGENCY ROOM.  I have read or have had read to me these discharge instructions for my   procedure and have received a written copy.  I understand these   instructions and will follow-up with my  physician if I have any questions.     __________________________________       _____________________________________  Nurse Signature                                          Patient/Designated   Responsible Party Signature  Maryellen Nathan MD  9/27/2024 3:07:28 PM  This report has been verified and signed electronically.  Dear patient,  As a result of recent federal legislation (The Federal Cures Act), you may   receive lab or pathology results from your procedure in your [facility]   account before your physician is able to contact you. Your physician or   their representative will relay the results to you with their   recommendations at their soonest availability.  Thank you,  [employer]

## 2024-09-30 ENCOUNTER — PATIENT MESSAGE (OUTPATIENT)
Dept: GASTROENTEROLOGY | Facility: CLINIC | Age: 49
End: 2024-09-30
Payer: COMMERCIAL

## 2024-10-02 ENCOUNTER — LAB VISIT (OUTPATIENT)
Dept: LAB | Facility: HOSPITAL | Age: 49
End: 2024-10-02
Attending: INTERNAL MEDICINE
Payer: COMMERCIAL

## 2024-10-02 ENCOUNTER — OFFICE VISIT (OUTPATIENT)
Dept: HEMATOLOGY/ONCOLOGY | Facility: CLINIC | Age: 49
End: 2024-10-02
Payer: COMMERCIAL

## 2024-10-02 VITALS
HEIGHT: 64 IN | HEART RATE: 88 BPM | OXYGEN SATURATION: 99 % | WEIGHT: 98 LBS | DIASTOLIC BLOOD PRESSURE: 95 MMHG | TEMPERATURE: 98 F | SYSTOLIC BLOOD PRESSURE: 140 MMHG | BODY MASS INDEX: 16.73 KG/M2

## 2024-10-02 DIAGNOSIS — D84.821 IMMUNODEFICIENCY DUE TO DRUG THERAPY: ICD-10-CM

## 2024-10-02 DIAGNOSIS — D63.1 ANEMIA, CHRONIC RENAL FAILURE, STAGE 4 (SEVERE): ICD-10-CM

## 2024-10-02 DIAGNOSIS — D50.0 IRON DEFICIENCY ANEMIA DUE TO CHRONIC BLOOD LOSS: Primary | ICD-10-CM

## 2024-10-02 DIAGNOSIS — D75.839 THROMBOCYTOSIS: ICD-10-CM

## 2024-10-02 DIAGNOSIS — Z79.899 IMMUNODEFICIENCY DUE TO DRUG THERAPY: ICD-10-CM

## 2024-10-02 DIAGNOSIS — I77.82 ANCA-ASSOCIATED VASCULITIS: ICD-10-CM

## 2024-10-02 DIAGNOSIS — N18.4 ANEMIA, CHRONIC RENAL FAILURE, STAGE 4 (SEVERE): ICD-10-CM

## 2024-10-02 DIAGNOSIS — R76.8 RHEUMATOID FACTOR POSITIVE: ICD-10-CM

## 2024-10-02 DIAGNOSIS — D50.0 IRON DEFICIENCY ANEMIA DUE TO CHRONIC BLOOD LOSS: ICD-10-CM

## 2024-10-02 DIAGNOSIS — R80.9 PROTEINURIA: ICD-10-CM

## 2024-10-02 DIAGNOSIS — N17.9 ACUTE KIDNEY FAILURE, UNSPECIFIED: ICD-10-CM

## 2024-10-02 LAB
ALBUMIN SERPL BCP-MCNC: 4.1 G/DL (ref 3.5–5.2)
ALBUMIN SERPL BCP-MCNC: 4.1 G/DL (ref 3.5–5.2)
ALP SERPL-CCNC: 67 U/L (ref 55–135)
ALT SERPL W/O P-5'-P-CCNC: 11 U/L (ref 10–44)
ANION GAP SERPL CALC-SCNC: 9 MMOL/L (ref 8–16)
ANION GAP SERPL CALC-SCNC: 9 MMOL/L (ref 8–16)
AST SERPL-CCNC: 13 U/L (ref 10–40)
BASOPHILS # BLD AUTO: 0.06 K/UL (ref 0–0.2)
BASOPHILS # BLD AUTO: 0.06 K/UL (ref 0–0.2)
BASOPHILS NFR BLD: 0.4 % (ref 0–1.9)
BASOPHILS NFR BLD: 0.4 % (ref 0–1.9)
BILIRUB SERPL-MCNC: 0.3 MG/DL (ref 0.1–1)
BUN SERPL-MCNC: 45 MG/DL (ref 6–20)
BUN SERPL-MCNC: 45 MG/DL (ref 6–20)
CALCIUM SERPL-MCNC: 9.6 MG/DL (ref 8.7–10.5)
CALCIUM SERPL-MCNC: 9.6 MG/DL (ref 8.7–10.5)
CHLORIDE SERPL-SCNC: 107 MMOL/L (ref 95–110)
CHLORIDE SERPL-SCNC: 107 MMOL/L (ref 95–110)
CO2 SERPL-SCNC: 24 MMOL/L (ref 23–29)
CO2 SERPL-SCNC: 24 MMOL/L (ref 23–29)
CREAT SERPL-MCNC: 2.2 MG/DL (ref 0.5–1.4)
CREAT SERPL-MCNC: 2.2 MG/DL (ref 0.5–1.4)
DIFFERENTIAL METHOD BLD: ABNORMAL
DIFFERENTIAL METHOD BLD: ABNORMAL
EOSINOPHIL # BLD AUTO: 0 K/UL (ref 0–0.5)
EOSINOPHIL # BLD AUTO: 0 K/UL (ref 0–0.5)
EOSINOPHIL NFR BLD: 0.2 % (ref 0–8)
EOSINOPHIL NFR BLD: 0.2 % (ref 0–8)
ERYTHROCYTE [DISTWIDTH] IN BLOOD BY AUTOMATED COUNT: 20.1 % (ref 11.5–14.5)
ERYTHROCYTE [DISTWIDTH] IN BLOOD BY AUTOMATED COUNT: 20.1 % (ref 11.5–14.5)
EST. GFR  (NO RACE VARIABLE): 27 ML/MIN/1.73 M^2
EST. GFR  (NO RACE VARIABLE): 27 ML/MIN/1.73 M^2
FERRITIN SERPL-MCNC: 1497 NG/ML (ref 20–300)
GLUCOSE SERPL-MCNC: 127 MG/DL (ref 70–110)
GLUCOSE SERPL-MCNC: 127 MG/DL (ref 70–110)
HCT VFR BLD AUTO: 36.1 % (ref 37–48.5)
HCT VFR BLD AUTO: 36.1 % (ref 37–48.5)
HGB BLD-MCNC: 11.6 G/DL (ref 12–16)
HGB BLD-MCNC: 11.6 G/DL (ref 12–16)
IMM GRANULOCYTES # BLD AUTO: 0.21 K/UL (ref 0–0.04)
IMM GRANULOCYTES # BLD AUTO: 0.21 K/UL (ref 0–0.04)
IMM GRANULOCYTES NFR BLD AUTO: 1.5 % (ref 0–0.5)
IMM GRANULOCYTES NFR BLD AUTO: 1.5 % (ref 0–0.5)
LYMPHOCYTES # BLD AUTO: 1.9 K/UL (ref 1–4.8)
LYMPHOCYTES # BLD AUTO: 1.9 K/UL (ref 1–4.8)
LYMPHOCYTES NFR BLD: 13.9 % (ref 18–48)
LYMPHOCYTES NFR BLD: 13.9 % (ref 18–48)
MCH RBC QN AUTO: 29.5 PG (ref 27–31)
MCH RBC QN AUTO: 29.5 PG (ref 27–31)
MCHC RBC AUTO-ENTMCNC: 32.1 G/DL (ref 32–36)
MCHC RBC AUTO-ENTMCNC: 32.1 G/DL (ref 32–36)
MCV RBC AUTO: 92 FL (ref 82–98)
MCV RBC AUTO: 92 FL (ref 82–98)
MONOCYTES # BLD AUTO: 1.2 K/UL (ref 0.3–1)
MONOCYTES # BLD AUTO: 1.2 K/UL (ref 0.3–1)
MONOCYTES NFR BLD: 8.7 % (ref 4–15)
MONOCYTES NFR BLD: 8.7 % (ref 4–15)
NEUTROPHILS # BLD AUTO: 10.4 K/UL (ref 1.8–7.7)
NEUTROPHILS # BLD AUTO: 10.4 K/UL (ref 1.8–7.7)
NEUTROPHILS NFR BLD: 75.3 % (ref 38–73)
NEUTROPHILS NFR BLD: 75.3 % (ref 38–73)
NRBC BLD-RTO: 0 /100 WBC
NRBC BLD-RTO: 0 /100 WBC
PHOSPHATE SERPL-MCNC: 3.2 MG/DL (ref 2.7–4.5)
PLATELET # BLD AUTO: 420 K/UL (ref 150–450)
PLATELET # BLD AUTO: 420 K/UL (ref 150–450)
PMV BLD AUTO: 9.4 FL (ref 9.2–12.9)
PMV BLD AUTO: 9.4 FL (ref 9.2–12.9)
POTASSIUM SERPL-SCNC: 4.3 MMOL/L (ref 3.5–5.1)
POTASSIUM SERPL-SCNC: 4.3 MMOL/L (ref 3.5–5.1)
PROT SERPL-MCNC: 6.7 G/DL (ref 6–8.4)
RBC # BLD AUTO: 3.93 M/UL (ref 4–5.4)
RBC # BLD AUTO: 3.93 M/UL (ref 4–5.4)
SODIUM SERPL-SCNC: 140 MMOL/L (ref 136–145)
SODIUM SERPL-SCNC: 140 MMOL/L (ref 136–145)
WBC # BLD AUTO: 13.85 K/UL (ref 3.9–12.7)
WBC # BLD AUTO: 13.85 K/UL (ref 3.9–12.7)

## 2024-10-02 PROCEDURE — 3008F BODY MASS INDEX DOCD: CPT | Mod: CPTII,S$GLB,, | Performed by: INTERNAL MEDICINE

## 2024-10-02 PROCEDURE — 83516 IMMUNOASSAY NONANTIBODY: CPT | Mod: 59 | Performed by: INTERNAL MEDICINE

## 2024-10-02 PROCEDURE — 83540 ASSAY OF IRON: CPT | Performed by: INTERNAL MEDICINE

## 2024-10-02 PROCEDURE — 80053 COMPREHEN METABOLIC PANEL: CPT | Performed by: INTERNAL MEDICINE

## 2024-10-02 PROCEDURE — 83516 IMMUNOASSAY NONANTIBODY: CPT | Performed by: INTERNAL MEDICINE

## 2024-10-02 PROCEDURE — 1159F MED LIST DOCD IN RCRD: CPT | Mod: CPTII,S$GLB,, | Performed by: INTERNAL MEDICINE

## 2024-10-02 PROCEDURE — 3044F HG A1C LEVEL LT 7.0%: CPT | Mod: CPTII,S$GLB,, | Performed by: INTERNAL MEDICINE

## 2024-10-02 PROCEDURE — 86036 ANCA SCREEN EACH ANTIBODY: CPT | Mod: 59 | Performed by: INTERNAL MEDICINE

## 2024-10-02 PROCEDURE — 3077F SYST BP >= 140 MM HG: CPT | Mod: CPTII,S$GLB,, | Performed by: INTERNAL MEDICINE

## 2024-10-02 PROCEDURE — 99214 OFFICE O/P EST MOD 30 MIN: CPT | Mod: S$GLB,,, | Performed by: INTERNAL MEDICINE

## 2024-10-02 PROCEDURE — 82728 ASSAY OF FERRITIN: CPT | Performed by: INTERNAL MEDICINE

## 2024-10-02 PROCEDURE — 4010F ACE/ARB THERAPY RXD/TAKEN: CPT | Mod: CPTII,S$GLB,, | Performed by: INTERNAL MEDICINE

## 2024-10-02 PROCEDURE — 85025 COMPLETE CBC W/AUTO DIFF WBC: CPT | Performed by: INTERNAL MEDICINE

## 2024-10-02 PROCEDURE — 3080F DIAST BP >= 90 MM HG: CPT | Mod: CPTII,S$GLB,, | Performed by: INTERNAL MEDICINE

## 2024-10-02 PROCEDURE — 36415 COLL VENOUS BLD VENIPUNCTURE: CPT | Performed by: INTERNAL MEDICINE

## 2024-10-02 PROCEDURE — 99999 PR PBB SHADOW E&M-EST. PATIENT-LVL III: CPT | Mod: PBBFAC,,, | Performed by: INTERNAL MEDICINE

## 2024-10-02 PROCEDURE — 80069 RENAL FUNCTION PANEL: CPT | Performed by: INTERNAL MEDICINE

## 2024-10-02 PROCEDURE — 3066F NEPHROPATHY DOC TX: CPT | Mod: CPTII,S$GLB,, | Performed by: INTERNAL MEDICINE

## 2024-10-02 RX ORDER — CHOLECALCIFEROL (VITAMIN D3) 25 MCG
1000 TABLET ORAL DAILY
COMMUNITY

## 2024-10-02 NOTE — PROGRESS NOTES
Subjective:       Patient ID: Shayy Faust is a 49 y.o. female.    Chief Complaint: Results, Anemia, and Chronic Renal Failure    HPI:  49-year-old female history of vasculitis affecting kidney seen on kidney biopsy.  Being monitored by renal Medicine with Rituxan.  Patient has documented iron deficiency recent upper lower endoscopies in video capsule completed.  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       Anemia, chronic renal failure, stage 4 (severe) 08/23/2024    Mild intermittent asthma, uncomplicated      Family History   Problem Relation Name Age of Onset    Hypertension Mother      Hypertension Father       Social History     Socioeconomic History    Marital status: Single   Tobacco Use    Smoking status: Never    Smokeless tobacco: Never   Substance and Sexual Activity    Alcohol use: Not Currently    Drug use: Never     Past Surgical History:   Procedure Laterality Date    BILATERAL TUBAL LIGATION N/A     COLONOSCOPY N/A 8/20/2024    Procedure: COLONOSCOPY;  Surgeon: Gena Saxena MD;  Location: [facility];  Service: Endoscopy;  Laterality: N/A;    ESOPHAGOGASTRODUODENOSCOPY N/A 8/20/2024    Procedure: EGD (ESOPHAGOGASTRODUODENOSCOPY);  Surgeon: Gena Saxena MD;  Location: [facility];  Service: Endoscopy;  Laterality: N/A;    INTRALUMINAL GASTROINTESTINAL TRACT IMAGING VIA CAPSULE N/A 9/19/2024    Procedure: IMAGING PROCEDURE, GI TRACT, INTRALUMINAL, VIA CAPSULE;  Surgeon: Ignacio Jimenez RN;  Location: [facility];  Service: Endoscopy;  Laterality: N/A;  Neg EGD/Colon. Hx MARIA ESTHER/weight loss/ARF       Labs:  Lab Results   Component Value Date    WBC 13.85 (H) 10/02/2024    WBC 13.85 (H) 10/02/2024    HGB 11.6 (L) 10/02/2024    HGB 11.6 (L) 10/02/2024    HCT 36.1 (L) 10/02/2024    HCT 36.1 (L) 10/02/2024    MCV 92 10/02/2024    MCV 92  10/02/2024     10/02/2024     10/02/2024     BMP  Lab Results   Component Value Date     10/02/2024     10/02/2024    K 4.3 10/02/2024    K 4.3 10/02/2024     10/02/2024     10/02/2024    CO2 24 10/02/2024    CO2 24 10/02/2024    BUN 45 (H) 10/02/2024    BUN 45 (H) 10/02/2024    CREATININE 2.2 (H) 10/02/2024    CREATININE 2.2 (H) 10/02/2024    CALCIUM 9.6 10/02/2024    CALCIUM 9.6 10/02/2024    ANIONGAP 9 10/02/2024    ANIONGAP 9 10/02/2024     Lab Results   Component Value Date    ALT 11 10/02/2024    AST 13 10/02/2024    ALKPHOS 67 10/02/2024    BILITOT 0.3 10/02/2024       Lab Results   Component Value Date    IRON 86 08/28/2024    TIBC 237 (L) 08/28/2024    FERRITIN 1,130 (H) 08/28/2024     Lab Results   Component Value Date    [id number] 778 08/13/2024     Lab Results   Component Value Date    FOLATE 10.9 08/13/2024     Lab Results   Component Value Date    TSH 2.852 04/01/2024         Review of Systems   Constitutional:  Negative for activity change, appetite change, chills, diaphoresis, fatigue, fever and unexpected weight change.   HENT:  Negative for congestion, dental problem, drooling, ear discharge, ear pain, facial swelling, hearing loss, mouth sores, nosebleeds, postnasal drip, rhinorrhea, sinus pressure, sneezing, sore throat, tinnitus, trouble swallowing and voice change.    Eyes:  Negative for photophobia, pain, discharge, redness, itching and visual disturbance.   Respiratory:  Negative for cough, choking, chest tightness, shortness of breath, wheezing and stridor.    Cardiovascular:  Negative for chest pain, palpitations and leg swelling.   Gastrointestinal:  Negative for abdominal distention, abdominal pain, anal bleeding, blood in stool, constipation, diarrhea, nausea, rectal pain and vomiting.   Endocrine: Negative for cold intolerance, heat intolerance, polydipsia, polyphagia and polyuria.   Genitourinary:  Negative for decreased urine volume, difficulty  urinating, dyspareunia, dysuria, enuresis, flank pain, frequency, genital sores, hematuria, menstrual problem, pelvic pain, urgency, vaginal bleeding, vaginal discharge and vaginal pain.   Musculoskeletal:  Negative for arthralgias, back pain, gait problem, joint swelling, myalgias, neck pain and neck stiffness.   Skin:  Negative for color change, pallor and rash.   Allergic/Immunologic: Negative for environmental allergies, food allergies and immunocompromised state.   Neurological:  Negative for dizziness, tremors, seizures, syncope, facial asymmetry, speech difficulty, weakness, light-headedness, numbness and headaches.   Hematological:  Negative for adenopathy. Does not bruise/bleed easily.   Psychiatric/Behavioral:  Negative for agitation, behavioral problems, confusion, decreased concentration, dysphoric mood, hallucinations, self-injury, sleep disturbance and suicidal ideas. The patient is not nervous/anxious and is not hyperactive.        Objective:      Physical Exam  Vitals reviewed.   Constitutional:       General: She is not in acute distress.     Appearance: Normal appearance. She is well-developed and underweight. She is ill-appearing. She is not diaphoretic.   HENT:      Head: Normocephalic and atraumatic.      Right Ear: External ear normal.      Left Ear: External ear normal.      Nose: Nose normal.      Right Sinus: No maxillary sinus tenderness or frontal sinus tenderness.      Left Sinus: No maxillary sinus tenderness or frontal sinus tenderness.      Mouth/Throat:      Pharynx: No oropharyngeal exudate.   Eyes:      General: Lids are normal. No scleral icterus.        Right eye: No discharge.         Left eye: No discharge.      Conjunctiva/sclera: Conjunctivae normal.      Right eye: Right conjunctiva is not injected. No hemorrhage.     Left eye: Left conjunctiva is not injected. No hemorrhage.     Pupils: Pupils are equal, round, and reactive to light.   Neck:      Thyroid: No thyromegaly.       Vascular: No JVD.      Trachea: No tracheal deviation.   Cardiovascular:      Rate and Rhythm: Normal rate.   Pulmonary:      Effort: Pulmonary effort is normal. No respiratory distress.      Breath sounds: No stridor.   Chest:      Chest wall: No tenderness.   Abdominal:      General: Bowel sounds are normal. There is no distension.      Palpations: Abdomen is soft. There is no hepatomegaly, splenomegaly or mass.      Tenderness: There is no abdominal tenderness. There is no rebound.   Musculoskeletal:         General: No tenderness. Normal range of motion.      Cervical back: Normal range of motion and neck supple.   Lymphadenopathy:      Cervical: No cervical adenopathy.      Upper Body:      Right upper body: No supraclavicular adenopathy.      Left upper body: No supraclavicular adenopathy.   Skin:     General: Skin is dry.      Findings: No erythema or rash.   Neurological:      Mental Status: She is alert and oriented to person, place, and time.      Cranial Nerves: No cranial nerve deficit.      Coordination: Coordination normal.   Psychiatric:         Behavior: Behavior normal.         Thought Content: Thought content normal.         Judgment: Judgment normal.             Assessment:      1. Iron deficiency anemia due to chronic blood loss    2. Anemia, chronic renal failure, stage 4 (severe)    3. Thrombocytosis    4. Immunodeficiency due to drug therapy    5. Rheumatoid factor positive    6. ANCA-associated vasculitis           Med Onc Chart Routing      Follow up with physician . Return in 3 months CBC CMP serum iron TIBC ferritin prior   Follow up with [facility]    Infusion scheduling note    Injection scheduling note    Labs    Imaging    Pharmacy appointment    Other referrals                   Plan:     Marked improvement in hemoglobin to near normal iron status pending told her hold oral iron supplementation will recheck in 3 months reviewed information with her.  Reviewed upper lower endoscopies in  video capsule all negative for GI blood loss did have some erythematous gastropathy on upper endoscopies video capsule        Marcin Mercado Jr, MD FACP

## 2024-10-03 ENCOUNTER — OFFICE VISIT (OUTPATIENT)
Dept: NEPHROLOGY | Facility: CLINIC | Age: 49
End: 2024-10-03
Payer: COMMERCIAL

## 2024-10-03 ENCOUNTER — OFFICE VISIT (OUTPATIENT)
Dept: INFECTIOUS DISEASES | Facility: CLINIC | Age: 49
End: 2024-10-03
Payer: COMMERCIAL

## 2024-10-03 VITALS
DIASTOLIC BLOOD PRESSURE: 68 MMHG | HEIGHT: 64 IN | HEART RATE: 80 BPM | RESPIRATION RATE: 18 BRPM | SYSTOLIC BLOOD PRESSURE: 118 MMHG | WEIGHT: 97.44 LBS | BODY MASS INDEX: 16.64 KG/M2

## 2024-10-03 VITALS
HEIGHT: 64 IN | RESPIRATION RATE: 18 BRPM | DIASTOLIC BLOOD PRESSURE: 90 MMHG | WEIGHT: 97.25 LBS | BODY MASS INDEX: 16.6 KG/M2 | HEART RATE: 80 BPM | SYSTOLIC BLOOD PRESSURE: 110 MMHG

## 2024-10-03 DIAGNOSIS — J45.31 MILD PERSISTENT ASTHMA WITH ACUTE EXACERBATION: Primary | ICD-10-CM

## 2024-10-03 DIAGNOSIS — I77.82 ANCA-ASSOCIATED VASCULITIS: ICD-10-CM

## 2024-10-03 DIAGNOSIS — I77.82 ANCA-ASSOCIATED VASCULITIS: Primary | ICD-10-CM

## 2024-10-03 PROCEDURE — 3080F DIAST BP >= 90 MM HG: CPT | Mod: CPTII,S$GLB,, | Performed by: INTERNAL MEDICINE

## 2024-10-03 PROCEDURE — 99999 PR PBB SHADOW E&M-EST. PATIENT-LVL III: CPT | Mod: PBBFAC,,, | Performed by: STUDENT IN AN ORGANIZED HEALTH CARE EDUCATION/TRAINING PROGRAM

## 2024-10-03 PROCEDURE — 99999 PR PBB SHADOW E&M-EST. PATIENT-LVL IV: CPT | Mod: PBBFAC,,, | Performed by: INTERNAL MEDICINE

## 2024-10-03 PROCEDURE — 3008F BODY MASS INDEX DOCD: CPT | Mod: CPTII,S$GLB,, | Performed by: INTERNAL MEDICINE

## 2024-10-03 PROCEDURE — 3074F SYST BP LT 130 MM HG: CPT | Mod: CPTII,S$GLB,, | Performed by: INTERNAL MEDICINE

## 2024-10-03 PROCEDURE — 99215 OFFICE O/P EST HI 40 MIN: CPT | Mod: S$GLB,,, | Performed by: INTERNAL MEDICINE

## 2024-10-03 PROCEDURE — 4010F ACE/ARB THERAPY RXD/TAKEN: CPT | Mod: CPTII,S$GLB,, | Performed by: INTERNAL MEDICINE

## 2024-10-03 PROCEDURE — 3044F HG A1C LEVEL LT 7.0%: CPT | Mod: CPTII,S$GLB,, | Performed by: INTERNAL MEDICINE

## 2024-10-03 PROCEDURE — 1159F MED LIST DOCD IN RCRD: CPT | Mod: CPTII,S$GLB,, | Performed by: INTERNAL MEDICINE

## 2024-10-03 PROCEDURE — 3066F NEPHROPATHY DOC TX: CPT | Mod: CPTII,S$GLB,, | Performed by: INTERNAL MEDICINE

## 2024-10-03 NOTE — PROGRESS NOTES
"Biologic Response Modifier Recipient Evaluation     Requesting Physician: Dr. Montero     Reason for Visit: Vaccine counseling    History of Present Illness  49 y.o. female with ANCA vasculitis who presents for vaccine counseling. Currently on Rituximab and high dose prednisone. OI ppx with Bactrim. Detailed infection history below.     Nephrology note reviewed: "Kidney failure and pulmonary symptoms due to P-ANCA vasculitis.  Positive anti-GBM antibody, but kidney biopsy showed no sign of anti-GBM linear staining and IF was negative for Goodpasture disease  S/p acute treatment with the following:  Plasmapheresis x3  Solumedrol pulse dose x 3  Retuximab 1 g IV on 8/25/24      Recommend:  Continue retuximab 1 g IV q 2 weeks x 4 doses total, next dose was ordered and arranged to be given on 9/12/24 by the hospital group at   Continue prednisone taper by 10 mg per week, currently on 50 mg  Continue bactrim x 6 months  Will need hep B vaccination  Will benefit from ID consult, will refer (at risk of infections)"     History of infections:  Recent infections: None   Recurrent infections (sinusitis / pneumonia / UTI): None  COVID-19: None    Admission for infection: No  Need for intravenous antibiotics: No    Chicken Pox: Yes         Shingles: No    History of Immunosuppression:  Prior chemotherapy / immunosuppression: No  Prior transplant: No  History of splenectomy: No           Tuberculosis             Prior screening for latent TB: Yes  Prior treatment for latent TB: No  Exposure to person with active TB: No    Geographical Exposures  Lived in the following states: [place]  Lived in [facility]: No   Traveled to the following countries: No                       Job History    Animal Exposure          Pets: Dog  Farm animals: No  Fishing / hunting: Yes   Consumption of raw or undercooked meat, fish, shellfish: No         Hobbies          Gardening: No  Hiking / camping: No    Social History  Alcohol: Social  Tobacco: " No  Recreational Drugs: No     The patient's immunization history was reviewed.   Immunization History   Administered Date(s) Administered    COVID-19, MRNA, LN-S, PF (MODERNA FULL 0.5 ML DOSE) 03/11/2021, 04/08/2021         Hep B Core Total Ab   Date Value Ref Range Status   08/30/2024 Non-reactive Non-reactive Final     Hepatitis B Surface Ag   Date Value Ref Range Status   08/30/2024 Non-reactive Non-reactive Final     HIV 1/2 Ag/Ab   Date Value Ref Range Status   08/13/2024 Negative Negative Final     TB Gold Plus   Date Value Ref Range Status   08/15/2024 Indeterminate (A) Negative Final     Comment:     Likelihood of M. tuberculosis infection cannot   be determined.          Plan:     Biologic Response Modifier Candidacy:   Based on available information, there are no identified significant barriers to BRMs from an infectious disease standpoint.    -    Counseling:  - I discussed with the patient the risk for increased susceptibility to infections following BRM therapy including increased risk for infection.      - Specific guidance has been provided to the patient regarding the patients occupation, hobbies and activities to avoid future infectious complications including but not limited to avoiding undercooked meats and seafood, proper hygiene, and contact with animals.    - The patients has been counseled on the importance of vaccinations including but not limited to a yearly flu vaccine.    Immunizations:  Based on the patients immunization history and serologies, immunizations were ordered:  - Prevnar 20  - TDaP  - Shingrix 0, 2 months     Above vaccines will be given 2 weeks before next ritixumab infusion    Flu shot to be given today.               The patient was encouraged to contact us about any problems that may develop after immunization and possible side effects were reviewed.

## 2024-10-03 NOTE — PROGRESS NOTES
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 Pt was last seen in renal clinic on 9/10/24. Discussed the case with rheumatology and with the Infusion Unit today. Pt feels well today, no new c/o's, no rash, no ulcers, no blood in urine orin sputum. No report of any infections or fever.     To review pt's initial presentation, pt presented with "asthma-like" symptoms  x 5 months that would not improve with conventional treatment. Pt was being seen by pulmonary, and was referred to nephrology. Renal failure was noted with s Cr 2.7 mg/dl. Based on initial positive serology for anti-GBP antibody (3.8) and the renal-pulmonary presentation, Goodpasture disease was suspected and pt immediately received 3 treatments with plasmapheresis, pule steroids IV x 3, and retuximab 1 g IV on 8/25/24, followed by a second dose on 9/13/24. Serologies were also remarkable for positive P-ANCA, but negative C-ANCA. 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 Patient tolerated above treatments well. Repeat serologies for anti-GBM antibody was negative.         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     PAST SURGICAL HISTORY:  She  has a past surgical history that includes Bilateral tubal ligation (N/A); Esophagogastroduodenoscopy (N/A, 8/20/2024); and Colonoscopy (N/A, 8/20/2024).     SOCIAL HISTORY:  She  reports that she has never smoked. She has never used " smokeless tobacco. She reports that she does not currently use alcohol. She reports that she does not use drugs.     FAMILY MEDICAL HISTORY:  Her family history includes Hypertension in her father and mother.     Review of patient's allergies indicates:  No Known Allergies     Meds reviewed    Current Outpatient Medications:     albuterol (PROVENTIL/VENTOLIN HFA) 90 mcg/actuation inhaler, Inhale 2 puffs into the lungs every 4 (four) hours as needed for Wheezing or Shortness of Breath., Disp: 18 g, Rfl: 11    amLODIPine (NORVASC) 5 MG tablet, Take 1 tablet (5 mg total) by mouth once daily., Disp: 30 tablet, Rfl: 0    fluticasone-salmeterol diskus inhaler 100-50 mcg, Inhale 1 puff into the lungs 2 (two) times daily. Controller, Disp: 60 each, Rfl: 11    lisinopriL 10 MG tablet, Take 1 tablet (10 mg total) by mouth once daily., Disp: 30 tablet, Rfl: 11    predniSONE (DELTASONE) 5 MG tablet, Take 3 tablets (15 mg total) by mouth once daily for 14 days, THEN 2.5 tablets (12.5 mg total) once daily for 14 days, THEN 2 tablets (10 mg total) once daily for 14 days, THEN 1.5 tablets (7.5 mg total) once daily for 28 days, THEN 1 tablet (5 mg total) once daily. Start after completing week 4 (20 mg daily).., Disp: 237 tablet, Rfl: 0    sulfamethoxazole-trimethoprim 800-160mg (BACTRIM DS) 800-160 mg Tab, Take 1 tablet by mouth 3 (three) times a week., Disp: 12 tablet, Rfl: 11    vitamin D (VITAMIN D3) 1000 units Tab, Take 1,000 Units by mouth once daily., Disp: , Rfl:     pantoprazole (PROTONIX) 40 MG tablet, Take 1 tablet (40 mg total) by mouth once daily. (Patient not taking: Reported on 10/3/2024), Disp: 90 tablet, Rfl: 3    sodium bicarbonate 650 MG tablet, Take 1 tablet (650 mg total) by mouth 2 (two) times daily. for 7 days, Disp: 14 tablet, Rfl: 0    Current Facility-Administered Medications:     [START ON 1/27/2025] pneumoc 20-jorge conj-dip cr(PF) (PREVNAR-20 (PF)) injection Syrg 0.5 mL, 0.5 mL, Intramuscular, 1 time in  "Clinic/HOD,     [START ON 1/27/2025] varicella zoster (Shingrix) IM vaccine (>/= 51 yo), 0.5 mL, Intramuscular, Q8 weeks,     [START ON 1/27/2025] VFC-Tdap (ADACEL) vaccine 0.5 mL, 0.5 mL, Intramuscular, 1 time in Clinic/HOD,                 REVIEW OF SYSTEMS:  Patient has no fever, fatigue, visual changes, chest pain, edema, cough, dyspnea, nausea, vomiting, constipation, diarrhea, arthralgias, pruritis, dizziness, weakness, depression, confusion.     PHYSICAL EXAM:  Blood pressure 110/90, pulse 80, height 5' 4" (1.626 m), weight 44.1 Kg, from 46.5 kg, BSA 1.42  Gen:WDWN female in no apparent distress  Psych:Normal mood and affect  Skin:No rashes or ulcers  Neck:No JVD  Chest:Clear with no rales, rhonchi, wheezing with normal effort  CV:Regular with no murmurs, gallops or rubs  Abd:Soft, nontender, no distension  Ext:No edema     Labs reviewed  BMP  Lab Results   Component Value Date     10/02/2024     10/02/2024    K 4.3 10/02/2024    K 4.3 10/02/2024     10/02/2024     10/02/2024    CO2 24 10/02/2024    CO2 24 10/02/2024    BUN 45 (H) 10/02/2024    BUN 45 (H) 10/02/2024    CREATININE 2.2 (H) 10/02/2024    CREATININE 2.2 (H) 10/02/2024    CALCIUM 9.6 10/02/2024    CALCIUM 9.6 10/02/2024    ANIONGAP 9 10/02/2024    ANIONGAP 9 10/02/2024    EGFRNORACEVR 27 (A) 10/02/2024    EGFRNORACEVR 27 (A) 10/02/2024     Lab Results   Component Value Date    WBC 13.85 (H) 10/02/2024    WBC 13.85 (H) 10/02/2024    HGB 11.6 (L) 10/02/2024    HGB 11.6 (L) 10/02/2024    HCT 36.1 (L) 10/02/2024    HCT 36.1 (L) 10/02/2024    MCV 92 10/02/2024    MCV 92 10/02/2024     10/02/2024     10/02/2024         U/a: trace protein (was 1+), 2+blood, 17 RBC 3 casts  Urine p/cr 0.25 g, from 0.29 g, from 1.0 g     Anti-GBM antibody 0.5 ( normal), from 3.8  C-ANCA neg  P-ANCA: 1:20, from 1:20, from 1:80  Myeloperoxidase 27, from 43  Proteinase 3 antibodies neg  Cryo nege     ESR 1, was >60     Kidney biopsy " results reviewed and discussed by phone with Dr. 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 The increase in Cr is hemodynamic  Clinically stable and has signs of improvement:  Asymptomatic   BP is well controlled  Proteinuria further improved  Hematuria has improved, but still present  P-ANCA's have improved  C-ANCA is negative  Anti-GBM antibody is negative  ESR normal  Good response to retuximab and prednisone high dose taper     To review:   Kidney failure and pulmonary symptoms due to P-ANCA vasculitis.  Positive anti-GBM antibody, but kidney biopsy showed no sign of anti-GBM linear staining and IF was negative for Goodpasture disease  S/p acute treatment with the following:  Plasmapheresis x3  Solumedrol pulse dose x 3  Retuximab 1 g IV on 8/25/24 and again on 9/13/24. Dosing was reviewed with Infusion Center and with rheumatology.  Pt did not received the renal dosing, which is 375 mg/m2 BSA q 1 week x 4 doses.  She received a larger dose, but less frequently, as normally given by rheumatology  Will discuss with pharmacy in am as to the remaining renal doses, and whether they are still indicated.      Recommend:  Continue prednisone taper by 10 mg per week, currently on 50 mg  Continue bactrim x 6 months  Continue lisinopril 10 mg po qd  Vaccinations per ID     Plans and recommendations:  As discussed above  Total time spent 40 minutes including time needed to review the records, the   patient evaluation, documentation, face-to-face discussion with the patient,   more than 50% of  the time was spent on coordination of care and counseling.    Level V visit.  RTC 3-4 weeks, OK to rosemary Montero MD

## 2024-10-03 NOTE — ASSESSMENT & PLAN NOTE
Plan for repeat IV Rituximab 1000 mg x2 doses 14 days apart every 6 months for 2 years. Continue prednisone taper and Bactrim. Followed by rheumatology and nephrology

## 2024-10-04 LAB — PROTEINASE3 IGG SER-ACNC: <0.2 U

## 2024-10-07 ENCOUNTER — TELEPHONE (OUTPATIENT)
Dept: INFECTIOUS DISEASES | Facility: CLINIC | Age: 49
End: 2024-10-07
Payer: COMMERCIAL

## 2024-10-07 LAB
ANCA AB TITR SER IF: ABNORMAL TITER
MYELOPEROXIDASE AB SER-ACNC: 30 U/ML
P-ANCA TITR SER IF: ABNORMAL TITER

## 2024-10-07 NOTE — TELEPHONE ENCOUNTER
----- Message from Nurse Tapia sent at 10/3/2024  4:27 PM CDT -----  Per nephrology patient actually getting Rituxan next month so lets do all the vaccines this month.

## 2024-10-07 NOTE — TELEPHONE ENCOUNTER
Prevnar 20  - TDaP  - Shingrix 0, 2 months    Called the pt to schedule vaccines above. No answer. LVM for pt to return call to get scheduled.

## 2024-10-08 NOTE — PROGRESS NOTES
Renal chart check:  P-ANCA and myeloperoxidase autoantibodies are still positive.  Spoke with daniel, chief pharmacist at [facility] about retuximab dosing.  Will await repeat labs in late Oct 2024 and will discussed with pt on 10/31/24 at 10 am, next clinic appt.    A/P P-ANCA vasculitis  S/p retuximab  Autoantibodies have improved but are not negative yet.   Called pt and left her a detailed message.    Moe Montero MD

## 2024-10-11 ENCOUNTER — TELEPHONE (OUTPATIENT)
Dept: INFECTIOUS DISEASES | Facility: CLINIC | Age: 49
End: 2024-10-11
Payer: COMMERCIAL

## 2024-10-11 NOTE — TELEPHONE ENCOUNTER
1st attempt    Attempted to schedule appt from referral placed to ID. Pt did not answer. LVM to rtc to clinic.

## 2024-10-15 DIAGNOSIS — Z79.899 IMMUNODEFICIENCY DUE TO DRUG THERAPY: Primary | ICD-10-CM

## 2024-10-15 DIAGNOSIS — D84.821 IMMUNODEFICIENCY DUE TO DRUG THERAPY: Primary | ICD-10-CM

## 2024-10-16 ENCOUNTER — TELEPHONE (OUTPATIENT)
Dept: INFUSION THERAPY | Facility: HOSPITAL | Age: 49
End: 2024-10-16
Payer: COMMERCIAL

## 2024-10-16 ENCOUNTER — PATIENT MESSAGE (OUTPATIENT)
Dept: RHEUMATOLOGY | Facility: CLINIC | Age: 49
End: 2024-10-16
Payer: COMMERCIAL

## 2024-10-16 ENCOUNTER — PATIENT MESSAGE (OUTPATIENT)
Dept: NEPHROLOGY | Facility: CLINIC | Age: 49
End: 2024-10-16
Payer: COMMERCIAL

## 2024-10-16 NOTE — TELEPHONE ENCOUNTER
"----- Message from Chad Douglass MD sent at 10/16/2024 11:08 AM CDT -----  Regarding: RE: follow visits and labs  Hi Blanche. Dr. Moe Montero in Nephrology stated he wants to take over care for this patient due to primarily kidney involvement of her GPA. He stated he was going to delete my rituximab plan and put one in under his name and take over lab monitoring and other aspects of her care.  ----- Message -----  From: Blanche Willard RN  Sent: 10/15/2024   1:04 PM CDT  To: Chad Douglass MD  Subject: RE: follow visits and labs                       Hi Dr Douglass   I just wanted to [place] back to you on this. The pt doesn't have a f/u visit with you yet. IV pharmacy requirements are a cbc & cmp with in 7 days of RTX infusion and Hep B screening serologies with in 6 months. I will schedule those. Please let me know if you need any other labs scheduled and I can do that. Thanks Blanche  ----- Message -----  From: Chad Douglass MD  Sent: 9/19/2024   9:01 AM CDT  To: Blanche Willard RN; #  Subject: RE: follow visits and labs                       Alicia, can you please update the rituximab plan? She needs 1000 mg x2 doses 14 days apart. Blanche, I will have a visit with her prior to deciding on labs. Also she has had tubal ligation so she doesn't need to keep getting pregnancy tests. Thank you!  ----- Message -----  From: Blanche Willard RN  Sent: 9/17/2024   3:10 PM CDT  To: Chad Douglass MD  Subject: RE: follow visits and labs                       Hi Dr Douglass,   Please update the RTX therapy plan to reflect 2 doses of rituxan. Now it only has "1 of 2" left on it. If you like I can edit it to 2 doses every 26 weeks that way it will not fall off the therapy plan. Then you can sign it.  I will schedule her for UPT, cbc, cmp and Hep B labs 1 week prior to next cycle. Do you want an IgG level or any other labs that day? Thanks Blanche  ----- Message -----  From: Chad Douglass MD  Sent: 9/12/2024  " 11:02 AM CDT  To: Blanche Willard, RN; [facility] Staff  Subject: RE: follow visits and labs                       Good morning! Thank you so much. Please schedule her for the 6 month rituximab. Staff, please make sure she sees me in the next month, virtual is fine. After a visit with her, I will evaluate and place rituximab orders. Thank you.  ----- Message -----  From: Blanche Willard RN  Sent: 9/12/2024   8:56 AM CDT  To: Chad Douglass MD  Subject: follow visits and labs                           GM :)    This pt will have her 2nd dose of rituximab today 9/12. I see  that she is scheduled for labs but no follow up for you. Last progress note states she will be getting RTX x 2 q 6 mo for 2  yrs. Do you want any labs prior to the next dose. I know we will need a cbc, cmp, and hep B screening serologies per IV pharmacy protocol. Do you want any other labs such as an IgG level?  Thanks, Blanche

## 2024-10-24 ENCOUNTER — LAB VISIT (OUTPATIENT)
Dept: LAB | Facility: HOSPITAL | Age: 49
End: 2024-10-24
Attending: INTERNAL MEDICINE
Payer: COMMERCIAL

## 2024-10-24 DIAGNOSIS — I77.82 ANCA-ASSOCIATED VASCULITIS: ICD-10-CM

## 2024-10-24 LAB
BILIRUB UR QL STRIP: NEGATIVE
CLARITY UR REFRACT.AUTO: CLEAR
COLOR UR AUTO: YELLOW
CREAT UR-MCNC: 31.7 MG/DL (ref 15–325)
GLUCOSE UR QL STRIP: NEGATIVE
HGB UR QL STRIP: ABNORMAL
KETONES UR QL STRIP: NEGATIVE
LEUKOCYTE ESTERASE UR QL STRIP: ABNORMAL
MICROSCOPIC COMMENT: NORMAL
NITRITE UR QL STRIP: NEGATIVE
PH UR STRIP: 6 [PH] (ref 5–8)
PROT UR QL STRIP: NEGATIVE
PROT UR-MCNC: <7 MG/DL (ref 0–15)
PROT/CREAT UR: NORMAL MG/G{CREAT} (ref 0–0.2)
RBC #/AREA URNS AUTO: 1 /HPF (ref 0–4)
SP GR UR STRIP: 1.01 (ref 1–1.03)
URN SPEC COLLECT METH UR: ABNORMAL
UROBILINOGEN UR STRIP-ACNC: NEGATIVE EU/DL
WBC #/AREA URNS AUTO: 2 /HPF (ref 0–5)

## 2024-10-24 PROCEDURE — 81000 URINALYSIS NONAUTO W/SCOPE: CPT | Mod: PO | Performed by: INTERNAL MEDICINE

## 2024-10-24 PROCEDURE — 84156 ASSAY OF PROTEIN URINE: CPT | Mod: PO | Performed by: INTERNAL MEDICINE

## 2024-10-31 ENCOUNTER — OFFICE VISIT (OUTPATIENT)
Dept: NEPHROLOGY | Facility: CLINIC | Age: 49
End: 2024-10-31
Payer: COMMERCIAL

## 2024-10-31 VITALS
HEART RATE: 79 BPM | BODY MASS INDEX: 17.39 KG/M2 | RESPIRATION RATE: 18 BRPM | SYSTOLIC BLOOD PRESSURE: 118 MMHG | DIASTOLIC BLOOD PRESSURE: 80 MMHG | WEIGHT: 101.88 LBS | HEIGHT: 64 IN

## 2024-10-31 DIAGNOSIS — I77.82 ANCA-ASSOCIATED VASCULITIS: Primary | ICD-10-CM

## 2024-10-31 PROCEDURE — 3008F BODY MASS INDEX DOCD: CPT | Mod: CPTII,S$GLB,, | Performed by: INTERNAL MEDICINE

## 2024-10-31 PROCEDURE — 1159F MED LIST DOCD IN RCRD: CPT | Mod: CPTII,S$GLB,, | Performed by: INTERNAL MEDICINE

## 2024-10-31 PROCEDURE — 99999 PR PBB SHADOW E&M-EST. PATIENT-LVL IV: CPT | Mod: PBBFAC,,, | Performed by: INTERNAL MEDICINE

## 2024-10-31 PROCEDURE — 3074F SYST BP LT 130 MM HG: CPT | Mod: CPTII,S$GLB,, | Performed by: INTERNAL MEDICINE

## 2024-10-31 PROCEDURE — 3079F DIAST BP 80-89 MM HG: CPT | Mod: CPTII,S$GLB,, | Performed by: INTERNAL MEDICINE

## 2024-10-31 PROCEDURE — 3066F NEPHROPATHY DOC TX: CPT | Mod: CPTII,S$GLB,, | Performed by: INTERNAL MEDICINE

## 2024-10-31 PROCEDURE — 4010F ACE/ARB THERAPY RXD/TAKEN: CPT | Mod: CPTII,S$GLB,, | Performed by: INTERNAL MEDICINE

## 2024-10-31 PROCEDURE — 99215 OFFICE O/P EST HI 40 MIN: CPT | Mod: S$GLB,,, | Performed by: INTERNAL MEDICINE

## 2024-10-31 PROCEDURE — 3044F HG A1C LEVEL LT 7.0%: CPT | Mod: CPTII,S$GLB,, | Performed by: INTERNAL MEDICINE

## 2024-12-03 ENCOUNTER — PATIENT MESSAGE (OUTPATIENT)
Dept: RHEUMATOLOGY | Facility: CLINIC | Age: 49
End: 2024-12-03
Payer: COMMERCIAL

## 2024-12-05 ENCOUNTER — HOSPITAL ENCOUNTER (OUTPATIENT)
Dept: RADIOLOGY | Facility: HOSPITAL | Age: 49
Discharge: HOME | End: 2024-12-05
Attending: INTERNAL MEDICINE
Payer: COMMERCIAL

## 2024-12-05 ENCOUNTER — OFFICE VISIT (OUTPATIENT)
Dept: INTERNAL MEDICINE | Facility: CLINIC | Age: 49
End: 2024-12-05
Payer: COMMERCIAL

## 2024-12-05 ENCOUNTER — CLINICAL SUPPORT (OUTPATIENT)
Dept: PULMONOLOGY | Facility: CLINIC | Age: 49
End: 2024-12-05
Attending: INTERNAL MEDICINE
Payer: COMMERCIAL

## 2024-12-05 VITALS
WEIGHT: 104.94 LBS | SYSTOLIC BLOOD PRESSURE: 138 MMHG | OXYGEN SATURATION: 95 % | RESPIRATION RATE: 16 BRPM | HEART RATE: 87 BPM | TEMPERATURE: 98 F | DIASTOLIC BLOOD PRESSURE: 92 MMHG | HEIGHT: 64 IN | BODY MASS INDEX: 17.92 KG/M2

## 2024-12-05 VITALS
RESPIRATION RATE: 17 BRPM | HEIGHT: 64 IN | WEIGHT: 103.81 LBS | DIASTOLIC BLOOD PRESSURE: 70 MMHG | OXYGEN SATURATION: 96 % | BODY MASS INDEX: 17.72 KG/M2 | HEART RATE: 70 BPM | SYSTOLIC BLOOD PRESSURE: 115 MMHG

## 2024-12-05 DIAGNOSIS — Z79.899 IMMUNODEFICIENCY DUE TO DRUG THERAPY: ICD-10-CM

## 2024-12-05 DIAGNOSIS — D84.821 IMMUNODEFICIENCY DUE TO DRUG THERAPY: ICD-10-CM

## 2024-12-05 DIAGNOSIS — J84.9 INTERSTITIAL PULMONARY DISEASE, UNSPECIFIED: ICD-10-CM

## 2024-12-05 DIAGNOSIS — I77.82 ANCA-ASSOCIATED VASCULITIS: ICD-10-CM

## 2024-12-05 DIAGNOSIS — Z12.31 ENCOUNTER FOR SCREENING MAMMOGRAM FOR MALIGNANT NEOPLASM OF BREAST: ICD-10-CM

## 2024-12-05 DIAGNOSIS — I10 PRIMARY HYPERTENSION: Primary | ICD-10-CM

## 2024-12-05 DIAGNOSIS — J45.30 MILD PERSISTENT ASTHMA WITHOUT COMPLICATION: ICD-10-CM

## 2024-12-05 DIAGNOSIS — D50.0 IRON DEFICIENCY ANEMIA DUE TO CHRONIC BLOOD LOSS: ICD-10-CM

## 2024-12-05 DIAGNOSIS — J45.991 COUGH VARIANT ASTHMA: ICD-10-CM

## 2024-12-05 DIAGNOSIS — N18.4 ANEMIA, CHRONIC RENAL FAILURE, STAGE 4 (SEVERE): ICD-10-CM

## 2024-12-05 DIAGNOSIS — I77.82 ANCA-ASSOCIATED VASCULITIS: Primary | ICD-10-CM

## 2024-12-05 DIAGNOSIS — D63.1 ANEMIA, CHRONIC RENAL FAILURE, STAGE 4 (SEVERE): ICD-10-CM

## 2024-12-05 DIAGNOSIS — J45.31 MILD PERSISTENT ASTHMA WITH ACUTE EXACERBATION: ICD-10-CM

## 2024-12-05 DIAGNOSIS — J45.41 MODERATE PERSISTENT ASTHMA WITH ACUTE EXACERBATION: ICD-10-CM

## 2024-12-05 DIAGNOSIS — D75.839 THROMBOCYTOSIS: ICD-10-CM

## 2024-12-05 LAB
FEF 25 75 CHG: 11.6 %
FEF 25 75 LLN: 1.85
FEF 25 75 POST REF: 8.5 %
FEF 25 75 PRE REF: 7.6 %
FEF 25 75 REF: 3.25
FET100 CHG: -5.2 %
FEV1 CHG: 1.2 %
FEV1 FVC CHG: 0.2 %
FEV1 FVC LLN: 69
FEV1 FVC POST REF: 41.8 %
FEV1 FVC PRE REF: 41.7 %
FEV1 FVC REF: 80
FEV1 LLN: 2.15
FEV1 POST REF: 30.1 %
FEV1 PRE REF: 29.7 %
FEV1 REF: 2.75
FVC CHG: 1 %
FVC LLN: 2.69
FVC POST REF: 71.5 %
FVC PRE REF: 70.8 %
FVC REF: 3.44
PEF CHG: 4.8 %
PEF LLN: 5.02
PEF POST REF: 35.9 %
PEF PRE REF: 34.3 %
PEF REF: 6.73
POST FEF 25 75: 0.28 L/S (ref 1.85–4.65)
POST FET 100: 13.25 SEC
POST FEV1 FVC: 33.64 % (ref 69.31–89.89)
POST FEV1: 0.83 L (ref 2.15–3.34)
POST FVC: 2.46 L (ref 2.69–4.22)
POST PEF: 2.42 L/S (ref 5.02–8.45)
PRE FEF 25 75: 0.25 L/S (ref 1.85–4.65)
PRE FET 100: 13.99 SEC
PRE FEV1 FVC: 33.58 % (ref 69.31–89.89)
PRE FEV1: 0.82 L (ref 2.15–3.34)
PRE FVC: 2.44 L (ref 2.69–4.22)
PRE PEF: 2.31 L/S (ref 5.02–8.45)

## 2024-12-05 PROCEDURE — 1160F RVW MEDS BY RX/DR IN RCRD: CPT | Mod: CPTII,S$GLB,, | Performed by: NURSE PRACTITIONER

## 2024-12-05 PROCEDURE — 4010F ACE/ARB THERAPY RXD/TAKEN: CPT | Mod: CPTII,S$GLB,, | Performed by: NURSE PRACTITIONER

## 2024-12-05 PROCEDURE — 1159F MED LIST DOCD IN RCRD: CPT | Mod: CPTII,S$GLB,, | Performed by: NURSE PRACTITIONER

## 2024-12-05 PROCEDURE — 3008F BODY MASS INDEX DOCD: CPT | Mod: CPTII,S$GLB,, | Performed by: NURSE PRACTITIONER

## 2024-12-05 PROCEDURE — 3075F SYST BP GE 130 - 139MM HG: CPT | Mod: CPTII,S$GLB,, | Performed by: NURSE PRACTITIONER

## 2024-12-05 PROCEDURE — 71250 CT THORAX DX C-: CPT | Mod: TC

## 2024-12-05 PROCEDURE — G2211 COMPLEX E/M VISIT ADD ON: HCPCS | Mod: S$GLB,,, | Performed by: NURSE PRACTITIONER

## 2024-12-05 PROCEDURE — 3044F HG A1C LEVEL LT 7.0%: CPT | Mod: CPTII,S$GLB,, | Performed by: NURSE PRACTITIONER

## 2024-12-05 PROCEDURE — 3080F DIAST BP >= 90 MM HG: CPT | Mod: CPTII,S$GLB,, | Performed by: NURSE PRACTITIONER

## 2024-12-05 PROCEDURE — 99214 OFFICE O/P EST MOD 30 MIN: CPT | Mod: S$GLB,,, | Performed by: NURSE PRACTITIONER

## 2024-12-05 PROCEDURE — 3066F NEPHROPATHY DOC TX: CPT | Mod: CPTII,S$GLB,, | Performed by: NURSE PRACTITIONER

## 2024-12-05 PROCEDURE — 99999 PR PBB SHADOW E&M-EST. PATIENT-LVL V: CPT | Mod: PBBFAC,,, | Performed by: NURSE PRACTITIONER

## 2024-12-05 PROCEDURE — 71250 CT THORAX DX C-: CPT | Mod: 26,,, | Performed by: RADIOLOGY

## 2024-12-05 PROCEDURE — 99999 PR PBB SHADOW E&M-EST. PATIENT-LVL IV: CPT | Mod: PBBFAC,,, | Performed by: INTERNAL MEDICINE

## 2024-12-05 RX ORDER — LISINOPRIL 10 MG/1
10 TABLET ORAL DAILY
Start: 2024-12-05 | End: 2025-12-05

## 2024-12-05 RX ORDER — FLUTICASONE PROPIONATE AND SALMETEROL 250; 50 UG/1; UG/1
1 POWDER RESPIRATORY (INHALATION) 2 TIMES DAILY
Qty: 60 EACH | Refills: 11 | Status: SHIPPED | OUTPATIENT
Start: 2024-12-05 | End: 2025-12-05

## 2024-12-05 NOTE — ASSESSMENT & PLAN NOTE
Lab Results   Component Value Date    WBC 4.11 10/24/2024    HGB 13.0 10/24/2024    HCT 39.9 10/24/2024    MCV 91 10/24/2024     10/24/2024     Improved. Continue to monitor. Followed by hematology.

## 2024-12-05 NOTE — ASSESSMENT & PLAN NOTE
Wheezing noted on exam. Continue inhalers. Pulmonology appointment and CT chest scheduled for today.

## 2024-12-05 NOTE — PROGRESS NOTES
Subjective:       Patient ID: Shayy Faust is a 49 y.o. female.    Chief Complaint: Hypertension    History of Present Illness    HPI:  Ms. Faust presents to clinic for HTN follow up. She has elevated blood pressure today, with a initial reading of 162/100 and repeat 138/90. She was previously prescribed amlodipine and lisinopril which she has not been taking. She was not aware she was to continue the medications.     Ms. Faust continues seeing multiple specialists for ANCA-associated vasculitis. Her nephrologist has taken over care from the rheumatologist due to the condition mainly affecting the kidneys. The nephrologist is adjusting the Rituxan dosing to a lower dose more frequently, instead of the rheumatologist's previous regimen of a high dose twice a year.    Ms. Faust is scheduled to see a pulmonologist today and undergo testing. In the past few days, the patient noticed wheezing, with a slightly runny nose and cough. Ms. Faust continues a maintenance inhaler daily and has albuterol as needed.    Reviewed recent labs completed with nephrologist. Ms. Faust reports feeling better overall and has been gaining weight.    Ms. Faust denies significant upper respiratory symptoms or congestion beyond a slightly runny nose, and denies shortness of breath.    IMAGING:  - CT Chest: scheduled for today          HPI    Patient Active Problem List   Diagnosis    Moderate persistent asthma with acute exacerbation    Rheumatoid factor positive    Mild persistent asthma with acute exacerbation    Iron deficiency anemia    JAIME (acute kidney injury)    PNA (pneumonia)    Thrombocytosis    Unintentional weight loss    Fatigue    Anemia, chronic renal failure, stage 4 (severe)    Moderate protein-calorie malnutrition    ANCA-associated vasculitis    Immunodeficiency due to drug therapy    Primary hypertension       Family History   Problem Relation Name Age of Onset    Hypertension Mother      Hypertension Father        Past Surgical History:   Procedure Laterality Date    BILATERAL TUBAL LIGATION N/A     COLONOSCOPY N/A 8/20/2024    Procedure: COLONOSCOPY;  Surgeon: Gena Saxena MD;  Location: [facility] ENDO;  Service: Endoscopy;  Laterality: N/A;    ESOPHAGOGASTRODUODENOSCOPY N/A 8/20/2024    Procedure: EGD (ESOPHAGOGASTRODUODENOSCOPY);  Surgeon: Gena Saxena MD;  Location: [facility] ENDO;  Service: Endoscopy;  Laterality: N/A;    INTRALUMINAL GASTROINTESTINAL TRACT IMAGING VIA CAPSULE N/A 9/19/2024    Procedure: IMAGING PROCEDURE, GI TRACT, INTRALUMINAL, VIA CAPSULE;  Surgeon: Ignacio Jimenez RN;  Location: [facility] ENDO;  Service: Endoscopy;  Laterality: N/A;  Neg EGD/Colon. Hx MARIA ESTHER/weight loss/ARF         Current Outpatient Medications:     albuterol (PROVENTIL/VENTOLIN HFA) 90 mcg/actuation inhaler, Inhale 2 puffs into the lungs every 4 (four) hours as needed for Wheezing or Shortness of Breath., Disp: 18 g, Rfl: 11    fluticasone-salmeterol diskus inhaler 100-50 mcg, Inhale 1 puff into the lungs 2 (two) times daily. Controller, Disp: 60 each, Rfl: 11    predniSONE (DELTASONE) 5 MG tablet, Take 3 tablets (15 mg total) by mouth once daily for 14 days, THEN 2.5 tablets (12.5 mg total) once daily for 14 days, THEN 2 tablets (10 mg total) once daily for 14 days, THEN 1.5 tablets (7.5 mg total) once daily for 28 days, THEN 1 tablet (5 mg total) once daily. Start after completing week 4 (20 mg daily).., Disp: 237 tablet, Rfl: 0    sulfamethoxazole-trimethoprim 800-160mg (BACTRIM DS) 800-160 mg Tab, Take 1 tablet by mouth 3 (three) times a week., Disp: 12 tablet, Rfl: 11    vitamin D (VITAMIN D3) 1000 units Tab, Take 1,000 Units by mouth once daily., Disp: , Rfl:     lisinopriL 10 MG tablet, Take 1 tablet (10 mg total) by mouth once daily., Disp: , Rfl:     Current Facility-Administered Medications:     [START ON 1/27/2025] pneumoc 20-jorge conj-dip cr(PF) (PREVNAR-20 (PF)) injection Syrg 0.5 mL, 0.5 mL, Intramuscular, 1 time in  "Clinic/HOD,     [START ON 1/27/2025] varicella zoster (Shingrix) IM vaccine (>/= 49 yo), 0.5 mL, Intramuscular, Q8 weeks,     [START ON 1/27/2025] VFC-Tdap (ADACEL) vaccine 0.5 mL, 0.5 mL, Intramuscular, 1 time in Clinic/HOD,     Review of Systems   Constitutional:  Negative for activity change, appetite change, chills, fatigue, fever and unexpected weight change.   HENT:  Positive for rhinorrhea. Negative for congestion, postnasal drip, sinus pressure, sore throat and trouble swallowing.    Eyes:  Negative for visual disturbance.   Respiratory:  Positive for cough and wheezing. Negative for chest tightness and shortness of breath.    Cardiovascular:  Negative for chest pain, palpitations and leg swelling.   Gastrointestinal:  Negative for abdominal pain, blood in stool, constipation, diarrhea, nausea and vomiting.   Genitourinary:  Negative for difficulty urinating and menstrual problem.   Musculoskeletal:  Negative for arthralgias, joint swelling, myalgias and neck pain.   Skin:  Negative for rash.   Neurological:  Negative for dizziness, syncope, weakness, light-headedness, numbness and headaches.   Psychiatric/Behavioral:  Negative for agitation, confusion and dysphoric mood. The patient is not nervous/anxious.        Objective:   BP (!) 138/92 (BP Location: Left arm, Patient Position: Sitting)   Pulse 87   Temp 98.1 °F (36.7 °C) (Oral)   Resp 16   Ht 5' 4" (1.626 m)   Wt 47.6 kg (104 lb 15 oz)   SpO2 95%   BMI 18.01 kg/m²      Physical Exam  Vitals reviewed.   Constitutional:       General: She is not in acute distress.     Appearance: Normal appearance. She is not ill-appearing, toxic-appearing or diaphoretic.   HENT:      Head: Normocephalic and atraumatic.      Right Ear: Tympanic membrane, ear canal and external ear normal.      Left Ear: Tympanic membrane, ear canal and external ear normal.      Nose: Nose normal. No congestion.      Mouth/Throat:      Pharynx: Oropharynx is clear. No oropharyngeal " exudate or posterior oropharyngeal erythema.   Eyes:      Extraocular Movements: Extraocular movements intact.      Conjunctiva/sclera: Conjunctivae normal.   Cardiovascular:      Rate and Rhythm: Normal rate and regular rhythm.      Heart sounds: Normal heart sounds. No murmur heard.  Pulmonary:      Effort: Pulmonary effort is normal. No respiratory distress.      Breath sounds: Wheezing (GERALD, LLL) present. No rhonchi or rales.   Musculoskeletal:      Cervical back: Normal range of motion. No rigidity.      Right lower leg: No edema.      Left lower leg: No edema.   Neurological:      Mental Status: She is alert and oriented to person, place, and time.      Coordination: Coordination normal.      Gait: Gait normal.   Psychiatric:         Mood and Affect: Mood normal.         Behavior: Behavior normal.         Assessment & Plan     Assessment & Plan    HYPERTENSION:  - Assessed patient's blood pressure: initially elevated at 162/100, decreased to 138/90 upon rechecking.  - Will restart lisinopril 10 mg daily for kidney protection and blood pressure management.  - Ok to remain off amlodipine for now, pending blood pressure monitoring.  - Explained importance of kidney-protective medications like ACE inhibitors for blood pressure management.  - Discussed need for accurate blood pressure monitoring at home.  - Ms. Faust to purchase a blood pressure cuff for home monitoring.  - Ms. Faust to keep a blood pressure log for 1 weeks.  - Follow up for nurse visit in 1 week for blood pressure check and review of home readings.      CHRONIC KIDNEY DISEASE:  - Reviewed recent lab results  - Will restart lisinopril 10 mg daily   - Keep scheduled appointment with nephrology.    ASTHMA:  - Noted wheezing in left lung, but deferred respiratory management to pulmonologist.  - Keep scheduled pulmonology appointment and imaging today.    IMMUNOSUPPRESSIVE THERAPY:  - Considered timing of vaccinations in relation to immunosuppressive  therapy, deferring to infectious disease consult.  - Advised on the importance of timing vaccinations with immunosuppressive therapy to maximize vaccine efficacy.  - Continue prednisone.  - Continue Bactrim    BREAST CANCER SCREENING:  - Ordered mammogram.    CERVICAL CANCER SCREENING:  - Follow up to schedule PAP smear (patient to decide on provider and timing).    FOLLOW-UP:  - Follow up in 6 months, unless problems arise sooner.          1. Primary hypertension  -     lisinopriL 10 MG tablet; Take 1 tablet (10 mg total) by mouth once daily.    2. 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 Reviewed recent labs. Continue with current treatment plan. Rituximab, prednisone, and Bactrim. Followed by nephrology and ID with upcoming appointments.      3. Immunodeficiency due to drug therapy  Assessment & Plan:  Reviewed last labs. On Rituxan. Due for labs on 12/11. Followed by nephrology      4. Moderate persistent asthma with acute exacerbation  Assessment & Plan:  Wheezing noted on exam. Continue inhalers. Pulmonology appointment and CT chest scheduled for today.      5. Anemia, chronic renal failure, stage 4 (severe)  Assessment & Plan:  Lab Results   Component Value Date    WBC 4.11 10/24/2024    HGB 13.0 10/24/2024    HCT 39.9 10/24/2024    MCV 91 10/24/2024     10/24/2024     Improved. Continue to monitor. Followed by hematology.      6. Iron deficiency anemia due to chronic blood loss  Overview:  8/20/24: EGD/Colon: minor gastritis, otherwise negative/ Poor prep in right colon and fair prep in left colon. No masses seen. No polyps appreciated.     Assessment & Plan:  Improved. Continue to monitor. Followed by hematology      7. Thrombocytosis  Assessment & Plan:  Lab Results   Component Value Date     10/24/2024     Improved. Continue to monitor. Followed by  "hematology      8. Encounter for screening mammogram for malignant neoplasm of breast  -     Mammo Digital Screening Bilat w/ Jerry; Future; Expected date: 12/05/2024        Visit today included increased complexity associated with the care of the episodic problem HTN addressed and managing the longitudinal care of the patient due to the serious and/or complex managed problem(s).      MICHELE Kline    This note was generated with the assistance of ambient listening technology. Verbal consent was obtained by the patient and accompanying visitor(s) for the recording of patient appointment to facilitate this note. I attest to having reviewed and edited the generated note for accuracy, though some syntax or spelling errors may persist. Please contact the author of this note for any clarification.       Portions of this note may have been created with voice recognition software. Occasional "wrong-word" or "sound-a-like" substitutions may have occurred due to the inherent limitations of voice recognition software. Please, read the note carefully and recognize, using context, where substitutions have occurred.        "

## 2024-12-05 NOTE — ASSESSMENT & PLAN NOTE
Lab Results   Component Value Date     10/24/2024     Improved. Continue to monitor. Followed by hematology

## 2024-12-05 NOTE — PROGRESS NOTES
Subjective:     Patient ID: Shayy Faust is a 49 y.o. female.    Chief Complaint:  Overall slow improvement    HPI 49 y.o.   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  ANCA - presently on a tapering dose of prednisone  No wheezing or shortness of breath   She has retruned to work     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       Anemia, chronic renal failure, stage 4 (severe) 08/23/2024    Mild intermittent asthma, uncomplicated      Past Surgical History:   Procedure Laterality Date    BILATERAL TUBAL LIGATION N/A     COLONOSCOPY N/A 8/20/2024    Procedure: COLONOSCOPY;  Surgeon: Gena Saxena MD;  Location: [facility];  Service: Endoscopy;  Laterality: N/A;    ESOPHAGOGASTRODUODENOSCOPY N/A 8/20/2024    Procedure: EGD (ESOPHAGOGASTRODUODENOSCOPY);  Surgeon: Gena Saxena MD;  Location: [facility];  Service: Endoscopy;  Laterality: N/A;    INTRALUMINAL GASTROINTESTINAL TRACT IMAGING VIA CAPSULE N/A 9/19/2024    Procedure: IMAGING PROCEDURE, GI TRACT, INTRALUMINAL, VIA CAPSULE;  Surgeon: Ignacio Jimenez RN;  Location: [facility];  Service: Endoscopy;  Laterality: N/A;  Neg EGD/Colon. Hx MARIA ESTHER/weight loss/ARF     Review of patient's allergies indicates:  No Known Allergies  Current Outpatient Medications on File Prior to Visit   Medication Sig Dispense Refill    albuterol (PROVENTIL/VENTOLIN HFA) 90 mcg/actuation inhaler Inhale 2 puffs into the lungs every 4 (four) hours as needed for Wheezing or Shortness of Breath. 18 g 11    predniSONE (DELTASONE) 5 MG tablet Take 3 tablets (15 mg total) by mouth once daily for 14 days, THEN 2.5 tablets (12.5 mg total) once daily for 14 days, THEN 2 tablets (10 mg total)  "once daily for 14 days, THEN 1.5 tablets (7.5 mg total) once daily for 28 days, THEN 1 tablet (5 mg total) once daily. Start after completing week 4 (20 mg daily).. 237 tablet 0    sulfamethoxazole-trimethoprim 800-160mg (BACTRIM DS) 800-160 mg Tab Take 1 tablet by mouth 3 (three) times a week. 12 tablet 11    vitamin D (VITAMIN D3) 1000 units Tab Take 1,000 Units by mouth once daily.       Current Facility-Administered Medications on File Prior to Visit   Medication Dose Route Frequency Provider Last Rate Last Admin    [START ON 1/27/2025] pneumoc 20-jorge conj-dip cr(PF) (PREVNAR-20 (PF)) injection Syrg 0.5 mL  0.5 mL Intramuscular 1 time in Clinic/HOD         [START ON 1/27/2025] varicella zoster (Shingrix) IM vaccine (>/= 49 yo)  0.5 mL Intramuscular Q8 weeks         [START ON 1/27/2025] VFC-Tdap (ADACEL) vaccine 0.5 mL  0.5 mL Intramuscular 1 time in Clinic/HOD          Social History     Socioeconomic History    Marital status: Single   Tobacco Use    Smoking status: Never    Smokeless tobacco: Never   Substance and Sexual Activity    Alcohol use: Not Currently    Drug use: Never     Family History   Problem Relation Name Age of Onset    Hypertension Mother      Hypertension Father         Review of Systems   Constitutional:  Positive for fatigue.   Respiratory:  Positive for shortness of breath and dyspnea on extertion.        Objective:      /70 (Patient Position: Sitting)   Pulse 70   Resp 17   Ht 5' 4" (1.626 m)   Wt 47.1 kg (103 lb 12.8 oz)   SpO2 96%   BMI 17.82 kg/m²   Physical Exam  Vitals and nursing note reviewed.   Constitutional:       Appearance: Normal appearance. She is well-developed.   HENT:      Head: Normocephalic and atraumatic.      Nose: Nose normal.   Eyes:      Conjunctiva/sclera: Conjunctivae normal.      Pupils: Pupils are equal, round, and reactive to light.   Neck:      Thyroid: No thyromegaly.      Vascular: No JVD.      Trachea: No tracheal deviation.   Cardiovascular: "      Rate and Rhythm: Normal rate and regular rhythm.      Heart sounds: Normal heart sounds.   Pulmonary:      Effort: Pulmonary effort is normal. No respiratory distress.      Breath sounds: Rales present. No wheezing.   Chest:      Chest wall: No tenderness.   Abdominal:      General: Bowel sounds are normal.      Palpations: Abdomen is soft.   Musculoskeletal:         General: Normal range of motion.      Cervical back: Neck supple.   Lymphadenopathy:      Cervical: No cervical adenopathy.   Skin:     General: Skin is warm and dry.   Neurological:      Mental Status: She is alert and oriented to person, place, and time.       Personal Diagnostic Review  CT improved   XAM: CT CHEST WITHOUT CONTRAST     HISTORY: Pulmonary nodule for follow-up.     TECHNIQUE: Noncontrast CT scan of through the chest performed.  Comparison made to examination dated 08/13/2024.     FINDINGS: Multiple scattered subcentimeter pulmonary nodules noted bilaterally.  Previously demonstrated 13 mm nodule in the left upper lobe now measures 7 mm in diameter.  The largest nodule in the right lung is in the right lower lobe laterally measuring 7 mm in diameter.  No pleural fluid is identified.  No pleural masses.  No mediastinal, hilar, or axillary adenopathy.  No mediastinal masses are identified.  The aorta is nondilated.  No acute bony abnormality is identified.  Limited evaluation of the upper abdomen is unremarkable.        Impression:   Scattered bilateral pulmonary nodules.  These appear similar to the prior exam although the larger nodules have decreased in size.  No new nodules are identified.     All CT scans at [this location] are performed using dose modulation techniques as appropriate to a performed exam including the following:  Automated exposure control; adjustment of the mA and/or kV according to patient size (this includes techniques or standardized protocols for targeted exams where dose is matched to indication / reason  "for exam; i.e. extremities or head); use of iterative reconstruction technique.     Finalized on: 12/5/2024 7:28 PM By:  Harsha RENRG# [id number]      2024-12-05 19:30:46.142    RUTH ANN      12/5/2024     3:00 PM   Pulmonary Studies Review   SpO2 96 %   Height 5' 4" (1.626 m)   Weight 47.1 kg (103 lb 12.8 oz)   BMI (Calculated) 17.8   Predicted Distance 475.43   Predicted Distance Meters (Calculated) 613.27 meters       CT Chest Without Contrast  Narrative: EXAM: CT CHEST WITHOUT CONTRAST    HISTORY: Pulmonary nodule for follow-up.    TECHNIQUE: Noncontrast CT scan of through the chest performed.  Comparison made to examination dated 08/13/2024.    FINDINGS: Multiple scattered subcentimeter pulmonary nodules noted bilaterally.  Previously demonstrated 13 mm nodule in the left upper lobe now measures 7 mm in diameter.  The largest nodule in the right lung is in the right lower lobe laterally measuring 7 mm in diameter.  No pleural fluid is identified.  No pleural masses.  No mediastinal, hilar, or axillary adenopathy.  No mediastinal masses are identified.  The aorta is nondilated.  No acute bony abnormality is identified.  Limited evaluation of the upper abdomen is unremarkable.  Impression:  Scattered bilateral pulmonary nodules.  These appear similar to the prior exam although the larger nodules have decreased in size.  No new nodules are identified.    All CT scans at [this location] are performed using dose modulation techniques as appropriate to a performed exam including the following:  Automated exposure control; adjustment of the mA and/or kV according to patient size (this includes techniques or standardized protocols for targeted exams where dose is matched to indication / reason for exam; i.e. extremities or head); use of iterative reconstruction technique.    Finalized on: 12/5/2024 7:28 PM By:  Harsha RENRG# [id number]      2024-12-05 19:30:46.142    RUTH ANN      Office Spirometry Results:         " "12/5/2024     3:00 PM 12/5/2024    10:25 AM 10/31/2024     9:54 AM 10/3/2024     3:02 PM 10/3/2024     1:13 PM 10/2/2024    10:52 AM 9/19/2024    10:05 AM   Pulmonary Function Tests   SpO2 96 % 95 %    99 % 97 %   Height 5' 4" (1.626 m) 5' 4" (1.626 m) 5' 4" (1.626 m) 5' 4" (1.626 m) 5' 4" (1.626 m) 5' 4" (1.626 m) 5' 4" (1.626 m)   Weight 47.1 kg (103 lb 12.8 oz) 47.6 kg (104 lb 15 oz) 46.2 kg (101 lb 13.6 oz) 44.1 kg (97 lb 3.6 oz) 44.2 kg (97 lb 7.1 oz) 44.4 kg (97 lb 15.9 oz) 45.2 kg (99 lb 10.4 oz)   BMI (Calculated) 17.8 18 17.5 16.7 16.7 16.8 17.1         12/5/2024     3:00 PM   Pulmonary Studies Review   SpO2 96 %   Height 5' 4" (1.626 m)   Weight 47.1 kg (103 lb 12.8 oz)   BMI (Calculated) 17.8   Predicted Distance 475.43   Predicted Distance Meters (Calculated) 613.27 meters           No results found for this or any previous visit (from the past 2 weeks).    Assessment:            ANCA-associated vasculitis  -     Complete PFT with bronchodilator; Future; Expected date: 12/05/2024  -     CT Chest Without Contrast; Future; Expected date: 12/05/2024    Immunodeficiency due to drug therapy    Interstitial pulmonary disease, unspecified  -     Complete PFT with bronchodilator; Future; Expected date: 12/05/2024  -     CT Chest Without Contrast; Future; Expected date: 12/05/2024    Mild persistent asthma without complication  -     fluticasone-salmeterol diskus inhaler 250-50 mcg; Inhale 1 puff into the lungs 2 (two) times daily. Controller  Dispense: 60 each; Refill: 11          Outpatient Encounter Medications as of 12/5/2024   Medication Sig Dispense Refill    albuterol (PROVENTIL/VENTOLIN HFA) 90 mcg/actuation inhaler Inhale 2 puffs into the lungs every 4 (four) hours as needed for Wheezing or Shortness of Breath. 18 g 11    lisinopriL 10 MG tablet Take 1 tablet (10 mg total) by mouth once daily.      predniSONE (DELTASONE) 5 MG tablet Take 3 tablets (15 mg total) by mouth once daily for 14 days, THEN 2.5 " tablets (12.5 mg total) once daily for 14 days, THEN 2 tablets (10 mg total) once daily for 14 days, THEN 1.5 tablets (7.5 mg total) once daily for 28 days, THEN 1 tablet (5 mg total) once daily. Start after completing week 4 (20 mg daily).. 237 tablet 0    sulfamethoxazole-trimethoprim 800-160mg (BACTRIM DS) 800-160 mg Tab Take 1 tablet by mouth 3 (three) times a week. 12 tablet 11    vitamin D (VITAMIN D3) 1000 units Tab Take 1,000 Units by mouth once daily.      [DISCONTINUED] fluticasone-salmeterol diskus inhaler 100-50 mcg Inhale 1 puff into the lungs 2 (two) times daily. Controller 60 each 11    fluticasone-salmeterol diskus inhaler 250-50 mcg Inhale 1 puff into the lungs 2 (two) times daily. Controller 60 each 11    [DISCONTINUED] amLODIPine (NORVASC) 5 MG tablet Take 1 tablet (5 mg total) by mouth once daily. (Patient not taking: Reported on 12/5/2024) 30 tablet 0    [DISCONTINUED] lisinopriL 10 MG tablet Take 1 tablet (10 mg total) by mouth once daily. (Patient not taking: Reported on 12/5/2024) 30 tablet 11    [DISCONTINUED] pantoprazole (PROTONIX) 40 MG tablet Take 1 tablet (40 mg total) by mouth once daily. (Patient not taking: Reported on 12/5/2024) 90 tablet 3    [DISCONTINUED] sodium bicarbonate 650 MG tablet Take 1 tablet (650 mg total) by mouth 2 (two) times daily. for 7 days (Patient not taking: Reported on 10/31/2024) 14 tablet 0     Facility-Administered Encounter Medications as of 12/5/2024   Medication Dose Route Frequency Provider Last Rate Last Admin    [START ON 1/27/2025] pneumoc 20-jorge conj-dip cr(PF) (PREVNAR-20 (PF)) injection Syrg 0.5 mL  0.5 mL Intramuscular 1 time in Clinic/HOD         [START ON 1/27/2025] varicella zoster (Shingrix) IM vaccine (>/= 49 yo)  0.5 mL Intramuscular Q8 weeks         [START ON 1/27/2025] VFC-Tdap (ADACEL) vaccine 0.5 mL  0.5 mL Intramuscular 1 time in Clinic/HOD          Plan:       Requested Prescriptions     Signed Prescriptions Disp Refills     fluticasone-salmeterol diskus inhaler 250-50 mcg 60 each 11     Sig: Inhale 1 puff into the lungs 2 (two) times daily. Controller     Problem List Items Addressed This Visit       ANCA-associated vasculitis - Primary    Overview     08/20/2024 Kidney biopsy: PAUCI-IMMUNE NECROTIZING CRESCENTIC GLOMERULONEPHRITIS          Relevant Orders    Complete PFT with bronchodilator    CT Chest Without Contrast    Immunodeficiency due to drug therapy     Other Visit Diagnoses       Interstitial pulmonary disease, unspecified        Relevant Orders    Complete PFT with bronchodilator    CT Chest Without Contrast    Mild persistent asthma without complication        Relevant Medications    fluticasone-salmeterol diskus inhaler 250-50 mcg               Follow up in about 3 months (around 3/5/2025) for CT - on return visit, PFT -return.    MEDICAL DECISION MAKING: Moderate to high complexity.  Overall, the multiple problems listed are of moderate to high severity that may impact quality of life and activities of daily living. Side effects of medications, treatment plan as well as options and alternatives reviewed and discussed with patient. There was counseling of patient concerning these issues.    Total time spent in counseling and coordination of care - 32  minutes of total time spent on the encounter, which includes face to face time and non-face to face time preparing to see the patient (eg, review of tests), Obtaining and/or reviewing separately obtained history, Documenting clinical information in the electronic or other health record, Independently interpreting results (not separately reported) and communicating results to the patient/family/caregiver, or Care coordination (not separately reported).    Time was used in discussion of prognosis, risks, benefits of treatment, instructions and compliance with regimen . Discussion with other physicians and/or health care providers - home health or for use of durable medical  equipment (oxygen, nebulizers, CPAP, BiPAP) occurred.

## 2024-12-05 NOTE — ASSESSMENT & PLAN NOTE
Reviewed nephrology and ID notes. Reviewed recent labs. Continue with current treatment plan. Rituximab, prednisone, and Bactrim. Followed by nephrology and ID with upcoming appointments.

## 2024-12-06 ENCOUNTER — PATIENT MESSAGE (OUTPATIENT)
Dept: PULMONOLOGY | Facility: CLINIC | Age: 49
End: 2024-12-06
Payer: COMMERCIAL

## 2024-12-10 ENCOUNTER — TELEPHONE (OUTPATIENT)
Dept: HEMATOLOGY/ONCOLOGY | Facility: CLINIC | Age: 49
End: 2024-12-10
Payer: COMMERCIAL

## 2024-12-10 DIAGNOSIS — J45.991 COUGH VARIANT ASTHMA: ICD-10-CM

## 2024-12-10 DIAGNOSIS — I77.82 ANCA-ASSOCIATED VASCULITIS: ICD-10-CM

## 2024-12-10 DIAGNOSIS — R91.8 OTHER NONSPECIFIC ABNORMAL FINDING OF LUNG FIELD: Primary | ICD-10-CM

## 2024-12-10 NOTE — TELEPHONE ENCOUNTER
Lvm in reference to Hematology appt on 12/31 has been r/s to a vv with NP. [facility] message sent.

## 2024-12-11 ENCOUNTER — HOSPITAL ENCOUNTER (OUTPATIENT)
Dept: RADIOLOGY | Facility: HOSPITAL | Age: 49
Discharge: HOME | End: 2024-12-11
Attending: NURSE PRACTITIONER
Payer: COMMERCIAL

## 2024-12-11 ENCOUNTER — CLINICAL SUPPORT (OUTPATIENT)
Dept: INTERNAL MEDICINE | Facility: CLINIC | Age: 49
End: 2024-12-11
Payer: COMMERCIAL

## 2024-12-11 ENCOUNTER — OFFICE VISIT (OUTPATIENT)
Dept: INFECTIOUS DISEASES | Facility: CLINIC | Age: 49
End: 2024-12-11
Payer: COMMERCIAL

## 2024-12-11 VITALS
BODY MASS INDEX: 17.58 KG/M2 | WEIGHT: 103 LBS | DIASTOLIC BLOOD PRESSURE: 80 MMHG | HEIGHT: 64 IN | SYSTOLIC BLOOD PRESSURE: 130 MMHG

## 2024-12-11 DIAGNOSIS — I10 PRIMARY HYPERTENSION: Primary | ICD-10-CM

## 2024-12-11 DIAGNOSIS — Z12.31 ENCOUNTER FOR SCREENING MAMMOGRAM FOR MALIGNANT NEOPLASM OF BREAST: ICD-10-CM

## 2024-12-11 DIAGNOSIS — I77.82 ANCA-ASSOCIATED VASCULITIS: ICD-10-CM

## 2024-12-11 PROCEDURE — 99214 OFFICE O/P EST MOD 30 MIN: CPT | Mod: 95,,, | Performed by: STUDENT IN AN ORGANIZED HEALTH CARE EDUCATION/TRAINING PROGRAM

## 2024-12-11 PROCEDURE — 99999 PR PBB SHADOW E&M-EST. PATIENT-LVL II: CPT | Mod: PBBFAC,,,

## 2024-12-11 PROCEDURE — 4010F ACE/ARB THERAPY RXD/TAKEN: CPT | Mod: CPTII,95,, | Performed by: STUDENT IN AN ORGANIZED HEALTH CARE EDUCATION/TRAINING PROGRAM

## 2024-12-11 PROCEDURE — 77063 BREAST TOMOSYNTHESIS BI: CPT | Mod: 26,,, | Performed by: RADIOLOGY

## 2024-12-11 PROCEDURE — 77067 SCR MAMMO BI INCL CAD: CPT | Mod: 26,,, | Performed by: RADIOLOGY

## 2024-12-11 PROCEDURE — 3066F NEPHROPATHY DOC TX: CPT | Mod: CPTII,95,, | Performed by: STUDENT IN AN ORGANIZED HEALTH CARE EDUCATION/TRAINING PROGRAM

## 2024-12-11 PROCEDURE — 77067 SCR MAMMO BI INCL CAD: CPT | Mod: TC,PO

## 2024-12-11 PROCEDURE — 3044F HG A1C LEVEL LT 7.0%: CPT | Mod: CPTII,95,, | Performed by: STUDENT IN AN ORGANIZED HEALTH CARE EDUCATION/TRAINING PROGRAM

## 2024-12-11 NOTE — PROGRESS NOTES
Patient presents to clinic for a blood pressure check. Blood pressure is ok. Patient will fax readings

## 2024-12-11 NOTE — PROGRESS NOTES
"The patient location is: [place]   The chief complaint leading to consultation is: Vaccine counseling      Visit type: audiovisual     Notes:     Subjective:     HPI: 49 y.o. female with ANCA vasculitis who presents for vaccine counseling. Currently on Rituximab and high dose prednisone. OI ppx with Bactrim. Detailed infection history below.     12/11: Here for vaccine follow up. Plans for maintenance phase of Rituxan once evaluated by nephrology this month. Has been completing prednisone taper. Also on Bactrim. Feeling much better. Would like to receive vaccines next week after appointment with nephrology at [facility]. Will arrange. Will check hep A and B immunity today with other labs. No contraindication to maintenance Rituxan if pursued. Will discuss stopping Bactrim with nephrology.     Last nephrology note: "Kidney failure and pulmonary symptoms due to P-ANCA vasculitis.  Positive anti-GBM antibody, but kidney biopsy showed no sign of anti-GBM linear staining and IF was negative for Goodpasture disease  S/p acute treatment with the following:  Plasmapheresis x3  Solumedrol pulse dose x 3  Retuximab 1 g IV on 8/25/24 and again 1 g on 9/13/24. Dosing was reviewed with [facility] and with rheumatology.  Pt did not received the renal dosing, which is 375 mg/m2 BSA q 1 week x 4 doses.  She received a larger dose, but less frequently, as normally given by rheumatology  Pt has so far received 2000 mg of retuximab (1 g x 2, which would have been the same as 375 mg x 1.42 m2 BSA (=500 mg) x 4)  Will discuss with pharmacy in am as to the remaining renal doses, and whether they are still indicated.       Recommend:  Continue prednisone taper by 10 mg per week  Continue bactrim x 6 months  Continue lisinopril 10 mg po qd  Vaccinations per ID  Will refer back to ID  Will wait 3 months (Dec 2024 - Jan 2024) after the last retuximab dose to initiate maintenance phase of the treatment  Repeat labs with next visit" "     History of infections:  Recent infections: None   Recurrent infections (sinusitis / pneumonia / UTI): None  COVID-19: None     Admission for infection: No  Need for intravenous antibiotics: No     Chicken Pox: Yes         Shingles: No     History of Immunosuppression:  Prior chemotherapy / immunosuppression: No  Prior transplant: No  History of splenectomy: No           Tuberculosis                                                                                                                Prior screening for latent TB: Yes  Prior treatment for latent TB: No  Exposure to person with active TB: No     Geographical Exposures  Lived in the following states: [place]  Lived in [facility]: No   Traveled to the following countries: No                                                                                                   Job History     Animal Exposure                                                                      Pets: Dog  Farm animals: No  Fishing / hunting: Yes   Consumption of raw or undercooked meat, fish, shellfish: No                                                                Hobbies                                                                                                Gardening: No  Hiking / camping: No     Social History  Alcohol: Social  Tobacco: No  Recreational Drugs: No      The patient's immunization history was reviewed.        Immunization History   Administered Date(s) Administered    COVID-19, MRNA, LN-S, PF (MODERNA FULL 0.5 ML DOSE) 03/11/2021, 04/08/2021                  Hep B Core Total Ab   Date Value Ref Range Status   08/30/2024 Non-reactive Non-reactive Final            Hepatitis B Surface Ag   Date Value Ref Range Status   08/30/2024 Non-reactive Non-reactive Final            HIV 1/2 Ag/Ab   Date Value Ref Range Status   08/13/2024 Negative Negative Final              TB Gold Plus   Date Value Ref Range Status   08/15/2024 Indeterminate (A) Negative Final        Comment:       Likelihood of M. tuberculosis infection cannot   be determined.        Review of Systems:   Negative unless otherwise noted positive-  Gen- Weakness/ Fatigue  Neuro- Confusion  CV- Chest Pain/ Palpitations  : Dysuria/Frequency/Urgency   Resp: Cough/ SOB  GI- Nausea/vomiting  MSK- Arthralgias/myalgias      Objective:     Physical Exam:  General- Patient alert and oriented x3  HEENT- PERRLA, EOMI, OP clear  Resp- No increased WOB noted. Not using accessory muscles.  Extrem- No cyanosis, clubbing, edema.   Skin-  No Jaundice. No visible skin lesions.        Plan:  ANCA vasculitis   --Completing prednisone taper (current dose: 5 mg)   --Possible plans for maintenance rituximab; no contraindication from ID standpoint   --Recommend Prevnar 20, Shingrix (0, 8 weeks), and Tdap prior to next rituximab administration   --Will schedule nurse visit for vaccines Dec 18th following nephrology appointment at [facility]  --Will check hep A and B antibodies today to see if patient also needs those booster vaccines   --Will discuss stopping TMP/SMX with nephrology   --Can see me as needed     HTN  Continue current medications and follow up with PCP      Asthma  Continue current medications and follow up with PCP        Face to Face time with patient: 8 minutes   25 minutes of total time spent on the encounter, which includes face to face time and non-face to face time preparing to see the patient (eg, review of tests), Obtaining and/or reviewing separately obtained history, Documenting clinical information in the electronic or other health record, Independently interpreting results (not separately reported) and communicating results to the patient/family/caregiver, or Care coordination (not separately reported).       Each patient to whom he or she provides medical services by telemedicine is:  (1) informed of the relationship between the physician and patient and the respective role of any other health care provider  with respect to management of the patient; and (2) notified that he or she may decline to receive medical services by telemedicine and may withdraw from such care at any time.

## 2024-12-12 ENCOUNTER — TELEPHONE (OUTPATIENT)
Dept: INFECTIOUS DISEASES | Facility: CLINIC | Age: 49
End: 2024-12-12
Payer: COMMERCIAL

## 2024-12-12 DIAGNOSIS — I77.82 ANCA-ASSOCIATED VASCULITIS: Primary | ICD-10-CM

## 2024-12-12 NOTE — TELEPHONE ENCOUNTER
----- Message from Justin Maldonado DO sent at 12/12/2024  7:24 AM CST -----  Regarding: Hep A vaccine  Please add hepatitis A to her vaccines Dec 18th. Order is in.

## 2024-12-16 ENCOUNTER — TELEPHONE (OUTPATIENT)
Dept: INFECTIOUS DISEASES | Facility: CLINIC | Age: 49
End: 2024-12-16
Payer: COMMERCIAL

## 2024-12-16 NOTE — TELEPHONE ENCOUNTER
Patient NV scheduled on 12/18 at [facility] r/s to 12/18 at 2 pm at [facility]. Pt has another appt at that location same day. Pt verbalized understanding and agreed to appt date, time, and location.

## 2024-12-18 ENCOUNTER — CLINICAL SUPPORT (OUTPATIENT)
Dept: INFECTIOUS DISEASES | Facility: CLINIC | Age: 49
End: 2024-12-18
Payer: COMMERCIAL

## 2024-12-18 ENCOUNTER — OFFICE VISIT (OUTPATIENT)
Dept: NEPHROLOGY | Facility: CLINIC | Age: 49
End: 2024-12-18
Payer: COMMERCIAL

## 2024-12-18 VITALS
DIASTOLIC BLOOD PRESSURE: 74 MMHG | WEIGHT: 102.94 LBS | BODY MASS INDEX: 17.57 KG/M2 | RESPIRATION RATE: 18 BRPM | HEART RATE: 73 BPM | HEIGHT: 64 IN | SYSTOLIC BLOOD PRESSURE: 104 MMHG

## 2024-12-18 DIAGNOSIS — I77.82 ANCA-ASSOCIATED VASCULITIS: Primary | ICD-10-CM

## 2024-12-18 PROCEDURE — 3078F DIAST BP <80 MM HG: CPT | Mod: CPTII,S$GLB,, | Performed by: INTERNAL MEDICINE

## 2024-12-18 PROCEDURE — 4010F ACE/ARB THERAPY RXD/TAKEN: CPT | Mod: CPTII,S$GLB,, | Performed by: INTERNAL MEDICINE

## 2024-12-18 PROCEDURE — 3074F SYST BP LT 130 MM HG: CPT | Mod: CPTII,S$GLB,, | Performed by: INTERNAL MEDICINE

## 2024-12-18 PROCEDURE — 90750 HZV VACC RECOMBINANT IM: CPT | Mod: S$GLB,,, | Performed by: STUDENT IN AN ORGANIZED HEALTH CARE EDUCATION/TRAINING PROGRAM

## 2024-12-18 PROCEDURE — 90472 IMMUNIZATION ADMIN EACH ADD: CPT | Mod: S$GLB,,, | Performed by: STUDENT IN AN ORGANIZED HEALTH CARE EDUCATION/TRAINING PROGRAM

## 2024-12-18 PROCEDURE — 3044F HG A1C LEVEL LT 7.0%: CPT | Mod: CPTII,S$GLB,, | Performed by: INTERNAL MEDICINE

## 2024-12-18 PROCEDURE — G0009 ADMIN PNEUMOCOCCAL VACCINE: HCPCS | Mod: 59,S$GLB,, | Performed by: STUDENT IN AN ORGANIZED HEALTH CARE EDUCATION/TRAINING PROGRAM

## 2024-12-18 PROCEDURE — 99999 PR PBB SHADOW E&M-EST. PATIENT-LVL IV: CPT | Mod: PBBFAC,,, | Performed by: INTERNAL MEDICINE

## 2024-12-18 PROCEDURE — 99215 OFFICE O/P EST HI 40 MIN: CPT | Mod: S$GLB,,, | Performed by: INTERNAL MEDICINE

## 2024-12-18 PROCEDURE — 1159F MED LIST DOCD IN RCRD: CPT | Mod: CPTII,S$GLB,, | Performed by: INTERNAL MEDICINE

## 2024-12-18 PROCEDURE — 3008F BODY MASS INDEX DOCD: CPT | Mod: CPTII,S$GLB,, | Performed by: INTERNAL MEDICINE

## 2024-12-18 PROCEDURE — 3066F NEPHROPATHY DOC TX: CPT | Mod: CPTII,S$GLB,, | Performed by: INTERNAL MEDICINE

## 2024-12-18 PROCEDURE — 90471 IMMUNIZATION ADMIN: CPT | Mod: S$GLB,,, | Performed by: STUDENT IN AN ORGANIZED HEALTH CARE EDUCATION/TRAINING PROGRAM

## 2024-12-18 PROCEDURE — 90636 HEP A/HEP B VACC ADULT IM: CPT | Mod: S$GLB,,, | Performed by: STUDENT IN AN ORGANIZED HEALTH CARE EDUCATION/TRAINING PROGRAM

## 2024-12-18 PROCEDURE — 90715 TDAP VACCINE 7 YRS/> IM: CPT | Mod: S$GLB,,, | Performed by: STUDENT IN AN ORGANIZED HEALTH CARE EDUCATION/TRAINING PROGRAM

## 2024-12-18 PROCEDURE — 90677 PCV20 VACCINE IM: CPT | Mod: S$GLB,,, | Performed by: STUDENT IN AN ORGANIZED HEALTH CARE EDUCATION/TRAINING PROGRAM

## 2024-12-18 PROCEDURE — 99999 PR PBB SHADOW E&M-EST. PATIENT-LVL II: CPT | Mod: PBBFAC,,,

## 2024-12-19 NOTE — PROGRESS NOTES
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 Pt was last seen in renal clinic ain Ocy 2024. On f/u today, pt feels well today, no new c/o's, no rash, no ulcers, no blood in urine orin sputum. No report of any infections or fever.     To review pt's initial presentation, pt presented with "asthma-like" symptoms  x 5 months that would not improve with conventional treatment. Pt was being seen by pulmonary, and was referred to nephrology. Renal failure was noted with s Cr 2.7 mg/dl. Based on initial positive serology for anti-GBP antibody (3.8) and the renal-pulmonary presentation, Goodpasture disease was suspected and pt immediately received 3 treatments with plasmapheresis, pule steroids IV x 3, and retuximab 1 g IV on 8/25/24, followed by a second dose on 9/13/24. Serologies were also remarkable for positive P-ANCA, but negative C-ANCA. 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 Patient tolerated above treatments well. Repeat serologies for anti-GBM antibody was negative.         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     PAST SURGICAL HISTORY:  She  has a past surgical history that includes Bilateral tubal ligation (N/A); Esophagogastroduodenoscopy (N/A, 8/20/2024); and Colonoscopy (N/A, 8/20/2024).     SOCIAL HISTORY:  She  reports that she has never smoked. She has never used smokeless tobacco. She " "reports that she does not currently use alcohol. She reports that she does not use drugs.     FAMILY MEDICAL HISTORY:  Her family history includes Hypertension in her father and mother.     Review of patient's allergies indicates:  No Known Allergies     Meds reviewed    Current Outpatient Medications:     albuterol (PROVENTIL/VENTOLIN HFA) 90 mcg/actuation inhaler, Inhale 2 puffs into the lungs every 4 (four) hours as needed for Wheezing or Shortness of Breath., Disp: 18 g, Rfl: 11    fluticasone-salmeterol diskus inhaler 250-50 mcg, Inhale 1 puff into the lungs 2 (two) times daily. Controller, Disp: 60 each, Rfl: 11    lisinopriL 10 MG tablet, Take 1 tablet (10 mg total) by mouth once daily., Disp: , Rfl:     predniSONE (DELTASONE) 5 MG tablet, Take 3 tablets (15 mg total) by mouth once daily for 14 days, THEN 2.5 tablets (12.5 mg total) once daily for 14 days, THEN 2 tablets (10 mg total) once daily for 14 days, THEN 1.5 tablets (7.5 mg total) once daily for 28 days, THEN 1 tablet (5 mg total) once daily. Start after completing week 4 (20 mg daily).., Disp: 237 tablet, Rfl: 0    sulfamethoxazole-trimethoprim 800-160mg (BACTRIM DS) 800-160 mg Tab, Take 1 tablet by mouth 3 (three) times a week., Disp: 12 tablet, Rfl: 11    vitamin D (VITAMIN D3) 1000 units Tab, Take 1,000 Units by mouth once daily., Disp: , Rfl:     Current Facility-Administered Medications:     [START ON 1/27/2025] varicella zoster (Shingrix) IM vaccine (>/= 51 yo), 0.5 mL, Intramuscular, Q8 weeks, , 0.5 mL at 12/18/24 1409              REVIEW OF SYSTEMS:  Patient has no fever, fatigue, visual changes, chest pain, edema, cough, dyspnea, nausea, vomiting, constipation, diarrhea, arthralgias, pruritis, dizziness, weakness, depression, confusion.     PHYSICAL EXAM:  Blood pressure 104/74, pulse 73, height 5' 4" (1.626 m), weight 46.2 Kg, from 44.1 Kg, BSA 1.42  Gen:WDWN female in no apparent distress  Psych:Normal mood and affect  Skin:No rashes or " ulcers  Neck:No JVD  Chest:Clear with no rales, rhonchi, wheezing with normal effort  CV:Regular with no murmurs, gallops or rubs  Abd:Soft, nontender, no distension  Ext:No edema     Labs reviewed, Cr before starting retuximab was 2.7  BMP  Lab Results   Component Value Date     12/11/2024    K 3.8 12/11/2024     12/11/2024    CO2 26 12/11/2024    BUN 19 12/11/2024    CREATININE 2.1 (H) 12/11/2024    CALCIUM 9.4 12/11/2024    ANIONGAP 10 12/11/2024    EGFRNORACEVR 28.2 (A) 12/11/2024     Lab Results   Component Value Date    WBC 10.09 12/11/2024    WBC 10.09 12/11/2024    WBC 10.09 12/11/2024    HGB 14.7 12/11/2024    HGB 14.7 12/11/2024    HGB 14.7 12/11/2024    HCT 44.6 12/11/2024    HCT 44.6 12/11/2024    HCT 44.6 12/11/2024    MCV 94 12/11/2024    MCV 94 12/11/2024    MCV 94 12/11/2024     (H) 12/11/2024     (H) 12/11/2024     (H) 12/11/2024             U/a: no protein, no blood, 1 RBC  Urine p/cr 0.21 g, from 0.25 g, from 0.29 g, from 1.0 g     Anti-GBM antibody 0.5 ( normal), from 3.8  C-ANCA neg  P-ANCA: 1:20, from 1:20, from 1:20, from 1:80  Myeloperoxidase 12, from 16, from 27, from 43  Proteinase 3 antibodies neg  Cryo nege     ESR 1, was >60     Kidney biopsy results reviewed  Moderate interstitial fibrosis  Small sample  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  Noted Cr has only improved by 0.5 mg since therapy was started and that pt has MODERATE fibrosis on kidney biopsy  These two issues may predict less than optimal response to therapy  Clinically stable and has signs of improvement:  Asymptomatic   BP is well controlled  Proteinuria, lower, further  improved  Hematuria much improved, no blood on last u/a, and fewer RBC's  P-ANCA's have improved  Myeloperoxidase noted further improved  C-ANCA and proteinase 3 antibody are negative  Anti-GBM antibody is negative  ESR normal  Good response to retuximab and prednisone high dose taper     To review:   Kidney failure and pulmonary symptoms due to P-ANCA vasculitis.  Positive anti-GBM antibody, but kidney biopsy showed no sign of anti-GBM linear staining and IF was negative for Goodpasture disease  S/p acute treatment with the following:  Plasmapheresis x3  Solumedrol pulse dose x 3  Retuximab 1 g IV on 8/25/24 and again 1 g on 9/13/24. Dosing was reviewed with Infusion Center and with rheumatology.  Pt did not received the renal dosing, which is 375 mg/m2 BSA q 1 week x 4 doses.  She received a larger dose, but less frequently, as normally given by rheumatology  Pt has so far received 2000 mg of retuximab (1 g x 2, which would have been the same as 375 mg x 1.42 m2 BSA (=500 mg) x 4)  Will discuss with pharmacy in am as to the remaining renal doses, and whether they are still indicated.      Recommend:  Continue prednisone 5 mg po qd  Continue bactrim x 6 months  Continue lisinopril 10 mg po qd  Vaccinations per ID, were done  Will wait 3 months (Dec 2024 - Jan 2024) after the last retuximab dose to initiate maintenance phase of the treatment  Repeat labs with next visit        Plans and recommendations:  As discussed above  Total time spent 40 minutes including time needed to review the records, the   patient evaluation, documentation, face-to-face discussion with the patient,   more than 50% of the time was spent on coordination of care and counseling.    Level V visit.  RTC 3-4 weeks, OK to rosemary Montero MD

## 2024-12-20 NOTE — PROGRESS NOTES
After verifying two pt identifier. Pt agus. Vaccines well. She verbally stated understanding to wait 15 mnts before leaving. She said she has another appointment after Dr. Maldonado with her Kidney Doctor on the same floor.

## 2024-12-27 ENCOUNTER — LAB VISIT (OUTPATIENT)
Dept: LAB | Facility: HOSPITAL | Age: 49
End: 2024-12-27
Attending: INTERNAL MEDICINE
Payer: COMMERCIAL

## 2024-12-27 DIAGNOSIS — D50.0 IRON DEFICIENCY ANEMIA DUE TO CHRONIC BLOOD LOSS: ICD-10-CM

## 2024-12-27 LAB
ALBUMIN SERPL BCP-MCNC: 3.6 G/DL (ref 3.5–5.2)
ALP SERPL-CCNC: 84 U/L (ref 40–150)
ALT SERPL W/O P-5'-P-CCNC: 25 U/L (ref 10–44)
ANION GAP SERPL CALC-SCNC: 12 MMOL/L (ref 8–16)
AST SERPL-CCNC: 15 U/L (ref 10–40)
BASOPHILS # BLD AUTO: 0.09 K/UL (ref 0–0.2)
BASOPHILS NFR BLD: 0.5 % (ref 0–1.9)
BILIRUB SERPL-MCNC: 0.5 MG/DL (ref 0.1–1)
BUN SERPL-MCNC: 22 MG/DL (ref 6–20)
CALCIUM SERPL-MCNC: 9.4 MG/DL (ref 8.7–10.5)
CHLORIDE SERPL-SCNC: 106 MMOL/L (ref 95–110)
CO2 SERPL-SCNC: 19 MMOL/L (ref 23–29)
CREAT SERPL-MCNC: 1.9 MG/DL (ref 0.5–1.4)
DIFFERENTIAL METHOD BLD: ABNORMAL
EOSINOPHIL # BLD AUTO: 0.2 K/UL (ref 0–0.5)
EOSINOPHIL NFR BLD: 0.9 % (ref 0–8)
ERYTHROCYTE [DISTWIDTH] IN BLOOD BY AUTOMATED COUNT: 12.3 % (ref 11.5–14.5)
EST. GFR  (NO RACE VARIABLE): 31.8 ML/MIN/1.73 M^2
GLUCOSE SERPL-MCNC: 78 MG/DL (ref 70–110)
HCT VFR BLD AUTO: 40.1 % (ref 37–48.5)
HGB BLD-MCNC: 13.2 G/DL (ref 12–16)
IMM GRANULOCYTES # BLD AUTO: 0.12 K/UL (ref 0–0.04)
IMM GRANULOCYTES NFR BLD AUTO: 0.6 % (ref 0–0.5)
LYMPHOCYTES # BLD AUTO: 2.1 K/UL (ref 1–4.8)
LYMPHOCYTES NFR BLD: 10.6 % (ref 18–48)
MCH RBC QN AUTO: 30.9 PG (ref 27–31)
MCHC RBC AUTO-ENTMCNC: 32.9 G/DL (ref 32–36)
MCV RBC AUTO: 94 FL (ref 82–98)
MONOCYTES # BLD AUTO: 1.7 K/UL (ref 0.3–1)
MONOCYTES NFR BLD: 8.7 % (ref 4–15)
NEUTROPHILS # BLD AUTO: 15.8 K/UL (ref 1.8–7.7)
NEUTROPHILS NFR BLD: 78.7 % (ref 38–73)
NRBC BLD-RTO: 0 /100 WBC
PLATELET # BLD AUTO: 363 K/UL (ref 150–450)
PMV BLD AUTO: 9.9 FL (ref 9.2–12.9)
POTASSIUM SERPL-SCNC: 4.7 MMOL/L (ref 3.5–5.1)
PROT SERPL-MCNC: 6.5 G/DL (ref 6–8.4)
RBC # BLD AUTO: 4.27 M/UL (ref 4–5.4)
SODIUM SERPL-SCNC: 137 MMOL/L (ref 136–145)
WBC # BLD AUTO: 19.98 K/UL (ref 3.9–12.7)

## 2024-12-27 PROCEDURE — 82728 ASSAY OF FERRITIN: CPT | Performed by: INTERNAL MEDICINE

## 2024-12-27 PROCEDURE — 83540 ASSAY OF IRON: CPT | Performed by: INTERNAL MEDICINE

## 2024-12-27 PROCEDURE — 85025 COMPLETE CBC W/AUTO DIFF WBC: CPT | Mod: PO | Performed by: INTERNAL MEDICINE

## 2024-12-27 PROCEDURE — 36415 COLL VENOUS BLD VENIPUNCTURE: CPT | Mod: PO | Performed by: INTERNAL MEDICINE

## 2024-12-27 PROCEDURE — 80053 COMPREHEN METABOLIC PANEL: CPT | Mod: PO | Performed by: INTERNAL MEDICINE

## 2024-12-28 LAB
FERRITIN SERPL-MCNC: 1483 NG/ML (ref 20–300)
IRON SERPL-MCNC: 17 UG/DL (ref 30–160)
SATURATED IRON: 7 % (ref 20–50)
TOTAL IRON BINDING CAPACITY: 231 UG/DL (ref 250–450)
TRANSFERRIN SERPL-MCNC: 156 MG/DL (ref 200–375)

## 2024-12-30 NOTE — PROGRESS NOTES
Subjective:       Patient ID: Shayy Faust is a 49 y.o. female.    Chief Complaint:   1. Iron deficiency anemia due to chronic blood loss          Current Treatment:       Treatment History:  Venofer 200mg IV on 8/17/2024 & 8/24/2024 while inpatient        FERRLECIT (FERRIC GLUCONATE) QW on 8/28/2024, 9/4/2024, & 9/12/2024    HPI: This is a 49 year old  woman with ANCA-associated vasculitis who is seen in Hem/Onc for iron deficiency anemia. She was seen initially in 8/2024 while inpatient for JAIME. She complained of increasing fatigue and weakness and was found to be profoundly iron deficient. She was treated with 2 doses of IV Venofer. EGD noted gastritis; colonoscopy was normal. Biopsies were negative. Capsule endoscopy was done in 9/2024 and revealed erythematous gastropathy with a single red spot in the small bowel; no bleeding.     Her primary Hematologist/Oncologist is Dr. Mercado.    Interval History: Patient presents for follow up on labs. She presents alone at home and denies symptoms of anemia. WBC, ANC elevated. She is on steroids for ANCA vasculitis.   No anemia. Iron & saturation low; TIBC low; ferritin elevated significantly. Will consult with patient's primary hematologist regarding recommendations to address iron levels inconsistent with blood counts.     Reviewed labs with patient:   CBC:   Recent Labs   Lab 12/27/24  1155   WBC 19.98 H   RBC 4.27   Hemoglobin 13.2   Hematocrit 40.1   Platelets 363   MCV 94   MCH 30.9   MCHC 32.9     CMP:  Recent Labs   Lab 12/27/24  1155   Glucose 78   Calcium 9.4   Albumin 3.6   Total Protein 6.5   Sodium 137   Potassium 4.7   CO2 19 L   Chloride 106   BUN 22 H   Creatinine 1.9 H   Alkaline Phosphatase 84   ALT 25   AST 15   Total Bilirubin 0.5     Lab Results   Component Value Date    IRON 17 (L) 12/27/2024    TRANSFERRIN 156 (L) 12/27/2024    TIBC 231 (L) 12/27/2024    FESATURATED 7 (L) 12/27/2024      Lab Results   Component Value Date    FERRITIN  1483 (H) 12/27/2024     The patient location is: [place]  The chief complaint leading to consultation is: iron deficiency anemia    Visit type: audiovisual    Face to Face time with patient: 14 minutes  21 minutes of total time spent on the encounter, which includes face to face time and non-face to face time preparing to see the patient (eg, review of tests), Obtaining and/or reviewing separately obtained history, Documenting clinical information in the electronic or other health record, Independently interpreting results (not separately reported) and communicating results to the patient/family/caregiver, or Care coordination (not separately reported).     Each patient to whom he or she provides medical services by telemedicine is:  (1) informed of the relationship between the physician and patient and the respective role of any other health care provider with respect to management of the patient; and (2) notified that he or she may decline to receive medical services by telemedicine and may withdraw from such care at any time.    Social History     Socioeconomic History    Marital status: Single   Tobacco Use    Smoking status: Never    Smokeless tobacco: Never   Substance and Sexual Activity    Alcohol use: Not Currently    Drug use: Never     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       Anemia, chronic renal failure, stage 4 (severe) 08/23/2024    Mild intermittent asthma, uncomplicated      Family History   Problem Relation Name Age of Onset    Hypertension Mother      Hypertension Father       Past Surgical History:   Procedure Laterality Date    BILATERAL TUBAL LIGATION N/A     COLONOSCOPY N/A 8/20/2024    Procedure: COLONOSCOPY;  Surgeon: Gena Saxena MD;  Location: [facility];  Service: Endoscopy;  Laterality: N/A;    ESOPHAGOGASTRODUODENOSCOPY N/A 8/20/2024    Procedure: EGD  (ESOPHAGOGASTRODUODENOSCOPY);  Surgeon: Gena Saxena MD;  Location: [facility] ENDO;  Service: Endoscopy;  Laterality: N/A;    INTRALUMINAL GASTROINTESTINAL TRACT IMAGING VIA CAPSULE N/A 9/19/2024    Procedure: IMAGING PROCEDURE, GI TRACT, INTRALUMINAL, VIA CAPSULE;  Surgeon: Ignacio Jimenez RN;  Location: [facility] ENDO;  Service: Endoscopy;  Laterality: N/A;  Neg EGD/Colon. Hx MARIA ESTHER/weight loss/ARF     Review of Systems   Constitutional:  Negative for appetite change, chills, fatigue and fever.   HENT:  Negative for mouth sores, rhinorrhea and sore throat.    Eyes: Negative.    Respiratory: Negative.  Negative for cough and shortness of breath.    Cardiovascular: Negative.    Gastrointestinal:  Negative for constipation, diarrhea, nausea and vomiting.   Endocrine: Negative.  Negative for cold intolerance.   Genitourinary: Negative.    Musculoskeletal: Negative.    Integumentary:  Negative.   Allergic/Immunologic: Negative.    Neurological:  Negative for dizziness, weakness, light-headedness, numbness and headaches.   Hematological: Negative.    Psychiatric/Behavioral: Negative.         Medication List with Changes/Refills   Current Medications    ALBUTEROL (PROVENTIL/VENTOLIN HFA) 90 MCG/ACTUATION INHALER    Inhale 2 puffs into the lungs every 4 (four) hours as needed for Wheezing or Shortness of Breath.    FLUTICASONE-SALMETEROL DISKUS INHALER 250-50 MCG    Inhale 1 puff into the lungs 2 (two) times daily. Controller    LISINOPRIL 10 MG TABLET    Take 1 tablet (10 mg total) by mouth once daily.    PREDNISONE (DELTASONE) 5 MG TABLET    Take 3 tablets (15 mg total) by mouth once daily for 14 days, THEN 2.5 tablets (12.5 mg total) once daily for 14 days, THEN 2 tablets (10 mg total) once daily for 14 days, THEN 1.5 tablets (7.5 mg total) once daily for 28 days, THEN 1 tablet (5 mg total) once daily. Start after completing week 4 (20 mg daily)..    SULFAMETHOXAZOLE-TRIMETHOPRIM 800-160MG (BACTRIM DS) 800-160 MG TAB    Take 1  tablet by mouth 3 (three) times a week.    VITAMIN D (VITAMIN D3) 1000 UNITS TAB    Take 1,000 Units by mouth once daily.     Objective:   There were no vitals filed for this visit.  Physical Exam     Unable to assess due to virtual visit.    (0) Fully active, able to carry on all predisease performance without restriction  Assessment:     Problem List Items Addressed This Visit          Oncology    Iron deficiency anemia - Primary     Treated with IV Venofer & Ferrlecit with repletion. Now significantly iron deficient.           Plan:     Iron deficiency anemia due to chronic blood loss    Labs reviewed; no anemia. Iron level and saturation low with significantly elevated ferritin.   Ferrlecit IV weekly x 8 doses.   Follow up in 3 months with iron profile, ferritin, CBC, and Comprehensive Metabolic Panel.    Route Chart for Scheduling    Med Onc Chart Routing      Follow up with physician    Follow up with [facility] 3 months. virtual   Infusion scheduling note   Ferrlecit weely x 8 doses   Injection scheduling note    Labs CBC, CMP, ferritin and iron and TIBC   Scheduling:  Preferred lab:  Lab interval:  in 3 months, 2 days prior   Imaging None      Pharmacy appointment No pharmacy appointment needed      Other referrals       No additional referrals needed             I will review assessment/plan with collaborating physician.      KRISTEN Denis

## 2024-12-31 ENCOUNTER — OFFICE VISIT (OUTPATIENT)
Dept: HEMATOLOGY/ONCOLOGY | Facility: CLINIC | Age: 49
End: 2024-12-31
Payer: COMMERCIAL

## 2024-12-31 DIAGNOSIS — D50.0 IRON DEFICIENCY ANEMIA DUE TO CHRONIC BLOOD LOSS: Primary | ICD-10-CM

## 2024-12-31 PROCEDURE — 99214 OFFICE O/P EST MOD 30 MIN: CPT | Mod: 95,,, | Performed by: NURSE PRACTITIONER

## 2024-12-31 PROCEDURE — 1159F MED LIST DOCD IN RCRD: CPT | Mod: CPTII,95,, | Performed by: NURSE PRACTITIONER

## 2024-12-31 PROCEDURE — 3044F HG A1C LEVEL LT 7.0%: CPT | Mod: CPTII,95,, | Performed by: NURSE PRACTITIONER

## 2024-12-31 PROCEDURE — 4010F ACE/ARB THERAPY RXD/TAKEN: CPT | Mod: CPTII,95,, | Performed by: NURSE PRACTITIONER

## 2024-12-31 PROCEDURE — 1160F RVW MEDS BY RX/DR IN RCRD: CPT | Mod: CPTII,95,, | Performed by: NURSE PRACTITIONER

## 2024-12-31 PROCEDURE — 3066F NEPHROPATHY DOC TX: CPT | Mod: CPTII,95,, | Performed by: NURSE PRACTITIONER

## 2024-12-31 RX ORDER — HEPARIN 100 UNIT/ML
500 SYRINGE INTRAVENOUS
OUTPATIENT
Start: 2024-12-31

## 2024-12-31 RX ORDER — EPINEPHRINE 0.3 MG/.3ML
0.3 INJECTION SUBCUTANEOUS ONCE AS NEEDED
OUTPATIENT
Start: 2024-12-31

## 2024-12-31 RX ORDER — DIPHENHYDRAMINE HYDROCHLORIDE 50 MG/ML
50 INJECTION INTRAMUSCULAR; INTRAVENOUS ONCE AS NEEDED
OUTPATIENT
Start: 2024-12-31

## 2024-12-31 RX ORDER — SODIUM CHLORIDE 0.9 % (FLUSH) 0.9 %
10 SYRINGE (ML) INJECTION
OUTPATIENT
Start: 2024-12-31

## 2025-01-09 ENCOUNTER — TELEPHONE (OUTPATIENT)
Dept: INFECTIOUS DISEASES | Facility: CLINIC | Age: 50
End: 2025-01-09
Payer: COMMERCIAL

## 2025-01-09 ENCOUNTER — PATIENT MESSAGE (OUTPATIENT)
Dept: INFUSION THERAPY | Facility: HOSPITAL | Age: 50
End: 2025-01-09
Payer: COMMERCIAL

## 2025-01-09 ENCOUNTER — PATIENT MESSAGE (OUTPATIENT)
Dept: INFECTIOUS DISEASES | Facility: CLINIC | Age: 50
End: 2025-01-09
Payer: COMMERCIAL

## 2025-01-09 NOTE — TELEPHONE ENCOUNTER
Called the pt. Informed the Jan. 13th appt Nurse visit has been cancelled. Other nurse visit are for @2 dose of vacc. Shingles and TwinRix. Pt voiced understand.

## 2025-01-13 ENCOUNTER — LAB VISIT (OUTPATIENT)
Dept: LAB | Facility: HOSPITAL | Age: 50
End: 2025-01-13
Attending: INTERNAL MEDICINE
Payer: COMMERCIAL

## 2025-01-13 DIAGNOSIS — I77.82 ANCA-ASSOCIATED VASCULITIS: ICD-10-CM

## 2025-01-13 LAB
ALBUMIN SERPL BCP-MCNC: 3.8 G/DL (ref 3.5–5.2)
ANION GAP SERPL CALC-SCNC: 12 MMOL/L (ref 8–16)
BASOPHILS # BLD AUTO: 0.12 K/UL (ref 0–0.2)
BASOPHILS NFR BLD: 1.4 % (ref 0–1.9)
BUN SERPL-MCNC: 23 MG/DL (ref 6–20)
CALCIUM SERPL-MCNC: 8.6 MG/DL (ref 8.7–10.5)
CHLORIDE SERPL-SCNC: 107 MMOL/L (ref 95–110)
CO2 SERPL-SCNC: 25 MMOL/L (ref 23–29)
CREAT SERPL-MCNC: 1.8 MG/DL (ref 0.5–1.4)
CREAT UR-MCNC: 142.5 MG/DL (ref 15–325)
DIFFERENTIAL METHOD BLD: ABNORMAL
EOSINOPHIL # BLD AUTO: 0.5 K/UL (ref 0–0.5)
EOSINOPHIL NFR BLD: 6.5 % (ref 0–8)
ERYTHROCYTE [DISTWIDTH] IN BLOOD BY AUTOMATED COUNT: 12.3 % (ref 11.5–14.5)
EST. GFR  (NO RACE VARIABLE): 33.9 ML/MIN/1.73 M^2
GLUCOSE SERPL-MCNC: 87 MG/DL (ref 70–110)
HCT VFR BLD AUTO: 40.2 % (ref 37–48.5)
HGB BLD-MCNC: 13.2 G/DL (ref 12–16)
IMM GRANULOCYTES # BLD AUTO: 0.02 K/UL (ref 0–0.04)
IMM GRANULOCYTES NFR BLD AUTO: 0.2 % (ref 0–0.5)
LYMPHOCYTES # BLD AUTO: 2.6 K/UL (ref 1–4.8)
LYMPHOCYTES NFR BLD: 31 % (ref 18–48)
MCH RBC QN AUTO: 30.7 PG (ref 27–31)
MCHC RBC AUTO-ENTMCNC: 32.8 G/DL (ref 32–36)
MCV RBC AUTO: 94 FL (ref 82–98)
MONOCYTES # BLD AUTO: 0.7 K/UL (ref 0.3–1)
MONOCYTES NFR BLD: 8.6 % (ref 4–15)
NEUTROPHILS # BLD AUTO: 4.3 K/UL (ref 1.8–7.7)
NEUTROPHILS NFR BLD: 52.3 % (ref 38–73)
NRBC BLD-RTO: 0 /100 WBC
PHOSPHATE SERPL-MCNC: 4.2 MG/DL (ref 2.7–4.5)
PLATELET # BLD AUTO: 482 K/UL (ref 150–450)
PMV BLD AUTO: 9.6 FL (ref 9.2–12.9)
POTASSIUM SERPL-SCNC: 4.1 MMOL/L (ref 3.5–5.1)
PROT UR-MCNC: 15 MG/DL (ref 0–15)
PROT/CREAT UR: 0.11 MG/G{CREAT} (ref 0–0.2)
RBC # BLD AUTO: 4.3 M/UL (ref 4–5.4)
SODIUM SERPL-SCNC: 144 MMOL/L (ref 136–145)
WBC # BLD AUTO: 8.28 K/UL (ref 3.9–12.7)

## 2025-01-13 PROCEDURE — 83516 IMMUNOASSAY NONANTIBODY: CPT | Mod: 59 | Performed by: INTERNAL MEDICINE

## 2025-01-13 PROCEDURE — 85025 COMPLETE CBC W/AUTO DIFF WBC: CPT | Mod: PO | Performed by: INTERNAL MEDICINE

## 2025-01-13 PROCEDURE — 84156 ASSAY OF PROTEIN URINE: CPT | Mod: PO | Performed by: INTERNAL MEDICINE

## 2025-01-13 PROCEDURE — 83516 IMMUNOASSAY NONANTIBODY: CPT | Performed by: INTERNAL MEDICINE

## 2025-01-13 PROCEDURE — 80069 RENAL FUNCTION PANEL: CPT | Mod: PO | Performed by: INTERNAL MEDICINE

## 2025-01-13 PROCEDURE — 36415 COLL VENOUS BLD VENIPUNCTURE: CPT | Mod: PO | Performed by: INTERNAL MEDICINE

## 2025-01-13 PROCEDURE — 86036 ANCA SCREEN EACH ANTIBODY: CPT | Performed by: INTERNAL MEDICINE

## 2025-01-13 NOTE — NURSING
FYI:  Pt calling wanting to know if she needs medical clearance fromher PCP ,her sx is sched for 2/7/25 with Dr Greene and Dr Milan.    Pls advise   Pt tolerated Ferrlecit #2/3 well. No adverse reaction noted. Pt education reinforced on possible side effects, what to expect, and when to call . Pt verbalized understanding. I reviewed pt calendar w/ pt and understanding verbalized. After 30 minute post Ferrlecit wait time, IV flushed w/ NS and D/C per protocol.

## 2025-01-15 LAB — PROTEINASE3 IGG SER-ACNC: <0.2 U

## 2025-01-16 LAB
ANCA AB TITR SER IF: ABNORMAL TITER
MYELOPEROXIDASE AB SER-ACNC: 5.4 U/ML
P-ANCA TITR SER IF: ABNORMAL TITER

## 2025-01-17 ENCOUNTER — PATIENT MESSAGE (OUTPATIENT)
Dept: NEPHROLOGY | Facility: CLINIC | Age: 50
End: 2025-01-17
Payer: COMMERCIAL

## 2025-01-22 ENCOUNTER — PATIENT MESSAGE (OUTPATIENT)
Dept: NEPHROLOGY | Facility: CLINIC | Age: 50
End: 2025-01-22
Payer: COMMERCIAL

## 2025-01-25 ENCOUNTER — OFFICE VISIT (OUTPATIENT)
Dept: NEPHROLOGY | Facility: CLINIC | Age: 50
End: 2025-01-25
Payer: COMMERCIAL

## 2025-01-25 VITALS
SYSTOLIC BLOOD PRESSURE: 130 MMHG | BODY MASS INDEX: 17.96 KG/M2 | HEIGHT: 64 IN | WEIGHT: 105.19 LBS | HEART RATE: 70 BPM | DIASTOLIC BLOOD PRESSURE: 80 MMHG | RESPIRATION RATE: 18 BRPM

## 2025-01-25 DIAGNOSIS — N18.4 ANEMIA, CHRONIC RENAL FAILURE, STAGE 4 (SEVERE): ICD-10-CM

## 2025-01-25 DIAGNOSIS — I77.82 ANCA-ASSOCIATED VASCULITIS: Primary | ICD-10-CM

## 2025-01-25 DIAGNOSIS — D63.1 ANEMIA, CHRONIC RENAL FAILURE, STAGE 4 (SEVERE): ICD-10-CM

## 2025-01-25 PROCEDURE — 3008F BODY MASS INDEX DOCD: CPT | Mod: CPTII,S$GLB,, | Performed by: INTERNAL MEDICINE

## 2025-01-25 PROCEDURE — 1159F MED LIST DOCD IN RCRD: CPT | Mod: CPTII,S$GLB,, | Performed by: INTERNAL MEDICINE

## 2025-01-25 PROCEDURE — 3066F NEPHROPATHY DOC TX: CPT | Mod: CPTII,S$GLB,, | Performed by: INTERNAL MEDICINE

## 2025-01-25 PROCEDURE — 4010F ACE/ARB THERAPY RXD/TAKEN: CPT | Mod: CPTII,S$GLB,, | Performed by: INTERNAL MEDICINE

## 2025-01-25 PROCEDURE — 3079F DIAST BP 80-89 MM HG: CPT | Mod: CPTII,S$GLB,, | Performed by: INTERNAL MEDICINE

## 2025-01-25 PROCEDURE — 99999 PR PBB SHADOW E&M-EST. PATIENT-LVL III: CPT | Mod: PBBFAC,,, | Performed by: INTERNAL MEDICINE

## 2025-01-25 PROCEDURE — 99215 OFFICE O/P EST HI 40 MIN: CPT | Mod: S$GLB,,, | Performed by: INTERNAL MEDICINE

## 2025-01-25 PROCEDURE — 3075F SYST BP GE 130 - 139MM HG: CPT | Mod: CPTII,S$GLB,, | Performed by: INTERNAL MEDICINE

## 2025-01-25 RX ORDER — AZATHIOPRINE 50 MG/1
50 TABLET ORAL EVERY OTHER DAY
Qty: 15 TABLET | Refills: 11 | Status: SHIPPED | OUTPATIENT
Start: 2025-01-25 | End: 2026-01-25

## 2025-01-25 NOTE — PROGRESS NOTES
"Renal clinic consult f/u note:  Date of pt visit: 1/25/25  Reason for f/u and chief c/o: ANCA vasculitis     HPI: Pt is a 50 y/o white female with h/o of renal failure due to kidney biopsy proven H-ERDV-alegtpsw necrotizing pauci-immune crescentic glomerulonephritis on 8/20/24 who presents for f/u. Pt was last seen in renal clinic in Dec 2024. On f/u today, pt feels well today, no new c/o's, no rash, no ulcers, no blood in urine or in sputum. No report of any infections or fever.     To review pt's initial presentation, pt presented with "asthma-like" symptoms  x 5 months that would not improve with conventional treatment. Pt was being seen by pulmonary, and was referred to nephrology. Renal failure was noted with s Cr 2.7 mg/dl. Based on initial positive serology for anti-GBP antibody (3.8) and the renal-pulmonary presentation, Goodpasture disease was suspected and pt immediately received 3 treatments with plasmapheresis, pule steroids IV x 3, and retuximab 1 g IV on 8/25/24, followed by a second dose on 9/13/24. Serologies were also remarkable for positive P-ANCA, but negative C-ANCA. 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 Patient tolerated above treatments well. Repeat serologies for anti-GBM antibody was negative.         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     PAST SURGICAL HISTORY:  She  has a past surgical history that includes Bilateral tubal ligation (N/A); Esophagogastroduodenoscopy (N/A, 8/20/2024); and Colonoscopy (N/A, 8/20/2024).     SOCIAL HISTORY:  She  reports that she has never smoked. She has never used smokeless tobacco. She reports that she does not " "currently use alcohol. She reports that she does not use drugs.     FAMILY MEDICAL HISTORY:  Her family history includes Hypertension in her father and mother.     Review of patient's allergies indicates:  No Known Allergies     Meds reviewed    Current Outpatient Medications:     albuterol (PROVENTIL/VENTOLIN HFA) 90 mcg/actuation inhaler, Inhale 2 puffs into the lungs every 4 (four) hours as needed for Wheezing or Shortness of Breath., Disp: 18 g, Rfl: 11    fluticasone-salmeterol diskus inhaler 250-50 mcg, Inhale 1 puff into the lungs 2 (two) times daily. Controller, Disp: 60 each, Rfl: 11    lisinopriL 10 MG tablet, Take 1 tablet (10 mg total) by mouth once daily., Disp: , Rfl:     predniSONE (DELTASONE) 5 MG tablet, Take 3 tablets (15 mg total) by mouth once daily for 14 days, THEN 2.5 tablets (12.5 mg total) once daily for 14 days, THEN 2 tablets (10 mg total) once daily for 14 days, THEN 1.5 tablets (7.5 mg total) once daily for 28 days, THEN 1 tablet (5 mg total) once daily. Start after completing week 4 (20 mg daily).., Disp: 237 tablet, Rfl: 0    sulfamethoxazole-trimethoprim 800-160mg (BACTRIM DS) 800-160 mg Tab, Take 1 tablet by mouth 3 (three) times a week., Disp: 12 tablet, Rfl: 11    vitamin D (VITAMIN D3) 1000 units Tab, Take 1,000 Units by mouth once daily., Disp: , Rfl:   Current Facility-Administered Medications:     [START ON 1/27/2025] varicella zoster (Shingrix) IM vaccine (>/= 51 yo), 0.5 mL, Intramuscular, Q8 weeks, , 0.5 mL at 12/18/24 1409                   REVIEW OF SYSTEMS:  Patient has no fever, fatigue, visual changes, chest pain, edema, cough, dyspnea, nausea, vomiting, constipation, diarrhea, arthralgias, pruritis, dizziness, weakness, depression, confusion.     PHYSICAL EXAM:  Blood pressure 130/80, pulse 70, height 5' 4" (1.626 m), weight 47.7 Kg, from 46.2 Kg, from 44.1 Kg, BSA 1.42  Gen:WDWN female in no apparent distress  Psych:Normal mood and affect  Skin:No rashes or " ulcers  Neck:No JVD  Chest:Clear with no rales, rhonchi, wheezing with normal effort  CV:Regular with no murmurs, gallops or rubs  Abd:Soft, nontender, no distension  Ext:No edema  Mouth, MM's clear, no ulcers     Labs reviewed, Cr before starting retuximab in Aug 2024 was 2.7  BMP  Lab Results   Component Value Date     01/13/2025    K 4.1 01/13/2025     01/13/2025    CO2 25 01/13/2025    BUN 23 (H) 01/13/2025    CREATININE 1.8 (H) 01/13/2025    CALCIUM 8.6 (L) 01/13/2025    ANIONGAP 12 01/13/2025    EGFRNORACEVR 33.9 (A) 01/13/2025     Lab Results   Component Value Date    WBC 8.28 01/13/2025    HGB 13.2 01/13/2025    HCT 40.2 01/13/2025    MCV 94 01/13/2025     (H) 01/13/2025             U/a: no protein, trace blood, 1 RBC  Urine p/cr 0.11 g, from 0.21 g, from 0.25 g, from 0.29 g, from 1.0 g     Anti-GBM antibody 0.5 ( normal), from 3.8  C-ANCA neg  P-ANCA: 1:20, from 1:20, from 1:20, from 1:20, from 1:80  Myeloperoxidase 5.4, from 12, from 16, from 27, from 43  Proteinase 3 antibodies neg  Cryo nege     ESR 1, was >60     Kidney biopsy results reviewed  Moderate interstitial fibrosis  Small sample  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  IF was negative  No linear staining of anti-GBM           IMPRESSION AND RECOMMENDATIONS: 48 y/o white female with h/o of JAIME due to kidney biopsy 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  Noted Cr (though improved) has not returned to normal since therapy was started and that pt has MODERATE fibrosis on kidney biopsy  Likely means pt has residual kidney damage  Suspect pt's renal injury began quite sometime before pt initially presented  These two issues may predict less than optimal response to therapy    Clinically stable and has signs of improvement:  Asymptomatic   BP is well controlled  Proteinuria, lower,  further improved today  Hematuria much improved, no blood on last u/a, and fewer RBC's  P-ANCA's have improved  Myeloperoxidase noted further improved  C-ANCA and proteinase 3 antibody are negative  Anti-GBM antibody is negative  ESR normal  Good response to retuximab and prednisone high dose taper     To review:   Kidney failure and pulmonary symptoms due to P-ANCA vasculitis.  Positive anti-GBM antibody, but kidney biopsy showed no sign of anti-GBM linear staining and IF was negative for Goodpasture disease  S/p acute treatment with the following:  Plasmapheresis x3  Solumedrol pulse dose x 3  Retuximab 1 g IV on 8/25/24 and again 1 g on 9/13/24. Dosing was reviewed with Infusion Center and with rheumatology.  Pt did not received the renal dosing, which is 375 mg/m2 BSA q 1 week x 4 doses.  She received a larger dose, but less frequently, as normally given by rheumatology  Pt has so far received 2000 mg of retuximab (1 g x 2, which would have been the same as 375 mg x 1.42 m2 BSA (=500 mg) x 4)  Will discuss with pharmacy in am as to the remaining renal doses, and whether they are still indicated.      Recommend:  Continue prednisone 5 mg po qd  Continue bactrim x 6 months  Continue lisinopril 10 mg po qd  Vaccinations per ID, were done  It has been 4 months since the last retuximab dose  Pt has risk of relapse, will initiate maintenance phase of the treatment with imuran. Will start 50 mg po qod.  Side effects discussed with pt.  Repeat labs with next visit        Plans and recommendations:  As discussed above  Total time spent 40 minutes including time needed to review the records, the   patient evaluation, documentation, face-to-face discussion with the patient,   more than 50% of the time was spent on coordination of care and counseling.    Level V visit.  RTC 4-5 weeks, OK to rosemary Montero MD

## 2025-01-29 ENCOUNTER — INFUSION (OUTPATIENT)
Dept: INFUSION THERAPY | Facility: HOSPITAL | Age: 50
End: 2025-01-29
Attending: NURSE PRACTITIONER
Payer: COMMERCIAL

## 2025-01-29 VITALS
SYSTOLIC BLOOD PRESSURE: 100 MMHG | TEMPERATURE: 98 F | RESPIRATION RATE: 16 BRPM | HEART RATE: 60 BPM | OXYGEN SATURATION: 98 % | DIASTOLIC BLOOD PRESSURE: 66 MMHG

## 2025-01-29 DIAGNOSIS — D50.0 IRON DEFICIENCY ANEMIA DUE TO CHRONIC BLOOD LOSS: Primary | ICD-10-CM

## 2025-01-29 PROCEDURE — A4216 STERILE WATER/SALINE, 10 ML: HCPCS | Performed by: NURSE PRACTITIONER

## 2025-01-29 PROCEDURE — 63600175 PHARM REV CODE 636 W HCPCS: Performed by: NURSE PRACTITIONER

## 2025-01-29 PROCEDURE — 25000003 PHARM REV CODE 250: Performed by: NURSE PRACTITIONER

## 2025-01-29 PROCEDURE — 96365 THER/PROPH/DIAG IV INF INIT: CPT

## 2025-01-29 RX ORDER — HEPARIN 100 UNIT/ML
500 SYRINGE INTRAVENOUS
OUTPATIENT
Start: 2025-02-05

## 2025-01-29 RX ORDER — SODIUM CHLORIDE 0.9 % (FLUSH) 0.9 %
10 SYRINGE (ML) INJECTION
Status: DISCONTINUED | OUTPATIENT
Start: 2025-01-29 | End: 2025-01-29 | Stop reason: HOSPADM

## 2025-01-29 RX ORDER — HEPARIN 100 UNIT/ML
500 SYRINGE INTRAVENOUS
Status: CANCELLED | OUTPATIENT
Start: 2025-02-05

## 2025-01-29 RX ORDER — DIPHENHYDRAMINE HYDROCHLORIDE 50 MG/ML
50 INJECTION INTRAMUSCULAR; INTRAVENOUS ONCE AS NEEDED
Status: CANCELLED | OUTPATIENT
Start: 2025-02-05

## 2025-01-29 RX ORDER — EPINEPHRINE 0.3 MG/.3ML
0.3 INJECTION SUBCUTANEOUS ONCE AS NEEDED
Status: CANCELLED | OUTPATIENT
Start: 2025-02-05

## 2025-01-29 RX ORDER — EPINEPHRINE 0.3 MG/.3ML
0.3 INJECTION SUBCUTANEOUS ONCE AS NEEDED
OUTPATIENT
Start: 2025-02-05

## 2025-01-29 RX ORDER — SODIUM CHLORIDE 0.9 % (FLUSH) 0.9 %
10 SYRINGE (ML) INJECTION
OUTPATIENT
Start: 2025-02-05

## 2025-01-29 RX ORDER — DIPHENHYDRAMINE HYDROCHLORIDE 50 MG/ML
50 INJECTION INTRAMUSCULAR; INTRAVENOUS ONCE AS NEEDED
OUTPATIENT
Start: 2025-02-05

## 2025-01-29 RX ORDER — SODIUM CHLORIDE 0.9 % (FLUSH) 0.9 %
10 SYRINGE (ML) INJECTION
Status: CANCELLED | OUTPATIENT
Start: 2025-02-05

## 2025-01-29 RX ADMIN — SODIUM CHLORIDE 125 MG: 9 INJECTION, SOLUTION INTRAVENOUS at 01:01

## 2025-01-29 RX ADMIN — Medication 10 ML: at 01:01

## 2025-01-29 NOTE — PLAN OF CARE
Plan of care reviewed with patient. Discussed if there are any new or ongoing concerns. Denies.   Problem: Adult Inpatient Plan of Care  Goal: Plan of Care Review  Outcome: Progressing  Flowsheets (Taken 1/29/2025 1313)  Plan of Care Reviewed With: patient  Goal: Absence of Hospital-Acquired Illness or Injury  Outcome: Progressing  Intervention: Identify and Manage Fall Risk  Flowsheets (Taken 1/29/2025 1313)  Safety Promotion/Fall Prevention: in recliner, wheels locked  Goal: Optimal Comfort and Wellbeing  Outcome: Progressing  Intervention: Provide Person-Centered Care  Flowsheets (Taken 1/29/2025 1313)  Trust Relationship/Rapport:   care explained   questions encouraged   choices provided   reassurance provided   thoughts/feelings acknowledged   empathic listening provided   emotional support provided   questions answered

## 2025-01-29 NOTE — NURSING
Infusion # 1/8 - Ferrlecit 125 mg     Premeds-none     Ferrlecit 125 mg administered IV at a 60 minute rate per orders; see MAR and vitals for more details. Monitored for 60 minutes post infusion for any s/sx of reaction. Tolerated well without adverse events, discharged and ambulatory out of clinic.

## 2025-02-05 ENCOUNTER — INFUSION (OUTPATIENT)
Dept: INFUSION THERAPY | Facility: HOSPITAL | Age: 50
End: 2025-02-05
Attending: NURSE PRACTITIONER
Payer: COMMERCIAL

## 2025-02-05 VITALS
OXYGEN SATURATION: 96 % | DIASTOLIC BLOOD PRESSURE: 70 MMHG | SYSTOLIC BLOOD PRESSURE: 114 MMHG | TEMPERATURE: 97 F | RESPIRATION RATE: 18 BRPM | HEART RATE: 61 BPM

## 2025-02-05 DIAGNOSIS — D50.0 IRON DEFICIENCY ANEMIA DUE TO CHRONIC BLOOD LOSS: Primary | ICD-10-CM

## 2025-02-05 PROCEDURE — 96365 THER/PROPH/DIAG IV INF INIT: CPT

## 2025-02-05 PROCEDURE — 25000003 PHARM REV CODE 250: Performed by: NURSE PRACTITIONER

## 2025-02-05 PROCEDURE — 63600175 PHARM REV CODE 636 W HCPCS: Performed by: NURSE PRACTITIONER

## 2025-02-05 RX ORDER — DIPHENHYDRAMINE HYDROCHLORIDE 50 MG/ML
50 INJECTION INTRAMUSCULAR; INTRAVENOUS ONCE AS NEEDED
Status: CANCELLED | OUTPATIENT
Start: 2025-02-12

## 2025-02-05 RX ORDER — HEPARIN 100 UNIT/ML
500 SYRINGE INTRAVENOUS
Status: CANCELLED | OUTPATIENT
Start: 2025-02-12

## 2025-02-05 RX ORDER — EPINEPHRINE 0.3 MG/.3ML
0.3 INJECTION SUBCUTANEOUS ONCE AS NEEDED
Status: CANCELLED | OUTPATIENT
Start: 2025-02-12

## 2025-02-05 RX ORDER — SODIUM CHLORIDE 0.9 % (FLUSH) 0.9 %
10 SYRINGE (ML) INJECTION
Status: CANCELLED | OUTPATIENT
Start: 2025-02-12

## 2025-02-05 RX ADMIN — SODIUM CHLORIDE 125 MG: 9 INJECTION, SOLUTION INTRAVENOUS at 01:02

## 2025-02-05 NOTE — PLAN OF CARE
Discussed plan of care with pt. Addressed any and ongoing concerns. Pt denies    Problem: Adult Inpatient Plan of Care  Goal: Plan of Care Review  Outcome: Progressing  Flowsheets (Taken 2/5/2025 1307)  Plan of Care Reviewed With: patient  Goal: Absence of Hospital-Acquired Illness or Injury  Outcome: Progressing  Intervention: Identify and Manage Fall Risk  Flowsheets (Taken 2/5/2025 1307)  Safety Promotion/Fall Prevention:   room near unit station   in recliner, wheels locked   nonskid shoes/socks when out of bed  Intervention: Prevent Infection  Flowsheets (Taken 2/5/2025 1307)  Infection Prevention:   hand hygiene promoted   equipment surfaces disinfected  Goal: Optimal Comfort and Wellbeing  Outcome: Progressing  Intervention: Monitor Pain and Promote Comfort  Flowsheets (Taken 2/5/2025 1307)  Pain Management Interventions:   quiet environment facilitated   relaxation techniques promoted   warm blanket provided  Intervention: Provide Person-Centered Care  Flowsheets (Taken 2/5/2025 1307)  Trust Relationship/Rapport:   care explained   reassurance provided   thoughts/feelings acknowledged   choices provided   emotional support provided   empathic listening provided   questions answered   questions encouraged

## 2025-02-10 ENCOUNTER — TELEPHONE (OUTPATIENT)
Dept: INFUSION THERAPY | Facility: HOSPITAL | Age: 50
End: 2025-02-10
Payer: COMMERCIAL

## 2025-02-10 NOTE — TELEPHONE ENCOUNTER
----- Message from Yolanda sent at 2/10/2025 11:57 AM CST -----  Contact: DAX Lucas @[phone]  PT calling to speak with the nurse to see if they have an appointment  02/13 @ 1 pm? Please call to advise.

## 2025-02-13 ENCOUNTER — INFUSION (OUTPATIENT)
Dept: INFUSION THERAPY | Facility: HOSPITAL | Age: 50
End: 2025-02-13
Attending: NURSE PRACTITIONER
Payer: COMMERCIAL

## 2025-02-13 VITALS
HEART RATE: 66 BPM | OXYGEN SATURATION: 98 % | DIASTOLIC BLOOD PRESSURE: 74 MMHG | SYSTOLIC BLOOD PRESSURE: 114 MMHG | RESPIRATION RATE: 18 BRPM | TEMPERATURE: 97 F

## 2025-02-13 DIAGNOSIS — D50.0 IRON DEFICIENCY ANEMIA DUE TO CHRONIC BLOOD LOSS: Primary | ICD-10-CM

## 2025-02-13 PROCEDURE — 25000003 PHARM REV CODE 250: Performed by: NURSE PRACTITIONER

## 2025-02-13 PROCEDURE — 96365 THER/PROPH/DIAG IV INF INIT: CPT

## 2025-02-13 PROCEDURE — 63600175 PHARM REV CODE 636 W HCPCS: Performed by: NURSE PRACTITIONER

## 2025-02-13 RX ORDER — HEPARIN 100 UNIT/ML
500 SYRINGE INTRAVENOUS
OUTPATIENT
Start: 2025-02-19

## 2025-02-13 RX ORDER — SODIUM CHLORIDE 0.9 % (FLUSH) 0.9 %
10 SYRINGE (ML) INJECTION
Status: DISCONTINUED | OUTPATIENT
Start: 2025-02-13 | End: 2025-02-13 | Stop reason: HOSPADM

## 2025-02-13 RX ORDER — DIPHENHYDRAMINE HYDROCHLORIDE 50 MG/ML
50 INJECTION INTRAMUSCULAR; INTRAVENOUS ONCE AS NEEDED
OUTPATIENT
Start: 2025-02-19

## 2025-02-13 RX ORDER — EPINEPHRINE 0.3 MG/.3ML
0.3 INJECTION SUBCUTANEOUS ONCE AS NEEDED
OUTPATIENT
Start: 2025-02-19

## 2025-02-13 RX ORDER — SODIUM CHLORIDE 0.9 % (FLUSH) 0.9 %
10 SYRINGE (ML) INJECTION
OUTPATIENT
Start: 2025-02-19

## 2025-02-13 RX ADMIN — SODIUM CHLORIDE 125 MG: 9 INJECTION, SOLUTION INTRAVENOUS at 01:02

## 2025-02-19 ENCOUNTER — INFUSION (OUTPATIENT)
Dept: INFUSION THERAPY | Facility: HOSPITAL | Age: 50
End: 2025-02-19
Attending: NURSE PRACTITIONER
Payer: COMMERCIAL

## 2025-02-19 VITALS
DIASTOLIC BLOOD PRESSURE: 87 MMHG | HEART RATE: 64 BPM | TEMPERATURE: 98 F | OXYGEN SATURATION: 97 % | WEIGHT: 102.75 LBS | RESPIRATION RATE: 16 BRPM | BODY MASS INDEX: 17.63 KG/M2 | SYSTOLIC BLOOD PRESSURE: 128 MMHG

## 2025-02-19 DIAGNOSIS — D50.0 IRON DEFICIENCY ANEMIA DUE TO CHRONIC BLOOD LOSS: Primary | ICD-10-CM

## 2025-02-19 PROCEDURE — 25000003 PHARM REV CODE 250: Performed by: NURSE PRACTITIONER

## 2025-02-19 PROCEDURE — 96365 THER/PROPH/DIAG IV INF INIT: CPT

## 2025-02-19 PROCEDURE — 63600175 PHARM REV CODE 636 W HCPCS: Performed by: NURSE PRACTITIONER

## 2025-02-19 RX ORDER — SODIUM CHLORIDE 0.9 % (FLUSH) 0.9 %
10 SYRINGE (ML) INJECTION
OUTPATIENT
Start: 2025-02-26

## 2025-02-19 RX ORDER — SODIUM CHLORIDE 0.9 % (FLUSH) 0.9 %
10 SYRINGE (ML) INJECTION
Status: DISCONTINUED | OUTPATIENT
Start: 2025-02-19 | End: 2025-02-19 | Stop reason: HOSPADM

## 2025-02-19 RX ORDER — EPINEPHRINE 0.3 MG/.3ML
0.3 INJECTION SUBCUTANEOUS ONCE AS NEEDED
OUTPATIENT
Start: 2025-02-26

## 2025-02-19 RX ORDER — DIPHENHYDRAMINE HYDROCHLORIDE 50 MG/ML
50 INJECTION INTRAMUSCULAR; INTRAVENOUS ONCE AS NEEDED
OUTPATIENT
Start: 2025-02-26

## 2025-02-19 RX ORDER — HEPARIN 100 UNIT/ML
500 SYRINGE INTRAVENOUS
OUTPATIENT
Start: 2025-02-26

## 2025-02-19 RX ADMIN — SODIUM CHLORIDE 125 MG: 9 INJECTION, SOLUTION INTRAVENOUS at 01:02

## 2025-02-19 NOTE — DISCHARGE INSTRUCTIONS
Thank you for letting me take care of YOU!!    Martha, RN and Jewell, RN          [facility]  [address] [address]  [phone] phone     [phone] fax  Hours of Operation: Monday- Friday 8:00am- 5:00pm  After hours phone  [phone]  Hematology / Oncology Physicians on call:    Dr. Rogelio Song        Nurse Practitioners:    Merle Gilbert, GENET Klein, KRISTINE Castano, GENET Bojorquez, GENET Fregoso, GENET      Please don't hesitate to call if you have any concerns.

## 2025-02-19 NOTE — PLAN OF CARE
Patient tolerated Ferrlecit 4/8 well today; no adverse reaction noted.  No significant complaints voiced.  IV discontinued with catheter intact and pressure dressing applied to the site.  Has f/u appt(s) scheduled per MD request.  No questions or concerns voiced.          Problem: Adult Inpatient Plan of Care  Goal: Plan of Care Review  Outcome: Progressing  Flowsheets (Taken 2/19/2025 1312)  Plan of Care Reviewed With: patient  Goal: Patient-Specific Goal (Individualized)  Outcome: Progressing  Flowsheets (Taken 2/19/2025 1312)  Individualized Care Needs: Pt requests warm blanket with legs elevated during infusion. Pt refused refreshments.  Anxieties, Fears or Concerns: Pt denies  Patient/Family-Specific Goals (Include Timeframe): Pt to tolerate IV iron infusion well today and schedule next 4 iron infusions.  Goal: Optimal Comfort and Wellbeing  Outcome: Progressing  Intervention: Monitor Pain and Promote Comfort  Flowsheets (Taken 2/19/2025 1312)  Pain Management Interventions:   care clustered   position adjusted   quiet environment facilitated  Intervention: Provide Person-Centered Care  Flowsheets (Taken 2/19/2025 1312)  Trust Relationship/Rapport:   care explained   choices provided   emotional support provided   empathic listening provided   questions answered   questions encouraged   reassurance provided   thoughts/feelings acknowledged  Goal: Readiness for Transition of Care  Outcome: Progressing

## 2025-02-25 ENCOUNTER — LAB VISIT (OUTPATIENT)
Dept: LAB | Facility: HOSPITAL | Age: 50
End: 2025-02-25
Attending: INTERNAL MEDICINE
Payer: COMMERCIAL

## 2025-02-25 DIAGNOSIS — I77.82 ANCA-ASSOCIATED VASCULITIS: ICD-10-CM

## 2025-02-25 LAB
ALBUMIN SERPL BCP-MCNC: 4.3 G/DL (ref 3.5–5.2)
ALP SERPL-CCNC: 67 U/L (ref 40–150)
ALT SERPL W/O P-5'-P-CCNC: 20 U/L (ref 10–44)
ANION GAP SERPL CALC-SCNC: 9 MMOL/L (ref 8–16)
AST SERPL-CCNC: 20 U/L (ref 10–40)
BASOPHILS # BLD AUTO: 0.07 K/UL (ref 0–0.2)
BASOPHILS NFR BLD: 0.8 % (ref 0–1.9)
BILIRUB SERPL-MCNC: 0.3 MG/DL (ref 0.1–1)
BUN SERPL-MCNC: 22 MG/DL (ref 6–20)
CALCIUM SERPL-MCNC: 9.1 MG/DL (ref 8.7–10.5)
CHLORIDE SERPL-SCNC: 107 MMOL/L (ref 95–110)
CO2 SERPL-SCNC: 26 MMOL/L (ref 23–29)
CREAT SERPL-MCNC: 1.8 MG/DL (ref 0.5–1.4)
DIFFERENTIAL METHOD BLD: ABNORMAL
EOSINOPHIL # BLD AUTO: 0.4 K/UL (ref 0–0.5)
EOSINOPHIL NFR BLD: 4 % (ref 0–8)
ERYTHROCYTE [DISTWIDTH] IN BLOOD BY AUTOMATED COUNT: 13.2 % (ref 11.5–14.5)
EST. GFR  (NO RACE VARIABLE): 33.9 ML/MIN/1.73 M^2
GLUCOSE SERPL-MCNC: 85 MG/DL (ref 70–110)
HCT VFR BLD AUTO: 40.3 % (ref 37–48.5)
HGB BLD-MCNC: 13 G/DL (ref 12–16)
IMM GRANULOCYTES # BLD AUTO: 0.06 K/UL (ref 0–0.04)
IMM GRANULOCYTES NFR BLD AUTO: 0.6 % (ref 0–0.5)
LYMPHOCYTES # BLD AUTO: 2.7 K/UL (ref 1–4.8)
LYMPHOCYTES NFR BLD: 28.5 % (ref 18–48)
MCH RBC QN AUTO: 30.6 PG (ref 27–31)
MCHC RBC AUTO-ENTMCNC: 32.3 G/DL (ref 32–36)
MCV RBC AUTO: 95 FL (ref 82–98)
MONOCYTES # BLD AUTO: 0.8 K/UL (ref 0.3–1)
MONOCYTES NFR BLD: 8.4 % (ref 4–15)
NEUTROPHILS # BLD AUTO: 5.4 K/UL (ref 1.8–7.7)
NEUTROPHILS NFR BLD: 57.7 % (ref 38–73)
NRBC BLD-RTO: 0 /100 WBC
PLATELET # BLD AUTO: 420 K/UL (ref 150–450)
PMV BLD AUTO: 9.3 FL (ref 9.2–12.9)
POTASSIUM SERPL-SCNC: 4.3 MMOL/L (ref 3.5–5.1)
PROT SERPL-MCNC: 6.7 G/DL (ref 6–8.4)
RBC # BLD AUTO: 4.25 M/UL (ref 4–5.4)
SODIUM SERPL-SCNC: 142 MMOL/L (ref 136–145)
WBC # BLD AUTO: 9.29 K/UL (ref 3.9–12.7)

## 2025-02-25 PROCEDURE — 83516 IMMUNOASSAY NONANTIBODY: CPT | Performed by: INTERNAL MEDICINE

## 2025-02-25 PROCEDURE — 80053 COMPREHEN METABOLIC PANEL: CPT | Mod: PO | Performed by: INTERNAL MEDICINE

## 2025-02-25 PROCEDURE — 85025 COMPLETE CBC W/AUTO DIFF WBC: CPT | Mod: PO | Performed by: INTERNAL MEDICINE

## 2025-02-25 PROCEDURE — 83516 IMMUNOASSAY NONANTIBODY: CPT | Mod: 59 | Performed by: INTERNAL MEDICINE

## 2025-02-25 PROCEDURE — 86036 ANCA SCREEN EACH ANTIBODY: CPT | Mod: 59 | Performed by: INTERNAL MEDICINE

## 2025-02-25 PROCEDURE — 36415 COLL VENOUS BLD VENIPUNCTURE: CPT | Mod: PO | Performed by: INTERNAL MEDICINE

## 2025-02-26 ENCOUNTER — INFUSION (OUTPATIENT)
Dept: INFUSION THERAPY | Facility: HOSPITAL | Age: 50
End: 2025-02-26
Attending: NURSE PRACTITIONER
Payer: COMMERCIAL

## 2025-02-26 VITALS
TEMPERATURE: 97 F | DIASTOLIC BLOOD PRESSURE: 68 MMHG | RESPIRATION RATE: 18 BRPM | HEART RATE: 68 BPM | OXYGEN SATURATION: 96 % | SYSTOLIC BLOOD PRESSURE: 108 MMHG

## 2025-02-26 DIAGNOSIS — D50.0 IRON DEFICIENCY ANEMIA DUE TO CHRONIC BLOOD LOSS: Primary | ICD-10-CM

## 2025-02-26 PROCEDURE — 96365 THER/PROPH/DIAG IV INF INIT: CPT

## 2025-02-26 PROCEDURE — 25000003 PHARM REV CODE 250: Performed by: NURSE PRACTITIONER

## 2025-02-26 PROCEDURE — 63600175 PHARM REV CODE 636 W HCPCS: Performed by: NURSE PRACTITIONER

## 2025-02-26 PROCEDURE — A4216 STERILE WATER/SALINE, 10 ML: HCPCS | Performed by: NURSE PRACTITIONER

## 2025-02-26 RX ORDER — HEPARIN 100 UNIT/ML
500 SYRINGE INTRAVENOUS
OUTPATIENT
Start: 2025-03-05

## 2025-02-26 RX ORDER — DIPHENHYDRAMINE HYDROCHLORIDE 50 MG/ML
50 INJECTION INTRAMUSCULAR; INTRAVENOUS ONCE AS NEEDED
OUTPATIENT
Start: 2025-03-05

## 2025-02-26 RX ORDER — SODIUM CHLORIDE 0.9 % (FLUSH) 0.9 %
10 SYRINGE (ML) INJECTION
OUTPATIENT
Start: 2025-03-05

## 2025-02-26 RX ORDER — EPINEPHRINE 0.3 MG/.3ML
0.3 INJECTION SUBCUTANEOUS ONCE AS NEEDED
OUTPATIENT
Start: 2025-03-05

## 2025-02-26 RX ORDER — SODIUM CHLORIDE 0.9 % (FLUSH) 0.9 %
10 SYRINGE (ML) INJECTION
Status: DISCONTINUED | OUTPATIENT
Start: 2025-02-26 | End: 2025-02-26 | Stop reason: HOSPADM

## 2025-02-26 RX ORDER — SODIUM FERRIC GLUCONATE COMPLEX IN SUCROSE 12.5 MG/ML
125 INJECTION INTRAVENOUS ONCE
Status: COMPLETED | OUTPATIENT
Start: 2025-02-26 | End: 2025-02-26

## 2025-02-26 RX ADMIN — Medication 10 ML: at 01:02

## 2025-02-26 RX ADMIN — SODIUM CHLORIDE 125 MG: 9 INJECTION, SOLUTION INTRAVENOUS at 01:02

## 2025-02-26 NOTE — NURSING
Infusion # 5/8 - Ferrlecit 125 mg  Last dose- 2/19/25     Premeds-none     Ferrlecit 125 mg administered IV at a 60 minute rate per orders; see MAR and vitals for more details. Monitored for 30 mibutes post infusion for any s/sx of reaction. Tolerated well without adverse events, discharged and ambulatory out of clinic.

## 2025-02-26 NOTE — PLAN OF CARE
Plan of care reviewed with patient. Discussed if there are any new or ongoing concerns. Denies.  Problem: Adult Inpatient Plan of Care  Goal: Plan of Care Review  2/26/2025 1314 by Chidi Roca RN  Flowsheets (Taken 2/26/2025 1314)  Plan of Care Reviewed With: patient  2/26/2025 1313 by Chidi Roca RN  Outcome: Progressing  Flowsheets (Taken 2/26/2025 1313)  Plan of Care Reviewed With: patient  Goal: Patient-Specific Goal (Individualized)  Outcome: Progressing  Goal: Optimal Comfort and Wellbeing  Outcome: Progressing  Intervention: Provide Person-Centered Care  2/26/2025 1314 by Chidi Roca RN  Flowsheets (Taken 2/26/2025 1314)  Trust Relationship/Rapport:   care explained   questions encouraged   choices provided   reassurance provided   emotional support provided   thoughts/feelings acknowledged   empathic listening provided   questions answered  2/26/2025 1313 by Chidi Roca RN  Flowsheets (Taken 2/26/2025 1313)  Trust Relationship/Rapport:   care explained   questions encouraged   choices provided   reassurance provided   emotional support provided   thoughts/feelings acknowledged   empathic listening provided   questions answered  Goal: Readiness for Transition of Care  Outcome: Progressing

## 2025-02-27 LAB — PROTEINASE3 IGG SER-ACNC: <0.2 U

## 2025-02-28 LAB
ANCA AB TITR SER IF: ABNORMAL TITER
MYELOPEROXIDASE AB SER-ACNC: 7.1 U/ML
P-ANCA TITR SER IF: ABNORMAL TITER

## 2025-03-04 ENCOUNTER — OFFICE VISIT (OUTPATIENT)
Dept: NEPHROLOGY | Facility: CLINIC | Age: 50
End: 2025-03-04
Payer: COMMERCIAL

## 2025-03-04 VITALS
BODY MASS INDEX: 17.08 KG/M2 | HEART RATE: 78 BPM | RESPIRATION RATE: 18 BRPM | SYSTOLIC BLOOD PRESSURE: 116 MMHG | DIASTOLIC BLOOD PRESSURE: 80 MMHG | HEIGHT: 64 IN | WEIGHT: 100.06 LBS

## 2025-03-04 DIAGNOSIS — I77.6 VASCULITIS: Primary | ICD-10-CM

## 2025-03-04 PROCEDURE — 1159F MED LIST DOCD IN RCRD: CPT | Mod: CPTII,S$GLB,, | Performed by: INTERNAL MEDICINE

## 2025-03-04 PROCEDURE — 99999 PR PBB SHADOW E&M-EST. PATIENT-LVL III: CPT | Mod: PBBFAC,,, | Performed by: INTERNAL MEDICINE

## 2025-03-04 PROCEDURE — 99215 OFFICE O/P EST HI 40 MIN: CPT | Mod: S$GLB,,, | Performed by: INTERNAL MEDICINE

## 2025-03-04 PROCEDURE — 3066F NEPHROPATHY DOC TX: CPT | Mod: CPTII,S$GLB,, | Performed by: INTERNAL MEDICINE

## 2025-03-04 PROCEDURE — 3008F BODY MASS INDEX DOCD: CPT | Mod: CPTII,S$GLB,, | Performed by: INTERNAL MEDICINE

## 2025-03-04 PROCEDURE — 3079F DIAST BP 80-89 MM HG: CPT | Mod: CPTII,S$GLB,, | Performed by: INTERNAL MEDICINE

## 2025-03-04 PROCEDURE — 3074F SYST BP LT 130 MM HG: CPT | Mod: CPTII,S$GLB,, | Performed by: INTERNAL MEDICINE

## 2025-03-04 PROCEDURE — 4010F ACE/ARB THERAPY RXD/TAKEN: CPT | Mod: CPTII,S$GLB,, | Performed by: INTERNAL MEDICINE

## 2025-03-04 NOTE — PROGRESS NOTES
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 Pt was last seen in renal clinic in Jan 2025. Pt has completed induction treatment with retuximab. On her last visit, pt started taking imuran 50 mg po qod for maintenance treatment. On f/u today, pt feels well but reports nausea and occasional vomiting 3-4 hours after she takes imuran. It happens every time and does not happen on days she purcell not take imuran. Pt reports no new c/o's, no rash, no ulcers, no blood in urine or in sputum. No report of any infections or fever.     To review pt's initial presentation, pt presented with "asthma-like" symptoms  x 5 months that would not improve with conventional treatment. Pt was being seen by pulmonary, and was referred to nephrology. Renal failure was noted with s Cr 2.7 mg/dl. Based on initial positive serology for anti-GBP antibody (3.8) and the renal-pulmonary presentation, Goodpasture disease was suspected and pt immediately received 3 treatments with plasmapheresis, pule steroids IV x 3, and retuximab 1 g IV on 8/25/24, followed by a second dose on 9/13/24. Serologies were also remarkable for positive P-ANCA, but negative C-ANCA. 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 Patient tolerated above treatments well. Repeat serologies for anti-GBM antibody was negative. Started imuran for maintenance treatment 50 mg po qod on 1/25/25.         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     PAST SURGICAL HISTORY:  She  has a past surgical history that includes Bilateral tubal ligation (N/A); Esophagogastroduodenoscopy (N/A, 8/20/2024); and Colonoscopy (N/A, 8/20/2024).     SOCIAL HISTORY:  She  reports that she has never smoked. She has never used smokeless tobacco. She reports that she does not currently use alcohol. She reports that she does not use drugs.     FAMILY MEDICAL HISTORY:  Her family history includes Hypertension in her father and mother.     Review of patient's allergies indicates:  No Known Allergies     Meds reviewed     Current Outpatient Medications   Medication Instructions    albuterol (PROVENTIL/VENTOLIN HFA) 90 mcg/actuation inhaler 2 puffs, Inhalation, Every 4 hours PRN    azaTHIOprine (IMURAN) 50 mg, Oral, Every other day    fluticasone-salmeterol diskus inhaler 250-50 mcg 1 puff, Inhalation, 2 times daily, Controller    lisinopriL 10 mg, Oral, Daily    sulfamethoxazole-trimethoprim 800-160mg (BACTRIM DS) 800-160 mg Tab 1 tablet, Oral, Three times weekly    vitamin D (VITAMIN D3) 1,000 Units, Daily                        REVIEW OF SYSTEMS:  Patient has no fever, fatigue, visual changes, chest pain, edema, cough, dyspnea, nausea, vomiting, constipation, diarrhea, arthralgias, pruritis, dizziness, weakness, depression, confusion.     PHYSICAL EXAM:  Blood pressure 116/80, pulse 78, height 5' 4" (1.626 m), weight 45.4 Kg, from 47.7 Kg, from 46.2 Kg, from 44.1 Kg, BSA 1.42  Gen:WDWN female in no apparent distress  Psych:Normal mood and affect  Skin:No rashes or ulcers  Neck:No JVD  Chest:Clear with no rales, rhonchi, wheezing with normal effort  CV:Regular with no murmurs, gallops or rubs  Abd:Soft, nontender, no distension  Ext:No edema  Mouth, MM's clear, no ulcers     Labs reviewed, Cr before starting retuximab in Aug 2024 was 2.7  BMP  Lab Results   Component Value Date     02/25/2025    K 4.3 02/25/2025     02/25/2025    CO2 " 26 02/25/2025    BUN 22 (H) 02/25/2025    CREATININE 1.8 (H) 02/25/2025    CALCIUM 9.1 02/25/2025    ANIONGAP 9 02/25/2025    EGFRNORACEVR 33.9 (A) 02/25/2025     Lab Results   Component Value Date    WBC 9.29 02/25/2025    HGB 13.0 02/25/2025    HCT 40.3 02/25/2025    MCV 95 02/25/2025     02/25/2025             U/a: no protein, no blood  Urine p/cr 0.08 g, from 0.11 g, from 0.21 g, from 0.25 g, from 0.29 g, from 1.0 g     Anti-GBM antibody 0.5 ( normal), from 3.8  C-ANCA neg  P-ANCA: 1:20, from 1:20, from 1:20, from 1:20, from 1:20, from 1:80  Myeloperoxidase 7.1, from 5.4, from 12, from 16, from 27, from 43  Proteinase 3 antibodies neg  Cryo nege     ESR 1, was >60     Kidney biopsy results reviewed  Moderate interstitial fibrosis  Small sample  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  Noted Cr (though improved) has not returned to normal since therapy was started and that pt has MODERATE fibrosis on kidney biopsy  Likely means pt has residual kidney damage  Suspect pt's renal injury began quite sometime before pt initially presented  These two issues may predict less than optimal response to therapy     Clinically stable and has signs of improvement  No sign of relapse at present  The slight increase in myeloperoxidase antibodies should be taken in the overall context and not interpreted by itself alone in predicting a relapse. See below:  Remains asymptomatic and no new rash or ulcers   BP is well controlled  Proteinuria, lower, further improved today  Hematuria, no blood on last u/a  P-ANCA's have improved  Myeloperoxidase as above  C-ANCA and proteinase 3  antibody are negative  Anti-GBM antibody is negative  ESR normal  Good response to retuximab and prednisone high dose taper    Pt remains at risk of relapse  Started imuran for maintenance treatment on 1/25/25  Reports GI side effects (n/v) with 50 mg po qod  Recommend lower dose to 25 mg po qd to qod  Alternative is to use mycophenolate  Discussed with pt     To review:   Kidney failure and pulmonary symptoms due to P-ANCA vasculitis.  Positive anti-GBM antibody, but kidney biopsy showed no sign of anti-GBM linear staining and IF was negative for Goodpasture disease  S/p acute treatment with the following:  Plasmapheresis x3  Solumedrol pulse dose x 3  Retuximab 1 g IV on 8/25/24 and again 1 g on 9/13/24. Dosing was reviewed with [facility] and with rheumatology.  Pt did not received the renal dosing, which is 375 mg/m2 BSA q 1 week x 4 doses.  She received a larger dose, but less frequently, as normally given by rheumatology  Pt has so far received 2000 mg of retuximab (1 g x 2, which would have been the same as 375 mg x 1.42 m2 BSA (=500 mg) x 4)  Will discuss with pharmacy in am as to the remaining renal doses, and whether they are still indicated.      Recommend:  Continue prednisone 5 mg po qd  Continue bactrim x 6 months  Continue lisinopril 10 mg po qd  Vaccinations per ID, were done  Continue imuran and lower dose to 25 mg po qd to qod  Side effects discussed with pt.  Repeat labs with next visit        Plans and recommendations:  As discussed above  Total time spent 40 minutes including time needed to review the records, the   patient evaluation, documentation, face-to-face discussion with the patient,   more than 50% of the time was spent on coordination of care and counseling.    Level V visit.  RTC 4-5 weeks, OK to overbaron Montero MD

## 2025-03-13 ENCOUNTER — PATIENT MESSAGE (OUTPATIENT)
Dept: RHEUMATOLOGY | Facility: CLINIC | Age: 50
End: 2025-03-13
Payer: COMMERCIAL

## 2025-03-17 ENCOUNTER — OFFICE VISIT (OUTPATIENT)
Dept: PULMONOLOGY | Facility: CLINIC | Age: 50
End: 2025-03-17
Attending: INTERNAL MEDICINE
Payer: COMMERCIAL

## 2025-03-17 ENCOUNTER — HOSPITAL ENCOUNTER (OUTPATIENT)
Dept: RADIOLOGY | Facility: HOSPITAL | Age: 50
Discharge: HOME | End: 2025-03-17
Attending: INTERNAL MEDICINE
Payer: COMMERCIAL

## 2025-03-17 VITALS
OXYGEN SATURATION: 95 % | SYSTOLIC BLOOD PRESSURE: 100 MMHG | HEART RATE: 85 BPM | HEIGHT: 64 IN | DIASTOLIC BLOOD PRESSURE: 68 MMHG | BODY MASS INDEX: 17.08 KG/M2 | WEIGHT: 100.06 LBS | RESPIRATION RATE: 17 BRPM

## 2025-03-17 DIAGNOSIS — J45.991 COUGH VARIANT ASTHMA: ICD-10-CM

## 2025-03-17 DIAGNOSIS — J45.30 MILD PERSISTENT ASTHMA WITHOUT COMPLICATION: ICD-10-CM

## 2025-03-17 DIAGNOSIS — I77.82 ANCA-ASSOCIATED VASCULITIS: ICD-10-CM

## 2025-03-17 DIAGNOSIS — J45.31 MILD PERSISTENT ASTHMA WITH ACUTE EXACERBATION: ICD-10-CM

## 2025-03-17 DIAGNOSIS — J84.9 INTERSTITIAL PULMONARY DISEASE, UNSPECIFIED: ICD-10-CM

## 2025-03-17 DIAGNOSIS — J84.9 INTERSTITIAL PULMONARY DISEASE, UNSPECIFIED: Primary | ICD-10-CM

## 2025-03-17 PROCEDURE — 99214 OFFICE O/P EST MOD 30 MIN: CPT | Mod: 25,S$GLB,, | Performed by: INTERNAL MEDICINE

## 2025-03-17 PROCEDURE — 1160F RVW MEDS BY RX/DR IN RCRD: CPT | Mod: CPTII,S$GLB,, | Performed by: INTERNAL MEDICINE

## 2025-03-17 PROCEDURE — 3008F BODY MASS INDEX DOCD: CPT | Mod: CPTII,S$GLB,, | Performed by: INTERNAL MEDICINE

## 2025-03-17 PROCEDURE — 4010F ACE/ARB THERAPY RXD/TAKEN: CPT | Mod: CPTII,S$GLB,, | Performed by: INTERNAL MEDICINE

## 2025-03-17 PROCEDURE — 94010 BREATHING CAPACITY TEST: CPT | Mod: S$GLB,,, | Performed by: INTERNAL MEDICINE

## 2025-03-17 PROCEDURE — 3074F SYST BP LT 130 MM HG: CPT | Mod: CPTII,S$GLB,, | Performed by: INTERNAL MEDICINE

## 2025-03-17 PROCEDURE — 94729 DIFFUSING CAPACITY: CPT | Mod: S$GLB,,, | Performed by: INTERNAL MEDICINE

## 2025-03-17 PROCEDURE — 71250 CT THORAX DX C-: CPT | Mod: 26,,, | Performed by: RADIOLOGY

## 2025-03-17 PROCEDURE — 99999 PR PBB SHADOW E&M-EST. PATIENT-LVL IV: CPT | Mod: PBBFAC,,, | Performed by: INTERNAL MEDICINE

## 2025-03-17 PROCEDURE — 94726 PLETHYSMOGRAPHY LUNG VOLUMES: CPT | Mod: S$GLB,,, | Performed by: INTERNAL MEDICINE

## 2025-03-17 PROCEDURE — 3066F NEPHROPATHY DOC TX: CPT | Mod: CPTII,S$GLB,, | Performed by: INTERNAL MEDICINE

## 2025-03-17 PROCEDURE — 1159F MED LIST DOCD IN RCRD: CPT | Mod: CPTII,S$GLB,, | Performed by: INTERNAL MEDICINE

## 2025-03-17 PROCEDURE — 71250 CT THORAX DX C-: CPT | Mod: TC

## 2025-03-17 PROCEDURE — 3078F DIAST BP <80 MM HG: CPT | Mod: CPTII,S$GLB,, | Performed by: INTERNAL MEDICINE

## 2025-03-17 RX ORDER — FLUTICASONE PROPIONATE AND SALMETEROL 250; 50 UG/1; UG/1
1 POWDER RESPIRATORY (INHALATION) 2 TIMES DAILY
Qty: 60 EACH | Refills: 11 | Status: SHIPPED | OUTPATIENT
Start: 2025-03-17 | End: 2026-03-17

## 2025-03-17 RX ORDER — PREDNISONE 5 MG/1
5 TABLET ORAL DAILY
Qty: 30 TABLET | Refills: 5 | Status: SHIPPED | OUTPATIENT
Start: 2025-03-17

## 2025-03-17 RX ORDER — ALBUTEROL SULFATE 90 UG/1
2 INHALANT RESPIRATORY (INHALATION) EVERY 4 HOURS PRN
Qty: 18 G | Refills: 11 | Status: SHIPPED | OUTPATIENT
Start: 2025-03-17

## 2025-03-17 RX ORDER — PREDNISONE 20 MG/1
TABLET ORAL
Qty: 20 TABLET | Refills: 0 | Status: SHIPPED | OUTPATIENT
Start: 2025-03-17

## 2025-03-17 RX ORDER — DOXYCYCLINE 100 MG/1
100 CAPSULE ORAL EVERY 12 HOURS
Qty: 20 CAPSULE | Refills: 1 | Status: SHIPPED | OUTPATIENT
Start: 2025-03-17

## 2025-03-17 NOTE — PROGRESS NOTES
Subjective:     Patient ID: Shayy Faust is a 49 y.o. female.    Chief Complaint:      HPI 49 y.o. 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    C/o cough and chest congestion with yellow sputum production that has been going on for 2 weeks.  No fever chills no pleuritic chest pain.  Patient has been tapered off the Imuran as well as prednisone at this time.      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       Anemia, chronic renal failure, stage 4 (severe) 08/23/2024    Mild intermittent asthma, uncomplicated      Past Surgical History:   Procedure Laterality Date    BILATERAL TUBAL LIGATION N/A     COLONOSCOPY N/A 8/20/2024    Procedure: COLONOSCOPY;  Surgeon: Gena Saxena MD;  Location: [facility];  Service: Endoscopy;  Laterality: N/A;    ESOPHAGOGASTRODUODENOSCOPY N/A 8/20/2024    Procedure: EGD (ESOPHAGOGASTRODUODENOSCOPY);  Surgeon: Gena Saxena MD;  Location: [facility];  Service: Endoscopy;  Laterality: N/A;    INTRALUMINAL GASTROINTESTINAL TRACT IMAGING VIA CAPSULE N/A 9/19/2024    Procedure: IMAGING PROCEDURE, GI TRACT, INTRALUMINAL, VIA CAPSULE;  Surgeon: Ignacio Jimenez RN;  Location: [facility];  Service: Endoscopy;  Laterality: N/A;  Neg EGD/Colon. Hx MARIA ESTHER/weight loss/ARF     Review of patient's allergies indicates:  No Known Allergies  Medications Ordered Prior to Encounter[1]  Social History[2]  Family History   Problem Relation Name Age of Onset    Hypertension Mother      Hypertension Father         Review of Systems   Constitutional:  Positive for fatigue. Negative for fever.   HENT:  Positive for postnasal drip, rhinorrhea and congestion.    Eyes:  Negative for redness and itching.   Respiratory:   "Positive for cough, sputum production, shortness of breath, dyspnea on extertion, use of rescue inhaler and Paroxysmal Nocturnal Dyspnea.    Cardiovascular:  Negative for chest pain, palpitations and leg swelling.   Genitourinary:  Negative for difficulty urinating and hematuria.   Endocrine:  Negative for cold intolerance and heat intolerance.    Musculoskeletal:  Positive for arthralgias.   Skin:  Negative for rash.   Gastrointestinal:  Negative for nausea and abdominal pain.   Neurological:  Negative for dizziness, syncope, weakness and light-headedness.   Hematological:  Negative for adenopathy. Does not bruise/bleed easily.   Psychiatric/Behavioral:  Negative for sleep disturbance. The patient is not nervous/anxious.        Objective:      /68   Pulse 85   Resp 17   Ht 5' 4" (1.626 m)   Wt 45.4 kg (100 lb 1.4 oz)   SpO2 95%   BMI 17.18 kg/m²   Physical Exam  Vitals and nursing note reviewed.   Constitutional:       Appearance: She is well-developed.   HENT:      Head: Normocephalic and atraumatic.      Nose: Nose normal.      Mouth/Throat:      Pharynx: No oropharyngeal exudate.   Eyes:      Conjunctiva/sclera: Conjunctivae normal.      Pupils: Pupils are equal, round, and reactive to light.   Neck:      Thyroid: No thyromegaly.      Vascular: No JVD.      Trachea: No tracheal deviation.   Cardiovascular:      Rate and Rhythm: Normal rate and regular rhythm.      Heart sounds: Normal heart sounds.   Pulmonary:      Effort: Pulmonary effort is normal. No respiratory distress.      Breath sounds: Examination of the right-lower field reveals wheezing. Examination of the left-lower field reveals wheezing. Decreased breath sounds, wheezing and rales present. No rhonchi.   Chest:      Chest wall: No tenderness.   Abdominal:      General: Bowel sounds are normal.      Palpations: Abdomen is soft.   Musculoskeletal:      Cervical back: Neck supple.      Comments: Decreased range of motion of  hands .   " "  Lymphadenopathy:      Cervical: No cervical adenopathy.   Skin:     General: Skin is warm and dry.   Neurological:      Mental Status: She is alert and oriented to person, place, and time.       Personal Diagnostic Review  CT of chest performed on today without contrast revealed stable findings.        3/17/2025     9:14 AM   Pulmonary Studies Review   SpO2 95 %   Height 5' 4" (1.626 m)   Weight 45.4 kg (100 lb 1.4 oz)   BMI (Calculated) 17.2   Predicted Distance 479.17   Predicted Distance Meters (Calculated) 617.12 meters       CT Chest Without Contrast  Narrative: EXAM:  CT CHEST WITHOUT CONTRAST    CLINICAL HISTORY:          Interstitial lung disease; [I77.82]-Antineutrophilic cytoplasmic antibody (ANCA) vasculitis./[J84.9]-Interstitial pulmonary disease, unspecified.    TECHNIQUE: High-resolution chest CT protocol was performed without contrast. All CT scans at [this location] are performed using dose modulation techniques as appropriate to a performed exam includiNo acute pulmonary process.  No pulmonary mass.ng the following: automated exposure control; adjustment of the mA and/or kV according to patient size (this includes techniques or standardized protocols for targeted exams where dose is matched to indication / reason for exam; i.e. extremities or head); use of iterative reconstruction technique.  Pulmonary nodules reported in average transaxial dimension.    Comparison: 12/05/2024.    FINDINGS:  Lungs/pleura:  Essentially unchanged pulmonary nodules bilaterally to include opacities in the subsegmental bronchi.  Bilateral reticulonodular opacities.  Subpleural sparing.  No honeycombing.  No air trapping.  Heart: No significant cardiomegaly or pericardial effusion. Coronary artery atherosclerotic calcifications are not present.  Lymph nodes: No pathologic adenopathy.  Vascular: Nonaneurysmal aorta.  Osseous: No acute fracture or suspicious appearing osseous lesion.  Miscellaneous: No acute abnormality " "in the visualized abdomen.  Impression: Essentially stable chest.  Findings probably represent chronic infectious or inflammatory disease.  Consider granulomatous disease to include granulomatosis with polyangiitis and microscopic polyangiitis.  Chronic mycobacterial infection is another consideration.  No evidence of UIP.  Consider follow-up in one year.    Finalized on: 3/17/2025 2:09 PM By:  Ryan Yeung MD  [facility]# [id number]      2025-03-17 14:11:[id number]     [facility]      Office Spirometry Results:         3/17/2025     9:14 AM 3/4/2025     8:45 AM 2/26/2025     3:09 PM 2/26/2025     1:12 PM 2/19/2025     2:47 PM 2/19/2025     1:06 PM 2/13/2025     2:55 PM   Pulmonary Function Tests   SpO2 95 %  96 % 96 % 97 % 98 % 98 %   Height 5' 4" (1.626 m) 5' 4" (1.626 m)        Weight 45.4 kg (100 lb 1.4 oz) 45.4 kg (100 lb 1.4 oz)    46.6 kg (102 lb 11.8 oz)    BMI (Calculated) 17.2 17.2    17.6          3/17/2025     9:14 AM   Pulmonary Studies Review   SpO2 95 %   Height 5' 4" (1.626 m)   Weight 45.4 kg (100 lb 1.4 oz)   BMI (Calculated) 17.2   Predicted Distance 479.17   Predicted Distance Meters (Calculated) 617.12 meters           No results found for this or any previous visit (from the past 2 weeks).    Assessment:            Interstitial pulmonary disease, unspecified    Mild persistent asthma with acute exacerbation  -     predniSONE (DELTASONE) 20 MG tablet; Prednisone 60 mg/ day for 3 days, 40 mg/day for 3 days,20 mg/ day for 3 days, (1/2 tablet )10 mg a day for 3 days.  Dispense: 20 tablet; Refill: 0  -     doxycycline (MONODOX) 100 MG capsule; Take 1 capsule (100 mg total) by mouth every 12 (twelve) hours.  Dispense: 20 capsule; Refill: 1  -     albuterol (PROVENTIL/VENTOLIN HFA) 90 mcg/actuation inhaler; Inhale 2 puffs into the lungs every 4 (four) hours as needed for Wheezing or Shortness of Breath.  Dispense: 18 g; Refill: 11    Cough variant asthma    Mild persistent asthma without complication  -     " albuterol (PROVENTIL/VENTOLIN HFA) 90 mcg/actuation inhaler; Inhale 2 puffs into the lungs every 4 (four) hours as needed for Wheezing or Shortness of Breath.  Dispense: 18 g; Refill: 11  -     fluticasone-salmeterol diskus inhaler 250-50 mcg; Inhale 1 puff into the lungs 2 (two) times daily. Controller  Dispense: 60 each; Refill: 11  -     predniSONE (DELTASONE) 5 MG tablet; Take 1 tablet (5 mg total) by mouth once daily. Start after high dose prednisone  Dispense: 30 tablet; Refill: 5  -     Six Minute Walk Test to qualify for Home Oxygen; Future          Encounter Medications[3]  Plan:       Requested Prescriptions     Signed Prescriptions Disp Refills    predniSONE (DELTASONE) 20 MG tablet 20 tablet 0     Sig: Prednisone 60 mg/ day for 3 days, 40 mg/day for 3 days,20 mg/ day for 3 days, (1/2 tablet )10 mg a day for 3 days.    doxycycline (MONODOX) 100 MG capsule 20 capsule 1     Sig: Take 1 capsule (100 mg total) by mouth every 12 (twelve) hours.    albuterol (PROVENTIL/VENTOLIN HFA) 90 mcg/actuation inhaler 18 g 11     Sig: Inhale 2 puffs into the lungs every 4 (four) hours as needed for Wheezing or Shortness of Breath.    fluticasone-salmeterol diskus inhaler 250-50 mcg 60 each 11     Sig: Inhale 1 puff into the lungs 2 (two) times daily. Controller    predniSONE (DELTASONE) 5 MG tablet 30 tablet 5     Sig: Take 1 tablet (5 mg total) by mouth once daily. Start after high dose prednisone     Problem List Items Addressed This Visit       Interstitial pulmonary disease, unspecified - Primary    Mild persistent asthma with acute exacerbation    Relevant Medications    predniSONE (DELTASONE) 20 MG tablet    doxycycline (MONODOX) 100 MG capsule    albuterol (PROVENTIL/VENTOLIN HFA) 90 mcg/actuation inhaler     Other Visit Diagnoses         Cough variant asthma          Mild persistent asthma without complication        Relevant Medications    albuterol (PROVENTIL/VENTOLIN HFA) 90 mcg/actuation inhaler     fluticasone-salmeterol diskus inhaler 250-50 mcg    predniSONE (DELTASONE) 5 MG tablet    Other Relevant Orders    Six Minute Walk Test to qualify for Home Oxygen               Follow up in about 6 months (around 9/17/2025) for Review progress, 6 min walk - on return.    MEDICAL DECISION MAKING: Moderate to high complexity.  Overall, the multiple problems listed are of moderate to high severity that may impact quality of life and activities of daily living. Side effects of medications, treatment plan as well as options and alternatives reviewed and discussed with patient. There was counseling of patient concerning these issues.    Total time spent in counseling and coordination of care - 33  minutes of total time spent on the encounter, which includes face to face time and non-face to face time preparing to see the patient (eg, review of tests), Obtaining and/or reviewing separately obtained history, Documenting clinical information in the electronic or other health record, Independently interpreting results (not separately reported) and communicating results to the patient/family/caregiver, or Care coordination (not separately reported).    Time was used in discussion of prognosis, risks, benefits of treatment, instructions and compliance with regimen . Discussion with other physicians and/or health care providers - home health or for use of durable medical equipment (oxygen, nebulizers, CPAP, BiPAP) occurred.         [1]   Current Outpatient Medications on File Prior to Visit   Medication Sig Dispense Refill    lisinopriL 10 MG tablet Take 1 tablet (10 mg total) by mouth once daily.      sulfamethoxazole-trimethoprim 800-160mg (BACTRIM DS) 800-160 mg Tab Take 1 tablet by mouth 3 (three) times a week. 12 tablet 11    vitamin D (VITAMIN D3) 1000 units Tab Take 1,000 Units by mouth once daily.      [DISCONTINUED] albuterol (PROVENTIL/VENTOLIN HFA) 90 mcg/actuation inhaler Inhale 2 puffs into the lungs every 4 (four)  hours as needed for Wheezing or Shortness of Breath. 18 g 11    [DISCONTINUED] fluticasone-salmeterol diskus inhaler 250-50 mcg Inhale 1 puff into the lungs 2 (two) times daily. Controller 60 each 11    azaTHIOprine (IMURAN) 50 mg Tab Take 1 tablet (50 mg total) by mouth every other day. (Patient not taking: Reported on 3/17/2025) 15 tablet 11    [DISCONTINUED] predniSONE (DELTASONE) 5 MG tablet Take 3 tablets (15 mg total) by mouth once daily for 14 days, THEN 2.5 tablets (12.5 mg total) once daily for 14 days, THEN 2 tablets (10 mg total) once daily for 14 days, THEN 1.5 tablets (7.5 mg total) once daily for 28 days, THEN 1 tablet (5 mg total) once daily. Start after completing week 4 (20 mg daily).. 237 tablet 0     Current Facility-Administered Medications on File Prior to Visit   Medication Dose Route Frequency Provider Last Rate Last Admin    varicella zoster (Shingrix) IM vaccine (>/= 51 yo)  0.5 mL Intramuscular Q8 weeks    0.5 mL at 12/18/24 1409   [2]   Social History  Socioeconomic History    Marital status: Single   Tobacco Use    Smoking status: Never    Smokeless tobacco: Never   Substance and Sexual Activity    Alcohol use: Not Currently    Drug use: Never     Social Drivers of Health     Financial Resource Strain: Low Risk  (12/31/2024)    Overall Financial Resource Strain (CARDIA)     Difficulty of Paying Living Expenses: Not very hard   Food Insecurity: No Food Insecurity (12/31/2024)    Hunger Vital Sign     Worried About Running Out of Food in the Last Year: Never true     Ran Out of Food in the Last Year: Never true   Physical Activity: Insufficiently Active (12/31/2024)    Exercise Vital Sign     Days of Exercise per Week: 3 days     Minutes of Exercise per Session: 20 min   Stress: No Stress Concern Present (12/31/2024)    [employer] - Occupational Stress Questionnaire     Feeling of Stress : Not at all   Housing Stability: Unknown (12/31/2024)    Housing  Stability Vital Sign     Unable to Pay for Housing in the Last Year: No   [3]   Outpatient Encounter Medications as of 3/17/2025   Medication Sig Dispense Refill    lisinopriL 10 MG tablet Take 1 tablet (10 mg total) by mouth once daily.      sulfamethoxazole-trimethoprim 800-160mg (BACTRIM DS) 800-160 mg Tab Take 1 tablet by mouth 3 (three) times a week. 12 tablet 11    vitamin D (VITAMIN D3) 1000 units Tab Take 1,000 Units by mouth once daily.      [DISCONTINUED] albuterol (PROVENTIL/VENTOLIN HFA) 90 mcg/actuation inhaler Inhale 2 puffs into the lungs every 4 (four) hours as needed for Wheezing or Shortness of Breath. 18 g 11    [DISCONTINUED] fluticasone-salmeterol diskus inhaler 250-50 mcg Inhale 1 puff into the lungs 2 (two) times daily. Controller 60 each 11    albuterol (PROVENTIL/VENTOLIN HFA) 90 mcg/actuation inhaler Inhale 2 puffs into the lungs every 4 (four) hours as needed for Wheezing or Shortness of Breath. 18 g 11    azaTHIOprine (IMURAN) 50 mg Tab Take 1 tablet (50 mg total) by mouth every other day. (Patient not taking: Reported on 3/17/2025) 15 tablet 11    doxycycline (MONODOX) 100 MG capsule Take 1 capsule (100 mg total) by mouth every 12 (twelve) hours. 20 capsule 1    fluticasone-salmeterol diskus inhaler 250-50 mcg Inhale 1 puff into the lungs 2 (two) times daily. Controller 60 each 11    predniSONE (DELTASONE) 20 MG tablet Prednisone 60 mg/ day for 3 days, 40 mg/day for 3 days,20 mg/ day for 3 days, (1/2 tablet )10 mg a day for 3 days. 20 tablet 0    predniSONE (DELTASONE) 5 MG tablet Take 1 tablet (5 mg total) by mouth once daily. Start after high dose prednisone 30 tablet 5    [DISCONTINUED] predniSONE (DELTASONE) 5 MG tablet Take 3 tablets (15 mg total) by mouth once daily for 14 days, THEN 2.5 tablets (12.5 mg total) once daily for 14 days, THEN 2 tablets (10 mg total) once daily for 14 days, THEN 1.5 tablets (7.5 mg total) once daily for 28 days, THEN 1 tablet (5 mg total) once daily.  Start after completing week 4 (20 mg daily).. 237 tablet 0     Facility-Administered Encounter Medications as of 3/17/2025   Medication Dose Route Frequency Provider Last Rate Last Admin    varicella zoster (Shingrix) IM vaccine (>/= 49 yo)  0.5 mL Intramuscular Q8 weeks    0.5 mL at 12/18/24 3945

## 2025-03-18 LAB
BRPFT: NORMAL
DLCO ADJ PRE: 11.35 ML/(MIN*MMHG)
DLCO SINGLE BREATH LLN: 18.58
DLCO SINGLE BREATH PRE REF: 46.1 %
DLCO SINGLE BREATH REF: 24.32
DLCOC SBVA LLN: 3.38
DLCOC SBVA PRE REF: 84.9 %
DLCOC SBVA REF: 4.89
DLCOC SINGLE BREATH LLN: 18.58
DLCOC SINGLE BREATH PRE REF: 46.7 %
DLCOC SINGLE BREATH REF: 24.32
DLCOVA LLN: 3.38
DLCOVA PRE REF: 83.9 %
DLCOVA PRE: 4.11 ML/(MIN*MMHG*L)
DLCOVA REF: 4.89
DLVAADJ PRE: 4.16 ML/(MIN*MMHG*L)
ERV LLN: -16449.05
ERV PRE REF: 114.3 %
ERV REF: 0.95
FEF 25 75 LLN: 1.86
FEF 25 75 PRE REF: 9.3 %
FEF 25 75 REF: 3.26
FEV1 FVC LLN: 69
FEV1 FVC PRE REF: 41.7 %
FEV1 FVC REF: 80
FEV1 LLN: 2.15
FEV1 PRE REF: 30.9 %
FEV1 REF: 2.76
FRCPLETH LLN: 1.88
FRCPLETH PREREF: 94.3 %
FRCPLETH REF: 2.7
FVC LLN: 2.7
FVC PRE REF: 73.5 %
FVC REF: 3.46
IVC PRE: 1.67 L
IVC SINGLE BREATH LLN: 2.7
IVC SINGLE BREATH PRE REF: 48.4 %
IVC SINGLE BREATH REF: 3.46
MVV LLN: 88
MVV PRE REF: 28 %
MVV REF: 104
PEF LLN: 5.04
PEF PRE REF: 34.5 %
PEF REF: 6.76
PRE DLCO: 11.21 ML/(MIN*MMHG)
PRE ERV: 1.09 L
PRE FEF 25 75: 0.3 L/S
PRE FET 100: 9.84 SEC
PRE FEV1 FVC: 33.57 %
PRE FEV1: 0.85 L
PRE FRC PL: 2.55 L
PRE FVC: 2.54 L
PRE MVV: 29 L/MIN
PRE PEF: 2.33 L/S
PRE RV: 1.15 L
PRE TLC: 3.69 L
RAW LLN: 3.06
RAW PRE REF: 668.9 %
RAW PRE: 20.46 CMH2O*S/L
RAW REF: 3.06
RV LLN: 1.17
RV PRE REF: 65.4 %
RV REF: 1.75
RVTLC LLN: 26
RVTLC PRE REF: 86.4 %
RVTLC PRE: 31.06 %
RVTLC REF: 36
TLC LLN: 3.98
TLC PRE REF: 74.2 %
TLC REF: 4.97
VA PRE: 2.73 L
VA SINGLE BREATH LLN: 4.82
VA SINGLE BREATH PRE REF: 56.7 %
VA SINGLE BREATH REF: 4.82
VC LLN: 2.7
VC PRE REF: 73.5 %
VC PRE: 2.54 L
VC REF: 3.46
VTGRAWPRE: 2.65 L

## 2025-03-20 ENCOUNTER — TELEPHONE (OUTPATIENT)
Dept: HEMATOLOGY/ONCOLOGY | Facility: CLINIC | Age: 50
End: 2025-03-20
Payer: COMMERCIAL

## 2025-03-20 NOTE — TELEPHONE ENCOUNTER
Attempted to contact pt to let her know that her appt on 03/28 had to be Rs'd due to the provided being out. No answer VM left

## 2025-04-04 ENCOUNTER — OFFICE VISIT (OUTPATIENT)
Dept: INTERNAL MEDICINE | Facility: CLINIC | Age: 50
End: 2025-04-04
Payer: COMMERCIAL

## 2025-04-04 ENCOUNTER — HOSPITAL ENCOUNTER (INPATIENT)
Facility: HOSPITAL | Age: 50
LOS: 4 days | Discharge: HOME | DRG: 871 | End: 2025-04-08
Attending: EMERGENCY MEDICINE | Admitting: HOSPITALIST
Payer: COMMERCIAL

## 2025-04-04 VITALS
SYSTOLIC BLOOD PRESSURE: 100 MMHG | TEMPERATURE: 98 F | BODY MASS INDEX: 16.71 KG/M2 | HEART RATE: 108 BPM | DIASTOLIC BLOOD PRESSURE: 56 MMHG | OXYGEN SATURATION: 88 % | RESPIRATION RATE: 18 BRPM | HEIGHT: 64 IN | WEIGHT: 97.88 LBS

## 2025-04-04 DIAGNOSIS — I77.82 ANCA-ASSOCIATED VASCULITIS: ICD-10-CM

## 2025-04-04 DIAGNOSIS — J11.1 INFLUENZA: ICD-10-CM

## 2025-04-04 DIAGNOSIS — R07.9 CHEST PAIN: ICD-10-CM

## 2025-04-04 DIAGNOSIS — U07.1 COVID-19: ICD-10-CM

## 2025-04-04 DIAGNOSIS — J10.1 INFLUENZA A: Primary | ICD-10-CM

## 2025-04-04 DIAGNOSIS — R50.9 FEVER, UNSPECIFIED FEVER CAUSE: ICD-10-CM

## 2025-04-04 DIAGNOSIS — D84.821 IMMUNODEFICIENCY DUE TO DRUG THERAPY: ICD-10-CM

## 2025-04-04 DIAGNOSIS — U07.1 COVID: ICD-10-CM

## 2025-04-04 DIAGNOSIS — R09.02 HYPOXIA: Primary | ICD-10-CM

## 2025-04-04 DIAGNOSIS — Z79.899 IMMUNODEFICIENCY DUE TO DRUG THERAPY: ICD-10-CM

## 2025-04-04 DIAGNOSIS — J10.1 INFLUENZA A: ICD-10-CM

## 2025-04-04 DIAGNOSIS — Z13.6 SCREENING FOR CARDIOVASCULAR CONDITION: ICD-10-CM

## 2025-04-04 DIAGNOSIS — D84.9 IMMUNOCOMPROMISED: ICD-10-CM

## 2025-04-04 DIAGNOSIS — R09.02 HYPOXIA: ICD-10-CM

## 2025-04-04 LAB
ABSOLUTE EOSINOPHIL (OHS): 0 K/UL
ABSOLUTE MONOCYTE (OHS): 0.6 K/UL (ref 0.3–1)
ABSOLUTE NEUTROPHIL COUNT (OHS): 9.58 K/UL (ref 1.8–7.7)
ALBUMIN SERPL BCP-MCNC: 4 G/DL (ref 3.5–5.2)
ALLENS TEST: ABNORMAL
ALP SERPL-CCNC: 83 UNIT/L (ref 40–150)
ALT SERPL W/O P-5'-P-CCNC: 70 UNIT/L (ref 10–44)
ANION GAP (OHS): 18 MMOL/L (ref 8–16)
AST SERPL-CCNC: 108 UNIT/L (ref 11–45)
BACTERIA #/AREA URNS HPF: ABNORMAL /HPF
BASOPHILS # BLD AUTO: 0.04 K/UL
BASOPHILS NFR BLD AUTO: 0.4 %
BILIRUB SERPL-MCNC: 0.7 MG/DL (ref 0.1–1)
BILIRUB UR QL STRIP.AUTO: NEGATIVE
BUN SERPL-MCNC: 46 MG/DL (ref 6–20)
CALCIUM SERPL-MCNC: 9.5 MG/DL (ref 8.7–10.5)
CHLORIDE SERPL-SCNC: 102 MMOL/L (ref 95–110)
CLARITY UR: CLEAR
CO2 SERPL-SCNC: 18 MMOL/L (ref 23–29)
COLOR UR AUTO: YELLOW
CREAT SERPL-MCNC: 2 MG/DL (ref 0.5–1.4)
CTP QC/QA: YES
DELSYS: ABNORMAL
ERYTHROCYTE [DISTWIDTH] IN BLOOD BY AUTOMATED COUNT: 13.5 % (ref 11.5–14.5)
FIO2: 21
GFR SERPLBLD CREATININE-BSD FMLA CKD-EPI: 30 ML/MIN/1.73/M2
GLUCOSE SERPL-MCNC: 94 MG/DL (ref 70–110)
GLUCOSE UR QL STRIP: NEGATIVE
GRAN CASTS #/AREA URNS LPF: 2 /LPF (ref ?–0)
HCO3 UR-SCNC: 20.4 MMOL/L (ref 24–28)
HCT VFR BLD AUTO: 50 % (ref 37–48.5)
HGB BLD-MCNC: 16.4 GM/DL (ref 12–16)
HGB UR QL STRIP: NEGATIVE
HYALINE CASTS #/AREA URNS LPF: 1 /LPF (ref 0–1)
IMM GRANULOCYTES # BLD AUTO: 0.05 K/UL (ref 0–0.04)
IMM GRANULOCYTES NFR BLD AUTO: 0.5 % (ref 0–0.5)
KETONES UR QL STRIP: ABNORMAL
LACTATE SERPL-SCNC: 1.1 MMOL/L (ref 0.5–2.2)
LACTATE SERPL-SCNC: 1.6 MMOL/L (ref 0.5–2.2)
LEUKOCYTE ESTERASE UR QL STRIP: NEGATIVE
LYMPHOCYTES # BLD AUTO: 0.84 K/UL (ref 1–4.8)
MCH RBC QN AUTO: 31.1 PG (ref 27–31)
MCHC RBC AUTO-ENTMCNC: 32.8 G/DL (ref 32–36)
MCV RBC AUTO: 95 FL (ref 82–98)
MICROSCOPIC COMMENT: ABNORMAL
MODE: ABNORMAL
MOLECULAR STREP A: NEGATIVE
NITRITE UR QL STRIP: NEGATIVE
NUCLEATED RBC (/100WBC) (OHS): 0 /100 WBC
OHS QRS DURATION: 68 MS
OHS QTC CALCULATION: 405 MS
PCO2 BLDA: 36.7 MMHG (ref 35–45)
PH SMN: 7.35 [PH] (ref 7.35–7.45)
PH UR STRIP: 6 [PH]
PLATELET # BLD AUTO: 207 K/UL (ref 150–450)
PMV BLD AUTO: 10.9 FL (ref 9.2–12.9)
PO2 BLDA: 49 MMHG (ref 80–100)
POC BE: -5 MMOL/L
POC MOLECULAR INFLUENZA A AGN: POSITIVE
POC MOLECULAR INFLUENZA B AGN: NEGATIVE
POC SATURATED O2: 82 % (ref 95–100)
POTASSIUM SERPL-SCNC: 4.4 MMOL/L (ref 3.5–5.1)
PROCALCITONIN SERPL-MCNC: 20.55 NG/ML
PROT SERPL-MCNC: 7.4 GM/DL (ref 6–8.4)
PROT UR QL STRIP: ABNORMAL
RBC # BLD AUTO: 5.27 M/UL (ref 4–5.4)
RBC #/AREA URNS HPF: 3 /HPF (ref 0–4)
RELATIVE EOSINOPHIL (OHS): 0 %
RELATIVE LYMPHOCYTE (OHS): 7.6 % (ref 18–48)
RELATIVE MONOCYTE (OHS): 5.4 % (ref 4–15)
RELATIVE NEUTROPHIL (OHS): 86.1 % (ref 38–73)
SAMPLE: ABNORMAL
SARS-COV-2 RDRP RESP QL NAA+PROBE: POSITIVE
SITE: ABNORMAL
SODIUM SERPL-SCNC: 138 MMOL/L (ref 136–145)
SP GR UR STRIP: 1.01
UROBILINOGEN UR STRIP-ACNC: NEGATIVE EU/DL
WBC # BLD AUTO: 11.11 K/UL (ref 3.9–12.7)
WBC #/AREA URNS HPF: 2 /HPF (ref 0–5)

## 2025-04-04 PROCEDURE — 85025 COMPLETE CBC W/AUTO DIFF WBC: CPT | Mod: ER | Performed by: EMERGENCY MEDICINE

## 2025-04-04 PROCEDURE — 96361 HYDRATE IV INFUSION ADD-ON: CPT | Mod: ER

## 2025-04-04 PROCEDURE — 84145 PROCALCITONIN (PCT): CPT | Mod: ER | Performed by: EMERGENCY MEDICINE

## 2025-04-04 PROCEDURE — 63600175 PHARM REV CODE 636 W HCPCS: Mod: ER | Performed by: EMERGENCY MEDICINE

## 2025-04-04 PROCEDURE — 82803 BLOOD GASES ANY COMBINATION: CPT | Mod: ER

## 2025-04-04 PROCEDURE — XW033E5 INTRODUCTION OF REMDESIVIR ANTI-INFECTIVE INTO PERIPHERAL VEIN, PERCUTANEOUS APPROACH, NEW TECHNOLOGY GROUP 5: ICD-10-PCS | Performed by: HOSPITALIST

## 2025-04-04 PROCEDURE — 3066F NEPHROPATHY DOC TX: CPT | Mod: CPTII,S$GLB,, | Performed by: NURSE PRACTITIONER

## 2025-04-04 PROCEDURE — 99900035 HC TECH TIME PER 15 MIN (STAT): Mod: ER

## 2025-04-04 PROCEDURE — 3008F BODY MASS INDEX DOCD: CPT | Mod: CPTII,S$GLB,, | Performed by: NURSE PRACTITIONER

## 2025-04-04 PROCEDURE — 87502 INFLUENZA DNA AMP PROBE: CPT | Mod: QW,S$GLB,, | Performed by: NURSE PRACTITIONER

## 2025-04-04 PROCEDURE — 87635 SARS-COV-2 COVID-19 AMP PRB: CPT | Mod: QW,S$GLB,, | Performed by: NURSE PRACTITIONER

## 2025-04-04 PROCEDURE — 63600175 PHARM REV CODE 636 W HCPCS: Mod: JZ,TB | Performed by: NURSE PRACTITIONER

## 2025-04-04 PROCEDURE — 36600 WITHDRAWAL OF ARTERIAL BLOOD: CPT | Mod: ER

## 2025-04-04 PROCEDURE — 87651 STREP A DNA AMP PROBE: CPT | Mod: QW,S$GLB,, | Performed by: NURSE PRACTITIONER

## 2025-04-04 PROCEDURE — 81003 URINALYSIS AUTO W/O SCOPE: CPT | Mod: ER | Performed by: EMERGENCY MEDICINE

## 2025-04-04 PROCEDURE — 87040 BLOOD CULTURE FOR BACTERIA: CPT | Performed by: EMERGENCY MEDICINE

## 2025-04-04 PROCEDURE — 99285 EMERGENCY DEPT VISIT HI MDM: CPT | Mod: 25,ER

## 2025-04-04 PROCEDURE — 99214 OFFICE O/P EST MOD 30 MIN: CPT | Mod: S$GLB,,, | Performed by: NURSE PRACTITIONER

## 2025-04-04 PROCEDURE — 96375 TX/PRO/DX INJ NEW DRUG ADDON: CPT | Mod: ER

## 2025-04-04 PROCEDURE — 3074F SYST BP LT 130 MM HG: CPT | Mod: CPTII,S$GLB,, | Performed by: NURSE PRACTITIONER

## 2025-04-04 PROCEDURE — 83605 ASSAY OF LACTIC ACID: CPT | Mod: ER | Performed by: EMERGENCY MEDICINE

## 2025-04-04 PROCEDURE — 96374 THER/PROPH/DIAG INJ IV PUSH: CPT | Mod: ER

## 2025-04-04 PROCEDURE — 25000003 PHARM REV CODE 250: Mod: ER | Performed by: EMERGENCY MEDICINE

## 2025-04-04 PROCEDURE — 25000003 PHARM REV CODE 250: Performed by: HOSPITALIST

## 2025-04-04 PROCEDURE — 93005 ELECTROCARDIOGRAM TRACING: CPT | Mod: ER

## 2025-04-04 PROCEDURE — 82040 ASSAY OF SERUM ALBUMIN: CPT | Mod: ER | Performed by: EMERGENCY MEDICINE

## 2025-04-04 PROCEDURE — 25000003 PHARM REV CODE 250: Performed by: NURSE PRACTITIONER

## 2025-04-04 PROCEDURE — 27000207 HC ISOLATION

## 2025-04-04 PROCEDURE — 3078F DIAST BP <80 MM HG: CPT | Mod: CPTII,S$GLB,, | Performed by: NURSE PRACTITIONER

## 2025-04-04 PROCEDURE — 99999 PR PBB SHADOW E&M-EST. PATIENT-LVL V: CPT | Mod: PBBFAC,,, | Performed by: NURSE PRACTITIONER

## 2025-04-04 PROCEDURE — 21400001 HC TELEMETRY ROOM

## 2025-04-04 PROCEDURE — 4010F ACE/ARB THERAPY RXD/TAKEN: CPT | Mod: CPTII,S$GLB,, | Performed by: NURSE PRACTITIONER

## 2025-04-04 PROCEDURE — 93010 ELECTROCARDIOGRAM REPORT: CPT | Mod: ,,, | Performed by: INTERNAL MEDICINE

## 2025-04-04 RX ORDER — TALC
6 POWDER (GRAM) TOPICAL NIGHTLY PRN
Status: DISCONTINUED | OUTPATIENT
Start: 2025-04-04 | End: 2025-04-08 | Stop reason: HOSPADM

## 2025-04-04 RX ORDER — ACETAMINOPHEN 325 MG/1
650 TABLET ORAL EVERY 6 HOURS PRN
Status: CANCELLED | OUTPATIENT
Start: 2025-04-04

## 2025-04-04 RX ORDER — OSELTAMIVIR PHOSPHATE 30 MG/1
30 CAPSULE ORAL DAILY
Qty: 4 CAPSULE | Refills: 0 | Status: SHIPPED | OUTPATIENT
Start: 2025-04-05 | End: 2025-04-08

## 2025-04-04 RX ORDER — SIMETHICONE 80 MG
1 TABLET,CHEWABLE ORAL 4 TIMES DAILY PRN
Status: DISCONTINUED | OUTPATIENT
Start: 2025-04-04 | End: 2025-04-08 | Stop reason: HOSPADM

## 2025-04-04 RX ORDER — PROMETHAZINE HYDROCHLORIDE 25 MG/1
25 TABLET ORAL EVERY 6 HOURS PRN
Status: DISCONTINUED | OUTPATIENT
Start: 2025-04-04 | End: 2025-04-08 | Stop reason: HOSPADM

## 2025-04-04 RX ORDER — NALOXONE HCL 0.4 MG/ML
0.02 VIAL (ML) INJECTION
Status: DISCONTINUED | OUTPATIENT
Start: 2025-04-04 | End: 2025-04-08 | Stop reason: HOSPADM

## 2025-04-04 RX ORDER — GLUCAGON 1 MG
1 KIT INJECTION
Status: DISCONTINUED | OUTPATIENT
Start: 2025-04-04 | End: 2025-04-08 | Stop reason: HOSPADM

## 2025-04-04 RX ORDER — SODIUM CHLORIDE 0.9 % (FLUSH) 0.9 %
3 SYRINGE (ML) INJECTION EVERY 12 HOURS PRN
Status: DISCONTINUED | OUTPATIENT
Start: 2025-04-04 | End: 2025-04-08 | Stop reason: HOSPADM

## 2025-04-04 RX ORDER — OSELTAMIVIR PHOSPHATE 75 MG/1
75 CAPSULE ORAL 2 TIMES DAILY
Status: DISCONTINUED | OUTPATIENT
Start: 2025-04-04 | End: 2025-04-04

## 2025-04-04 RX ORDER — ENOXAPARIN SODIUM 100 MG/ML
30 INJECTION SUBCUTANEOUS EVERY 24 HOURS
Status: DISCONTINUED | OUTPATIENT
Start: 2025-04-04 | End: 2025-04-08 | Stop reason: HOSPADM

## 2025-04-04 RX ORDER — IPRATROPIUM BROMIDE AND ALBUTEROL SULFATE 2.5; .5 MG/3ML; MG/3ML
3 SOLUTION RESPIRATORY (INHALATION) EVERY 4 HOURS PRN
Status: DISCONTINUED | OUTPATIENT
Start: 2025-04-04 | End: 2025-04-08 | Stop reason: HOSPADM

## 2025-04-04 RX ORDER — IBUPROFEN 200 MG
24 TABLET ORAL
Status: DISCONTINUED | OUTPATIENT
Start: 2025-04-04 | End: 2025-04-08 | Stop reason: HOSPADM

## 2025-04-04 RX ORDER — ONDANSETRON HYDROCHLORIDE 2 MG/ML
4 INJECTION, SOLUTION INTRAVENOUS EVERY 8 HOURS PRN
Status: DISCONTINUED | OUTPATIENT
Start: 2025-04-04 | End: 2025-04-08 | Stop reason: HOSPADM

## 2025-04-04 RX ORDER — ACETAMINOPHEN 650 MG/1
650 SUPPOSITORY RECTAL EVERY 6 HOURS PRN
Status: DISCONTINUED | OUTPATIENT
Start: 2025-04-04 | End: 2025-04-08 | Stop reason: HOSPADM

## 2025-04-04 RX ORDER — IPRATROPIUM BROMIDE AND ALBUTEROL SULFATE 2.5; .5 MG/3ML; MG/3ML
3 SOLUTION RESPIRATORY (INHALATION) EVERY 4 HOURS PRN
Status: CANCELLED | OUTPATIENT
Start: 2025-04-04

## 2025-04-04 RX ORDER — ACETAMINOPHEN 325 MG/1
650 TABLET ORAL EVERY 6 HOURS PRN
Status: DISCONTINUED | OUTPATIENT
Start: 2025-04-04 | End: 2025-04-08 | Stop reason: HOSPADM

## 2025-04-04 RX ORDER — POLYETHYLENE GLYCOL 3350 17 G/17G
17 POWDER, FOR SOLUTION ORAL DAILY PRN
Status: DISCONTINUED | OUTPATIENT
Start: 2025-04-04 | End: 2025-04-08 | Stop reason: HOSPADM

## 2025-04-04 RX ORDER — IBUPROFEN 200 MG
16 TABLET ORAL
Status: DISCONTINUED | OUTPATIENT
Start: 2025-04-04 | End: 2025-04-08 | Stop reason: HOSPADM

## 2025-04-04 RX ORDER — ONDANSETRON HYDROCHLORIDE 2 MG/ML
4 INJECTION, SOLUTION INTRAVENOUS
Status: COMPLETED | OUTPATIENT
Start: 2025-04-04 | End: 2025-04-04

## 2025-04-04 RX ORDER — MUPIROCIN 20 MG/G
OINTMENT TOPICAL 2 TIMES DAILY
Status: DISCONTINUED | OUTPATIENT
Start: 2025-04-04 | End: 2025-04-08 | Stop reason: HOSPADM

## 2025-04-04 RX ORDER — OSELTAMIVIR PHOSPHATE 30 MG/1
30 CAPSULE ORAL DAILY
Status: COMPLETED | OUTPATIENT
Start: 2025-04-05 | End: 2025-04-08

## 2025-04-04 RX ORDER — ALUMINUM HYDROXIDE, MAGNESIUM HYDROXIDE, AND SIMETHICONE 1200; 120; 1200 MG/30ML; MG/30ML; MG/30ML
30 SUSPENSION ORAL 4 TIMES DAILY PRN
Status: DISCONTINUED | OUTPATIENT
Start: 2025-04-04 | End: 2025-04-08 | Stop reason: HOSPADM

## 2025-04-04 RX ADMIN — ONDANSETRON 4 MG: 2 INJECTION INTRAMUSCULAR; INTRAVENOUS at 03:04

## 2025-04-04 RX ADMIN — REMDESIVIR 200 MG: 100 INJECTION, POWDER, LYOPHILIZED, FOR SOLUTION INTRAVENOUS at 10:04

## 2025-04-04 RX ADMIN — HYDROCORTISONE SODIUM SUCCINATE 100 MG: 100 INJECTION, POWDER, FOR SOLUTION INTRAMUSCULAR; INTRAVENOUS at 03:04

## 2025-04-04 RX ADMIN — SODIUM CHLORIDE, POTASSIUM CHLORIDE, SODIUM LACTATE AND CALCIUM CHLORIDE 1000 ML: 600; 310; 30; 20 INJECTION, SOLUTION INTRAVENOUS at 04:04

## 2025-04-04 RX ADMIN — OSELTAMIVIR PHOSPHATE 75 MG: 75 CAPSULE ORAL at 03:04

## 2025-04-04 RX ADMIN — ENOXAPARIN SODIUM 30 MG: 30 INJECTION SUBCUTANEOUS at 10:04

## 2025-04-04 RX ADMIN — MUPIROCIN: 20 OINTMENT TOPICAL at 10:04

## 2025-04-04 RX ADMIN — SODIUM CHLORIDE, POTASSIUM CHLORIDE, SODIUM LACTATE AND CALCIUM CHLORIDE 1000 ML: 600; 310; 30; 20 INJECTION, SOLUTION INTRAVENOUS at 02:04

## 2025-04-04 NOTE — ED PROVIDER NOTES
Emergency Medicine Provider Note - 4/4/2025       History     Chief Complaint   Patient presents with    Influenza     Pt postive for flu and covid in clinic. Bp low in clinic. Hx interstitial pulmonary disease, ANCA. Vomiting, diarrhea.        Allergies:  Review of patient's allergies indicates:  No Known Allergies     History of Present Illness   HPI    4/4/2025, 2:19 PM  The history is provided by the patient    Shayy Faust is a 49 y.o. female presenting to the ED for weakness.  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  Patient on chronic prednisone therapy.  Patient started having nausea, vomiting, diarrhea, fever, body aches, chills, nonproductive cough.  Patient denies any chest pain, chest pressure, shortness of breath, dysuria, urgency, frequency.    Vitals at [facility]:  04/04/25 1350 -- -- -- 108 -- -- 100/56 (Abnormal)   -- -- -- -- -- -- -- 88 % (Abnormal)   -- KR   04/04/25 1324 -- 98.4 °F (36.9 °C) Oral 110 -- 18 82/60 (Abnormal)   -- -- -- -- -- -- -- 90 % (Abnormal)   -- BL      Latest Reference Range & Units 04/04/25 13:55   SARS-CoV-2 RNA, Amplification, Qual Negative  Positive !      Latest Reference Range & Units 04/04/25 13:43   POC Molecular Influenza A Ag Negative  Positive !   POC Molecular Influenza B Ag Negative  Negative   !: Data is abnormal    Arrival mode: Private Vehicle     PCP: No, Primary Doctor     Past Medical History:  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       Anemia, chronic renal failure, stage 4 (severe) 08/23/2024    Mild intermittent asthma, uncomplicated        Past Surgical History:  Past Surgical History:   Procedure Laterality Date    BILATERAL TUBAL LIGATION N/A      COLONOSCOPY N/A 8/20/2024    Procedure: COLONOSCOPY;  Surgeon: Gena Saxena MD;  Location: [facility];  Service: Endoscopy;  Laterality: N/A;    ESOPHAGOGASTRODUODENOSCOPY N/A 8/20/2024    Procedure: EGD (ESOPHAGOGASTRODUODENOSCOPY);  Surgeon: Gena Saxena MD;  Location: [facility];  Service: Endoscopy;  Laterality: N/A;    INTRALUMINAL GASTROINTESTINAL TRACT IMAGING VIA CAPSULE N/A 9/19/2024    Procedure: IMAGING PROCEDURE, GI TRACT, INTRALUMINAL, VIA CAPSULE;  Surgeon: Ignacio Jimenez RN;  Location: [facility];  Service: Endoscopy;  Laterality: N/A;  Neg EGD/Colon. Hx MARIA ESTHER/weight loss/ARF         Family History:  Family History   Problem Relation Name Age of Onset    Hypertension Mother      Hypertension Father         Social History:  Social History     Tobacco Use    Smoking status: Never    Smokeless tobacco: Never   Substance and Sexual Activity    Alcohol use: Not Currently    Drug use: Never    Sexual activity: Not on file       The Past Medical History, Social History, Surgical History and Family History was reviewed as documented above.     Review of Systems   Review of Systems   Constitutional:  Positive for chills. Negative for fever.   HENT:  Negative for sore throat.    Respiratory:  Positive for cough. Negative for shortness of breath.    Cardiovascular:  Negative for chest pain.   Gastrointestinal:  Positive for diarrhea, nausea and vomiting.   Genitourinary:  Negative for dysuria.   Musculoskeletal:  Negative for back pain.   Skin:  Negative for rash.   Neurological:  Negative for weakness.   Hematological:  Does not bruise/bleed easily.        Physical Exam     Initial Vitals   BP Pulse Resp Temp SpO2   04/04/25 1409 04/04/25 1409 04/04/25 1407 04/04/25 1409 04/04/25 1407   111/71 100 (!) 34 99.1 °F (37.3 °C) (!) 94 %      MAP       --                 Physical Exam    Nursing Notes and Vital Signs Reviewed.  Constitutional: Patient is in mild distress. Thin.  Ill appearing.   Eyes: PERRL.  EOM intact. Conjunctivae are not pale. No scleral icterus.  ENT: Mucous membranes are moist. Oropharynx is clear and symmetric.    Neck: Supple. Full ROM. No lymphadenopathy.  Cardiovascular: Regular rate. Regular rhythm. No murmurs, rubs, or gallops. Distal pulses are 2+ and symmetric.  Pulmonary/Chest: (+) rales.  No accessory muscle use.  No conversational dyspnea.  Abdominal: Soft and non-distended.  There is no tenderness.  No rebound, guarding, or rigidity. Good bowel sounds.  Musculoskeletal: Moves all extremities. No obvious deformities. No edema. No calf tenderness.  Genitourinary: N/A  Skin: Warm and dry.  Neurological:  Alert, awake, and appropriate.  Normal speech.  No acute focal neurological deficits are appreciated.  Psychiatric: Normal affect. Good eye contact. Appropriate in content.     ED Course   ED Procedures:  Critical Care    Date/Time: 4/4/2025 2:19 PM    Performed by: Keshia Frazier DO  Authorized by: Keshia Frazier DO  Direct patient critical care time: 22 minutes  Additional history critical care time: 5 minutes  Ordering / reviewing critical care time: 5 minutes  Documentation critical care time: 8 minutes  Consulting other physicians critical care time: 3 minutes  Total critical care time (exclusive of procedural time) : 43 minutes  Critical care time was exclusive of teaching time.  Critical care was necessary to treat or prevent imminent or life-threatening deterioration of the following conditions: dehydration and respiratory failure.  Critical care was time spent personally by me on the following activities: blood draw for specimens, development of treatment plan with patient or surrogate, discussions with consultants, interpretation of cardiac output measurements, evaluation of patient's response to treatment, examination of patient, obtaining history from patient or surrogate, ordering and performing treatments and interventions, ordering and review of laboratory studies,  ordering and review of radiographic studies, pulse oximetry, re-evaluation of patient's condition and review of old charts.          ED Vital Signs:  Vitals:    04/04/25 1407 04/04/25 1409 04/04/25 1425 04/04/25 1515   BP:  111/71  (!) 146/98   Pulse:  100 94 93   Resp: (!) 34   19   Temp:  99.1 °F (37.3 °C)     TempSrc:  Oral     SpO2: (!) 94%   (!) 94%   Weight:  44.4 kg (97 lb 14.2 oz)      04/04/25 1553 04/04/25 1700 04/04/25 1701 04/04/25 1702   BP: 117/71 (!) 142/86     Pulse: 99 98 97 95   Resp: 16 (!) 27 (!) 27 (!) 28   Temp: 99.1 °F (37.3 °C)      TempSrc: Oral      SpO2: (!) 94% (!) 87% (!) 82% (!) 85%   Weight:        04/04/25 1704 04/04/25 1718 04/04/25 1740 04/04/25 1747   BP:  126/69  137/78   Pulse: 92 91 94 96   Resp: (!) 25 (!) 24 (!) 28 (!) 26   Temp:       TempSrc:       SpO2: (!) 88% (!) 88% (!) 94% (!) 94%   Weight:           All Lab Results:  Results for orders placed or performed during the hospital encounter of 04/04/25   EKG 12-lead    Collection Time: 04/04/25  2:11 PM   Result Value Ref Range    QRS Duration 68 ms    OHS QTC Calculation 405 ms   Comprehensive metabolic panel    Collection Time: 04/04/25  2:31 PM   Result Value Ref Range    Sodium 138 136 - 145 mmol/L    Potassium 4.4 3.5 - 5.1 mmol/L    Chloride 102 95 - 110 mmol/L    CO2 18 (L) 23 - 29 mmol/L    Glucose 94 70 - 110 mg/dL    BUN 46 (H) 6 - 20 mg/dL    Creatinine 2.0 (H) 0.5 - 1.4 mg/dL    Calcium 9.5 8.7 - 10.5 mg/dL    Protein Total 7.4 6.0 - 8.4 gm/dL    Albumin 4.0 3.5 - 5.2 g/dL    Bilirubin Total 0.7 0.1 - 1.0 mg/dL    ALP 83 40 - 150 unit/L     (H) 11 - 45 unit/L    ALT 70 (H) 10 - 44 unit/L    Anion Gap 18 (H) 8 - 16 mmol/L    eGFR 30 (L) >60 mL/min/1.73/m2   Lactic acid, plasma #1    Collection Time: 04/04/25  2:31 PM   Result Value Ref Range    Lactic Acid Level 1.6 0.5 - 2.2 mmol/L   Procalcitonin    Collection Time: 04/04/25  2:31 PM   Result Value Ref Range    Procalcitonin 20.55 (H) <0.25 ng/mL   CBC  with Differential    Collection Time: 04/04/25  2:31 PM   Result Value Ref Range    WBC 11.11 3.90 - 12.70 K/uL    RBC 5.27 4.00 - 5.40 M/uL    HGB 16.4 (H) 12.0 - 16.0 gm/dL    HCT 50.0 (H) 37.0 - 48.5 %    MCV 95 82 - 98 fL    MCH 31.1 (H) 27.0 - 31.0 pg    MCHC 32.8 32.0 - 36.0 g/dL    RDW 13.5 11.5 - 14.5 %    Platelet Count 207 150 - 450 K/uL    MPV 10.9 9.2 - 12.9 fL    Nucleated RBC 0 <=0 /100 WBC    Neut % 86.1 (H) 38 - 73 %    Lymph % 7.6 (L) 18 - 48 %    Mono % 5.4 4 - 15 %    Eos % 0.0 <=8 %    Basophil % 0.4 <=1.9 %    Imm Grans % 0.5 0.0 - 0.5 %    Neut # 9.58 (H) 1.8 - 7.7 K/uL    Lymph # 0.84 (L) 1 - 4.8 K/uL    Mono # 0.60 0.3 - 1 K/uL    Eos # 0.00 <=0.5 K/uL    Baso # 0.04 <=0.2 K/uL    Imm Grans # 0.05 (H) 0.00 - 0.04 K/uL   ISTAT PROCEDURE    Collection Time: 04/04/25  5:38 PM   Result Value Ref Range    POC PH 7.353 7.35 - 7.45    POC PCO2 36.7 35 - 45 mmHg    POC PO2 49 (LL) 80 - 100 mmHg    POC HCO3 20.4 (L) 24 - 28 mmol/L    POC BE -5 (L) -2 to 2 mmol/L    POC SATURATED O2 82 95 - 100 %    Sample ARTERIAL     Site LR     Allens Test Pass     DelSys Room Air     Mode SPONT     FiO2 21          Reviewed Prior Labs:   Latest Reference Range & Units 01/13/25 13:02 02/25/25 12:47 04/04/25 14:31   BUN 6 - 20 mg/dL 23 (H) 22 (H) 46 (H)   Creatinine 0.5 - 1.4 mg/dL 1.8 (H) 1.8 (H) 2.0 (H)   (H): Data is abnormally high   Latest Reference Range & Units 01/13/25 13:02 02/25/25 12:47 04/04/25 14:31   Hemoglobin 12.0 - 16.0 gm/dL 13.2 13.0 16.4 (H)   Hematocrit 37.0 - 48.5 % 40.2 40.3 50.0 (H)   (H): Data is abnormally high      The EKG was ordered, reviewed, and independently interpreted by the ED provider:    ECG Results              EKG 12-lead (Final result)        Collection Time Result Time QRS Duration OHS QTC Calculation    04/04/25 14:11:18 04/04/25 15:50:04 68 405                     Wet Read by Keshia Frazier DO (04/04/25 15:50:35, [facility] - Emergency Dept, Emergency Medicine)    Rate  of 98 beats per minute.  Sinus rhythm with occasional PVCs.  Bilateral atrial enlargement.  Pulmonary artery disease.  No STEMI.                      Final result by Interface, Lab In [facility] (04/04/25 14:42:52)                   Narrative:    Test Reason : Z13.6,    Vent. Rate :  98 BPM     Atrial Rate :  98 BPM     P-R Int : 120 ms          QRS Dur :  68 ms      QT Int : 318 ms       P-R-T Axes :  82  88  73 degrees    QTcB Int : 405 ms    Sinus rhythm with occasional Premature ventricular complexes  Biatrial enlargement  Pulmonary disease pattern  Abnormal ECG  No previous ECGs available  Confirmed by Isma Nichols ([id number]) on 4/4/2025 2:42:50 PM    Referred By: AAAREFERRAL SELF           Confirmed By: Isma Nichols                                  ECG Results              EKG 12-lead (Final result)        Collection Time Result Time QRS Duration OHS QTC Calculation    04/04/25 14:11:18 04/04/25 15:50:04 68 405                     Wet Read by Keshia Frazier DO (04/04/25 15:50:35, [facility] - Emergency Dept, Emergency Medicine)    Rate of 98 beats per minute.  Sinus rhythm with occasional PVCs.  Bilateral atrial enlargement.  Pulmonary artery disease.  No STEMI.                      Final result by Interface, Lab In [facility] (04/04/25 14:42:52)                   Narrative:    Test Reason : Z13.6,    Vent. Rate :  98 BPM     Atrial Rate :  98 BPM     P-R Int : 120 ms          QRS Dur :  68 ms      QT Int : 318 ms       P-R-T Axes :  82  88  73 degrees    QTcB Int : 405 ms    Sinus rhythm with occasional Premature ventricular complexes  Biatrial enlargement  Pulmonary disease pattern  Abnormal ECG  No previous ECGs available  Confirmed by Isma Nichols ([id number]) on 4/4/2025 2:42:50 PM    Referred By: AAAREFERRAL SELF           Confirmed By: Isma Nichols                                      Imaging Results:  Imaging Results              X-Ray Chest AP Portable (Final result)  Result time 04/04/25 14:51:00      Final result  by Hakan Moss MD (Timothy) (04/04/25 14:51:00)                   Impression:      No definite infiltrates      Electronically signed by: Hakan Moss MD  Date:    04/04/2025  Time:    14:51               Narrative:    EXAMINATION:  XR CHEST AP PORTABLE    CLINICAL HISTORY:  <Diagnosis>, Sepsis;    COMPARISON:  Chest, 05/23/2024.    FINDINGS:  One view.  Heart is normal in size.  Lungs are clear.                                            The Emergency Provider reviewed the vital signs and test results, which are outlined above.     ED Discussion   ED Medication(s):  Medications   lactated ringers bolus 1,000 mL (0 mLs Intravenous Stopped 4/4/25 1727)   ondansetron injection 4 mg (4 mg Intravenous Given 4/4/25 1511)   hydrocortisone sodium succinate injection 100 mg (100 mg Intravenous Given 4/4/25 1515)   lactated ringers bolus 1,000 mL (0 mLs Intravenous Stopped 4/4/25 1728)       ED Course as of 04/04/25 1814   Fri Apr 04, 2025   1454 WBC: 11.11 [LB]   1524 Creatinine(!): 2.0 [LB]   1524 BUN(!): 46 [LB]   1524 Lactic Acid Level: 1.6 [LB]   1550 Procalcitonin(!): 20.55 [LB]   1551 BP: 111/71 [LB]   1621 Secure chat with Eugenie Glaser NP (Hospital medicine):   Recommending another 1 L of fluid. PO challenge.  [LB]   1750 Patient noted to be hypoxic.  ABG obtained.  Sats 82%.  Will reach out to Hospital Medicine to place in hospital. [LB]      ED Course User Index  [LB] Keshia Frazier DO             6:11 PM : Secure chat with  Eugenie Glaser NP. Florentin White MD agrees with current care and management of pt and accepts admission.   Admitting/Observing Service: Hospital Medicine Service   Admitting Physician: Florentin White MD  Floor:Med/Tele      6:12 PM   All historical, clinical, radiographic, and laboratory findings were reviewed with the patient/family in detail.  I discussed the indications and treatment need: (Internal Medicine) .    Patient/Family requests transfer to: [facility]  [place]    Patient/Family understands that [facility] [place] does provide (Internal Medicine) services.     Patient/family verbalized understanding.   All remaining questions and concerns were addressed at that time and the patient/family agrees to proceed accordingly.  Similarly all pertinent details of the encounter were discussed with Eugenie Glaser NP at [facility] . Florentin White MD agrees to accept the patient in transfer based on the needs/patient preferences outlined above.  Patient will be transferred by [facility] , secondary to a need for ongoing Cardiac Monitoring and oxygen  en route.  Risks: of transfer:    inclement weather, loss of vitals signs, permanent neurologic damage, MVC, loss of current lifestyle,  and/or death.  Benefits of transfer: Internal Medicine.  Patient and/or family agree and verbalize understanding.     Keshia Frazier, DO,  FACEP     MIPS Measures     Smoker? No     Hypertension: Patient has a known history of hypertension.     Medical Decision Making                 Medical Decision Making  Differential diagnosis: Pneumonia, sepsis, acute kidney injury, electrolyte abnormality, secondary adrenal insufficiency    ED course: Patient is immunocompromise and on daily prednisone autoimmune disease.  Patient started having nausea, vomiting, diarrhea, fever.  Was seen at clinic next door and tested positive both for COVID and flu.  Patient reportedly was hypotensive with blood pressure 82/60 and pulse ox of 90%.  Sepsis bundle initiated.  Lactic 1.6.  Procalcitonin 20.55.  White cell count 11.11.  Hemoglobin/hematocrit 16.4/50.0.  This may reflect hemo concentration as patient's baseline hemoglobin 13.0-13.2/40.2-40.3.  Additionally, there has been a modest increase in her creatinine.  Currently is 2.0.  Her baseline within last 3 months have been between 1.8 and 1.9.  Her BUN also increased from 46 from 22.   Against suggestive of hemo concentration.  Patient was given ondansetron 4 mg IVP and hydrocortisone 100 mg secondary to concerns for adrenal insufficiency given her chronic prednisone use.  1 L lactated Ringer's.  Tamiflu 75 mg p.o..  Chest x-ray shows no infiltrates.  As patient is immunocompromise with both COVID and influenza demonstrating transient hypotension, on chronic prednisone, recommend observation for patient.  Hospital Medicine message back.  Recommended another L of fluid.  pH 7.353 PCO2 36.7 PO2 49 BE -5 HCO3 20.4 SPO2 82%    Amount and/or Complexity of Data Reviewed  Labs: ordered. Decision-making details documented in ED Course.  Radiology: ordered. Decision-making details documented in ED Course.  ECG/medicine tests: ordered and independent interpretation performed. Decision-making details documented in ED Course.    Risk  Prescription drug management.    Critical Care  Total time providing critical care: 43 minutes        Coding    Referrals:  No orders of the defined types were placed in this encounter.      Prescription Management: I performed a review of the patient's current Rx medication list as input by nursing staff.    Patient's Medications   New Prescriptions    MOLNUPIRAVIR 200 MG CAPSULE (EUA)    Take 4 capsules (800 mg total) by mouth every 12 (twelve) hours. for 5 days    OSELTAMIVIR (TAMIFLU) 30 MG CAPSULE    Take 1 capsule (30 mg total) by mouth once daily. for 4 days   Previous Medications    ALBUTEROL (PROVENTIL/VENTOLIN HFA) 90 MCG/ACTUATION INHALER    Inhale 2 puffs into the lungs every 4 (four) hours as needed for Wheezing or Shortness of Breath.    AZATHIOPRINE (IMURAN) 50 MG TAB    Take 1 tablet (50 mg total) by mouth every other day.    DOXYCYCLINE (MONODOX) 100 MG CAPSULE    Take 1 capsule (100 mg total) by mouth every 12 (twelve) hours.    FLUTICASONE-SALMETEROL DISKUS INHALER 250-50 MCG    Inhale 1 puff into the lungs 2 (two) times daily. Controller    LISINOPRIL 10 MG  "TABLET    Take 1 tablet (10 mg total) by mouth once daily.    PREDNISONE (DELTASONE) 20 MG TABLET    Prednisone 60 mg/ day for 3 days, 40 mg/day for 3 days,20 mg/ day for 3 days, (1/2 tablet )10 mg a day for 3 days.    PREDNISONE (DELTASONE) 5 MG TABLET    Take 1 tablet (5 mg total) by mouth once daily. Start after high dose prednisone    SULFAMETHOXAZOLE-TRIMETHOPRIM 800-160MG (BACTRIM DS) 800-160 MG TAB    Take 1 tablet by mouth 3 (three) times a week.    VITAMIN D (VITAMIN D3) 1000 UNITS TAB    Take 1,000 Units by mouth once daily.   Modified Medications    No medications on file   Discontinued Medications    No medications on file        Discussed case verbally with: N/A      Portions of this note may have been created with voice recognition software. Occasional "wrong-word" or "sound-a-like" substitutions may have occurred due to the inherent limitations of voice recognition software. Please, read the note carefully and recognize, using context, where substitutions have occurred.          Clinical Impression       ICD-10-CM ICD-9-CM   1. Hypoxia  R09.02 799.02   2. Screening for cardiovascular condition  Z13.6 V81.2   3. COVID  U07.1 079.89   4. Influenza  J11.1 487.1   5. Immunocompromised  D84.9 279.9        Disposition        Disposition: Admit to Med/tele  Patient condition: Keshia Shrestha, DO  04/04/25 1815    "

## 2025-04-04 NOTE — DISCHARGE INSTRUCTIONS
Our goal at [facility] is to always give you outstanding care and exceptional service. You may receive a survey from [employer] by mail, text or e-mail in the next 24-48 hours asking about the care you received with us. The survey should only take 5-10 minutes to complete and is very important to us.     Your feedback provides us with a way to recognize our staff who work tirelessly to provide the best care! Also, your responses help us learn how to improve when your experience was below our aspiration of excellence. We are always looking for ways to improve your stay. We WILL use your feedback to continue making improvements to help us provide the highest quality care. We keep your personal information and feedback confidential. We appreciate your time completing this survey and can't wait to hear from you!!!    We look forward to your continued care with us! Thanks so much for choosing [facility] for your healthcare needs!      Home Covid Therapies    Take a fever reducer:  Take a fever reducers.   Acetaminophen is what is usually recommended.  Do not exceed recommended daily dosages.    Stay hydrated. Fever usually causes sweating, which means los  of water from your body.   Drink lots of fluids.  Preferably water or pedialyte.   Sip on drinks throughout the day.   Not only will this keep your throat moist and comfortable, it will also help keep you hydrated.  Drink Warm beverages, like tea or broth.  The will heat up the airways, keep you hydrated and break up any mucus you might have in your throat and upper airway.  Try a teaspoon of honey in warm tea or or warm water.  A little bit of honey can soothe a sore throat.  However, children under 1 years of age should not try honey.  Gargle salt water.  Many people swear that salt water helps their sore throat.  There is no harm in trying and it might help you.  Use 1 tsp of salt in 8 oz of warm water.  Make sure you spit it out and disinfect the sink afterwards.  Breath  in steam.  Use a hot shower, humidifier, vaporizer or other means of making steam.  Eat a frozen treat.  The coldness may numb the pain and soothe your throat if it is sore from coughing.  Get plenty of rest!  Take deep breaths.  Try to take 10 deep breaths per hour.  This help open up your airways and possibly prevent a severe pneumonia.  Try to sleep on your stomach.  There is some evidence that lying on the stomach might help people get move oxygen.   Try to sit upright, straight in a chair during the day. This will help with lung oxygenation.  Try Afrin Nasal spray for sinus pressure or nasal congestion (12 hour acting):    Do not use more than 3 days in a row.  (Your body may develop a dependence)  Try Nasal Saline for nasal congestion.  This will help moisturize the nasal and help with congestion.  You may use this as much as you want.     SARS-CoV-2, which causes COVID-19, is a virus, and therefore antibiotics will not treat a coronavirus infection.    Steroids (Prednisone/Dexamethasone) have not been shown to be helpful in patients with mild covid infections.  Steroid treatments are recommended for hospitalized patients.    Stay isolated in one room, away from your family and other people as much as possible.  This includes eating in your room. Open windows to keep air circulating.  Use a separate bathroom, if possible.    Ending Isolation or quarantine  A minimum of at least 5 days since your symptoms started AND  At least 24 hours have passed with no fever without the use of fever-reducing medicine and other symptoms are improving -- loss of taste and smell might last for weeks or months after recovery but shouldn't delay ending isolation.  A mask is recommended for 5 days post  quarantine.          Take frequent sips of Pedialyte, Powerade, Gatorade.  Popsicles.  [place] diet, bananas, breads, rice.  Avoid red sauces, fruit juices, and dairy products as can irritate your stomach.  If drinking water, be sure  to have something salty such as crackers to help restore electrolytes.  Return to emergency department for: Weakness, passing out, blood in stool, blood in vomit, fever, worsening abdominal pain, or other concerns.

## 2025-04-04 NOTE — PROGRESS NOTES
Subjective:       Patient ID: Shayy Faust is a 49 y.o. female.    Chief Complaint: Fever, Emesis, and Diarrhea (Yesterday morning)    History of Present Illness    HPI:  Ms. Faust reports an onset of symptoms yesterday morning, including vomiting, fever, and labored breathing. She had a fever of 101°F yesterday morning. She has been using her inhalers and taking Tylenol for symptom relief. She reports ongoing shortness of breath. The vomiting and diarrhea have persisted, with the most recent episode occurring this morning. These symptoms have been frequent throughout the night.    Her vital signs currently show a low blood pressure of 82/60, pulse of 110, and an SpO2 of 90%.    She was evaluated by her pulmonologist on March 17th and was prescribed doxycycline. She continues prednisone daily. She was started on Imuran every over day for maintenance treatment of ANCA vasculitis by her nephrologist.    During this visit, she tested positive for both flu and COVID-19.    She denies chest pain and abdominal pain.    TEST RESULTS:  - Flu test: Today, positive  - COVID test: Today, positive         HPI    Problem List[1]    Family History   Problem Relation Name Age of Onset    Hypertension Mother      Hypertension Father       Past Surgical History:   Procedure Laterality Date    BILATERAL TUBAL LIGATION N/A     COLONOSCOPY N/A 8/20/2024    Procedure: COLONOSCOPY;  Surgeon: Gena Saxena MD;  Location: [facility];  Service: Endoscopy;  Laterality: N/A;    ESOPHAGOGASTRODUODENOSCOPY N/A 8/20/2024    Procedure: EGD (ESOPHAGOGASTRODUODENOSCOPY);  Surgeon: Gena Saxena MD;  Location: [facility];  Service: Endoscopy;  Laterality: N/A;    INTRALUMINAL GASTROINTESTINAL TRACT IMAGING VIA CAPSULE N/A 9/19/2024    Procedure: IMAGING PROCEDURE, GI TRACT, INTRALUMINAL, VIA CAPSULE;  Surgeon: Ignacio Jimenez RN;  Location: [facility];  Service: Endoscopy;  Laterality: N/A;  Neg EGD/Colon. Hx MARIA SETHER/weight loss/ARF  "      Current Medications[2]    Review of Systems   Constitutional:  Positive for activity change, appetite change, chills, fatigue and fever.   Respiratory:  Positive for cough and shortness of breath.    Cardiovascular:  Negative for chest pain.   Gastrointestinal:  Positive for diarrhea, nausea and vomiting. Negative for abdominal pain.       Objective:   BP (!) 100/56   Pulse 108   Temp 98.4 °F (36.9 °C) (Oral)   Resp 18   Ht 5' 4" (1.626 m)   Wt 44.4 kg (97 lb 14.2 oz)   SpO2 (!) 88%   BMI 16.80 kg/m²      Physical Exam  Vitals reviewed.   Constitutional:       Appearance: She is ill-appearing.      Comments: Thin   HENT:      Head: Normocephalic.   Eyes:      Conjunctiva/sclera: Conjunctivae normal.   Cardiovascular:      Rate and Rhythm: Regular rhythm. Tachycardia present.      Heart sounds: Normal heart sounds. No murmur heard.  Pulmonary:      Breath sounds: Rales present.      Comments: Labored breathing  Abdominal:      General: There is no distension.      Palpations: Abdomen is soft.      Tenderness: There is no abdominal tenderness.   Skin:     Findings: No rash.   Neurological:      Mental Status: She is alert and oriented to person, place, and time.   Psychiatric:         Behavior: Behavior normal.         Assessment & Plan     Results for orders placed or performed in visit on 04/04/25   POCT Influenza A/B Molecular    Collection Time: 04/04/25  1:43 PM   Result Value Ref Range    POC Molecular Influenza A Ag Positive (A) Negative    POC Molecular Influenza B Ag Negative Negative     Acceptable Yes    POCT Strep A, Molecular    Collection Time: 04/04/25  1:44 PM   Result Value Ref Range    Molecular Strep A, POC Negative Negative     Acceptable Yes    POCT COVID-19 Rapid Screening    Collection Time: 04/04/25  1:55 PM   Result Value Ref Range    POC Rapid COVID Positive (A) Negative     Acceptable Yes        Assessment & Plan    COVID-19:  - " Confirmed positive COVID-19 test result.    INFLUENZA:  - Confirmed positive influenza test result.    HYPOXIA:  - Assessed vital signs: hypotension (82/60), tachycardia (110), and hypoxemia (87-90% SpO2).  - Determined that the patient requires emergency care due to severity of symptoms and vital sign abnormalities.  - Assessed the need for intravenous fluid administration due to the patient's inability to maintain oral intake.  - Referred the patient to the emergency department for immediate evaluation and treatment, including possible intravenous fluid resuscitation.    DIGESTIVE SYSTEM SYMPTOMS:  - Noted significant symptoms including nausea, emesis, and diarrhea.  - Assessed the need for intravenous fluid administration due to the patient's inability to maintain oral intake.  - Referred the patient to the emergency department for further evaluation and treatment, including possible intravenous fluid administration.    FOLLOW-UP AND ADDITIONAL NOTES:  - Referred the patient to the emergency department for further evaluation and treatment.  - Possibility of hospital admission depending on ER findings and patient's response to treatment.           1. Hypoxia    2. Influenza A    3. COVID-19    4. Fever, unspecified fever cause  -     POCT Influenza A/B Molecular  -     POCT COVID-19 Rapid Screening  -     POCT Strep A, Molecular    5. Immunodeficiency due to drug therapy    6. 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 Sending to ER. Report given 1:55 PM to ER. Patient transported via wheelchair by staff.      MICHELE Kline    This note was generated with the assistance of ambient listening technology. Verbal consent was obtained by the patient and accompanying visitor(s) for the recording of patient appointment to facilitate this note. I attest to having  "reviewed and edited the generated note for accuracy, though some syntax or spelling errors may persist. Please contact the author of this note for any clarification.       Portions of this note may have been created with voice recognition software. Occasional "wrong-word" or "sound-a-like" substitutions may have occurred due to the inherent limitations of voice recognition software. Please, read the note carefully and recognize, using context, where substitutions have occurred.             [1]   Patient Active Problem List  Diagnosis    Moderate persistent asthma with acute exacerbation    Rheumatoid factor positive    Mild persistent asthma with acute exacerbation    Iron deficiency anemia    JAIME (acute kidney injury)    PNA (pneumonia)    Thrombocytosis    Unintentional weight loss    Fatigue    Anemia, chronic renal failure, stage 4 (severe)    Moderate protein-calorie malnutrition    ANCA-associated vasculitis    Immunodeficiency due to drug therapy    Primary hypertension    Interstitial pulmonary disease, unspecified    Sepsis    Influenza A    COVID-19    Anemia    CKD (chronic kidney disease) stage 3, GFR 30-59 ml/min   [2] No current facility-administered medications for this visit.    Current Outpatient Medications:     molnupiravir 200 mg capsule (EUA), Take 4 capsules (800 mg total) by mouth every 12 (twelve) hours. for 5 days, Disp: 40 capsule, Rfl: 0    oseltamivir (TAMIFLU) 30 MG capsule, Take 1 capsule (30 mg total) by mouth once daily. for 4 days, Disp: 4 capsule, Rfl: 0    Facility-Administered Medications Ordered in Other Visits:     acetaminophen suppository 650 mg, 650 mg, Rectal, Q6H PRN, Jac White, NP    acetaminophen tablet 650 mg, 650 mg, Oral, Q6H PRN, Jac White, NP    albuterol-ipratropium 2.5 mg-0.5 mg/3 mL nebulizer solution 3 mL, 3 mL, Nebulization, Q4H PRN, Jac White, NP    aluminum-magnesium hydroxide-simethicone 200-200-20 mg/5 mL suspension 30 mL, 30 mL, " Oral, QID PRN, Jac White NP    ceFEPIme injection 1 g, 1 g, Intravenous, Q12H, Florentin White MD, 1 g at 04/05/25 0603    dextrose 50% injection 12.5 g, 12.5 g, Intravenous, PRN, Jac White NP    dextrose 50% injection 25 g, 25 g, Intravenous, PRN, Jac White NP    enoxaparin injection 30 mg, 30 mg, Subcutaneous, Daily, Jac White NP, 30 mg at 04/04/25 2220    glucagon (human recombinant) injection 1 mg, 1 mg, Intramuscular, PRN, Jac White NP    glucose chewable tablet 16 g, 16 g, Oral, PRN, Jac White NP    glucose chewable tablet 24 g, 24 g, Oral, PRN, Jac White NP    lisinopriL tablet 10 mg, 10 mg, Oral, Daily, Florentin White MD, 10 mg at 04/05/25 0821    melatonin tablet 6 mg, 6 mg, Oral, Nightly PRN, Jac White NP    mupirocin 2 % ointment, , Nasal, BID, Florentin White MD, Given at 04/05/25 0821    naloxone 0.4 mg/mL injection 0.02 mg, 0.02 mg, Intravenous, PRN, Jac White NP    ondansetron injection 4 mg, 4 mg, Intravenous, Q8H PRN, Jac White NP    oseltamivir capsule 30 mg, 30 mg, Oral, Daily, Jac White NP, 30 mg at 04/05/25 0821    polyethylene glycol packet 17 g, 17 g, Oral, Daily PRN, Jac White NP    predniSONE tablet 5 mg, 5 mg, Oral, Daily, Florentin White MD, 5 mg at 04/05/25 0821    promethazine tablet 25 mg, 25 mg, Oral, Q6H PRN, Jac White NP    [COMPLETED] remdesivir 200 mg in 0.9% NaCl 250 mL infusion, 200 mg, Intravenous, Q24H, Stopped at 04/04/25 2250 **FOLLOWED BY** remdesivir 100 mg in 0.9% NaCl 250 mL infusion, 100 mg, Intravenous, Daily, Jac White, NP, Stopped at 04/05/25 1034    simethicone chewable tablet 80 mg, 1 tablet, Oral, QID PRN, Jac White, NP    sodium chloride 0.9% flush 3 mL, 3 mL, Intravenous, Q12H PRN, Jac White, NP

## 2025-04-04 NOTE — LETTER
April 8, 2025         [id number] [place] [id number]  Phone: [phone]  Fax: [phone]       Patient: Shayy Faust     Date of Visit: 04/08/2025    To Whom It May Concern:    Asha Faust  was at [facility] on 04/04/2025 to 04/08/2025. The patient may return to work on Friday, 04/11/25 with no restrictions.     Sincerely,    Audrey Maria RN

## 2025-04-05 PROBLEM — I10 PRIMARY HYPERTENSION: Chronic | Status: ACTIVE | Noted: 2024-12-05

## 2025-04-05 PROBLEM — N18.30 CKD (CHRONIC KIDNEY DISEASE) STAGE 3, GFR 30-59 ML/MIN: Chronic | Status: ACTIVE | Noted: 2025-04-05

## 2025-04-05 PROBLEM — A41.9 SEPSIS: Status: ACTIVE | Noted: 2025-04-05

## 2025-04-05 PROBLEM — I77.82 ANCA-ASSOCIATED VASCULITIS: Chronic | Status: ACTIVE | Noted: 2024-08-29

## 2025-04-05 PROBLEM — N18.30 CKD (CHRONIC KIDNEY DISEASE) STAGE 3, GFR 30-59 ML/MIN: Status: ACTIVE | Noted: 2025-04-05

## 2025-04-05 PROBLEM — J84.9 INTERSTITIAL PULMONARY DISEASE, UNSPECIFIED: Chronic | Status: ACTIVE | Noted: 2025-03-17

## 2025-04-05 PROBLEM — U07.1 COVID-19: Status: ACTIVE | Noted: 2025-04-05

## 2025-04-05 PROBLEM — D64.9 ANEMIA: Status: ACTIVE | Noted: 2025-04-05

## 2025-04-05 PROBLEM — D64.9 ANEMIA: Chronic | Status: ACTIVE | Noted: 2025-04-05

## 2025-04-05 PROBLEM — J10.1 INFLUENZA A: Status: ACTIVE | Noted: 2025-04-05

## 2025-04-05 LAB
ABSOLUTE EOSINOPHIL (OHS): 0 K/UL
ABSOLUTE MONOCYTE (OHS): 0.35 K/UL (ref 0.3–1)
ABSOLUTE NEUTROPHIL COUNT (OHS): 5.54 K/UL (ref 1.8–7.7)
ALBUMIN SERPL BCP-MCNC: 2.7 G/DL (ref 3.5–5.2)
ALP SERPL-CCNC: 55 UNIT/L (ref 40–150)
ALT SERPL W/O P-5'-P-CCNC: 48 UNIT/L (ref 10–44)
ANION GAP (OHS): 10 MMOL/L (ref 8–16)
AST SERPL-CCNC: 61 UNIT/L (ref 11–45)
BASOPHILS # BLD AUTO: 0.01 K/UL
BASOPHILS NFR BLD AUTO: 0.2 %
BILIRUB SERPL-MCNC: 0.2 MG/DL (ref 0.1–1)
BUN SERPL-MCNC: 43 MG/DL (ref 6–20)
CALCIUM SERPL-MCNC: 8.1 MG/DL (ref 8.7–10.5)
CHLORIDE SERPL-SCNC: 108 MMOL/L (ref 95–110)
CO2 SERPL-SCNC: 20 MMOL/L (ref 23–29)
CREAT SERPL-MCNC: 1.7 MG/DL (ref 0.5–1.4)
ERYTHROCYTE [DISTWIDTH] IN BLOOD BY AUTOMATED COUNT: 13.3 % (ref 11.5–14.5)
GFR SERPLBLD CREATININE-BSD FMLA CKD-EPI: 36 ML/MIN/1.73/M2
GLUCOSE SERPL-MCNC: 109 MG/DL (ref 70–110)
HCT VFR BLD AUTO: 38.2 % (ref 37–48.5)
HGB BLD-MCNC: 12.4 GM/DL (ref 12–16)
IMM GRANULOCYTES # BLD AUTO: 0.03 K/UL (ref 0–0.04)
IMM GRANULOCYTES NFR BLD AUTO: 0.5 % (ref 0–0.5)
LYMPHOCYTES # BLD AUTO: 0.65 K/UL (ref 1–4.8)
MCH RBC QN AUTO: 31.6 PG (ref 27–31)
MCHC RBC AUTO-ENTMCNC: 32.5 G/DL (ref 32–36)
MCV RBC AUTO: 97 FL (ref 82–98)
NUCLEATED RBC (/100WBC) (OHS): 0 /100 WBC
PLATELET # BLD AUTO: 180 K/UL (ref 150–450)
PMV BLD AUTO: 10.7 FL (ref 9.2–12.9)
POTASSIUM SERPL-SCNC: 4.1 MMOL/L (ref 3.5–5.1)
PROT SERPL-MCNC: 4.9 GM/DL (ref 6–8.4)
RBC # BLD AUTO: 3.93 M/UL (ref 4–5.4)
RELATIVE EOSINOPHIL (OHS): 0 %
RELATIVE LYMPHOCYTE (OHS): 9.9 % (ref 18–48)
RELATIVE MONOCYTE (OHS): 5.3 % (ref 4–15)
RELATIVE NEUTROPHIL (OHS): 84.1 % (ref 38–73)
SODIUM SERPL-SCNC: 138 MMOL/L (ref 136–145)
WBC # BLD AUTO: 6.58 K/UL (ref 3.9–12.7)

## 2025-04-05 PROCEDURE — 27000207 HC ISOLATION

## 2025-04-05 PROCEDURE — 94799 UNLISTED PULMONARY SVC/PX: CPT

## 2025-04-05 PROCEDURE — 80053 COMPREHEN METABOLIC PANEL: CPT | Performed by: NURSE PRACTITIONER

## 2025-04-05 PROCEDURE — 99900035 HC TECH TIME PER 15 MIN (STAT)

## 2025-04-05 PROCEDURE — 63600175 PHARM REV CODE 636 W HCPCS: Performed by: NURSE PRACTITIONER

## 2025-04-05 PROCEDURE — 63600175 PHARM REV CODE 636 W HCPCS: Performed by: HOSPITALIST

## 2025-04-05 PROCEDURE — 27000221 HC OXYGEN, UP TO 24 HOURS

## 2025-04-05 PROCEDURE — 25000003 PHARM REV CODE 250: Performed by: HOSPITALIST

## 2025-04-05 PROCEDURE — 25000003 PHARM REV CODE 250: Performed by: NURSE PRACTITIONER

## 2025-04-05 PROCEDURE — 21400001 HC TELEMETRY ROOM

## 2025-04-05 PROCEDURE — 94761 N-INVAS EAR/PLS OXIMETRY MLT: CPT

## 2025-04-05 PROCEDURE — 36415 COLL VENOUS BLD VENIPUNCTURE: CPT | Performed by: NURSE PRACTITIONER

## 2025-04-05 PROCEDURE — 85025 COMPLETE CBC W/AUTO DIFF WBC: CPT | Performed by: NURSE PRACTITIONER

## 2025-04-05 RX ORDER — CEFEPIME HYDROCHLORIDE 1 G/1
1 INJECTION, POWDER, FOR SOLUTION INTRAMUSCULAR; INTRAVENOUS
Status: DISCONTINUED | OUTPATIENT
Start: 2025-04-05 | End: 2025-04-07

## 2025-04-05 RX ORDER — LISINOPRIL 10 MG/1
10 TABLET ORAL DAILY
Status: DISCONTINUED | OUTPATIENT
Start: 2025-04-05 | End: 2025-04-07

## 2025-04-05 RX ORDER — PREDNISONE 5 MG/1
5 TABLET ORAL DAILY
Status: DISCONTINUED | OUTPATIENT
Start: 2025-04-05 | End: 2025-04-07

## 2025-04-05 RX ADMIN — MUPIROCIN: 20 OINTMENT TOPICAL at 08:04

## 2025-04-05 RX ADMIN — CEFEPIME 1 G: 1 INJECTION, POWDER, FOR SOLUTION INTRAMUSCULAR; INTRAVENOUS at 05:04

## 2025-04-05 RX ADMIN — PREDNISONE 5 MG: 5 TABLET ORAL at 08:04

## 2025-04-05 RX ADMIN — LISINOPRIL 10 MG: 10 TABLET ORAL at 08:04

## 2025-04-05 RX ADMIN — REMDESIVIR 100 MG: 100 INJECTION, POWDER, LYOPHILIZED, FOR SOLUTION INTRAVENOUS at 10:04

## 2025-04-05 RX ADMIN — OSELTAMIVIR PHOSPHATE 30 MG: 30 CAPSULE ORAL at 08:04

## 2025-04-05 RX ADMIN — ENOXAPARIN SODIUM 30 MG: 30 INJECTION SUBCUTANEOUS at 05:04

## 2025-04-05 RX ADMIN — ONDANSETRON 4 MG: 2 INJECTION INTRAMUSCULAR; INTRAVENOUS at 08:04

## 2025-04-05 RX ADMIN — CEFEPIME 1 G: 1 INJECTION, POWDER, FOR SOLUTION INTRAMUSCULAR; INTRAVENOUS at 06:04

## 2025-04-05 RX ADMIN — ACETAMINOPHEN 650 MG: 325 TABLET ORAL at 03:04

## 2025-04-05 NOTE — PROGRESS NOTES
[facility] Medicine  Progress Note    Patient Name: Shayy Faust  MRN: [id number]  Patient Class: IP- Inpatient   Admission Date: 4/4/2025  Length of Stay: 1 days  Attending Physician: Ovidio Sosa MD  Primary Care Provider: Karine, Primary Doctor        Subjective     Principal Problem:Influenza A        HPI:  Shayy Faust is a 49 y.o. female with a PMH  has a past medical history of ANCA-associated vasculitis (08/29/2024), Anemia, chronic renal failure, stage 4 (severe) (08/23/2024), and Mild intermittent asthma, uncomplicated. who presented as a transfer from [facility] for higher level of care and continued medical management after being diagnosed with symptomatic influenza A and COVID in an immunocompromised patient.  Patient presented to outside facility initially from clinic after testing positive for flu a and COVID and was found to have low BP.  Patient has significant history including ANCA associated vasculitis and on chronic steroids.  Patient reported endorsing worsening generalized weakness/fatigue, nonproductive cough, fevers, chills, sweats, nausea, vomiting, diarrhea, generalized myalgias with no known alleviating or aggravating factors noted.  Patient currently follows Dr. Jiménez from pulmonology and was treated for acute asthma exacerbation with stress dose steroids as well as doxycycline back on 03/17.  She reported compliance with home medications and reported she initially felt better but began to feel worse shortly afterwards.  All other review of systems negative except as noted above.  Initial workup at outside facility revealed patient to be afebrile without leukocytosis, saturating 98% on 5 L via nasal cannula, creatinine/GFR 2.0/30, AST//70, lactic acid within normal limits, procalcitonin 20.55, influenza a/COVID positive, chest x-ray negative for acute findings.  Patient initiated on flu/COVID treatment as well as antibiotics and admitted to  Hospital Medicine inpatient for continued medical management.      PCP: Karine, Primary Doctor      Overview/Hospital Course:  50 y/o female admitted for acute respiratory failure, sepsis secondary to influenza A and COVID. Remdesivir, Tamiflu, and cefepime initiated. Patient is on 3L NC, O2 sat 94-96%. Renal function improving with IV hydration.     Interval History: Patient reports feeling somewhat better. Continue current medical management plan.     Review of Systems   Constitutional:  Positive for chills and fatigue.   HENT: Negative.     Eyes: Negative.    Respiratory:  Positive for cough and shortness of breath.    Cardiovascular: Negative.    Gastrointestinal:  Positive for diarrhea, nausea and vomiting.   Endocrine: Negative.    Genitourinary: Negative.    Musculoskeletal: Negative.    Skin: Negative.    Allergic/Immunologic: Negative.    Neurological:  Positive for weakness.   Hematological: Negative.    Psychiatric/Behavioral: Negative.       Objective:     Vital Signs (Most Recent):  Temp: 98.6 °F (37 °C) (04/05/25 0803)  Pulse: 72 (04/05/25 0820)  Resp: 18 (04/05/25 0803)  BP: 126/86 (04/05/25 0803)  SpO2: 95 % (04/05/25 0803) Vital Signs (24h Range):  Temp:  [98.1 °F (36.7 °C)-99.3 °F (37.4 °C)] 98.6 °F (37 °C)  Pulse:  [] 72  Resp:  [16-34] 18  SpO2:  [82 %-98 %] 95 %  BP: ()/(56-98) 126/86     Weight: 45.5 kg (100 lb 5 oz)  Body mass index is 17.22 kg/m².    Intake/Output Summary (Last 24 hours) at 4/5/2025 1135  Last data filed at 4/5/2025 1004  Gross per 24 hour   Intake 200 ml   Output 2 ml   Net 198 ml         Physical Exam  Vitals reviewed.   Constitutional:       General: She is not in acute distress.     Appearance: She is ill-appearing. She is not toxic-appearing or diaphoretic.      Interventions: Nasal cannula in place.      Comments: Thin and ill-appearing    HENT:      Head: Normocephalic and atraumatic.      Right Ear: External ear normal.      Left Ear: External ear normal.       Nose: Nose normal. No congestion or rhinorrhea.      Mouth/Throat:      Mouth: Mucous membranes are moist.      Pharynx: Oropharynx is clear. No oropharyngeal exudate or posterior oropharyngeal erythema.   Eyes:      General: No scleral icterus.     Extraocular Movements: Extraocular movements intact.      Conjunctiva/sclera: Conjunctivae normal.      Pupils: Pupils are equal, round, and reactive to light.   Neck:      Vascular: No carotid bruit.   Cardiovascular:      Rate and Rhythm: Normal rate and regular rhythm.      Pulses: Normal pulses.      Heart sounds: Normal heart sounds. No murmur heard.     No friction rub. No gallop.   Pulmonary:      Effort: Pulmonary effort is normal. No respiratory distress.      Breath sounds: No stridor. Rhonchi present. No wheezing or rales.   Chest:      Chest wall: No tenderness.   Abdominal:      General: Abdomen is flat. Bowel sounds are normal. There is no distension.      Palpations: Abdomen is soft. There is no mass.      Tenderness: There is no abdominal tenderness. There is no right CVA tenderness, left CVA tenderness, guarding or rebound.      Hernia: No hernia is present.   Musculoskeletal:         General: No swelling, tenderness, deformity or signs of injury. Normal range of motion.      Cervical back: Normal range of motion and neck supple. No rigidity or tenderness.      Right lower leg: No edema.      Left lower leg: No edema.   Lymphadenopathy:      Cervical: No cervical adenopathy.   Skin:     General: Skin is warm and dry.      Capillary Refill: Capillary refill takes less than 2 seconds.      Coloration: Skin is not jaundiced or pale.      Findings: No bruising, erythema, lesion or rash.   Neurological:      General: No focal deficit present.      Mental Status: She is alert and oriented to person, place, and time. Mental status is at baseline.      Cranial Nerves: No cranial nerve deficit.      Sensory: No sensory deficit.      Motor: No weakness.       "Coordination: Coordination normal.   Psychiatric:         Mood and Affect: Mood normal.         Behavior: Behavior normal.         Thought Content: Thought content normal.         Judgment: Judgment normal.               Significant Labs: All pertinent labs within the past 24 hours have been reviewed.  Blood Culture: No results for input(s): "LABBLOO" in the last 48 hours.  BMP:   Recent Labs   Lab 04/05/25  0430      K 4.1      CO2 20*   BUN 43*   CREATININE 1.7*   CALCIUM 8.1*     CBC:   Recent Labs   Lab 04/04/25  1431 04/05/25  0429   WBC 11.11 6.58   HGB 16.4* 12.4   HCT 50.0* 38.2    180     CMP:   Recent Labs   Lab 04/04/25  1431 04/05/25  0430    138   K 4.4 4.1    108   CO2 18* 20*   BUN 46* 43*   CREATININE 2.0* 1.7*   CALCIUM 9.5 8.1*   ALBUMIN 4.0 2.7*   BILITOT 0.7 0.2   ALKPHOS 83 55   * 61*   ALT 70* 48*   ANIONGAP 18* 10     Respiratory Culture: No results for input(s): "GSRESP", "RESPIRATORYC" in the last 48 hours.    Significant Imaging: I have reviewed all pertinent imaging results/findings within the past 24 hours.      Assessment & Plan  Influenza A  Continue Tamiflu   See plan below     COVID-19  Patient is identified as High risk for severe complications of COVID 19 based on COVID risk score of 4   Initiate standard COVID protocols; COVID-19 testing ,Infection Control notification  and isolation- respiratory, contact and droplet per protocol    Diagnostics: CBC, CMP, Procalcitonin, Ferritin, CRP, LDH, BNP, Troponin, ECG, Rapid Flu, Blood Culture x2, Portable CXR, and UA and culture    Management: Initiate targeted therapy with Remdesivir, 200mg IV x1, followed by 100mg IV daily x5 days total and Dexamethasone PO/IV 6mg daily x10 days, Maintain oxygen saturations 92-96% via Nasal Cannula 3 LPM and monitor with continuous/intermittent pulse oximetry. , Inhaled bronchodilators as needed for shortness of breath., and Continuous cardiac monitoring.    Advance " Care Planning  Current advance care plan has been discussed with patient/family/POA and patient currently wishes Full Code.     Sepsis  This patient does have evidence of infective focus  My overall impression is sepsis.  Source: Respiratory  Antibiotics given-   Antibiotics (72h ago, onward)      Start     Stop Route Frequency Ordered    04/05/25 0630  ceFEPIme injection 1 g         -- IV Every 12 hours (non-standard times) 04/05/25 0524    04/04/25 2230  mupirocin 2 % ointment         04/09/25 2059 Nasl 2 times daily 04/04/25 2120          Latest lactate reviewed-  Recent Labs   Lab 04/04/25  1818   LACTATE 1.1     Organ dysfunction indicated by Acute kidney injury and Acute respiratory failure    Fluid challenge Ideal Body Weight- The patient is obese (BMI>30) and their ideal body weight of Ideal body weight: 54.7 kg (120 lb 9.5 oz) will be used to calculate fluid bolus of 30 ml/kg.     Post- resuscitation assessment No - Post resuscitation assessment not needed     Will Not start Pressors- Levophed for MAP of 65    Source control achieved by:     Interstitial pulmonary disease, unspecified  Patient presently in acute exacerbation and is with signs/symptoms of distress. Patient currently saturating 98% on 5L/min. Flu A/COVID positive.  Plan:  -Continue home medications, titrate as needed  -Titrate oxygen requirements as needed  -Incentive spirometry   -Monitor pulse oximetry  -Adrián prn  -Continue treatment of Flu/COVID as noted above     4/5/2025  -O2 sat 94-96% on 3L nc.  -Continue current management.   ANCA-associated vasculitis  Current followed outpatient and chronically on steroids.  Plan:  -continue home medications  -f/u outpatient as directed     Primary hypertension  Chronic, controlled.  Latest blood pressure and vitals reviewed-   Temp:  [98.1 °F (36.7 °C)-99.3 °F (37.4 °C)]   Pulse:  []   Resp:  [16-34]   BP: ()/(56-98)   SpO2:  [82 %-98 %] .   Home meds for hypertension were reviewed  and noted below.   Hypertension Medications              lisinopriL 10 MG tablet Take 1 tablet (10 mg total) by mouth once daily.     While in the hospital, will manage blood pressure as follows; Continue home antihypertensive regimen    Will utilize p.r.n. blood pressure medication only if patient's blood pressure greater than  180/110 and she develops symptoms such as worsening chest pain or shortness of breath.    Anemia  Most recent hemoglobin and hematocrit are listed below.  Recent Labs     04/04/25  1431 04/05/25  0429   HGB 16.4* 12.4   HCT 50.0* 38.2     Plan  -Monitor serial CBC: Daily  -Transfuse PRBC if patient becomes hemodynamically unstable, symptomatic or H/H drops below 7/21.  -Patient has not received any PRBC transfusions to date  -Patient's anemia is currently stable    CKD (chronic kidney disease) stage 3, GFR 30-59 ml/min  Creatine stable for now. BMP reviewed- noted Estimated Creatinine Clearance: 28.4 mL/min (A) (based on SCr of 1.7 mg/dL (H)). according to latest data. Based on current GFR, CKD stage is stage 3 - GFR 30-59.  Monitor UOP and serial BMP and adjust therapy as needed. Renally dose meds. Avoid nephrotoxic medications and procedures.    VTE Risk Mitigation (From admission, onward)           Ordered     enoxaparin injection 30 mg  Daily         04/04/25 2134     IP VTE HIGH RISK PATIENT  Once         04/04/25 2134     Place sequential compression device  Until discontinued         04/04/25 2134                    Discharge Planning   POOJA:      Code Status: Full Code   Medical Readiness for Discharge Date:   Discharge Plan A: Home                        Katie Weeks NP  Department of Hospital Medicine   '[facility] - Telemetry ([facility])

## 2025-04-05 NOTE — PLAN OF CARE
[facility] - Telemetry (Hospital)  Initial Discharge Assessment       Primary Care Provider: No, Primary Doctor    Admission Diagnosis: Screening for cardiovascular condition [Z13.6]  Hypoxia [R09.02]  Immunocompromised [D84.9]  Influenza [J11.1]  COVID [U07.1]    Admission Date: 4/4/2025  Expected Discharge Date:     Transition of Care Barriers: None    Payor: [facility] / Plan: [facility] CHOICE PLUS / Product Type: Commercial /     Extended Emergency Contact Information  Primary Emergency Contact: Deisy Faust  Mobile Phone: [phone]  Relation: Mother   needed? No    Discharge Plan A: Home         [facility] - [place], [place] - [address] [address]  [address] [address]  Phone: [phone] Fax: [phone]      Initial Assessment (most recent)       Adult Discharge Assessment - 04/05/25 1113          Discharge Assessment    Assessment Type Discharge Planning Assessment     Confirmed/corrected address, phone number and insurance Yes     Confirmed Demographics Correct on Facesheet     Source of Information patient     When was your last doctors appointment? 04/04/25     People in Home alone     Do you have help at home or someone to help you manage your care at home? Yes     Who are your caregiver(s) and their phone number(s)? Family relative check in on pt daily     Prior to hospitilization cognitive status: Alert/Oriented     Current cognitive status: Alert/Oriented     Walking or Climbing Stairs Difficulty no     Dressing/Bathing Difficulty no     Equipment Currently Used at Home none     Readmission within 30 days? No     Patient currently being followed by outpatient case management? No     Do you currently have service(s) that help you manage your care at home? No     Do you take prescription medications? Yes     Do you have prescription coverage? Yes     Coverage [employer]     Do you have any problems affording any of your prescribed medications? No     Is the  patient taking medications as prescribed? yes     Who is going to help you get home at discharge? family member     How do you get to doctors appointments? car, drives self     Are you on dialysis? No     Do you take coumadin? No     Discharge Plan A Home     DME Needed Upon Discharge  none     Discharge Plan discussed with: Patient     Transition of Care Barriers None

## 2025-04-05 NOTE — H&P
[facility] Medicine  History & Physical    Patient Name: Shayy Faust  MRN: [id number]  Patient Class: IP- Inpatient  Admission Date: 4/4/2025  Attending Physician: Ovidio Sosa MD   Primary Care Provider: Karine Primary Doctor         Patient information was obtained from patient, past medical records, and ER records.     Subjective:     Principal Problem:Influenza A    Chief Complaint:   Chief Complaint   Patient presents with    Influenza     Pt postive for flu and covid in clinic. Bp low in clinic. Hx interstitial pulmonary disease, ANCA. Vomiting, diarrhea.         HPI: Shayy Faust is a 49 y.o. female with a PMH  has a past medical history of ANCA-associated vasculitis (08/29/2024), Anemia, chronic renal failure, stage 4 (severe) (08/23/2024), and Mild intermittent asthma, uncomplicated. who presented as a transfer from [facility] for higher level of care and continued medical management after being diagnosed with symptomatic influenza A and COVID in an immunocompromised patient.  Patient presented to outside facility initially from clinic after testing positive for flu a and COVID and was found to have low BP.  Patient has significant history including ANCA associated vasculitis and on chronic steroids.  Patient reported endorsing worsening generalized weakness/fatigue, nonproductive cough, fevers, chills, sweats, nausea, vomiting, diarrhea, generalized myalgias with no known alleviating or aggravating factors noted.  Patient currently follows Dr. Jiménez from pulmonology and was treated for acute asthma exacerbation with stress dose steroids as well as doxycycline back on 03/17.  She reported compliance with home medications and reported she initially felt better but began to feel worse shortly afterwards.  All other review of systems negative except as noted above.  Initial workup at outside facility revealed patient to be afebrile without leukocytosis, saturating 98%  on 5 L via nasal cannula, creatinine/GFR 2.0/30, AST//70, lactic acid within normal limits, procalcitonin 20.55, influenza a/COVID positive, chest x-ray negative for acute findings.  Patient initiated on flu/COVID treatment as well as antibiotics and admitted to Hospital Medicine inpatient for continued medical management.      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       Anemia, chronic renal failure, stage 4 (severe) 08/23/2024    Mild intermittent asthma, uncomplicated        Past Surgical History:   Procedure Laterality Date    BILATERAL TUBAL LIGATION N/A     COLONOSCOPY N/A 8/20/2024    Procedure: COLONOSCOPY;  Surgeon: Gena Saxena MD;  Location: [facility];  Service: Endoscopy;  Laterality: N/A;    ESOPHAGOGASTRODUODENOSCOPY N/A 8/20/2024    Procedure: EGD (ESOPHAGOGASTRODUODENOSCOPY);  Surgeon: Gena Saxena MD;  Location: [facility];  Service: Endoscopy;  Laterality: N/A;    INTRALUMINAL GASTROINTESTINAL TRACT IMAGING VIA CAPSULE N/A 9/19/2024    Procedure: IMAGING PROCEDURE, GI TRACT, INTRALUMINAL, VIA CAPSULE;  Surgeon: Ignacio Jimenez RN;  Location: [facility];  Service: Endoscopy;  Laterality: N/A;  Neg EGD/Colon. Hx MARIA ESTHER/weight loss/ARF       Review of patient's allergies indicates:  No Known Allergies    No current facility-administered medications on file prior to encounter.     Current Outpatient Medications on File Prior to Encounter   Medication Sig    albuterol (PROVENTIL/VENTOLIN HFA) 90 mcg/actuation inhaler Inhale 2 puffs into the lungs every 4 (four) hours as needed for Wheezing or Shortness of Breath.    azaTHIOprine (IMURAN) 50 mg Tab Take 1 tablet (50 mg total) by mouth every other day.    doxycycline (MONODOX) 100 MG capsule Take 1 capsule (100 mg total) by mouth every 12 (twelve) hours.    fluticasone-salmeterol diskus  inhaler 250-50 mcg Inhale 1 puff into the lungs 2 (two) times daily. Controller    lisinopriL 10 MG tablet Take 1 tablet (10 mg total) by mouth once daily.    predniSONE (DELTASONE) 20 MG tablet Prednisone 60 mg/ day for 3 days, 40 mg/day for 3 days,20 mg/ day for 3 days, (1/2 tablet )10 mg a day for 3 days.    predniSONE (DELTASONE) 5 MG tablet Take 1 tablet (5 mg total) by mouth once daily. Start after high dose prednisone    sulfamethoxazole-trimethoprim 800-160mg (BACTRIM DS) 800-160 mg Tab Take 1 tablet by mouth 3 (three) times a week.    vitamin D (VITAMIN D3) 1000 units Tab Take 1,000 Units by mouth once daily.     Family History       Problem Relation (Age of Onset)    Hypertension Mother, Father          Tobacco Use    Smoking status: Never    Smokeless tobacco: Never   Substance and Sexual Activity    Alcohol use: Not Currently    Drug use: Never    Sexual activity: Not on file     Review of Systems   All other systems reviewed and are negative.    Objective:     Vital Signs (Most Recent):  Temp: 98.1 °F (36.7 °C) (04/05/25 0029)  Pulse: 84 (04/05/25 0100)  Resp: 18 (04/05/25 0029)  BP: 132/82 (04/05/25 0029)  SpO2: (!) 94 % (04/05/25 0029) Vital Signs (24h Range):  Temp:  [98.1 °F (36.7 °C)-99.3 °F (37.4 °C)] 98.1 °F (36.7 °C)  Pulse:  [] 84  Resp:  [16-34] 18  SpO2:  [82 %-98 %] 94 %  BP: ()/(56-98) 132/82     Weight: 45.5 kg (100 lb 5 oz)  Body mass index is 17.22 kg/m².     Physical Exam  Vitals reviewed.   Constitutional:       General: She is not in acute distress.     Appearance: She is ill-appearing. She is not toxic-appearing or diaphoretic.      Comments: Thin and ill-appearing but nontoxic   HENT:      Head: Normocephalic and atraumatic.      Right Ear: External ear normal.      Left Ear: External ear normal.      Nose: Nose normal. No congestion or rhinorrhea.      Mouth/Throat:      Mouth: Mucous membranes are moist.      Pharynx: Oropharynx is clear. No oropharyngeal exudate or  posterior oropharyngeal erythema.   Eyes:      General: No scleral icterus.     Extraocular Movements: Extraocular movements intact.      Conjunctiva/sclera: Conjunctivae normal.      Pupils: Pupils are equal, round, and reactive to light.   Neck:      Vascular: No carotid bruit.   Cardiovascular:      Rate and Rhythm: Normal rate and regular rhythm.      Pulses: Normal pulses.      Heart sounds: Normal heart sounds. No murmur heard.     No friction rub. No gallop.   Pulmonary:      Effort: Pulmonary effort is normal. No respiratory distress.      Breath sounds: No stridor. Rales present. No wheezing or rhonchi.   Chest:      Chest wall: No tenderness.   Abdominal:      General: Abdomen is flat. Bowel sounds are normal. There is no distension.      Palpations: Abdomen is soft. There is no mass.      Tenderness: There is no abdominal tenderness. There is no right CVA tenderness, left CVA tenderness, guarding or rebound.      Hernia: No hernia is present.   Musculoskeletal:         General: No swelling, tenderness, deformity or signs of injury. Normal range of motion.      Cervical back: Normal range of motion and neck supple. No rigidity or tenderness.      Right lower leg: No edema.      Left lower leg: No edema.   Lymphadenopathy:      Cervical: No cervical adenopathy.   Skin:     General: Skin is warm and dry.      Capillary Refill: Capillary refill takes less than 2 seconds.      Coloration: Skin is not jaundiced or pale.      Findings: No bruising, erythema, lesion or rash.   Neurological:      General: No focal deficit present.      Mental Status: She is alert and oriented to person, place, and time. Mental status is at baseline.      Cranial Nerves: No cranial nerve deficit.      Sensory: No sensory deficit.      Motor: No weakness.      Coordination: Coordination normal.   Psychiatric:         Mood and Affect: Mood normal.         Behavior: Behavior normal.         Thought Content: Thought content normal.          Judgment: Judgment normal.              CRANIAL NERVES     CN III, IV, VI   Pupils are equal, round, and reactive to light.       Significant Labs: All pertinent labs within the past 24 hours have been reviewed.    Significant Imaging: I have reviewed all pertinent imaging results/findings within the past 24 hours.    LABS:  Recent Results (from the past 24 hours)   POCT Influenza A/B Molecular    Collection Time: 04/04/25  1:43 PM   Result Value Ref Range    POC Molecular Influenza A Ag Positive (A) Negative    POC Molecular Influenza B Ag Negative Negative     Acceptable Yes    POCT Strep A, Molecular    Collection Time: 04/04/25  1:44 PM   Result Value Ref Range    Molecular Strep A, POC Negative Negative     Acceptable Yes    POCT COVID-19 Rapid Screening    Collection Time: 04/04/25  1:55 PM   Result Value Ref Range    POC Rapid COVID Positive (A) Negative     Acceptable Yes    EKG 12-lead    Collection Time: 04/04/25  2:11 PM   Result Value Ref Range    QRS Duration 68 ms    [facility] QTC Calculation 405 ms   Comprehensive metabolic panel    Collection Time: 04/04/25  2:31 PM   Result Value Ref Range    Sodium 138 136 - 145 mmol/L    Potassium 4.4 3.5 - 5.1 mmol/L    Chloride 102 95 - 110 mmol/L    CO2 18 (L) 23 - 29 mmol/L    Glucose 94 70 - 110 mg/dL    BUN 46 (H) 6 - 20 mg/dL    Creatinine 2.0 (H) 0.5 - 1.4 mg/dL    Calcium 9.5 8.7 - 10.5 mg/dL    Protein Total 7.4 6.0 - 8.4 gm/dL    Albumin 4.0 3.5 - 5.2 g/dL    Bilirubin Total 0.7 0.1 - 1.0 mg/dL    ALP 83 40 - 150 unit/L     (H) 11 - 45 unit/L    ALT 70 (H) 10 - 44 unit/L    Anion Gap 18 (H) 8 - 16 mmol/L    eGFR 30 (L) >60 mL/min/1.73/m2   Lactic acid, plasma #1    Collection Time: 04/04/25  2:31 PM   Result Value Ref Range    Lactic Acid Level 1.6 0.5 - 2.2 mmol/L   Procalcitonin    Collection Time: 04/04/25  2:31 PM   Result Value Ref Range    Procalcitonin 20.55 (H) <0.25 ng/mL   CBC with  Differential    Collection Time: 04/04/25  2:31 PM   Result Value Ref Range    WBC 11.11 3.90 - 12.70 K/uL    RBC 5.27 4.00 - 5.40 M/uL    HGB 16.4 (H) 12.0 - 16.0 gm/dL    HCT 50.0 (H) 37.0 - 48.5 %    MCV 95 82 - 98 fL    MCH 31.1 (H) 27.0 - 31.0 pg    MCHC 32.8 32.0 - 36.0 g/dL    RDW 13.5 11.5 - 14.5 %    Platelet Count 207 150 - 450 K/uL    MPV 10.9 9.2 - 12.9 fL    Nucleated RBC 0 <=0 /100 WBC    Neut % 86.1 (H) 38 - 73 %    Lymph % 7.6 (L) 18 - 48 %    Mono % 5.4 4 - 15 %    Eos % 0.0 <=8 %    Basophil % 0.4 <=1.9 %    Imm Grans % 0.5 0.0 - 0.5 %    Neut # 9.58 (H) 1.8 - 7.7 K/uL    Lymph # 0.84 (L) 1 - 4.8 K/uL    Mono # 0.60 0.3 - 1 K/uL    Eos # 0.00 <=0.5 K/uL    Baso # 0.04 <=0.2 K/uL    Imm Grans # 0.05 (H) 0.00 - 0.04 K/uL   ISTAT PROCEDURE    Collection Time: 04/04/25  5:38 PM   Result Value Ref Range    POC PH 7.353 7.35 - 7.45    POC PCO2 36.7 35 - 45 mmHg    POC PO2 49 (LL) 80 - 100 mmHg    POC HCO3 20.4 (L) 24 - 28 mmol/L    POC BE -5 (L) -2 to 2 mmol/L    POC SATURATED O2 82 95 - 100 %    Sample ARTERIAL     Site LR     Allens Test Pass     DelSys Room Air     Mode SPONT     FiO2 21    Lactic acid, plasma #2    Collection Time: 04/04/25  6:18 PM   Result Value Ref Range    Lactic Acid Level 1.1 0.5 - 2.2 mmol/L   Urinalysis, Reflex to Urine Culture    Collection Time: 04/04/25  6:47 PM    Specimen: Urine, Clean Catch   Result Value Ref Range    Color, UA Yellow Straw, Roxy, Yellow, Light-Orange    Appearance, UA Clear Clear    pH, UA 6.0 5.0 - 8.0    Spec Grav UA 1.015 1.005 - 1.030    Protein, UA 1+ (A) Negative    Glucose, UA Negative Negative    Ketones, UA Trace (A) Negative    Bilirubin, UA Negative Negative    Blood, UA Negative Negative    Nitrites, UA Negative Negative    Urobilinogen, UA Negative <2.0 EU/dL    Leukocyte Esterase, UA Negative Negative   Urinalysis Microscopic    Collection Time: 04/04/25  6:47 PM   Result Value Ref Range    RBC, UA 3 0 - 4 /HPF    WBC, UA 2 0 - 5 /HPF     Bacteria, UA Occasional None, Rare, Occasional /HPF    Hyaline Casts, UA 1 0 - 1 /LPF    Granular Casts 2 (H) None  /LPF    Microscopic Comment         RADIOLOGY  X-Ray Chest AP Portable  Result Date: 4/4/2025  EXAMINATION: XR CHEST AP PORTABLE CLINICAL HISTORY: <Diagnosis>, Sepsis; COMPARISON: Chest, 05/23/2024. FINDINGS: One view.  Heart is normal in size.  Lungs are clear.     No definite infiltrates Electronically signed by: Hakan Moss MD Date:    04/04/2025 Time:    14:51    CT Chest Without Contrast  Result Date: 3/17/2025  EXAM:  CT CHEST WITHOUT CONTRAST CLINICAL HISTORY:          Interstitial lung disease; [I77.82]-Antineutrophilic cytoplasmic antibody (ANCA) vasculitis./[J84.9]-Interstitial pulmonary disease, unspecified. TECHNIQUE: High-resolution chest CT protocol was performed without contrast. All CT scans at [this location] are performed using dose modulation techniques as appropriate to a performed exam includiNo acute pulmonary process.  No pulmonary mass.ng the following: automated exposure control; adjustment of the mA and/or kV according to patient size (this includes techniques or standardized protocols for targeted exams where dose is matched to indication / reason for exam; i.e. extremities or head); use of iterative reconstruction technique.  Pulmonary nodules reported in average transaxial dimension. Comparison: 12/05/2024. FINDINGS: Lungs/pleura:  Essentially unchanged pulmonary nodules bilaterally to include opacities in the subsegmental bronchi.  Bilateral reticulonodular opacities.  Subpleural sparing.  No honeycombing.  No air trapping. Heart: No significant cardiomegaly or pericardial effusion. Coronary artery atherosclerotic calcifications are not present. Lymph nodes: No pathologic adenopathy. Vascular: Nonaneurysmal aorta. Osseous: No acute fracture or suspicious appearing osseous lesion. Miscellaneous: No acute abnormality in the visualized abdomen.     Essentially stable  chest.  Findings probably represent chronic infectious or inflammatory disease.  Consider granulomatous disease to include granulomatosis with polyangiitis and microscopic polyangiitis.  Chronic mycobacterial infection is another consideration.  No evidence of UIP.  Consider follow-up in one year. Finalized on: 3/17/2025 2:09 PM By:  Ryan Yeung MD [facility]# [id number]      2025-03-17 14:11:[id number]     [facility]      EKG    MICROBIOLOGY    MDM    Assessment/Plan:     Assessment & Plan  Influenza A    COVID-19  Patient is identified as High risk for severe complications of COVID 19 based on COVID risk score of 4   Initiate standard COVID protocols; COVID-19 testing ,Infection Control notification  and isolation- respiratory, contact and droplet per protocol    Diagnostics: CBC, CMP, Procalcitonin, Ferritin, CRP, LDH, BNP, Troponin, ECG, Rapid Flu, Blood Culture x2, Portable CXR, and UA and culture    Management: Initiate targeted therapy with Remdesivir, 200mg IV x1, followed by 100mg IV daily x5 days total and Dexamethasone PO/IV 6mg daily x10 days, Maintain oxygen saturations 92-96% via Nasal Cannula 5 LPM and monitor with continuous/intermittent pulse oximetry. , Inhaled bronchodilators as needed for shortness of breath., and Continuous cardiac monitoring.    Advance Care Planning  Current advance care plan has been discussed with patient/family/POA and patient currently wishes Full Code.     Sepsis  This patient does have evidence of infective focus  My overall impression is sepsis.  Source: Respiratory  Antibiotics given-   Antibiotics (72h ago, onward)      Start     Stop Route Frequency Ordered    04/05/25 0630  ceFEPIme injection 1 g         -- IV Every 12 hours (non-standard times) 04/05/25 0524    04/04/25 2230  mupirocin 2 % ointment         04/09/25 2059 Nasl 2 times daily 04/04/25 2120          Latest lactate reviewed-  Recent Labs   Lab 04/04/25  1818   LACTATE 1.1     Organ dysfunction indicated by Acute  kidney injury and Acute respiratory failure    Fluid challenge Ideal Body Weight- The patient is obese (BMI>30) and their ideal body weight of Ideal body weight: 54.7 kg (120 lb 9.5 oz) will be used to calculate fluid bolus of 30 ml/kg.     Post- resuscitation assessment No - Post resuscitation assessment not needed     Will Not start Pressors- Levophed for MAP of 65    Source control achieved by:     Interstitial pulmonary disease, unspecified  Patient presently in acute exacerbation and is with signs/symptoms of distress. Patient currently saturating 98% on 5L/min. Flu A/COVID positive.  Plan:  -Continue home medications, titrate as needed  -Titrate oxygen requirements as needed  -Incentive spirometry   -Monitor pulse oximetry  -Duonebs prn  -Continue treatment of Flu/COVID as noted above     ANCA-associated vasculitis  Current followed outpatient and chronically on steroids.  Plan:  -continue home medications  -f/u outpatient as directed     Primary hypertension  Chronic, controlled.  Latest blood pressure and vitals reviewed-   Temp:  [98.1 °F (36.7 °C)-99.3 °F (37.4 °C)]   Pulse:  []   Resp:  [16-34]   BP: ()/(56-98)   SpO2:  [82 %-98 %] .   Home meds for hypertension were reviewed and noted below.   Hypertension Medications              lisinopriL 10 MG tablet Take 1 tablet (10 mg total) by mouth once daily.     While in the hospital, will manage blood pressure as follows; Continue home antihypertensive regimen    Will utilize p.r.n. blood pressure medication only if patient's blood pressure greater than  180/110 and she develops symptoms such as worsening chest pain or shortness of breath.    Anemia  Most recent hemoglobin and hematocrit are listed below.  Recent Labs     04/04/25  1431 04/05/25  0429   HGB 16.4* 12.4   HCT 50.0* 38.2     Plan  -Monitor serial CBC: Daily  -Transfuse PRBC if patient becomes hemodynamically unstable, symptomatic or H/H drops below 7/21.  -Patient has not received  any PRBC transfusions to date  -Patient's anemia is currently stable    CKD (chronic kidney disease) stage 3, GFR 30-59 ml/min  Creatine stable for now. BMP reviewed- noted Estimated Creatinine Clearance: 28.4 mL/min (A) (based on SCr of 1.7 mg/dL (H)). according to latest data. Based on current GFR, CKD stage is stage 3 - GFR 30-59.  Monitor UOP and serial BMP and adjust therapy as needed. Renally dose meds. Avoid nephrotoxic medications and procedures.      VTE Risk Mitigation (From admission, onward)           Ordered     enoxaparin injection 30 mg  Daily         04/04/25 2134     IP VTE HIGH RISK PATIENT  Once         04/04/25 2134     Place sequential compression device  Until discontinued         04/04/25 2134                  //Core Measures   -DVT proph: SCDs, Lovenox   -Code status: Full    -Surrogate: none provided       Components of this note were documented using a voice recognition system and are subject to errors not corrected at the time the document was proof read. Please contact the author for any clarifications.       Pharmacist Renal Dose Adjustment Note    Shayy Faust is a 49 y.o. female being treated with the medication cefepime.    Patient Data:    Vital Signs (Most Recent):  Temp: 98.1 °F (36.7 °C) (04/05/25 0029)  Pulse: 71 (04/05/25 0456)  Resp: 18 (04/05/25 0029)  BP: 132/82 (04/05/25 0029)  SpO2: (!) 94 % (04/05/25 0029) Vital Signs (72h Range):  Temp:  [98.1 °F (36.7 °C)-99.3 °F (37.4 °C)]   Pulse:  []   Resp:  [16-34]   BP: ()/(56-98)   SpO2:  [82 %-98 %]      Recent Labs   Lab 04/04/25  1431   CREATININE 2.0*     Serum creatinine: 2 mg/dL (H) 04/04/25 1431  Estimated creatinine clearance: 24.2 mL/min (A)    Cefepime 2 g IV every 6 hours will be changed to cefepime 1 g IV every 12 hours for the treatment of severe infection with CrCl 11-29 ml/min.    Pharmacist's Name: Kendall Mata  Pharmacist's Extension: [phone]      Florentin White MD  Department of Hospital  Medicine  [facility] - Tele[facility] ([facility])

## 2025-04-05 NOTE — ASSESSMENT & PLAN NOTE
This patient does have evidence of infective focus  My overall impression is sepsis.  Source: Respiratory  Antibiotics given-   Antibiotics (72h ago, onward)      Start     Stop Route Frequency Ordered    04/05/25 0630  ceFEPIme injection 1 g         -- IV Every 12 hours (non-standard times) 04/05/25 0524    04/04/25 2230  mupirocin 2 % ointment         04/09/25 2059 Nasl 2 times daily 04/04/25 2120          Latest lactate reviewed-  Recent Labs   Lab 04/04/25  1818   LACTATE 1.1     Organ dysfunction indicated by Acute kidney injury and Acute respiratory failure    Fluid challenge Ideal Body Weight- The patient is obese (BMI>30) and their ideal body weight of Ideal body weight: 54.7 kg (120 lb 9.5 oz) will be used to calculate fluid bolus of 30 ml/kg.     Post- resuscitation assessment No - Post resuscitation assessment not needed     Will Not start Pressors- Levophed for MAP of 65    Source control achieved by:

## 2025-04-05 NOTE — SUBJECTIVE & OBJECTIVE
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       Anemia, chronic renal failure, stage 4 (severe) 08/23/2024    Mild intermittent asthma, uncomplicated        Past Surgical History:   Procedure Laterality Date    BILATERAL TUBAL LIGATION N/A     COLONOSCOPY N/A 8/20/2024    Procedure: COLONOSCOPY;  Surgeon: Gena Saxena MD;  Location: [facility] ENDO;  Service: Endoscopy;  Laterality: N/A;    ESOPHAGOGASTRODUODENOSCOPY N/A 8/20/2024    Procedure: EGD (ESOPHAGOGASTRODUODENOSCOPY);  Surgeon: Gena Saxena MD;  Location: [facility] ENDO;  Service: Endoscopy;  Laterality: N/A;    INTRALUMINAL GASTROINTESTINAL TRACT IMAGING VIA CAPSULE N/A 9/19/2024    Procedure: IMAGING PROCEDURE, GI TRACT, INTRALUMINAL, VIA CAPSULE;  Surgeon: Ignacio Jimenez RN;  Location: [facility] ENDO;  Service: Endoscopy;  Laterality: N/A;  Neg EGD/Colon. Hx MARIA ESTHER/weight loss/ARF       Review of patient's allergies indicates:  No Known Allergies    No current facility-administered medications on file prior to encounter.     Current Outpatient Medications on File Prior to Encounter   Medication Sig    albuterol (PROVENTIL/VENTOLIN HFA) 90 mcg/actuation inhaler Inhale 2 puffs into the lungs every 4 (four) hours as needed for Wheezing or Shortness of Breath.    azaTHIOprine (IMURAN) 50 mg Tab Take 1 tablet (50 mg total) by mouth every other day.    doxycycline (MONODOX) 100 MG capsule Take 1 capsule (100 mg total) by mouth every 12 (twelve) hours.    fluticasone-salmeterol diskus inhaler 250-50 mcg Inhale 1 puff into the lungs 2 (two) times daily. Controller    lisinopriL 10 MG tablet Take 1 tablet (10 mg total) by mouth once daily.    predniSONE (DELTASONE) 20 MG tablet Prednisone 60 mg/ day for 3 days, 40 mg/day for 3 days,20 mg/ day for 3 days, (1/2 tablet )10 mg a day for 3 days.    predniSONE (DELTASONE) 5 MG tablet Take 1 tablet (5 mg  total) by mouth once daily. Start after high dose prednisone    sulfamethoxazole-trimethoprim 800-160mg (BACTRIM DS) 800-160 mg Tab Take 1 tablet by mouth 3 (three) times a week.    vitamin D (VITAMIN D3) 1000 units Tab Take 1,000 Units by mouth once daily.     Family History       Problem Relation (Age of Onset)    Hypertension Mother, Father          Tobacco Use    Smoking status: Never    Smokeless tobacco: Never   Substance and Sexual Activity    Alcohol use: Not Currently    Drug use: Never    Sexual activity: Not on file     Review of Systems   All other systems reviewed and are negative.    Objective:     Vital Signs (Most Recent):  Temp: 98.1 °F (36.7 °C) (04/05/25 0029)  Pulse: 84 (04/05/25 0100)  Resp: 18 (04/05/25 0029)  BP: 132/82 (04/05/25 0029)  SpO2: (!) 94 % (04/05/25 0029) Vital Signs (24h Range):  Temp:  [98.1 °F (36.7 °C)-99.3 °F (37.4 °C)] 98.1 °F (36.7 °C)  Pulse:  [] 84  Resp:  [16-34] 18  SpO2:  [82 %-98 %] 94 %  BP: ()/(56-98) 132/82     Weight: 45.5 kg (100 lb 5 oz)  Body mass index is 17.22 kg/m².     Physical Exam  Vitals reviewed.   Constitutional:       General: She is not in acute distress.     Appearance: She is ill-appearing. She is not toxic-appearing or diaphoretic.      Comments: Thin and ill-appearing but nontoxic   HENT:      Head: Normocephalic and atraumatic.      Right Ear: External ear normal.      Left Ear: External ear normal.      Nose: Nose normal. No congestion or rhinorrhea.      Mouth/Throat:      Mouth: Mucous membranes are moist.      Pharynx: Oropharynx is clear. No oropharyngeal exudate or posterior oropharyngeal erythema.   Eyes:      General: No scleral icterus.     Extraocular Movements: Extraocular movements intact.      Conjunctiva/sclera: Conjunctivae normal.      Pupils: Pupils are equal, round, and reactive to light.   Neck:      Vascular: No carotid bruit.   Cardiovascular:      Rate and Rhythm: Normal rate and regular rhythm.      Pulses:  Normal pulses.      Heart sounds: Normal heart sounds. No murmur heard.     No friction rub. No gallop.   Pulmonary:      Effort: Pulmonary effort is normal. No respiratory distress.      Breath sounds: No stridor. Rales present. No wheezing or rhonchi.   Chest:      Chest wall: No tenderness.   Abdominal:      General: Abdomen is flat. Bowel sounds are normal. There is no distension.      Palpations: Abdomen is soft. There is no mass.      Tenderness: There is no abdominal tenderness. There is no right CVA tenderness, left CVA tenderness, guarding or rebound.      Hernia: No hernia is present.   Musculoskeletal:         General: No swelling, tenderness, deformity or signs of injury. Normal range of motion.      Cervical back: Normal range of motion and neck supple. No rigidity or tenderness.      Right lower leg: No edema.      Left lower leg: No edema.   Lymphadenopathy:      Cervical: No cervical adenopathy.   Skin:     General: Skin is warm and dry.      Capillary Refill: Capillary refill takes less than 2 seconds.      Coloration: Skin is not jaundiced or pale.      Findings: No bruising, erythema, lesion or rash.   Neurological:      General: No focal deficit present.      Mental Status: She is alert and oriented to person, place, and time. Mental status is at baseline.      Cranial Nerves: No cranial nerve deficit.      Sensory: No sensory deficit.      Motor: No weakness.      Coordination: Coordination normal.   Psychiatric:         Mood and Affect: Mood normal.         Behavior: Behavior normal.         Thought Content: Thought content normal.         Judgment: Judgment normal.              CRANIAL NERVES     CN III, IV, VI   Pupils are equal, round, and reactive to light.       Significant Labs: All pertinent labs within the past 24 hours have been reviewed.    Significant Imaging: I have reviewed all pertinent imaging results/findings within the past 24 hours.    LABS:  Recent Results (from the past 24  hours)   POCT Influenza A/B Molecular    Collection Time: 04/04/25  1:43 PM   Result Value Ref Range    POC Molecular Influenza A Ag Positive (A) Negative    POC Molecular Influenza B Ag Negative Negative     Acceptable Yes    POCT Strep A, Molecular    Collection Time: 04/04/25  1:44 PM   Result Value Ref Range    Molecular Strep A, POC Negative Negative     Acceptable Yes    POCT COVID-19 Rapid Screening    Collection Time: 04/04/25  1:55 PM   Result Value Ref Range    POC Rapid COVID Positive (A) Negative     Acceptable Yes    EKG 12-lead    Collection Time: 04/04/25  2:11 PM   Result Value Ref Range    QRS Duration 68 ms    [facility] QTC Calculation 405 ms   Comprehensive metabolic panel    Collection Time: 04/04/25  2:31 PM   Result Value Ref Range    Sodium 138 136 - 145 mmol/L    Potassium 4.4 3.5 - 5.1 mmol/L    Chloride 102 95 - 110 mmol/L    CO2 18 (L) 23 - 29 mmol/L    Glucose 94 70 - 110 mg/dL    BUN 46 (H) 6 - 20 mg/dL    Creatinine 2.0 (H) 0.5 - 1.4 mg/dL    Calcium 9.5 8.7 - 10.5 mg/dL    Protein Total 7.4 6.0 - 8.4 gm/dL    Albumin 4.0 3.5 - 5.2 g/dL    Bilirubin Total 0.7 0.1 - 1.0 mg/dL    ALP 83 40 - 150 unit/L     (H) 11 - 45 unit/L    ALT 70 (H) 10 - 44 unit/L    Anion Gap 18 (H) 8 - 16 mmol/L    eGFR 30 (L) >60 mL/min/1.73/m2   Lactic acid, plasma #1    Collection Time: 04/04/25  2:31 PM   Result Value Ref Range    Lactic Acid Level 1.6 0.5 - 2.2 mmol/L   Procalcitonin    Collection Time: 04/04/25  2:31 PM   Result Value Ref Range    Procalcitonin 20.55 (H) <0.25 ng/mL   CBC with Differential    Collection Time: 04/04/25  2:31 PM   Result Value Ref Range    WBC 11.11 3.90 - 12.70 K/uL    RBC 5.27 4.00 - 5.40 M/uL    HGB 16.4 (H) 12.0 - 16.0 gm/dL    HCT 50.0 (H) 37.0 - 48.5 %    MCV 95 82 - 98 fL    MCH 31.1 (H) 27.0 - 31.0 pg    MCHC 32.8 32.0 - 36.0 g/dL    RDW 13.5 11.5 - 14.5 %    Platelet Count 207 150 - 450 K/uL    MPV 10.9 9.2 - 12.9 fL     Nucleated RBC 0 <=0 /100 WBC    Neut % 86.1 (H) 38 - 73 %    Lymph % 7.6 (L) 18 - 48 %    Mono % 5.4 4 - 15 %    Eos % 0.0 <=8 %    Basophil % 0.4 <=1.9 %    Imm Grans % 0.5 0.0 - 0.5 %    Neut # 9.58 (H) 1.8 - 7.7 K/uL    Lymph # 0.84 (L) 1 - 4.8 K/uL    Mono # 0.60 0.3 - 1 K/uL    Eos # 0.00 <=0.5 K/uL    Baso # 0.04 <=0.2 K/uL    Imm Grans # 0.05 (H) 0.00 - 0.04 K/uL   ISTAT PROCEDURE    Collection Time: 04/04/25  5:38 PM   Result Value Ref Range    POC PH 7.353 7.35 - 7.45    POC PCO2 36.7 35 - 45 mmHg    POC PO2 49 (LL) 80 - 100 mmHg    POC HCO3 20.4 (L) 24 - 28 mmol/L    POC BE -5 (L) -2 to 2 mmol/L    POC SATURATED O2 82 95 - 100 %    Sample ARTERIAL     Site LR     Allens Test Pass     DelSys Room Air     Mode SPONT     FiO2 21    Lactic acid, plasma #2    Collection Time: 04/04/25  6:18 PM   Result Value Ref Range    Lactic Acid Level 1.1 0.5 - 2.2 mmol/L   Urinalysis, Reflex to Urine Culture    Collection Time: 04/04/25  6:47 PM    Specimen: Urine, Clean Catch   Result Value Ref Range    Color, UA Yellow Straw, Roxy, Yellow, Light-Orange    Appearance, UA Clear Clear    pH, UA 6.0 5.0 - 8.0    Spec Grav UA 1.015 1.005 - 1.030    Protein, UA 1+ (A) Negative    Glucose, UA Negative Negative    Ketones, UA Trace (A) Negative    Bilirubin, UA Negative Negative    Blood, UA Negative Negative    Nitrites, UA Negative Negative    Urobilinogen, UA Negative <2.0 EU/dL    Leukocyte Esterase, UA Negative Negative   Urinalysis Microscopic    Collection Time: 04/04/25  6:47 PM   Result Value Ref Range    RBC, UA 3 0 - 4 /HPF    WBC, UA 2 0 - 5 /HPF    Bacteria, UA Occasional None, Rare, Occasional /HPF    Hyaline Casts, UA 1 0 - 1 /LPF    Granular Casts 2 (H) None  /LPF    Microscopic Comment         RADIOLOGY  X-Ray Chest AP Portable  Result Date: 4/4/2025  EXAMINATION: XR CHEST AP PORTABLE CLINICAL HISTORY: <Diagnosis>, Sepsis; COMPARISON: Chest, 05/23/2024. FINDINGS: One view.  Heart is normal in size.  Lungs are  clear.     No definite infiltrates Electronically signed by: Hakan Moss MD Date:    04/04/2025 Time:    14:51    CT Chest Without Contrast  Result Date: 3/17/2025  EXAM:  CT CHEST WITHOUT CONTRAST CLINICAL HISTORY:          Interstitial lung disease; [I77.82]-Antineutrophilic cytoplasmic antibody (ANCA) vasculitis./[J84.9]-Interstitial pulmonary disease, unspecified. TECHNIQUE: High-resolution chest CT protocol was performed without contrast. All CT scans at [this location] are performed using dose modulation techniques as appropriate to a performed exam includiNo acute pulmonary process.  No pulmonary mass.ng the following: automated exposure control; adjustment of the mA and/or kV according to patient size (this includes techniques or standardized protocols for targeted exams where dose is matched to indication / reason for exam; i.e. extremities or head); use of iterative reconstruction technique.  Pulmonary nodules reported in average transaxial dimension. Comparison: 12/05/2024. FINDINGS: Lungs/pleura:  Essentially unchanged pulmonary nodules bilaterally to include opacities in the subsegmental bronchi.  Bilateral reticulonodular opacities.  Subpleural sparing.  No honeycombing.  No air trapping. Heart: No significant cardiomegaly or pericardial effusion. Coronary artery atherosclerotic calcifications are not present. Lymph nodes: No pathologic adenopathy. Vascular: Nonaneurysmal aorta. Osseous: No acute fracture or suspicious appearing osseous lesion. Miscellaneous: No acute abnormality in the visualized abdomen.     Essentially stable chest.  Findings probably represent chronic infectious or inflammatory disease.  Consider granulomatous disease to include granulomatosis with polyangiitis and microscopic polyangiitis.  Chronic mycobacterial infection is another consideration.  No evidence of UIP.  Consider follow-up in one year. Finalized on: 3/17/2025 2:09 PM By:  Ryan Yeung MD [facility]# [id number]       2025-03-17 14:11:[id number]     [facility]      EKG    MICROBIOLOGY    MDM

## 2025-04-05 NOTE — ASSESSMENT & PLAN NOTE
Current followed outpatient and chronically on steroids.  Plan:  -continue home medications  -f/u outpatient as directed

## 2025-04-05 NOTE — ASSESSMENT & PLAN NOTE
Most recent hemoglobin and hematocrit are listed below.  Recent Labs     04/04/25  1431 04/05/25  0429   HGB 16.4* 12.4   HCT 50.0* 38.2     Plan  -Monitor serial CBC: Daily  -Transfuse PRBC if patient becomes hemodynamically unstable, symptomatic or H/H drops below 7/21.  -Patient has not received any PRBC transfusions to date  -Patient's anemia is currently stable

## 2025-04-05 NOTE — ASSESSMENT & PLAN NOTE
Creatine stable for now. BMP reviewed- noted Estimated Creatinine Clearance: 28.4 mL/min (A) (based on SCr of 1.7 mg/dL (H)). according to latest data. Based on current GFR, CKD stage is stage 3 - GFR 30-59.  Monitor UOP and serial BMP and adjust therapy as needed. Renally dose meds. Avoid nephrotoxic medications and procedures.

## 2025-04-05 NOTE — HOSPITAL COURSE
48 y/o female admitted for acute respiratory failure, sepsis secondary to influenza A and COVID. Remdesivir, Tamiflu, and cefepime initiated. Patient is on 3L NC, O2 sat 94-96%. Renal function improving with IV hydration.     Cefepime transitioned to PO Levofloxacin (renally dosed) 04/07 as CXR with no focal consolidation noted but Procal elevated.     She was able to be weaned off supplemental oxygen.  RN ambulated patient on day of discharge in sats remained greater than 90% with ambulation off oxygen.  Patient is seen and examined on day of discharge, reports that she feels better, still has some cough that is nonproductive but is ready to go home.  Remains afebrile, hemodynamically stable.  Patient appears stable for discharge home today per my exam    On discharge, she will continue p.o. Tamiflu to complete 5 day course, continue home p.o. prednisone, continue renally dosed p.o. levofloxacin to complete 5 day course.  Advised to hold azathioprine until she can follow up/discuss with her rheumatologist.  Follow up with PCP within 3-5 days and pulmonology within 1-2 weeks.

## 2025-04-05 NOTE — ASSESSMENT & PLAN NOTE
Patient is identified as High risk for severe complications of COVID 19 based on COVID risk score of 4   Initiate standard COVID protocols; COVID-19 testing ,Infection Control notification  and isolation- respiratory, contact and droplet per protocol    Diagnostics: CBC, CMP, Procalcitonin, Ferritin, CRP, LDH, BNP, Troponin, ECG, Rapid Flu, Blood Culture x2, Portable CXR, and UA and culture    Management: Initiate targeted therapy with Remdesivir, 200mg IV x1, followed by 100mg IV daily x5 days total and Dexamethasone PO/IV 6mg daily x10 days, Maintain oxygen saturations 92-96% via Nasal Cannula 5 LPM and monitor with continuous/intermittent pulse oximetry. , Inhaled bronchodilators as needed for shortness of breath., and Continuous cardiac monitoring.    Advance Care Planning  Current advance care plan has been discussed with patient/family/POA and patient currently wishes Full Code.

## 2025-04-05 NOTE — SUBJECTIVE & OBJECTIVE
Interval History: Patient reports feeling somewhat better. Continue current medical management plan.     Review of Systems   Constitutional:  Positive for chills and fatigue.   HENT: Negative.     Eyes: Negative.    Respiratory:  Positive for cough and shortness of breath.    Cardiovascular: Negative.    Gastrointestinal:  Positive for diarrhea, nausea and vomiting.   Endocrine: Negative.    Genitourinary: Negative.    Musculoskeletal: Negative.    Skin: Negative.    Allergic/Immunologic: Negative.    Neurological:  Positive for weakness.   Hematological: Negative.    Psychiatric/Behavioral: Negative.       Objective:     Vital Signs (Most Recent):  Temp: 98.6 °F (37 °C) (04/05/25 0803)  Pulse: 72 (04/05/25 0820)  Resp: 18 (04/05/25 0803)  BP: 126/86 (04/05/25 0803)  SpO2: 95 % (04/05/25 0803) Vital Signs (24h Range):  Temp:  [98.1 °F (36.7 °C)-99.3 °F (37.4 °C)] 98.6 °F (37 °C)  Pulse:  [] 72  Resp:  [16-34] 18  SpO2:  [82 %-98 %] 95 %  BP: ()/(56-98) 126/86     Weight: 45.5 kg (100 lb 5 oz)  Body mass index is 17.22 kg/m².    Intake/Output Summary (Last 24 hours) at 4/5/2025 1135  Last data filed at 4/5/2025 1004  Gross per 24 hour   Intake 200 ml   Output 2 ml   Net 198 ml         Physical Exam  Vitals reviewed.   Constitutional:       General: She is not in acute distress.     Appearance: She is ill-appearing. She is not toxic-appearing or diaphoretic.      Interventions: Nasal cannula in place.      Comments: Thin and ill-appearing    HENT:      Head: Normocephalic and atraumatic.      Right Ear: External ear normal.      Left Ear: External ear normal.      Nose: Nose normal. No congestion or rhinorrhea.      Mouth/Throat:      Mouth: Mucous membranes are moist.      Pharynx: Oropharynx is clear. No oropharyngeal exudate or posterior oropharyngeal erythema.   Eyes:      General: No scleral icterus.     Extraocular Movements: Extraocular movements intact.      Conjunctiva/sclera: Conjunctivae normal.  "     Pupils: Pupils are equal, round, and reactive to light.   Neck:      Vascular: No carotid bruit.   Cardiovascular:      Rate and Rhythm: Normal rate and regular rhythm.      Pulses: Normal pulses.      Heart sounds: Normal heart sounds. No murmur heard.     No friction rub. No gallop.   Pulmonary:      Effort: Pulmonary effort is normal. No respiratory distress.      Breath sounds: No stridor. Rhonchi present. No wheezing or rales.   Chest:      Chest wall: No tenderness.   Abdominal:      General: Abdomen is flat. Bowel sounds are normal. There is no distension.      Palpations: Abdomen is soft. There is no mass.      Tenderness: There is no abdominal tenderness. There is no right CVA tenderness, left CVA tenderness, guarding or rebound.      Hernia: No hernia is present.   Musculoskeletal:         General: No swelling, tenderness, deformity or signs of injury. Normal range of motion.      Cervical back: Normal range of motion and neck supple. No rigidity or tenderness.      Right lower leg: No edema.      Left lower leg: No edema.   Lymphadenopathy:      Cervical: No cervical adenopathy.   Skin:     General: Skin is warm and dry.      Capillary Refill: Capillary refill takes less than 2 seconds.      Coloration: Skin is not jaundiced or pale.      Findings: No bruising, erythema, lesion or rash.   Neurological:      General: No focal deficit present.      Mental Status: She is alert and oriented to person, place, and time. Mental status is at baseline.      Cranial Nerves: No cranial nerve deficit.      Sensory: No sensory deficit.      Motor: No weakness.      Coordination: Coordination normal.   Psychiatric:         Mood and Affect: Mood normal.         Behavior: Behavior normal.         Thought Content: Thought content normal.         Judgment: Judgment normal.               Significant Labs: All pertinent labs within the past 24 hours have been reviewed.  Blood Culture: No results for input(s): "LABBLOO" " "in the last 48 hours.  BMP:   Recent Labs   Lab 04/05/25  0430      K 4.1      CO2 20*   BUN 43*   CREATININE 1.7*   CALCIUM 8.1*     CBC:   Recent Labs   Lab 04/04/25  1431 04/05/25  0429   WBC 11.11 6.58   HGB 16.4* 12.4   HCT 50.0* 38.2    180     CMP:   Recent Labs   Lab 04/04/25  1431 04/05/25  0430    138   K 4.4 4.1    108   CO2 18* 20*   BUN 46* 43*   CREATININE 2.0* 1.7*   CALCIUM 9.5 8.1*   ALBUMIN 4.0 2.7*   BILITOT 0.7 0.2   ALKPHOS 83 55   * 61*   ALT 70* 48*   ANIONGAP 18* 10     Respiratory Culture: No results for input(s): "GSRESP", "RESPIRATORYC" in the last 48 hours.    Significant Imaging: I have reviewed all pertinent imaging results/findings within the past 24 hours.  "

## 2025-04-05 NOTE — ASSESSMENT & PLAN NOTE
Patient presently in acute exacerbation and is with signs/symptoms of distress. Patient currently saturating 98% on 5L/min. Flu A/COVID positive.  Plan:  -Continue home medications, titrate as needed  -Titrate oxygen requirements as needed  -Incentive spirometry   -Monitor pulse oximetry  -Adrián prn  -Continue treatment of Flu/COVID as noted above     4/5/2025  -O2 sat 94-96% on 3L nc.  -Continue current management.

## 2025-04-05 NOTE — HPI
Shayy Faust is a 49 y.o. female with a PMH  has a past medical history of ANCA-associated vasculitis (08/29/2024), Anemia, chronic renal failure, stage 4 (severe) (08/23/2024), and Mild intermittent asthma, uncomplicated. who presented as a transfer from [facility] for higher level of care and continued medical management after being diagnosed with symptomatic influenza A and COVID in an immunocompromised patient.  Patient presented to outside facility initially from clinic after testing positive for flu a and COVID and was found to have low BP.  Patient has significant history including ANCA associated vasculitis and on chronic steroids.  Patient reported endorsing worsening generalized weakness/fatigue, nonproductive cough, fevers, chills, sweats, nausea, vomiting, diarrhea, generalized myalgias with no known alleviating or aggravating factors noted.  Patient currently follows Dr. Jiménez from pulmonology and was treated for acute asthma exacerbation with stress dose steroids as well as doxycycline back on 03/17.  She reported compliance with home medications and reported she initially felt better but began to feel worse shortly afterwards.  All other review of systems negative except as noted above.  Initial workup at outside facility revealed patient to be afebrile without leukocytosis, saturating 98% on 5 L via nasal cannula, creatinine/GFR 2.0/30, AST//70, lactic acid within normal limits, procalcitonin 20.55, influenza a/COVID positive, chest x-ray negative for acute findings.  Patient initiated on flu/COVID treatment as well as antibiotics and admitted to Hospital Medicine inpatient for continued medical management.      PCP: No, Primary Doctor

## 2025-04-05 NOTE — PLAN OF CARE
Problem: Adult Inpatient Plan of Care  Goal: Plan of Care Review  Outcome: Progressing  Goal: Patient-Specific Goal (Individualized)  Outcome: Progressing  Goal: Absence of Hospital-Acquired Illness or Injury  Outcome: Progressing  Goal: Optimal Comfort and Wellbeing  Outcome: Progressing  Goal: Readiness for Transition of Care  Outcome: Progressing     Problem: Acute Kidney Injury/Impairment  Goal: Fluid and Electrolyte Balance  Outcome: Progressing  Goal: Improved Oral Intake  Outcome: Progressing  Goal: Effective Renal Function  Outcome: Progressing     Problem: Pneumonia  Goal: Fluid Balance  Outcome: Progressing  Goal: Resolution of Infection Signs and Symptoms  Outcome: Progressing  Goal: Effective Oxygenation and Ventilation  Outcome: Progressing     Problem: Sepsis/Septic Shock  Goal: Optimal Coping  Outcome: Progressing  Goal: Absence of Bleeding  Outcome: Progressing  Goal: Blood Glucose Level Within Targeted Range  Outcome: Progressing  Goal: Absence of Infection Signs and Symptoms  Outcome: Progressing  Goal: Optimal Nutrition Intake  Outcome: Progressing

## 2025-04-05 NOTE — ASSESSMENT & PLAN NOTE
Chronic, controlled.  Latest blood pressure and vitals reviewed-   Temp:  [98.1 °F (36.7 °C)-99.3 °F (37.4 °C)]   Pulse:  []   Resp:  [16-34]   BP: ()/(56-98)   SpO2:  [82 %-98 %] .   Home meds for hypertension were reviewed and noted below.   Hypertension Medications              lisinopriL 10 MG tablet Take 1 tablet (10 mg total) by mouth once daily.     While in the hospital, will manage blood pressure as follows; Continue home antihypertensive regimen    Will utilize p.r.n. blood pressure medication only if patient's blood pressure greater than  180/110 and she develops symptoms such as worsening chest pain or shortness of breath.

## 2025-04-05 NOTE — ASSESSMENT & PLAN NOTE
Patient presently in acute exacerbation and is with signs/symptoms of distress. Patient currently saturating 98% on 5L/min. Flu A/COVID positive.  Plan:  -Continue home medications, titrate as needed  -Titrate oxygen requirements as needed  -Incentive spirometry   -Monitor pulse oximetry  -Adrián prn  -Continue treatment of Flu/COVID as noted above

## 2025-04-05 NOTE — PROGRESS NOTES
Pharmacist Renal Dose Adjustment Note    Shayy Faust is a 49 y.o. female being treated with the medication cefepime.    Patient Data:    Vital Signs (Most Recent):  Temp: 98.1 °F (36.7 °C) (04/05/25 0029)  Pulse: 71 (04/05/25 0456)  Resp: 18 (04/05/25 0029)  BP: 132/82 (04/05/25 0029)  SpO2: (!) 94 % (04/05/25 0029) Vital Signs (72h Range):  Temp:  [98.1 °F (36.7 °C)-99.3 °F (37.4 °C)]   Pulse:  []   Resp:  [16-34]   BP: ()/(56-98)   SpO2:  [82 %-98 %]      Recent Labs   Lab 04/04/25  1431   CREATININE 2.0*     Serum creatinine: 2 mg/dL (H) 04/04/25 1431  Estimated creatinine clearance: 24.2 mL/min (A)    Cefepime 2 g IV every 6 hours will be changed to cefepime 1 g IV every 12 hours for the treatment of severe infection with CrCl 11-29 ml/min.    Pharmacist's Name: Kendall Mata  Pharmacist's Extension: [phone]

## 2025-04-05 NOTE — ASSESSMENT & PLAN NOTE
Patient is identified as High risk for severe complications of COVID 19 based on COVID risk score of 4   Initiate standard COVID protocols; COVID-19 testing ,Infection Control notification  and isolation- respiratory, contact and droplet per protocol    Diagnostics: CBC, CMP, Procalcitonin, Ferritin, CRP, LDH, BNP, Troponin, ECG, Rapid Flu, Blood Culture x2, Portable CXR, and UA and culture    Management: Initiate targeted therapy with Remdesivir, 200mg IV x1, followed by 100mg IV daily x5 days total and Dexamethasone PO/IV 6mg daily x10 days, Maintain oxygen saturations 92-96% via Nasal Cannula 3 LPM and monitor with continuous/intermittent pulse oximetry. , Inhaled bronchodilators as needed for shortness of breath., and Continuous cardiac monitoring.    Advance Care Planning  Current advance care plan has been discussed with patient/family/POA and patient currently wishes Full Code.

## 2025-04-06 ENCOUNTER — PATIENT MESSAGE (OUTPATIENT)
Dept: NEPHROLOGY | Facility: CLINIC | Age: 50
End: 2025-04-06
Payer: COMMERCIAL

## 2025-04-06 LAB
ABSOLUTE EOSINOPHIL (OHS): 0.01 K/UL
ABSOLUTE MONOCYTE (OHS): 0.47 K/UL (ref 0.3–1)
ABSOLUTE NEUTROPHIL COUNT (OHS): 3.81 K/UL (ref 1.8–7.7)
ALBUMIN SERPL BCP-MCNC: 3.2 G/DL (ref 3.5–5.2)
ALP SERPL-CCNC: 58 UNIT/L (ref 40–150)
ALT SERPL W/O P-5'-P-CCNC: 45 UNIT/L (ref 10–44)
ANION GAP (OHS): 7 MMOL/L (ref 8–16)
AST SERPL-CCNC: 58 UNIT/L (ref 11–45)
BASOPHILS # BLD AUTO: 0.01 K/UL
BASOPHILS NFR BLD AUTO: 0.2 %
BILIRUB SERPL-MCNC: 0.3 MG/DL (ref 0.1–1)
BUN SERPL-MCNC: 44 MG/DL (ref 6–20)
CALCIUM SERPL-MCNC: 8.3 MG/DL (ref 8.7–10.5)
CHLORIDE SERPL-SCNC: 105 MMOL/L (ref 95–110)
CO2 SERPL-SCNC: 25 MMOL/L (ref 23–29)
CREAT SERPL-MCNC: 1.7 MG/DL (ref 0.5–1.4)
ERYTHROCYTE [DISTWIDTH] IN BLOOD BY AUTOMATED COUNT: 13.3 % (ref 11.5–14.5)
GFR SERPLBLD CREATININE-BSD FMLA CKD-EPI: 36 ML/MIN/1.73/M2
GLUCOSE SERPL-MCNC: 99 MG/DL (ref 70–110)
HCT VFR BLD AUTO: 43.2 % (ref 37–48.5)
HGB BLD-MCNC: 14 GM/DL (ref 12–16)
IMM GRANULOCYTES # BLD AUTO: 0.04 K/UL (ref 0–0.04)
IMM GRANULOCYTES NFR BLD AUTO: 0.7 % (ref 0–0.5)
LYMPHOCYTES # BLD AUTO: 1.35 K/UL (ref 1–4.8)
MCH RBC QN AUTO: 31.4 PG (ref 27–31)
MCHC RBC AUTO-ENTMCNC: 32.4 G/DL (ref 32–36)
MCV RBC AUTO: 97 FL (ref 82–98)
NUCLEATED RBC (/100WBC) (OHS): 0 /100 WBC
PLATELET # BLD AUTO: 188 K/UL (ref 150–450)
PMV BLD AUTO: 11.3 FL (ref 9.2–12.9)
POTASSIUM SERPL-SCNC: 4 MMOL/L (ref 3.5–5.1)
PROT SERPL-MCNC: 5.7 GM/DL (ref 6–8.4)
RBC # BLD AUTO: 4.46 M/UL (ref 4–5.4)
RELATIVE EOSINOPHIL (OHS): 0.2 %
RELATIVE LYMPHOCYTE (OHS): 23.7 % (ref 18–48)
RELATIVE MONOCYTE (OHS): 8.3 % (ref 4–15)
RELATIVE NEUTROPHIL (OHS): 66.9 % (ref 38–73)
SODIUM SERPL-SCNC: 137 MMOL/L (ref 136–145)
WBC # BLD AUTO: 5.69 K/UL (ref 3.9–12.7)

## 2025-04-06 PROCEDURE — 25000003 PHARM REV CODE 250: Performed by: NURSE PRACTITIONER

## 2025-04-06 PROCEDURE — 63600175 PHARM REV CODE 636 W HCPCS: Mod: JZ,TB | Performed by: NURSE PRACTITIONER

## 2025-04-06 PROCEDURE — 27000221 HC OXYGEN, UP TO 24 HOURS

## 2025-04-06 PROCEDURE — 63600175 PHARM REV CODE 636 W HCPCS: Performed by: HOSPITALIST

## 2025-04-06 PROCEDURE — 21400001 HC TELEMETRY ROOM

## 2025-04-06 PROCEDURE — 84132 ASSAY OF SERUM POTASSIUM: CPT | Performed by: NURSE PRACTITIONER

## 2025-04-06 PROCEDURE — 94761 N-INVAS EAR/PLS OXIMETRY MLT: CPT

## 2025-04-06 PROCEDURE — 25000003 PHARM REV CODE 250: Performed by: HOSPITALIST

## 2025-04-06 PROCEDURE — 36415 COLL VENOUS BLD VENIPUNCTURE: CPT | Performed by: NURSE PRACTITIONER

## 2025-04-06 PROCEDURE — 85025 COMPLETE CBC W/AUTO DIFF WBC: CPT | Performed by: NURSE PRACTITIONER

## 2025-04-06 PROCEDURE — 94799 UNLISTED PULMONARY SVC/PX: CPT

## 2025-04-06 PROCEDURE — 27000207 HC ISOLATION

## 2025-04-06 RX ADMIN — LISINOPRIL 10 MG: 10 TABLET ORAL at 07:04

## 2025-04-06 RX ADMIN — PREDNISONE 5 MG: 5 TABLET ORAL at 07:04

## 2025-04-06 RX ADMIN — OSELTAMIVIR PHOSPHATE 30 MG: 30 CAPSULE ORAL at 07:04

## 2025-04-06 RX ADMIN — REMDESIVIR 100 MG: 100 INJECTION, POWDER, LYOPHILIZED, FOR SOLUTION INTRAVENOUS at 09:04

## 2025-04-06 RX ADMIN — CEFEPIME 1 G: 1 INJECTION, POWDER, FOR SOLUTION INTRAMUSCULAR; INTRAVENOUS at 06:04

## 2025-04-06 RX ADMIN — MUPIROCIN: 20 OINTMENT TOPICAL at 10:04

## 2025-04-06 RX ADMIN — ENOXAPARIN SODIUM 30 MG: 30 INJECTION SUBCUTANEOUS at 05:04

## 2025-04-06 RX ADMIN — MUPIROCIN: 20 OINTMENT TOPICAL at 09:04

## 2025-04-06 RX ADMIN — CEFEPIME 1 G: 1 INJECTION, POWDER, FOR SOLUTION INTRAMUSCULAR; INTRAVENOUS at 05:04

## 2025-04-06 NOTE — ASSESSMENT & PLAN NOTE
This patient does have evidence of infective focus  My overall impression is sepsis.  Source: Respiratory  Antibiotics given-   Antibiotics (72h ago, onward)      Start     Stop Route Frequency Ordered    04/05/25 0630  ceFEPIme injection 1 g         -- IV Every 12 hours (non-standard times) 04/05/25 0524    04/04/25 2230  mupirocin 2 % ointment         04/09/25 2059 Nasl 2 times daily 04/04/25 2120          Latest lactate reviewed-  Recent Labs   Lab 04/04/25  1818   LACTATE 1.1     Organ dysfunction indicated by Acute kidney injury and Acute respiratory failure    Fluid challenge Ideal Body Weight- The patient is obese (BMI>30) and their ideal body weight of Ideal body weight: 54.7 kg (120 lb 9.5 oz) will be used to calculate fluid bolus of 30 ml/kg.     Post- resuscitation assessment No - Post resuscitation assessment not needed     Will Not start Pressors- Levophed for MAP of 65

## 2025-04-06 NOTE — ASSESSMENT & PLAN NOTE
Most recent hemoglobin and hematocrit are listed below.  Recent Labs     04/04/25  1431 04/05/25  0429 04/06/25  0812   HGB 16.4* 12.4 14.0   HCT 50.0* 38.2 43.2     Plan  -Monitor serial CBC: Daily  -Transfuse PRBC if patient becomes hemodynamically unstable, symptomatic or H/H drops below 7/21.  -Patient has not received any PRBC transfusions to date  -Patient's anemia is currently stable

## 2025-04-06 NOTE — ASSESSMENT & PLAN NOTE
Creatine stable for now. BMP reviewed- noted Estimated Creatinine Clearance: 28.4 mL/min (A) (based on SCr of 1.7 mg/dL (H)). according to latest data. Based on current GFR, CKD stage is stage 3 - GFR 30-59.  Monitor UOP and serial BMP and adjust therapy as needed. Renally dose meds. Avoid nephrotoxic medications and procedures.    Renal function at baseline

## 2025-04-06 NOTE — SUBJECTIVE & OBJECTIVE
Review of Systems   Constitutional:  Positive for fatigue. Negative for chills.   HENT: Negative.     Eyes: Negative.    Respiratory:  Positive for cough and shortness of breath.    Cardiovascular: Negative.    Gastrointestinal:  Negative for diarrhea, nausea and vomiting.   Endocrine: Negative.    Genitourinary: Negative.    Musculoskeletal: Negative.    Skin: Negative.    Allergic/Immunologic: Negative.    Neurological:  Positive for weakness.   Hematological: Negative.    Psychiatric/Behavioral: Negative.       Objective:     Vital Signs (Most Recent):  Temp: 98.4 °F (36.9 °C) (04/06/25 1203)  Pulse: 76 (04/06/25 1203)  Resp: 18 (04/06/25 1203)  BP: 108/76 (04/06/25 1203)  SpO2: (!) 92 % (04/06/25 1203) Vital Signs (24h Range):  Temp:  [98 °F (36.7 °C)-98.5 °F (36.9 °C)] 98.4 °F (36.9 °C)  Pulse:  [] 76  Resp:  [16-18] 18  SpO2:  [92 %-97 %] 92 %  BP: (108-130)/(70-90) 108/76     Weight: 45.5 kg (100 lb 5 oz)  Body mass index is 17.22 kg/m².    Intake/Output Summary (Last 24 hours) at 4/6/2025 1444  Last data filed at 4/6/2025 1200  Gross per 24 hour   Intake 480 ml   Output --   Net 480 ml         Physical Exam  Vitals reviewed.   Constitutional:       General: She is not in acute distress.     Appearance: She is not ill-appearing, toxic-appearing or diaphoretic.      Interventions: Nasal cannula in place.      Comments: Thin    HENT:      Head: Normocephalic and atraumatic.      Right Ear: External ear normal.      Left Ear: External ear normal.      Nose: Nose normal. No congestion or rhinorrhea.      Mouth/Throat:      Mouth: Mucous membranes are moist.      Pharynx: Oropharynx is clear. No oropharyngeal exudate or posterior oropharyngeal erythema.   Eyes:      General: No scleral icterus.     Extraocular Movements: Extraocular movements intact.      Conjunctiva/sclera: Conjunctivae normal.      Pupils: Pupils are equal, round, and reactive to light.   Neck:      Vascular: No carotid bruit.  "  Cardiovascular:      Rate and Rhythm: Normal rate and regular rhythm.      Pulses: Normal pulses.      Heart sounds: Normal heart sounds. No murmur heard.     No friction rub. No gallop.   Pulmonary:      Effort: Pulmonary effort is normal. No respiratory distress.      Breath sounds: No stridor. Rhonchi present. No wheezing or rales.   Chest:      Chest wall: No tenderness.   Abdominal:      General: Abdomen is flat. Bowel sounds are normal. There is no distension.      Palpations: Abdomen is soft. There is no mass.      Tenderness: There is no abdominal tenderness. There is no right CVA tenderness, left CVA tenderness, guarding or rebound.      Hernia: No hernia is present.   Musculoskeletal:         General: No swelling, tenderness, deformity or signs of injury. Normal range of motion.      Cervical back: Normal range of motion and neck supple. No rigidity or tenderness.      Right lower leg: No edema.      Left lower leg: No edema.   Lymphadenopathy:      Cervical: No cervical adenopathy.   Skin:     General: Skin is warm and dry.      Capillary Refill: Capillary refill takes less than 2 seconds.      Coloration: Skin is not jaundiced or pale.      Findings: No bruising, erythema, lesion or rash.   Neurological:      General: No focal deficit present.      Mental Status: She is alert and oriented to person, place, and time. Mental status is at baseline.      Cranial Nerves: No cranial nerve deficit.      Sensory: No sensory deficit.      Motor: No weakness.      Coordination: Coordination normal.   Psychiatric:         Mood and Affect: Mood normal.         Behavior: Behavior normal.         Thought Content: Thought content normal.         Judgment: Judgment normal.               Significant Labs: All pertinent labs within the past 24 hours have been reviewed.  Blood Culture: No results for input(s): "LABBLOO" in the last 48 hours.  BMP:   Recent Labs   Lab 04/06/25  0812      K 4.0      CO2 25 "   BUN 44*   CREATININE 1.7*   CALCIUM 8.3*     CBC:   Recent Labs   Lab 04/05/25  0429 04/06/25  0812   WBC 6.58 5.69   HGB 12.4 14.0   HCT 38.2 43.2    188     CMP:   Recent Labs   Lab 04/05/25  0430 04/06/25  0812    137   K 4.1 4.0    105   CO2 20* 25   BUN 43* 44*   CREATININE 1.7* 1.7*   CALCIUM 8.1* 8.3*   ALBUMIN 2.7* 3.2*   BILITOT 0.2 0.3   ALKPHOS 55 58   AST 61* 58*   ALT 48* 45*   ANIONGAP 10 7*     Lactic Acid:   Recent Labs   Lab 04/04/25  1818   LACTATE 1.1       Significant Imaging: I have reviewed all pertinent imaging results/findings within the past 24 hours.

## 2025-04-06 NOTE — ASSESSMENT & PLAN NOTE
Chronic, controlled.  Latest blood pressure and vitals reviewed-   Temp:  [98 °F (36.7 °C)-98.5 °F (36.9 °C)]   Pulse:  []   Resp:  [16-18]   BP: (108-130)/(70-90)   SpO2:  [92 %-97 %] .   Home meds for hypertension were reviewed and noted below.   Hypertension Medications              lisinopriL 10 MG tablet Take 1 tablet (10 mg total) by mouth once daily.     While in the hospital, will manage blood pressure as follows; Continue home antihypertensive regimen    Will utilize p.r.n. blood pressure medication only if patient's blood pressure greater than  180/110 and she develops symptoms such as worsening chest pain or shortness of breath.

## 2025-04-06 NOTE — ASSESSMENT & PLAN NOTE
Patient presently in acute exacerbation and is with signs/symptoms of distress. Patient currently saturating 98% on 5L/min. Flu A/COVID positive.  Plan:  -Continue home medications, titrate as needed  -Titrate oxygen requirements as needed  -Incentive spirometry   -Monitor pulse oximetry  -Adrián prn  -Continue treatment of Flu/COVID as noted above     4/6/2025  -O2 sat 93-95% on 1.5 L nc.  -Wean oxygen   -Continue current management.

## 2025-04-06 NOTE — PROGRESS NOTES
[facility] Medicine  Progress Note    Patient Name: Shayy Faust  MRN: [id number]  Patient Class: IP- Inpatient   Admission Date: 4/4/2025  Length of Stay: 2 days  Attending Physician: Ovidio Sosa MD  Primary Care Provider: Karine, Primary Doctor        Subjective     Principal Problem:Influenza A        HPI:  Shayy Faust is a 49 y.o. female with a PMH  has a past medical history of ANCA-associated vasculitis (08/29/2024), Anemia, chronic renal failure, stage 4 (severe) (08/23/2024), and Mild intermittent asthma, uncomplicated. who presented as a transfer from [facility] for higher level of care and continued medical management after being diagnosed with symptomatic influenza A and COVID in an immunocompromised patient.  Patient presented to outside facility initially from clinic after testing positive for flu a and COVID and was found to have low BP.  Patient has significant history including ANCA associated vasculitis and on chronic steroids.  Patient reported endorsing worsening generalized weakness/fatigue, nonproductive cough, fevers, chills, sweats, nausea, vomiting, diarrhea, generalized myalgias with no known alleviating or aggravating factors noted.  Patient currently follows Dr. Jiménez from pulmonology and was treated for acute asthma exacerbation with stress dose steroids as well as doxycycline back on 03/17.  She reported compliance with home medications and reported she initially felt better but began to feel worse shortly afterwards.  All other review of systems negative except as noted above.  Initial workup at outside facility revealed patient to be afebrile without leukocytosis, saturating 98% on 5 L via nasal cannula, creatinine/GFR 2.0/30, AST//70, lactic acid within normal limits, procalcitonin 20.55, influenza a/COVID positive, chest x-ray negative for acute findings.  Patient initiated on flu/COVID treatment as well as antibiotics and admitted to  Hospital Medicine inpatient for continued medical management.      PCP: Karine, Primary Doctor      Overview/Hospital Course:  48 y/o female admitted for acute respiratory failure, sepsis secondary to influenza A and COVID. Remdesivir, Tamiflu, and cefepime initiated. Patient is on 3L NC, O2 sat 94-96%. Renal function improving with IV hydration. 4/6/25-Patient doing better. Weaning off oxygen. Renal function back to baseline. Continue with current medical management plan.           Review of Systems   Constitutional:  Positive for fatigue. Negative for chills.   HENT: Negative.     Eyes: Negative.    Respiratory:  Positive for cough and shortness of breath.    Cardiovascular: Negative.    Gastrointestinal:  Negative for diarrhea, nausea and vomiting.   Endocrine: Negative.    Genitourinary: Negative.    Musculoskeletal: Negative.    Skin: Negative.    Allergic/Immunologic: Negative.    Neurological:  Positive for weakness.   Hematological: Negative.    Psychiatric/Behavioral: Negative.       Objective:     Vital Signs (Most Recent):  Temp: 98.4 °F (36.9 °C) (04/06/25 1203)  Pulse: 76 (04/06/25 1203)  Resp: 18 (04/06/25 1203)  BP: 108/76 (04/06/25 1203)  SpO2: (!) 92 % (04/06/25 1203) Vital Signs (24h Range):  Temp:  [98 °F (36.7 °C)-98.5 °F (36.9 °C)] 98.4 °F (36.9 °C)  Pulse:  [] 76  Resp:  [16-18] 18  SpO2:  [92 %-97 %] 92 %  BP: (108-130)/(70-90) 108/76     Weight: 45.5 kg (100 lb 5 oz)  Body mass index is 17.22 kg/m².    Intake/Output Summary (Last 24 hours) at 4/6/2025 1444  Last data filed at 4/6/2025 1200  Gross per 24 hour   Intake 480 ml   Output --   Net 480 ml         Physical Exam  Vitals reviewed.   Constitutional:       General: She is not in acute distress.     Appearance: She is not ill-appearing, toxic-appearing or diaphoretic.      Interventions: Nasal cannula in place.      Comments: Thin    HENT:      Head: Normocephalic and atraumatic.      Right Ear: External ear normal.      Left Ear:  External ear normal.      Nose: Nose normal. No congestion or rhinorrhea.      Mouth/Throat:      Mouth: Mucous membranes are moist.      Pharynx: Oropharynx is clear. No oropharyngeal exudate or posterior oropharyngeal erythema.   Eyes:      General: No scleral icterus.     Extraocular Movements: Extraocular movements intact.      Conjunctiva/sclera: Conjunctivae normal.      Pupils: Pupils are equal, round, and reactive to light.   Neck:      Vascular: No carotid bruit.   Cardiovascular:      Rate and Rhythm: Normal rate and regular rhythm.      Pulses: Normal pulses.      Heart sounds: Normal heart sounds. No murmur heard.     No friction rub. No gallop.   Pulmonary:      Effort: Pulmonary effort is normal. No respiratory distress.      Breath sounds: No stridor. Rhonchi present. No wheezing or rales.   Chest:      Chest wall: No tenderness.   Abdominal:      General: Abdomen is flat. Bowel sounds are normal. There is no distension.      Palpations: Abdomen is soft. There is no mass.      Tenderness: There is no abdominal tenderness. There is no right CVA tenderness, left CVA tenderness, guarding or rebound.      Hernia: No hernia is present.   Musculoskeletal:         General: No swelling, tenderness, deformity or signs of injury. Normal range of motion.      Cervical back: Normal range of motion and neck supple. No rigidity or tenderness.      Right lower leg: No edema.      Left lower leg: No edema.   Lymphadenopathy:      Cervical: No cervical adenopathy.   Skin:     General: Skin is warm and dry.      Capillary Refill: Capillary refill takes less than 2 seconds.      Coloration: Skin is not jaundiced or pale.      Findings: No bruising, erythema, lesion or rash.   Neurological:      General: No focal deficit present.      Mental Status: She is alert and oriented to person, place, and time. Mental status is at baseline.      Cranial Nerves: No cranial nerve deficit.      Sensory: No sensory deficit.       L Brow Units: 0 "Motor: No weakness.      Coordination: Coordination normal.   Psychiatric:         Mood and Affect: Mood normal.         Behavior: Behavior normal.         Thought Content: Thought content normal.         Judgment: Judgment normal.               Significant Labs: All pertinent labs within the past 24 hours have been reviewed.  Blood Culture: No results for input(s): "LABBLOO" in the last 48 hours.  BMP:   Recent Labs   Lab 04/06/25  0812      K 4.0      CO2 25   BUN 44*   CREATININE 1.7*   CALCIUM 8.3*     CBC:   Recent Labs   Lab 04/05/25  0429 04/06/25  0812   WBC 6.58 5.69   HGB 12.4 14.0   HCT 38.2 43.2    188     CMP:   Recent Labs   Lab 04/05/25  0430 04/06/25  0812    137   K 4.1 4.0    105   CO2 20* 25   BUN 43* 44*   CREATININE 1.7* 1.7*   CALCIUM 8.1* 8.3*   ALBUMIN 2.7* 3.2*   BILITOT 0.2 0.3   ALKPHOS 55 58   AST 61* 58*   ALT 48* 45*   ANIONGAP 10 7*     Lactic Acid:   Recent Labs   Lab 04/04/25  1818   LACTATE 1.1       Significant Imaging: I have reviewed all pertinent imaging results/findings within the past 24 hours.      Assessment & Plan  Influenza A  Continue Tamiflu   See plan below     COVID-19  Patient is identified as High risk for severe complications of COVID 19 based on COVID risk score of 4   Initiate standard COVID protocols; COVID-19 testing ,Infection Control notification  and isolation- respiratory, contact and droplet per protocol    Diagnostics: CBC, CMP, Procalcitonin, Ferritin, CRP, LDH, BNP, Troponin, ECG, Rapid Flu, Blood Culture x2, Portable CXR, and UA and culture    Management: Initiate targeted therapy with Remdesivir, 200mg IV x1, followed by 100mg IV daily x5 days total and Dexamethasone PO/IV 6mg daily x10 days, Maintain oxygen saturations 92-96% via Nasal Cannula 1.5 LPM and monitor with continuous/intermittent pulse oximetry. , Inhaled bronchodilators as needed for shortness of breath., and Continuous cardiac monitoring.    Advance Care " Show Right And Left Brow Units: No Planning  Current advance care plan has been discussed with patient/family/POA and patient currently wishes Full Code.     Sepsis  This patient does have evidence of infective focus  My overall impression is sepsis.  Source: Respiratory  Antibiotics given-   Antibiotics (72h ago, onward)      Start     Stop Route Frequency Ordered    04/05/25 0630  ceFEPIme injection 1 g         -- IV Every 12 hours (non-standard times) 04/05/25 0524    04/04/25 2230  mupirocin 2 % ointment         04/09/25 2059 Nasl 2 times daily 04/04/25 2120          Latest lactate reviewed-  Recent Labs   Lab 04/04/25  1818   LACTATE 1.1     Organ dysfunction indicated by Acute kidney injury and Acute respiratory failure    Fluid challenge Ideal Body Weight- The patient is obese (BMI>30) and their ideal body weight of Ideal body weight: 54.7 kg (120 lb 9.5 oz) will be used to calculate fluid bolus of 30 ml/kg.     Post- resuscitation assessment No - Post resuscitation assessment not needed     Will Not start Pressors- Levophed for MAP of 65        Interstitial pulmonary disease, unspecified  Patient presently in acute exacerbation and is with signs/symptoms of distress. Patient currently saturating 98% on 5L/min. Flu A/COVID positive.  Plan:  -Continue home medications, titrate as needed  -Titrate oxygen requirements as needed  -Incentive spirometry   -Monitor pulse oximetry  -Adrián prn  -Continue treatment of Flu/COVID as noted above     4/6/2025  -O2 sat 93-95% on 1.5 L nc.  -Wean oxygen   -Continue current management.   ANCA-associated vasculitis  Current followed outpatient and chronically on steroids.  Plan:  -continue home medications  -f/u outpatient as directed       Primary hypertension  Chronic, controlled.  Latest blood pressure and vitals reviewed-   Temp:  [98 °F (36.7 °C)-98.5 °F (36.9 °C)]   Pulse:  []   Resp:  [16-18]   BP: (108-130)/(70-90)   SpO2:  [92 %-97 %] .   Home meds for hypertension were reviewed and noted  Show Depressor Anguli Units: Yes Detail Level: Detailed below.   Hypertension Medications              lisinopriL 10 MG tablet Take 1 tablet (10 mg total) by mouth once daily.     While in the hospital, will manage blood pressure as follows; Continue home antihypertensive regimen    Will utilize p.r.n. blood pressure medication only if patient's blood pressure greater than  180/110 and she develops symptoms such as worsening chest pain or shortness of breath.    Anemia  Most recent hemoglobin and hematocrit are listed below.  Recent Labs     04/04/25  1431 04/05/25  0429 04/06/25  0812   HGB 16.4* 12.4 14.0   HCT 50.0* 38.2 43.2     Plan  -Monitor serial CBC: Daily  -Transfuse PRBC if patient becomes hemodynamically unstable, symptomatic or H/H drops below 7/21.  -Patient has not received any PRBC transfusions to date  -Patient's anemia is currently stable    CKD (chronic kidney disease) stage 3, GFR 30-59 ml/min  Creatine stable for now. BMP reviewed- noted Estimated Creatinine Clearance: 28.4 mL/min (A) (based on SCr of 1.7 mg/dL (H)). according to latest data. Based on current GFR, CKD stage is stage 3 - GFR 30-59.  Monitor UOP and serial BMP and adjust therapy as needed. Renally dose meds. Avoid nephrotoxic medications and procedures.    Renal function at baseline     VTE Risk Mitigation (From admission, onward)           Ordered     enoxaparin injection 30 mg  Daily         04/04/25 2134     IP VTE HIGH RISK PATIENT  Once         04/04/25 2134     Place sequential compression device  Until discontinued         04/04/25 2134                    Discharge Planning   POOJA:      Code Status: Full Code   Medical Readiness for Discharge Date:   Discharge Plan A: Home                        Katie Weeks NP  Department of Hospital Medicine   [facility] - Telemetry ([facility])     Glabellar Complex Units: 10 Expiration Date (Month Year): 11/2025 Price (Use Numbers Only, No Special Characters Or $): 220 Consent: Written consent obtained. Risks include but not limited to lid/brow ptosis, bruising, swelling, diplopia, temporary effect, incomplete chemical denervation. Additional Area 2 Location: [facility] Post-Care Instructions: Patient instructed to not lie down for 4 hours and limit physical activity for 24 hours. Patient instructed not to travel by airplane for 48 hours. Lot #: [id number] Dilution (U/0.1 Cc): 5 Additional Area 1 Location: Perioral Incrementing Botox Units: By 0.5 Units Additional Area 3 Location: Mental

## 2025-04-06 NOTE — ASSESSMENT & PLAN NOTE
Patient is identified as High risk for severe complications of COVID 19 based on COVID risk score of 4   Initiate standard COVID protocols; COVID-19 testing ,Infection Control notification  and isolation- respiratory, contact and droplet per protocol    Diagnostics: CBC, CMP, Procalcitonin, Ferritin, CRP, LDH, BNP, Troponin, ECG, Rapid Flu, Blood Culture x2, Portable CXR, and UA and culture    Management: Initiate targeted therapy with Remdesivir, 200mg IV x1, followed by 100mg IV daily x5 days total and Dexamethasone PO/IV 6mg daily x10 days, Maintain oxygen saturations 92-96% via Nasal Cannula 1.5 LPM and monitor with continuous/intermittent pulse oximetry. , Inhaled bronchodilators as needed for shortness of breath., and Continuous cardiac monitoring.    Advance Care Planning  Current advance care plan has been discussed with patient/family/POA and patient currently wishes Full Code.

## 2025-04-07 DIAGNOSIS — J45.41 MODERATE PERSISTENT ASTHMA WITH ACUTE EXACERBATION: ICD-10-CM

## 2025-04-07 DIAGNOSIS — J45.31 MILD PERSISTENT ASTHMA WITH ACUTE EXACERBATION: Primary | ICD-10-CM

## 2025-04-07 PROCEDURE — 25000003 PHARM REV CODE 250: Performed by: INTERNAL MEDICINE

## 2025-04-07 PROCEDURE — 27000207 HC ISOLATION

## 2025-04-07 PROCEDURE — 27000221 HC OXYGEN, UP TO 24 HOURS

## 2025-04-07 PROCEDURE — 94799 UNLISTED PULMONARY SVC/PX: CPT | Mod: XB

## 2025-04-07 PROCEDURE — 25000003 PHARM REV CODE 250: Performed by: NURSE PRACTITIONER

## 2025-04-07 PROCEDURE — 63600175 PHARM REV CODE 636 W HCPCS: Performed by: HOSPITALIST

## 2025-04-07 PROCEDURE — 94618 PULMONARY STRESS TESTING: CPT

## 2025-04-07 PROCEDURE — 63600175 PHARM REV CODE 636 W HCPCS: Performed by: NURSE PRACTITIONER

## 2025-04-07 PROCEDURE — 94761 N-INVAS EAR/PLS OXIMETRY MLT: CPT

## 2025-04-07 PROCEDURE — 99900035 HC TECH TIME PER 15 MIN (STAT)

## 2025-04-07 PROCEDURE — 21400001 HC TELEMETRY ROOM

## 2025-04-07 RX ORDER — LEVOFLOXACIN 750 MG/1
750 TABLET, FILM COATED ORAL EVERY OTHER DAY
Status: DISCONTINUED | OUTPATIENT
Start: 2025-04-07 | End: 2025-04-08 | Stop reason: HOSPADM

## 2025-04-07 RX ORDER — LEVOFLOXACIN 500 MG/1
500 TABLET, FILM COATED ORAL DAILY
Status: DISCONTINUED | OUTPATIENT
Start: 2025-04-07 | End: 2025-04-07 | Stop reason: DRUGHIGH

## 2025-04-07 RX ORDER — GUAIFENESIN 600 MG/1
600 TABLET, EXTENDED RELEASE ORAL 2 TIMES DAILY
Status: DISCONTINUED | OUTPATIENT
Start: 2025-04-07 | End: 2025-04-08 | Stop reason: HOSPADM

## 2025-04-07 RX ADMIN — REMDESIVIR 100 MG: 100 INJECTION, POWDER, LYOPHILIZED, FOR SOLUTION INTRAVENOUS at 09:04

## 2025-04-07 RX ADMIN — MUPIROCIN: 20 OINTMENT TOPICAL at 09:04

## 2025-04-07 RX ADMIN — PREDNISONE 5 MG: 5 TABLET ORAL at 09:04

## 2025-04-07 RX ADMIN — ONDANSETRON 4 MG: 2 INJECTION INTRAMUSCULAR; INTRAVENOUS at 05:04

## 2025-04-07 RX ADMIN — LEVOFLOXACIN 750 MG: 750 TABLET, FILM COATED ORAL at 09:04

## 2025-04-07 RX ADMIN — GUAIFENESIN 600 MG: 600 TABLET, EXTENDED RELEASE ORAL at 12:04

## 2025-04-07 RX ADMIN — GUAIFENESIN 600 MG: 600 TABLET, EXTENDED RELEASE ORAL at 09:04

## 2025-04-07 RX ADMIN — CEFEPIME 1 G: 1 INJECTION, POWDER, FOR SOLUTION INTRAMUSCULAR; INTRAVENOUS at 06:04

## 2025-04-07 RX ADMIN — ENOXAPARIN SODIUM 30 MG: 30 INJECTION SUBCUTANEOUS at 03:04

## 2025-04-07 RX ADMIN — OSELTAMIVIR PHOSPHATE 30 MG: 30 CAPSULE ORAL at 09:04

## 2025-04-07 NOTE — ASSESSMENT & PLAN NOTE
Chronic, controlled.  Latest blood pressure and vitals reviewed-   Temp:  [98.1 °F (36.7 °C)-98.7 °F (37.1 °C)]   Pulse:  [60-97]   Resp:  [16-20]   BP: ()/(64-93)   SpO2:  [91 %-98 %] .   Home meds for hypertension were reviewed and noted below.   Hypertension Medications              lisinopriL 10 MG tablet Take 1 tablet (10 mg total) by mouth once daily.     While in the hospital, will manage blood pressure as follows- hold ACE as BP low normal     Will utilize p.r.n. blood pressure medication only if patient's blood pressure greater than  180/110 and she develops symptoms such as worsening chest pain or shortness of breath.

## 2025-04-07 NOTE — SUBJECTIVE & OBJECTIVE
Interval History: NAEON. Pt seen and examined independently, nursing at bedside. She reports continued nonproductive cough. Denies shortness of breath, CP. Currently on 1.5-2L NC O2    Review of Systems  Objective:     Vital Signs (Most Recent):  Temp: 98.1 °F (36.7 °C) (04/07/25 1119)  Pulse: 91 (04/07/25 1119)  Resp: 20 (04/07/25 1119)  BP: 93/64 (04/07/25 1119)  SpO2: 97 % (04/07/25 1119) Vital Signs (24h Range):  Temp:  [98.1 °F (36.7 °C)-98.7 °F (37.1 °C)] 98.1 °F (36.7 °C)  Pulse:  [60-97] 91  Resp:  [16-20] 20  SpO2:  [91 %-98 %] 97 %  BP: ()/(64-93) 93/64     Weight: 45.5 kg (100 lb 5 oz)  Body mass index is 17.22 kg/m².  No intake or output data in the 24 hours ending 04/07/25 1244      Physical Exam  Vitals and nursing note reviewed.   Constitutional:       General: She is not in acute distress.     Appearance: She is ill-appearing.   Cardiovascular:      Rate and Rhythm: Regular rhythm. Tachycardia present.      Heart sounds: No murmur heard.  Pulmonary:      Effort: Pulmonary effort is normal.      Breath sounds: Normal breath sounds. No wheezing, rhonchi or rales.      Comments: 1.5L NC O2   Abdominal:      General: Bowel sounds are normal. There is no distension.      Palpations: Abdomen is soft.      Tenderness: There is no abdominal tenderness.   Musculoskeletal:      Right lower leg: No edema.      Left lower leg: No edema.   Neurological:      Mental Status: She is alert and oriented to person, place, and time.               Significant Labs: All pertinent labs within the past 24 hours have been reviewed.    Significant Imaging: I have reviewed all pertinent imaging results/findings within the past 24 hours.

## 2025-04-07 NOTE — PROGRESS NOTES
Pharmacist Renal Dose Adjustment Note    Shayy Faust is a 49 y.o. female being treated with the medication levofloxacin.     Patient Data:    Vital Signs (Most Recent):  Temp: 98.1 °F (36.7 °C) (04/07/25 0739)  Pulse: 77 (04/07/25 0739)  Resp: 20 (04/07/25 0739)  BP: 100/74 (04/07/25 0739)  SpO2: (!) 94 % (04/07/25 0739) Vital Signs (72h Range):  Temp:  [98 °F (36.7 °C)-99.3 °F (37.4 °C)]   Pulse:  []   Resp:  [16-34]   BP: ()/(56-98)   SpO2:  [82 %-98 %]      Recent Labs   Lab 04/04/25  1431 04/05/25  0430 04/06/25  0812   CREATININE 2.0* 1.7* 1.7*     Serum creatinine: 1.7 mg/dL (H) 04/06/25 0812  Estimated creatinine clearance: 28.4 mL/min (A)    Medication: levofloxacin 500 mg PO daily will be changed to levofloxacin 750 mg PO every other day per pharmacy renal dose adjustment protocol for patients with CrCl 20-49 mL/min.    Pharmacist's Name: Cathi Sweeney PharmD  Pharmacist's Extension: [phone]     Thank you for allowing us to participate in this patient's care.     Cathi Sweeney PharmD 04/07/2025 7:41 AM

## 2025-04-07 NOTE — ASSESSMENT & PLAN NOTE
Creatine stable for now. BMP reviewed- noted Estimated Creatinine Clearance: 28.4 mL/min (A) (based on SCr of 1.7 mg/dL (H)). according to latest data. Based on current GFR, CKD stage is stage 3 - GFR 30-59.  Monitor UOP and serial BMP and adjust therapy as needed. Renally dose meds. Avoid nephrotoxic medications and procedures.  Cr baseline approx 1.8

## 2025-04-07 NOTE — PROGRESS NOTES
[facility] Medicine  Progress Note    Patient Name: Shayy Faust  MRN: [id number]  Patient Class: IP- Inpatient   Admission Date: 4/4/2025  Length of Stay: 3 days  Attending Physician: Glendy Gibbs MD  Primary Care Provider: Karine, Primary Doctor        Subjective     Principal Problem:Influenza A        HPI:  Shayy Faust is a 49 y.o. female with a PMH  has a past medical history of ANCA-associated vasculitis (08/29/2024), Anemia, chronic renal failure, stage 4 (severe) (08/23/2024), and Mild intermittent asthma, uncomplicated. who presented as a transfer from [facility] for higher level of care and continued medical management after being diagnosed with symptomatic influenza A and COVID in an immunocompromised patient.  Patient presented to outside facility initially from clinic after testing positive for flu a and COVID and was found to have low BP.  Patient has significant history including ANCA associated vasculitis and on chronic steroids.  Patient reported endorsing worsening generalized weakness/fatigue, nonproductive cough, fevers, chills, sweats, nausea, vomiting, diarrhea, generalized myalgias with no known alleviating or aggravating factors noted.  Patient currently follows Dr. Jiménez from pulmonology and was treated for acute asthma exacerbation with stress dose steroids as well as doxycycline back on 03/17.  She reported compliance with home medications and reported she initially felt better but began to feel worse shortly afterwards.  All other review of systems negative except as noted above.  Initial workup at outside facility revealed patient to be afebrile without leukocytosis, saturating 98% on 5 L via nasal cannula, creatinine/GFR 2.0/30, AST//70, lactic acid within normal limits, procalcitonin 20.55, influenza a/COVID positive, chest x-ray negative for acute findings.  Patient initiated on flu/COVID treatment as well as antibiotics and admitted  to Hospital Medicine inpatient for continued medical management.      PCP: Karine, Primary Doctor      Overview/Hospital Course:  50 y/o female admitted for acute respiratory failure, sepsis secondary to influenza A and COVID. Remdesivir, Tamiflu, and cefepime initiated. Patient is on 3L NC, O2 sat 94-96%. Renal function improving with IV hydration.     Cefepime transitioned to PO Levofloxacin (renally dosed) 04/07 as CXR with no focal consolidation noted but Procal elevated. Remains on nasal cannula O2.       Interval History: NAEON. Pt seen and examined independently, nursing at bedside. She reports continued nonproductive cough. Denies shortness of breath, CP. Currently on 1.5-2L NC O2    Review of Systems  Objective:     Vital Signs (Most Recent):  Temp: 98.1 °F (36.7 °C) (04/07/25 1119)  Pulse: 91 (04/07/25 1119)  Resp: 20 (04/07/25 1119)  BP: 93/64 (04/07/25 1119)  SpO2: 97 % (04/07/25 1119) Vital Signs (24h Range):  Temp:  [98.1 °F (36.7 °C)-98.7 °F (37.1 °C)] 98.1 °F (36.7 °C)  Pulse:  [60-97] 91  Resp:  [16-20] 20  SpO2:  [91 %-98 %] 97 %  BP: ()/(64-93) 93/64     Weight: 45.5 kg (100 lb 5 oz)  Body mass index is 17.22 kg/m².  No intake or output data in the 24 hours ending 04/07/25 1244      Physical Exam  Vitals and nursing note reviewed.   Constitutional:       General: She is not in acute distress.     Appearance: She is ill-appearing.   Cardiovascular:      Rate and Rhythm: Regular rhythm. Tachycardia present.      Heart sounds: No murmur heard.  Pulmonary:      Effort: Pulmonary effort is normal.      Breath sounds: Normal breath sounds. No wheezing, rhonchi or rales.      Comments: 1.5L NC O2   Abdominal:      General: Bowel sounds are normal. There is no distension.      Palpations: Abdomen is soft.      Tenderness: There is no abdominal tenderness.   Musculoskeletal:      Right lower leg: No edema.      Left lower leg: No edema.   Neurological:      Mental Status: She is alert and oriented to  person, place, and time.               Significant Labs: All pertinent labs within the past 24 hours have been reviewed.    Significant Imaging: I have reviewed all pertinent imaging results/findings within the past 24 hours.      Assessment & Plan  Influenza A  Continue Tamiflu   Supportive management     COVID-19  Patient is identified as High risk for severe complications of COVID 19 based on COVID risk score of 4   Initiate standard COVID protocols; COVID-19 testing ,Infection Control notification  and isolation- respiratory, contact and droplet per protocol    Diagnostics: CBC, CMP, Procalcitonin, Ferritin, CRP, LDH, BNP, Troponin, ECG, Rapid Flu, Blood Culture x2, Portable CXR, and UA and culture    Management: Initiate targeted therapy with Remdesivir, 200mg IV x1, followed by 100mg IV daily x5 days total and Dexamethasone PO/IV 6mg daily x10 days, Maintain oxygen saturations 92-96% via Nasal Cannula 1.5 LPM and monitor with continuous/intermittent pulse oximetry. , Inhaled bronchodilators as needed for shortness of breath., and Continuous cardiac monitoring.  Add Mucinex     Sepsis  This patient does have evidence of infective focus  My overall impression is sepsis.  Source: Respiratory  Antibiotics given-   Antibiotics (72h ago, onward)      Start     Stop Route Frequency Ordered    04/07/25 0900  levoFLOXacin tablet 750 mg         -- Oral Every other day 04/07/25 0740    04/04/25 2230  mupirocin 2 % ointment         04/09/25 2059 Nasl 2 times daily 04/04/25 2120          Latest lactate reviewed-  Recent Labs   Lab 04/04/25  1818   LACTATE 1.1     Sepsis in setting of COVID and Flu infection, CXR with no PNA   Stop IV Cefepime, transition to PO Levofloxacin     Interstitial pulmonary disease, unspecified  Supplemental O2 as needed   Steroids and abx as above  Home O2 eval     ANCA-associated vasculitis  Current followed outpatient and chronically on steroids.  Hold home PO Pred while on PO Dexamethasone      Primary hypertension  Chronic, controlled.  Latest blood pressure and vitals reviewed-   Temp:  [98.1 °F (36.7 °C)-98.7 °F (37.1 °C)]   Pulse:  [60-97]   Resp:  [16-20]   BP: ()/(64-93)   SpO2:  [91 %-98 %] .   Home meds for hypertension were reviewed and noted below.   Hypertension Medications              lisinopriL 10 MG tablet Take 1 tablet (10 mg total) by mouth once daily.     While in the hospital, will manage blood pressure as follows- hold ACE as BP low normal     Will utilize p.r.n. blood pressure medication only if patient's blood pressure greater than  180/110 and she develops symptoms such as worsening chest pain or shortness of breath.    Anemia  Most recent hemoglobin and hematocrit are listed below.  Recent Labs     04/04/25  1431 04/05/25  0429 04/06/25  0812   HGB 16.4* 12.4 14.0   HCT 50.0* 38.2 43.2     Plan  -Monitor serial CBC: Daily  -Transfuse PRBC if patient becomes hemodynamically unstable, symptomatic or H/H drops below 7/21.  -Patient has not received any PRBC transfusions to date  -Patient's anemia is currently stable    CKD (chronic kidney disease) stage 3, GFR 30-59 ml/min  Creatine stable for now. BMP reviewed- noted Estimated Creatinine Clearance: 28.4 mL/min (A) (based on SCr of 1.7 mg/dL (H)). according to latest data. Based on current GFR, CKD stage is stage 3 - GFR 30-59.  Monitor UOP and serial BMP and adjust therapy as needed. Renally dose meds. Avoid nephrotoxic medications and procedures.  Cr baseline approx 1.8    VTE Risk Mitigation (From admission, onward)           Ordered     enoxaparin injection 30 mg  Daily         04/04/25 2134     IP VTE HIGH RISK PATIENT  Once         04/04/25 2134     Place sequential compression device  Until discontinued         04/04/25 2134                    Discharge Planning   POOJA:      Code Status: Full Code   Medical Readiness for Discharge Date:   Discharge Plan A: Home                        Glendy Gibbs MD  Department of  [facility] Medicine   [facility] - Tele[facility] ([facility])

## 2025-04-07 NOTE — ASSESSMENT & PLAN NOTE
This patient does have evidence of infective focus  My overall impression is sepsis.  Source: Respiratory  Antibiotics given-   Antibiotics (72h ago, onward)      Start     Stop Route Frequency Ordered    04/07/25 0900  levoFLOXacin tablet 750 mg         -- Oral Every other day 04/07/25 0740    04/04/25 2230  mupirocin 2 % ointment         04/09/25 2059 Nasl 2 times daily 04/04/25 2120          Latest lactate reviewed-  Recent Labs   Lab 04/04/25  1818   LACTATE 1.1     Sepsis in setting of COVID and Flu infection, CXR with no PNA   Stop IV Cefepime, transition to PO Levofloxacin

## 2025-04-07 NOTE — ASSESSMENT & PLAN NOTE
Patient is identified as High risk for severe complications of COVID 19 based on COVID risk score of 4   Initiate standard COVID protocols; COVID-19 testing ,Infection Control notification  and isolation- respiratory, contact and droplet per protocol    Diagnostics: CBC, CMP, Procalcitonin, Ferritin, CRP, LDH, BNP, Troponin, ECG, Rapid Flu, Blood Culture x2, Portable CXR, and UA and culture    Management: Initiate targeted therapy with Remdesivir, 200mg IV x1, followed by 100mg IV daily x5 days total and Dexamethasone PO/IV 6mg daily x10 days, Maintain oxygen saturations 92-96% via Nasal Cannula 1.5 LPM and monitor with continuous/intermittent pulse oximetry. , Inhaled bronchodilators as needed for shortness of breath., and Continuous cardiac monitoring.  Add Mucinex

## 2025-04-07 NOTE — ASSESSMENT & PLAN NOTE
Current followed outpatient and chronically on steroids.  Hold home PO Pred while on PO Dexamethasone

## 2025-04-07 NOTE — RESPIRATORY THERAPY
Home Oxygen Evaluation - [facility] - Cardiopulmonary Department      Date Performed: 2025      1) Patient's Home O2 Sat on room air, while at rest: Room Air SpO2 At Rest: (!) 86 %        If O2 sats on room air at rest are 88% or below, patient qualifies.  Document O2 liter flow needed in Step 2.  If O2 sats are 89% or above, complete Step 3.        2)  If patient is not ambulated and O2 sats are <88%, what is the O2 liter flow required to meet ordered saturation?  2 L     If O2 sats on room air while exercising remain 89% or above patient does not qualify, no further testing needed Document N/A in step 3. If O2 sats on room air while exercising are 88% or below, continue to Step 4.    3) Patient's O2 Sat on room air while exercisin) Patient's O2 Sat while exercising on O2:   at           (Must show improvement from #4 for patients to qualify)

## 2025-04-07 NOTE — PLAN OF CARE
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       Anemia, chronic renal failure, stage 4 (severe) 08/23/2024    Mild intermittent asthma, uncomplicated       PRN meds    acetaminophen, 650 mg, Q6H PRN  acetaminophen, 650 mg, Q6H PRN  albuterol-ipratropium, 3 mL, Q4H PRN  aluminum-magnesium hydroxide-simethicone, 30 mL, QID PRN  dextrose 50%, 12.5 g, PRN  dextrose 50%, 25 g, PRN  glucagon (human recombinant), 1 mg, PRN  glucose, 16 g, PRN  glucose, 24 g, PRN  melatonin, 6 mg, Nightly PRN  naloxone, 0.02 mg, PRN  ondansetron, 4 mg, Q8H PRN  polyethylene glycol, 17 g, Daily PRN  promethazine, 25 mg, Q6H PRN  simethicone, 1 tablet, QID PRN  sodium chloride 0.9%, 3 mL, Q12H PRN      Chart check completed. Will continue plan of care.      Orientation: oriented x 4  [place] Coma Scale Score: 15     Lead Monitored: Lead II Rhythm: normal sinus rhythm Frequency/Ectopy: PVCs  Cardiac/Telemetry Box Number: [id number]  VTE Core Measure: (SCDs) Sequential compression device initiated/maintained Last Bowel Movement: 04/05/25  Diet Adult Regular     John Score: 23  Fall Risk Score: 3  Accucheck []   Freq?      Lines/Drains/Airways       Peripheral Intravenous Line  Duration                  Peripheral IV - Single Lumen 04/07/25 1022 22 G Anterior;Left Forearm <1 day

## 2025-04-08 VITALS
RESPIRATION RATE: 18 BRPM | SYSTOLIC BLOOD PRESSURE: 115 MMHG | HEIGHT: 64 IN | OXYGEN SATURATION: 97 % | TEMPERATURE: 98 F | DIASTOLIC BLOOD PRESSURE: 66 MMHG | BODY MASS INDEX: 17.13 KG/M2 | WEIGHT: 100.31 LBS | HEART RATE: 97 BPM

## 2025-04-08 LAB
ABSOLUTE EOSINOPHIL (OHS): 0.02 K/UL
ABSOLUTE MONOCYTE (OHS): 0.7 K/UL (ref 0.3–1)
ABSOLUTE NEUTROPHIL COUNT (OHS): 1.57 K/UL (ref 1.8–7.7)
ANION GAP (OHS): 9 MMOL/L (ref 8–16)
BASOPHILS # BLD AUTO: 0.01 K/UL
BASOPHILS NFR BLD AUTO: 0.3 %
BUN SERPL-MCNC: 32 MG/DL (ref 6–20)
CALCIUM SERPL-MCNC: 8.2 MG/DL (ref 8.7–10.5)
CHLORIDE SERPL-SCNC: 106 MMOL/L (ref 95–110)
CO2 SERPL-SCNC: 21 MMOL/L (ref 23–29)
CREAT SERPL-MCNC: 1.4 MG/DL (ref 0.5–1.4)
ERYTHROCYTE [DISTWIDTH] IN BLOOD BY AUTOMATED COUNT: 13.2 % (ref 11.5–14.5)
GFR SERPLBLD CREATININE-BSD FMLA CKD-EPI: 46 ML/MIN/1.73/M2
GLUCOSE SERPL-MCNC: 78 MG/DL (ref 70–110)
HCT VFR BLD AUTO: 41.2 % (ref 37–48.5)
HGB BLD-MCNC: 13.2 GM/DL (ref 12–16)
IMM GRANULOCYTES # BLD AUTO: 0.04 K/UL (ref 0–0.04)
IMM GRANULOCYTES NFR BLD AUTO: 1 % (ref 0–0.5)
LYMPHOCYTES # BLD AUTO: 1.57 K/UL (ref 1–4.8)
MCH RBC QN AUTO: 30.8 PG (ref 27–31)
MCHC RBC AUTO-ENTMCNC: 32 G/DL (ref 32–36)
MCV RBC AUTO: 96 FL (ref 82–98)
NUCLEATED RBC (/100WBC) (OHS): 0 /100 WBC
PLATELET # BLD AUTO: 173 K/UL (ref 150–450)
PMV BLD AUTO: 11.1 FL (ref 9.2–12.9)
POTASSIUM SERPL-SCNC: 4.3 MMOL/L (ref 3.5–5.1)
RBC # BLD AUTO: 4.28 M/UL (ref 4–5.4)
RELATIVE EOSINOPHIL (OHS): 0.5 %
RELATIVE LYMPHOCYTE (OHS): 40.2 % (ref 18–48)
RELATIVE MONOCYTE (OHS): 17.9 % (ref 4–15)
RELATIVE NEUTROPHIL (OHS): 40.1 % (ref 38–73)
SODIUM SERPL-SCNC: 136 MMOL/L (ref 136–145)
WBC # BLD AUTO: 3.91 K/UL (ref 3.9–12.7)

## 2025-04-08 PROCEDURE — 63600175 PHARM REV CODE 636 W HCPCS: Performed by: INTERNAL MEDICINE

## 2025-04-08 PROCEDURE — 36415 COLL VENOUS BLD VENIPUNCTURE: CPT | Performed by: INTERNAL MEDICINE

## 2025-04-08 PROCEDURE — 25000003 PHARM REV CODE 250: Performed by: INTERNAL MEDICINE

## 2025-04-08 PROCEDURE — 25000003 PHARM REV CODE 250: Performed by: NURSE PRACTITIONER

## 2025-04-08 PROCEDURE — 25000003 PHARM REV CODE 250: Performed by: HOSPITALIST

## 2025-04-08 PROCEDURE — 80048 BASIC METABOLIC PNL TOTAL CA: CPT | Performed by: INTERNAL MEDICINE

## 2025-04-08 PROCEDURE — 94761 N-INVAS EAR/PLS OXIMETRY MLT: CPT

## 2025-04-08 PROCEDURE — 85025 COMPLETE CBC W/AUTO DIFF WBC: CPT | Performed by: INTERNAL MEDICINE

## 2025-04-08 PROCEDURE — 63600175 PHARM REV CODE 636 W HCPCS: Mod: JZ,TB | Performed by: NURSE PRACTITIONER

## 2025-04-08 RX ORDER — OSELTAMIVIR PHOSPHATE 30 MG/1
30 CAPSULE ORAL DAILY
Qty: 1 CAPSULE | Refills: 0 | Status: SHIPPED | OUTPATIENT
Start: 2025-04-08 | End: 2025-04-08

## 2025-04-08 RX ORDER — GUAIFENESIN 600 MG/1
600 TABLET, EXTENDED RELEASE ORAL 2 TIMES DAILY
Qty: 10 TABLET | Refills: 0 | Status: SHIPPED | OUTPATIENT
Start: 2025-04-08 | End: 2025-04-13

## 2025-04-08 RX ORDER — LEVOFLOXACIN 750 MG/1
750 TABLET, FILM COATED ORAL EVERY OTHER DAY
Qty: 2 TABLET | Refills: 0 | Status: SHIPPED | OUTPATIENT
Start: 2025-04-09 | End: 2025-04-13

## 2025-04-08 RX ORDER — OSELTAMIVIR PHOSPHATE 30 MG/1
30 CAPSULE ORAL DAILY
Qty: 1 CAPSULE | Refills: 0 | Status: SHIPPED | OUTPATIENT
Start: 2025-04-08 | End: 2025-04-09

## 2025-04-08 RX ADMIN — ONDANSETRON 4 MG: 2 INJECTION INTRAMUSCULAR; INTRAVENOUS at 10:04

## 2025-04-08 RX ADMIN — REMDESIVIR 100 MG: 100 INJECTION, POWDER, LYOPHILIZED, FOR SOLUTION INTRAVENOUS at 10:04

## 2025-04-08 RX ADMIN — GUAIFENESIN 600 MG: 600 TABLET, EXTENDED RELEASE ORAL at 08:04

## 2025-04-08 RX ADMIN — DEXAMETHASONE 6 MG: 4 TABLET ORAL at 08:04

## 2025-04-08 RX ADMIN — MUPIROCIN: 20 OINTMENT TOPICAL at 08:04

## 2025-04-08 RX ADMIN — OSELTAMIVIR PHOSPHATE 30 MG: 30 CAPSULE ORAL at 08:04

## 2025-04-08 NOTE — ASSESSMENT & PLAN NOTE
Continue steroids and antibiotics as above   Outpatient follow up with pulmonology Dr. Vazquez on discharge

## 2025-04-08 NOTE — ASSESSMENT & PLAN NOTE
Chronic, controlled.  Latest blood pressure and vitals reviewed-   Temp:  [97.9 °F (36.6 °C)-98.6 °F (37 °C)]   Pulse:  []   Resp:  [18-20]   BP: ()/(61-81)   SpO2:  [93 %-98 %] .   Home meds for hypertension were reviewed and noted below.   Hypertension Medications              lisinopriL 10 MG tablet Take 1 tablet (10 mg total) by mouth once daily.     While in the hospital, will manage blood pressure as follows- hold ACE as BP low normal     Will utilize p.r.n. blood pressure medication only if patient's blood pressure greater than  180/110 and she develops symptoms such as worsening chest pain or shortness of breath.

## 2025-04-08 NOTE — PROGRESS NOTES
AVS virtually reviewed with Shayy Faust in its entirety with emphasis on diet, medications, follow-up appointments and reasons to return to the ED or contact the [facility] On Call Nurse Care Line. Patient also encouraged to utilize their patient portal. Ease and convenience of use reiterated. Education complete and patient voiced understanding. All questions answered. Discharge teaching complete.

## 2025-04-08 NOTE — PLAN OF CARE
Problem: Adult Inpatient Plan of Care  Goal: Plan of Care Review  Outcome: Adequate for Care Transition  Goal: Patient-Specific Goal (Individualized)  Outcome: Adequate for Care Transition  Goal: Absence of Hospital-Acquired Illness or Injury  Outcome: Adequate for Care Transition  Goal: Optimal Comfort and Wellbeing  Outcome: Adequate for Care Transition  Goal: Readiness for Transition of Care  Outcome: Adequate for Care Transition     Problem: Acute Kidney Injury/Impairment  Goal: Fluid and Electrolyte Balance  Outcome: Adequate for Care Transition  Goal: Improved Oral Intake  Outcome: Adequate for Care Transition  Goal: Effective Renal Function  Outcome: Adequate for Care Transition     Problem: Pneumonia  Goal: Fluid Balance  Outcome: Adequate for Care Transition  Goal: Resolution of Infection Signs and Symptoms  Outcome: Adequate for Care Transition  Goal: Effective Oxygenation and Ventilation  Outcome: Adequate for Care Transition     Problem: Sepsis/Septic Shock  Goal: Optimal Coping  Outcome: Adequate for Care Transition  Goal: Absence of Bleeding  Outcome: Adequate for Care Transition  Goal: Blood Glucose Level Within Targeted Range  Outcome: Adequate for Care Transition  Goal: Absence of Infection Signs and Symptoms  Outcome: Adequate for Care Transition  Goal: Optimal Nutrition Intake  Outcome: Adequate for Care Transition     Problem: Activity Intolerance  Goal: Enhanced Capacity and Energy  Outcome: Adequate for Care Transition     Problem: Fatigue  Goal: Improved Activity Tolerance  Outcome: Adequate for Care Transition     Problem: Self-Care Deficit  Goal: Improved Ability to Complete Activities of Daily Living  Outcome: Adequate for Care Transition

## 2025-04-08 NOTE — ASSESSMENT & PLAN NOTE
Patient is identified as High risk for severe complications of COVID 19 based on COVID risk score of 4   Initiate standard COVID protocols; COVID-19 testing ,Infection Control notification  and isolation- respiratory, contact and droplet per protocol    Received p.o. steroids, IV remdesivir per COVID protocol   Outpatient pulmonology follow-up on discharge

## 2025-04-08 NOTE — ASSESSMENT & PLAN NOTE
Creatine stable for now. BMP reviewed- noted Estimated Creatinine Clearance: 34.5 mL/min (based on SCr of 1.4 mg/dL). according to latest data. Based on current GFR, CKD stage is stage 3 - GFR 30-59.  Monitor UOP and serial BMP and adjust therapy as needed. Renally dose meds. Avoid nephrotoxic medications and procedures.  Cr baseline approx 1.8  Outpatient follow up with Dr. Montero as previously scheduled

## 2025-04-08 NOTE — ASSESSMENT & PLAN NOTE
Current followed outpatient by Rheumatology and chronically on steroids and Imuran.  Resume home p.o. prednisone on discharge, advised to discuss resumption of Imuran with rheumatology provider prior to restarting

## 2025-04-08 NOTE — PLAN OF CARE
Problem: Adult Inpatient Plan of Care  Goal: Plan of Care Review  Outcome: Progressing  Goal: Patient-Specific Goal (Individualized)  Outcome: Progressing  Goal: Absence of Hospital-Acquired Illness or Injury  Outcome: Progressing  Goal: Optimal Comfort and Wellbeing  Outcome: Progressing  Goal: Readiness for Transition of Care  Outcome: Progressing     Problem: Acute Kidney Injury/Impairment  Goal: Fluid and Electrolyte Balance  Outcome: Progressing  Goal: Improved Oral Intake  Outcome: Progressing  Goal: Effective Renal Function  Outcome: Progressing     Problem: Pneumonia  Goal: Fluid Balance  Outcome: Progressing  Goal: Resolution of Infection Signs and Symptoms  Outcome: Progressing  Goal: Effective Oxygenation and Ventilation  Outcome: Progressing     Problem: Sepsis/Septic Shock  Goal: Optimal Coping  Outcome: Progressing  Goal: Absence of Bleeding  Outcome: Progressing  Goal: Blood Glucose Level Within Targeted Range  Outcome: Progressing  Goal: Absence of Infection Signs and Symptoms  Outcome: Progressing  Goal: Optimal Nutrition Intake  Outcome: Progressing     Problem: Activity Intolerance  Goal: Enhanced Capacity and Energy  Outcome: Progressing     Problem: Fatigue  Goal: Improved Activity Tolerance  Outcome: Progressing     Problem: Self-Care Deficit  Goal: Improved Ability to Complete Activities of Daily Living  Outcome: Progressing

## 2025-04-08 NOTE — PLAN OF CARE
[facility] - Tele[facility] (Hospital)  Discharge Final Note    Primary Care Provider: No, Primary Doctor    Expected Discharge Date: 4/8/2025    Final Discharge Note (most recent)       Final Note - 04/05/25 1113          Final Note    Assessment Type Discharge Planning Assessment        Post-Acute Status    Coverage [employer]                   Patient discharge home with no needs.             Contact Info       Colleen Blackmon, HOP-C   Specialty: Family Medicine   Relationship: Nurse Practitioner    [address] [place] [id number]   Phone: [phone]       Next Steps: Schedule an appointment as soon as possible for a visit in 3 day(s)    Ulysses Jiménez MD   Specialty: Pulmonary Disease    [address]  [place] [address]   Phone: [phone]       Next Steps: Schedule an appointment as soon as possible for a visit in 1 week(s)

## 2025-04-08 NOTE — DISCHARGE SUMMARY
[facility] Medicine  Discharge Summary      Patient Name: Shayy Faust  MRN: [id number]  [facility]: [id number]  Patient Class: IP- Inpatient  Admission Date: 4/4/2025  Hospital Length of Stay: 4 days  Discharge Date and Time: 04/08/2025 9:33 AM  Attending Physician: Glendy Gibbs MD   Discharging Provider: Glendy Gibbs MD  Primary Care Provider: Karine Primary Doctor    Primary Care Team: Networked reference to record PCT     HPI:   Shayy Faust is a 49 y.o. female with a PMH  has a past medical history of ANCA-associated vasculitis (08/29/2024), Anemia, chronic renal failure, stage 4 (severe) (08/23/2024), and Mild intermittent asthma, uncomplicated. who presented as a transfer from [facility] for higher level of care and continued medical management after being diagnosed with symptomatic influenza A and COVID in an immunocompromised patient.  Patient presented to outside facility initially from clinic after testing positive for flu a and COVID and was found to have low BP.  Patient has significant history including ANCA associated vasculitis and on chronic steroids.  Patient reported endorsing worsening generalized weakness/fatigue, nonproductive cough, fevers, chills, sweats, nausea, vomiting, diarrhea, generalized myalgias with no known alleviating or aggravating factors noted.  Patient currently follows Dr. Jiménez from pulmonology and was treated for acute asthma exacerbation with stress dose steroids as well as doxycycline back on 03/17.  She reported compliance with home medications and reported she initially felt better but began to feel worse shortly afterwards.  All other review of systems negative except as noted above.  Initial workup at outside facility revealed patient to be afebrile without leukocytosis, saturating 98% on 5 L via nasal cannula, creatinine/GFR 2.0/30, AST//70, lactic acid within normal limits, procalcitonin 20.55, influenza a/COVID  positive, chest x-ray negative for acute findings.  Patient initiated on flu/COVID treatment as well as antibiotics and admitted to Hospital Medicine inpatient for continued medical management.      PCP: No, Primary Doctor      * No surgery found *      Hospital Course:   50 y/o female admitted for acute respiratory failure, sepsis secondary to influenza A and COVID. Remdesivir, Tamiflu, and cefepime initiated. Patient is on 3L NC, O2 sat 94-96%. Renal function improving with IV hydration.     Cefepime transitioned to PO Levofloxacin (renally dosed) 04/07 as CXR with no focal consolidation noted but Procal elevated.     She was able to be weaned off supplemental oxygen.  RN ambulated patient on day of discharge in sats remained greater than 90% with ambulation off oxygen.  Patient is seen and examined on day of discharge, reports that she feels better, still has some cough that is nonproductive but is ready to go home.  Remains afebrile, hemodynamically stable.  Patient appears stable for discharge home today per my exam    On discharge, she will continue p.o. Tamiflu to complete 5 day course, continue home p.o. prednisone, continue renally dosed p.o. levofloxacin to complete 5 day course.  Advised to hold azathioprine until she can follow up/discuss with her rheumatologist.  Follow up with PCP within 3-5 days and pulmonology within 1-2 weeks.     Goals of Care Treatment Preferences:  Code Status: Full Code      SDOH Screening:  The patient was screened for utility difficulties, food insecurity, transport difficulties, housing insecurity, and interpersonal safety and there were no concerns identified this admission.     Consults:   Consults (From admission, onward)          Status Ordering Provider     Inpatient consult to Hospitalist  Once        Provider:  Florentin White MD    Acknowledged JONEL PIERRE            Assessment & Plan  Influenza A  Continue Tamiflu   Supportive management      COVID-19  Patient is identified as High risk for severe complications of COVID 19 based on COVID risk score of 4   Initiate standard COVID protocols; COVID-19 testing ,Infection Control notification  and isolation- respiratory, contact and droplet per protocol    Received p.o. steroids, IV remdesivir per COVID protocol   Outpatient pulmonology follow-up on discharge    Sepsis  This patient does have evidence of infective focus  My overall impression is sepsis.  Source: Respiratory  Antibiotics given-   Antibiotics (72h ago, onward)      Start     Stop Route Frequency Ordered    04/07/25 0900  levoFLOXacin tablet 750 mg         -- Oral Every other day 04/07/25 0740    04/04/25 2230  mupirocin 2 % ointment         04/09/25 2059 Nasl 2 times daily 04/04/25 2120          Sepsis in setting of COVID and Flu infection, CXR with no PNA   Transitioned to PO Levofloxacin to complete 5 day course given an elevated procalcitonin on admission  Outpatient follow up with pulmonology on discharge    Interstitial pulmonary disease, unspecified  Continue steroids and antibiotics as above   Outpatient follow up with pulmonology Dr. Vazquez on discharge    ANCA-associated vasculitis  Current followed outpatient by Rheumatology and chronically on steroids and Imuran.  Resume home p.o. prednisone on discharge, advised to discuss resumption of Imuran with rheumatology provider prior to restarting    Primary hypertension  Chronic, controlled.  Latest blood pressure and vitals reviewed-   Temp:  [97.9 °F (36.6 °C)-98.6 °F (37 °C)]   Pulse:  []   Resp:  [18-20]   BP: ()/(61-81)   SpO2:  [93 %-98 %] .   Home meds for hypertension were reviewed and noted below.   Hypertension Medications              lisinopriL 10 MG tablet Take 1 tablet (10 mg total) by mouth once daily.     While in the hospital, will manage blood pressure as follows- hold ACE as BP low normal     Will utilize p.r.n. blood pressure medication only if patient's  blood pressure greater than  180/110 and she develops symptoms such as worsening chest pain or shortness of breath.    Anemia  Most recent hemoglobin and hematocrit are listed below.  Recent Labs     04/06/25  0812 04/08/25  0436   HGB 14.0 13.2   HCT 43.2 41.2     Plan  -Monitor serial CBC: Daily  -Transfuse PRBC if patient becomes hemodynamically unstable, symptomatic or H/H drops below 7/21.  -Patient has not received any PRBC transfusions to date  -Patient's anemia is currently stable    CKD (chronic kidney disease) stage 3, GFR 30-59 ml/min  Creatine stable for now. BMP reviewed- noted Estimated Creatinine Clearance: 34.5 mL/min (based on SCr of 1.4 mg/dL). according to latest data. Based on current GFR, CKD stage is stage 3 - GFR 30-59.  Monitor UOP and serial BMP and adjust therapy as needed. Renally dose meds. Avoid nephrotoxic medications and procedures.  Cr baseline approx 1.8  Outpatient follow up with Dr. Montero as previously scheduled    Final Active Diagnoses:    Diagnosis Date Noted POA    PRINCIPAL PROBLEM:  Influenza A [J10.1] 04/05/2025 Yes    COVID-19 [U07.1] 04/05/2025 Yes    Sepsis [A41.9] 04/05/2025 Yes    Anemia [D64.9] 04/05/2025 Yes     Chronic    CKD (chronic kidney disease) stage 3, GFR 30-59 ml/min [N18.30] 04/05/2025 Yes     Chronic    Interstitial pulmonary disease, unspecified [J84.9] 03/17/2025 Yes     Chronic    Primary hypertension [I10] 12/05/2024 Yes     Chronic    ANCA-associated vasculitis [I77.82] 08/29/2024 Yes     Chronic      Problems Resolved During this Admission:       Discharged Condition: good    Disposition: Home or Self Care    Follow Up:   Follow-up Information       Colleen Blackmon FNP-C. Schedule an appointment as soon as possible for a visit in 3 day(s).    Specialty: Family Medicine  Contact information:  [address] [address] [address]  [phone]               Ulysses Jiménez MD. Schedule an appointment as soon as possible for a visit in 1  week(s).    Specialty: Pulmonary Disease  Contact information:  [address]  [place] [address]  [phone]                           Patient Instructions:      Diet Adult Regular     Notify your health care provider if you experience any of the following:  temperature >100.4     Notify your health care provider if you experience any of the following:  difficulty breathing or increased cough     Activity as tolerated       Significant Diagnostic Studies:  See hospital course    Pending Diagnostic Studies:       None           Medications:  Reconciled Home Medications:      Medication List        START taking these medications      guaiFENesin 600 mg 12 hr tablet  Commonly known as: MUCINEX  Take 1 tablet (600 mg total) by mouth 2 (two) times daily. for 5 days     levoFLOXacin 750 MG tablet  Commonly known as: LEVAQUIN  Take 1 tablet (750 mg total) by mouth every other day. for 4 days  Start taking on: April 9, 2025     oseltamivir 30 MG capsule  Commonly known as: TAMIFLU  Take 1 capsule (30 mg total) by mouth once daily. for 1 day            CONTINUE taking these medications      albuterol 90 mcg/actuation inhaler  Commonly known as: PROVENTIL/VENTOLIN HFA  Inhale 2 puffs into the lungs every 4 (four) hours as needed for Wheezing or Shortness of Breath.     azaTHIOprine 50 mg Tab  Commonly known as: IMURAN  Take 1 tablet (50 mg total) by mouth every other day.  Notes to patient: DISCUSS WITH PRESCRIBER PRIOR TO RESTARTING      doxycycline 100 MG capsule  Commonly known as: MONODOX  Take 1 capsule (100 mg total) by mouth every 12 (twelve) hours.     fluticasone-salmeterol 250-50 mcg/dose 250-50 mcg/dose diskus inhaler  Commonly known as: ADVAIR  Inhale 1 puff into the lungs 2 (two) times daily. Controller     lisinopriL 10 MG tablet  Take 1 tablet (10 mg total) by mouth once daily.     * predniSONE 20 MG tablet  Commonly known as: DELTASONE  Prednisone 60 mg/ day for 3 days, 40 mg/day for 3 days,20 mg/  day for 3 days, (1/2 tablet )10 mg a day for 3 days.     * predniSONE 5 MG tablet  Commonly known as: DELTASONE  Take 1 tablet (5 mg total) by mouth once daily. Start after high dose prednisone     sulfamethoxazole-trimethoprim 800-160mg 800-160 mg Tab  Commonly known as: BACTRIM DS  Take 1 tablet by mouth 3 (three) times a week.  Notes to patient: RESTART ONCE LEVOFLOXACIN DOSES COMPLETED      vitamin D 1000 units Tab  Commonly known as: VITAMIN D3  Take 1,000 Units by mouth once daily.           * This list has 2 medication(s) that are the same as other medications prescribed for you. Read the directions carefully, and ask your doctor or other care provider to review them with you.                  Indwelling Lines/Drains at time of discharge:   Lines/Drains/Airways       None                   Time spent on the discharge of patient: 28 minutes         Glendy Gibbs MD  Department of Hospital Medicine  [employer] - Telemetry ([facility])

## 2025-04-08 NOTE — ASSESSMENT & PLAN NOTE
This patient does have evidence of infective focus  My overall impression is sepsis.  Source: Respiratory  Antibiotics given-   Antibiotics (72h ago, onward)      Start     Stop Route Frequency Ordered    04/07/25 0900  levoFLOXacin tablet 750 mg         -- Oral Every other day 04/07/25 0740    04/04/25 2230  mupirocin 2 % ointment         04/09/25 2059 Nasl 2 times daily 04/04/25 2120          Sepsis in setting of COVID and Flu infection, CXR with no PNA   Transitioned to PO Levofloxacin to complete 5 day course given an elevated procalcitonin on admission  Outpatient follow up with pulmonology on discharge

## 2025-04-08 NOTE — ASSESSMENT & PLAN NOTE
Most recent hemoglobin and hematocrit are listed below.  Recent Labs     04/06/25  0812 04/08/25  0436   HGB 14.0 13.2   HCT 43.2 41.2     Plan  -Monitor serial CBC: Daily  -Transfuse PRBC if patient becomes hemodynamically unstable, symptomatic or H/H drops below 7/21.  -Patient has not received any PRBC transfusions to date  -Patient's anemia is currently stable

## 2025-04-09 DIAGNOSIS — N18.30 STAGE 3 CHRONIC KIDNEY DISEASE, UNSPECIFIED WHETHER STAGE 3A OR 3B CKD: Primary | Chronic | ICD-10-CM

## 2025-04-09 DIAGNOSIS — N18.4 ANEMIA, CHRONIC RENAL FAILURE, STAGE 4 (SEVERE): ICD-10-CM

## 2025-04-09 DIAGNOSIS — D63.1 ANEMIA, CHRONIC RENAL FAILURE, STAGE 4 (SEVERE): ICD-10-CM

## 2025-04-10 LAB
BACTERIA BLD CULT: NORMAL
BACTERIA BLD CULT: NORMAL

## 2025-04-14 ENCOUNTER — HOSPITAL ENCOUNTER (OUTPATIENT)
Dept: RADIOLOGY | Facility: HOSPITAL | Age: 50
Discharge: HOME | End: 2025-04-14
Attending: NURSE PRACTITIONER
Payer: COMMERCIAL

## 2025-04-14 ENCOUNTER — TELEPHONE (OUTPATIENT)
Dept: INTERNAL MEDICINE | Facility: CLINIC | Age: 50
End: 2025-04-14
Payer: COMMERCIAL

## 2025-04-14 ENCOUNTER — OFFICE VISIT (OUTPATIENT)
Dept: INTERNAL MEDICINE | Facility: CLINIC | Age: 50
End: 2025-04-14
Payer: COMMERCIAL

## 2025-04-14 VITALS
OXYGEN SATURATION: 87 % | DIASTOLIC BLOOD PRESSURE: 80 MMHG | TEMPERATURE: 98 F | HEART RATE: 96 BPM | SYSTOLIC BLOOD PRESSURE: 100 MMHG | HEIGHT: 64 IN | BODY MASS INDEX: 17.13 KG/M2 | WEIGHT: 100.31 LBS | RESPIRATION RATE: 18 BRPM

## 2025-04-14 DIAGNOSIS — Z09 HOSPITAL DISCHARGE FOLLOW-UP: Primary | ICD-10-CM

## 2025-04-14 DIAGNOSIS — J84.9 INTERSTITIAL PULMONARY DISEASE, UNSPECIFIED: Chronic | ICD-10-CM

## 2025-04-14 DIAGNOSIS — D84.821 IMMUNODEFICIENCY DUE TO DRUG THERAPY: ICD-10-CM

## 2025-04-14 DIAGNOSIS — J45.41 MODERATE PERSISTENT ASTHMA WITH ACUTE EXACERBATION: ICD-10-CM

## 2025-04-14 DIAGNOSIS — R06.02 SHORTNESS OF BREATH: ICD-10-CM

## 2025-04-14 DIAGNOSIS — I10 PRIMARY HYPERTENSION: Chronic | ICD-10-CM

## 2025-04-14 DIAGNOSIS — Z79.899 IMMUNODEFICIENCY DUE TO DRUG THERAPY: ICD-10-CM

## 2025-04-14 DIAGNOSIS — N18.31 STAGE 3A CHRONIC KIDNEY DISEASE: Chronic | ICD-10-CM

## 2025-04-14 DIAGNOSIS — I77.82 ANCA-ASSOCIATED VASCULITIS: Chronic | ICD-10-CM

## 2025-04-14 PROCEDURE — 71046 X-RAY EXAM CHEST 2 VIEWS: CPT | Mod: TC,PO

## 2025-04-14 PROCEDURE — 99999 PR PBB SHADOW E&M-EST. PATIENT-LVL V: CPT | Mod: PBBFAC,,, | Performed by: NURSE PRACTITIONER

## 2025-04-14 PROCEDURE — 99214 OFFICE O/P EST MOD 30 MIN: CPT | Mod: S$GLB,,, | Performed by: NURSE PRACTITIONER

## 2025-04-14 PROCEDURE — 4010F ACE/ARB THERAPY RXD/TAKEN: CPT | Mod: CPTII,S$GLB,, | Performed by: NURSE PRACTITIONER

## 2025-04-14 PROCEDURE — 71046 X-RAY EXAM CHEST 2 VIEWS: CPT | Mod: 26,,, | Performed by: RADIOLOGY

## 2025-04-14 PROCEDURE — 3074F SYST BP LT 130 MM HG: CPT | Mod: CPTII,S$GLB,, | Performed by: NURSE PRACTITIONER

## 2025-04-14 PROCEDURE — 3066F NEPHROPATHY DOC TX: CPT | Mod: CPTII,S$GLB,, | Performed by: NURSE PRACTITIONER

## 2025-04-14 PROCEDURE — 3079F DIAST BP 80-89 MM HG: CPT | Mod: CPTII,S$GLB,, | Performed by: NURSE PRACTITIONER

## 2025-04-14 NOTE — TELEPHONE ENCOUNTER
Just wanted to give an update on mutual patient. I saw Ms. Faust today for hospital follow up. She was hospitalized for 4 days after testing positive for influenza and COVID requiring oxygen. She was also treated with antibiotic to cover for pneumonia. She was advised to hold azathioprine until cleared by rheumatology. She was scheduled to see you on 4/8 which was when she was hospitalized.  Appointment was rescheduled to 5/28. She is still off azathioprine and also noted she was still experiencing GI side effects with the lower dose.

## 2025-04-14 NOTE — PROGRESS NOTES
Subjective:       Patient ID: Shayy Faust is a 49 y.o. female.    Chief Complaint: Hospital Follow Up    History of Present Illness    HPI:  Ms. Faust was recently hospitalized for a severe illness involving both influenza and COVID-19, discharged on Tuesday. During her hospital stay, she required oxygen therapy but no longer needed it upon discharge. She reports feeling better each day since leaving the hospital, but is still not back to her baseline health status.    She continues to have a persistent cough and shortness of breath. Her appetite has not returned to normal. She was treated for pneumonia during her hospitalization, although her chest XR was negative. Procalcitonin was elevated. Blood cultures negative. She completed a course of Levaquin (an antibiotic) prescribed upon discharge.    Prior to her acute illness, she was being treated for ANCA-associated vasculitis with Imuran (azathioprine) and prednisone. Azathioprine was causing significant nausea despite dosing being reduced to 25mg. She is currently not taking it due to the recent infections. She is still taking a steroid at a dose of 5mg.    She returned to work last Friday. She has upcoming appointments with pulmonology in 2 days and nephrology at the end of May. She was unable to complete previously scheduled lab work due to her illness.    She denies any worsening of symptoms since leaving the hospital.    HPI and discharge summary copied from hospital encounter:           HPI  Patient Name: Shayy Faust  MRN: [id number]  FANI: [id number]  Patient Class: IP- Inpatient  Admission Date: 4/4/2025  Hospital Length of Stay: 4 days  Discharge Date and Time: 04/08/2025 9:33 AM  Attending Physician: Glendy Gibbs MD   Discharging Provider: Glendy Gibbs MD  Primary Care Provider: Karine Primary Doctor     Primary Care Team: Networked reference to record PCT      HPI:   Shayy Faust is a 49 y.o. female with a PMH  has a past  medical history of ANCA-associated vasculitis (08/29/2024), Anemia, chronic renal failure, stage 4 (severe) (08/23/2024), and Mild intermittent asthma, uncomplicated. who presented as a transfer from [facility] for higher level of care and continued medical management after being diagnosed with symptomatic influenza A and COVID in an immunocompromised patient.  Patient presented to outside facility initially from clinic after testing positive for flu a and COVID and was found to have low BP.  Patient has significant history including ANCA associated vasculitis and on chronic steroids.  Patient reported endorsing worsening generalized weakness/fatigue, nonproductive cough, fevers, chills, sweats, nausea, vomiting, diarrhea, generalized myalgias with no known alleviating or aggravating factors noted.  Patient currently follows Dr. Jiménez from pulmonology and was treated for acute asthma exacerbation with stress dose steroids as well as doxycycline back on 03/17.  She reported compliance with home medications and reported she initially felt better but began to feel worse shortly afterwards.  All other review of systems negative except as noted above.  Initial workup at outside facility revealed patient to be afebrile without leukocytosis, saturating 98% on 5 L via nasal cannula, creatinine/GFR 2.0/30, AST//70, lactic acid within normal limits, procalcitonin 20.55, influenza a/COVID positive, chest x-ray negative for acute findings.  Patient initiated on flu/COVID treatment as well as antibiotics and admitted to Hospital Medicine inpatient for continued medical management.       PCP: No, Primary Doctor        * No surgery found *       Hospital Course:   48 y/o female admitted for acute respiratory failure, sepsis secondary to influenza A and COVID. Remdesivir, Tamiflu, and cefepime initiated. Patient is on 3L NC, O2 sat 94-96%. Renal function improving with IV hydration.      Cefepime transitioned to PO  Levofloxacin (renally dosed) 04/07 as CXR with no focal consolidation noted but Procal elevated.      She was able to be weaned off supplemental oxygen.  RN ambulated patient on day of discharge in sats remained greater than 90% with ambulation off oxygen.  Patient is seen and examined on day of discharge, reports that she feels better, still has some cough that is nonproductive but is ready to go home.  Remains afebrile, hemodynamically stable.  Patient appears stable for discharge home today per my exam     On discharge, she will continue p.o. Tamiflu to complete 5 day course, continue home p.o. prednisone, continue renally dosed p.o. levofloxacin to complete 5 day course.  Advised to hold azathioprine until she can follow up/discuss with her rheumatologist.  Follow up with PCP within 3-5 days and pulmonology within 1-2 weeks.     Transitional Care Note    Family and/or Caretaker present at visit?  No.  Diagnostic tests reviewed/disposition: No diagnosic tests pending after this hospitalization.  Disease/illness education: Yes  Home health/community services discussion/referrals: Patient does not have home health established from hospital visit.  They do not need home health.  If needed, we will set up home health for the patient.   Establishment or re-establishment of referral orders for community resources: No other necessary community resources.   Discussion with other health care providers:  Message sent to nephrology regarding Imuran being held and continued nausea despite reduced dosing. Advised to remain off Imuran until nephrology follow up .            Problem List[1]    Family History   Problem Relation Name Age of Onset    Hypertension Mother      Hypertension Father       Past Surgical History:   Procedure Laterality Date    BILATERAL TUBAL LIGATION N/A     COLONOSCOPY N/A 8/20/2024    Procedure: COLONOSCOPY;  Surgeon: Gena Saxena MD;  Location: [facility];  Service: Endoscopy;  Laterality: N/A;  "   ESOPHAGOGASTRODUODENOSCOPY N/A 8/20/2024    Procedure: EGD (ESOPHAGOGASTRODUODENOSCOPY);  Surgeon: Gena Saxena MD;  Location: [facility] ENDO;  Service: Endoscopy;  Laterality: N/A;    INTRALUMINAL GASTROINTESTINAL TRACT IMAGING VIA CAPSULE N/A 9/19/2024    Procedure: IMAGING PROCEDURE, GI TRACT, INTRALUMINAL, VIA CAPSULE;  Surgeon: Ignacio Jimenez RN;  Location: [facility];  Service: Endoscopy;  Laterality: N/A;  Neg EGD/Colon. Hx MARIA ESTHER/weight loss/ARF       Current Medications[2]    Review of Systems   Constitutional:  Positive for activity change, appetite change and fatigue. Negative for chills and fever.   Respiratory:  Positive for cough and shortness of breath.    Cardiovascular:  Negative for chest pain.   Gastrointestinal:  Negative for abdominal pain, diarrhea, nausea and vomiting.       Objective:   /80   Pulse 96   Temp 97.9 °F (36.6 °C) (Tympanic)   Resp 18   Ht 5' 4" (1.626 m)   Wt 45.5 kg (100 lb 5 oz)   SpO2 (!) 87%   BMI 17.22 kg/m²      Physical Exam  Constitutional:       General: She is not in acute distress.     Appearance: Normal appearance. She is ill-appearing (mild). She is not toxic-appearing or diaphoretic.   HENT:      Head: Normocephalic.   Eyes:      Conjunctiva/sclera: Conjunctivae normal.   Cardiovascular:      Rate and Rhythm: Normal rate and regular rhythm.      Heart sounds: Normal heart sounds. No murmur heard.  Pulmonary:      Effort: Pulmonary effort is normal. No respiratory distress.      Breath sounds: Wheezing present.      Comments: Course breath sounds  Neurological:      Mental Status: She is alert and oriented to person, place, and time.      Coordination: Coordination normal.      Gait: Gait normal.   Psychiatric:         Mood and Affect: Mood normal.         Behavior: Behavior normal.         Assessment & Plan     Assessment & Plan    INFLUENZA AND COVID-19 WITH PNEUMONIA:  - Monitored the patient's recovery from recent hospitalization for flu and COVID-19 " co-infection, treated for pneumonia despite negative chest XR.  - Evaluated the patient's condition, noting improvement but with residual symptoms such as cough and dyspnea.  - Pulse ox 87-91 on room air.  - Ordered a chest XR due to worsening oxygen levels and persistant symptoms.  - Noted the patient has completed antibiotic treatment (Levaquin) and has been using inhalers.  - Scheduled pulmonology appointment in 2 days.    HYPOXEMIA:  - Evaluated the patient's oxygen level, which is 87-91% on room air.  - Home oxygen was not recommended at hospital discharge.  - Recommend obtaining a pulse oximeter for home monitoring of oxygen levels.  - Keep scheduled pulmonology appointment in 2 days for further evaluation.    ANCA-ASSOCIATED VASCULITIS:  - Noted the patient's history of autoimmune condition and previous rheumatologist consultations.  - Continue to hold Azathioprine (Imuran) due to side effects and recent infection.  - Message sent to nephrology regarding azathioprine being held.  - Continued prednisone 5 mg daily.  - Keep scheduled follow up appointment with nephrology.    CHRONIC KIDNEY DISEASE:  - Noted improvement in GFR to 46 at hospital discharge.  - Instructed the patient to complete labs before the upcoming nephrology appointment.           1. Hospital discharge follow-up  Comments:  Reviewed hospital course, imaging, and labs.    2. Shortness of breath  -     X-Ray Chest PA And Lateral; Future; Expected date: 04/14/2025  -     CBC W/ AUTO DIFFERENTIAL; Future; Expected date: 04/14/2025  -     Comprehensive Metabolic Panel; Future; Expected date: 04/14/2025    3. ANCA-associated vasculitis  Overview:  08/20/2024 Kidney biopsy: PAUCI-IMMUNE NECROTIZING CRESCENTIC GLOMERULONEPHRITIS     Orders:  -     CBC W/ AUTO DIFFERENTIAL; Future; Expected date: 04/14/2025  -     Comprehensive Metabolic Panel; Future; Expected date: 04/14/2025    4. Immunodeficiency due to drug therapy  -     CBC W/ AUTO  "DIFFERENTIAL; Future; Expected date: 04/14/2025    5. Primary hypertension  Assessment & Plan:  Stable. BP on the low end today in clinic. Continue lisinopril      6. Interstitial pulmonary disease, unspecified  Assessment & Plan:  Finished antibiotic. Continue prednisone. Pulmonology follow up scheduled.      7. Moderate persistent asthma with acute exacerbation  Assessment & Plan:  Continue inhalers. Pulmonology appt scheduled.      8. Stage 3a chronic kidney disease  -     Comprehensive Metabolic Panel; Future; Expected date: 04/14/2025            MICHELE Kline    This note was generated with the assistance of ambient listening technology. Verbal consent was obtained by the patient and accompanying visitor(s) for the recording of patient appointment to facilitate this note. I attest to having reviewed and edited the generated note for accuracy, though some syntax or spelling errors may persist. Please contact the author of this note for any clarification.       Portions of this note may have been created with voice recognition software. Occasional "wrong-word" or "sound-a-like" substitutions may have occurred due to the inherent limitations of voice recognition software. Please, read the note carefully and recognize, using context, where substitutions have occurred.             [1]   Patient Active Problem List  Diagnosis    Moderate persistent asthma with acute exacerbation    Rheumatoid factor positive    Mild persistent asthma with acute exacerbation    Iron deficiency anemia    JAIME (acute kidney injury)    PNA (pneumonia)    Thrombocytosis    Unintentional weight loss    Fatigue    Anemia, chronic renal failure, stage 4 (severe)    Moderate protein-calorie malnutrition    ANCA-associated vasculitis    Immunodeficiency due to drug therapy    Primary hypertension    Interstitial pulmonary disease, unspecified    Sepsis    Influenza A    COVID-19    Anemia    CKD (chronic kidney disease) stage 3, GFR " 30-59 ml/min   [2]   Current Outpatient Medications:     albuterol (PROVENTIL/VENTOLIN HFA) 90 mcg/actuation inhaler, Inhale 2 puffs into the lungs every 4 (four) hours as needed for Wheezing or Shortness of Breath., Disp: 18 g, Rfl: 11    azaTHIOprine (IMURAN) 50 mg Tab, Take 1 tablet (50 mg total) by mouth every other day., Disp: 15 tablet, Rfl: 11    doxycycline (MONODOX) 100 MG capsule, Take 1 capsule (100 mg total) by mouth every 12 (twelve) hours., Disp: 20 capsule, Rfl: 1    fluticasone-salmeterol diskus inhaler 250-50 mcg, Inhale 1 puff into the lungs 2 (two) times daily. Controller, Disp: 60 each, Rfl: 11    lisinopriL 10 MG tablet, Take 1 tablet (10 mg total) by mouth once daily., Disp: , Rfl:     predniSONE (DELTASONE) 20 MG tablet, Prednisone 60 mg/ day for 3 days, 40 mg/day for 3 days,20 mg/ day for 3 days, (1/2 tablet )10 mg a day for 3 days., Disp: 20 tablet, Rfl: 0    predniSONE (DELTASONE) 5 MG tablet, Take 1 tablet (5 mg total) by mouth once daily. Start after high dose prednisone, Disp: 30 tablet, Rfl: 5    vitamin D (VITAMIN D3) 1000 units Tab, Take 1,000 Units by mouth once daily., Disp: , Rfl:     albuterol (PROVENTIL) 2.5 mg /3 mL (0.083 %) nebulizer solution, Take 3 mLs (2.5 mg total) by nebulization every 6 (six) hours as needed. Rescue, Disp: 180 mL, Rfl: 3    predniSONE (DELTASONE) 20 MG tablet, Take 3 tablets (60 mg total) by mouth once daily for 3 days, THEN 2 tablets (40 mg total) once daily for 3 days, THEN 1 tablet (20 mg total) once daily for 3 days, THEN 0.5 tablets (10 mg total) once daily for 4 days., Disp: 20 tablet, Rfl: 0    sulfamethoxazole-trimethoprim 800-160mg (BACTRIM DS) 800-160 mg Tab, Take 1 tablet by mouth 3 (three) times a week., Disp: 12 tablet, Rfl: 11    Current Facility-Administered Medications:     varicella zoster (Shingrix) IM vaccine (>/= 49 yo), 0.5 mL, Intramuscular, Q8 weeks, , 0.5 mL at 12/18/24 1934

## 2025-04-15 ENCOUNTER — RESULTS FOLLOW-UP (OUTPATIENT)
Dept: INTERNAL MEDICINE | Facility: CLINIC | Age: 50
End: 2025-04-15

## 2025-04-16 ENCOUNTER — OFFICE VISIT (OUTPATIENT)
Dept: PULMONOLOGY | Facility: CLINIC | Age: 50
End: 2025-04-16
Payer: COMMERCIAL

## 2025-04-16 VITALS
WEIGHT: 99.88 LBS | OXYGEN SATURATION: 93 % | BODY MASS INDEX: 17.05 KG/M2 | SYSTOLIC BLOOD PRESSURE: 102 MMHG | HEIGHT: 64 IN | RESPIRATION RATE: 21 BRPM | DIASTOLIC BLOOD PRESSURE: 70 MMHG | HEART RATE: 103 BPM

## 2025-04-16 DIAGNOSIS — J45.41 MODERATE PERSISTENT ASTHMA WITH ACUTE EXACERBATION: ICD-10-CM

## 2025-04-16 DIAGNOSIS — J45.31 MILD PERSISTENT ASTHMA WITH ACUTE EXACERBATION: ICD-10-CM

## 2025-04-16 DIAGNOSIS — I77.82 ANCA-ASSOCIATED VASCULITIS: Chronic | ICD-10-CM

## 2025-04-16 DIAGNOSIS — J06.9 VIRAL URI: Primary | ICD-10-CM

## 2025-04-16 PROCEDURE — 99999 PR PBB SHADOW E&M-EST. PATIENT-LVL IV: CPT | Mod: PBBFAC,,, | Performed by: HOSPITALIST

## 2025-04-16 PROCEDURE — 99213 OFFICE O/P EST LOW 20 MIN: CPT | Mod: S$GLB,,, | Performed by: HOSPITALIST

## 2025-04-16 PROCEDURE — 4010F ACE/ARB THERAPY RXD/TAKEN: CPT | Mod: CPTII,S$GLB,, | Performed by: HOSPITALIST

## 2025-04-16 PROCEDURE — 1111F DSCHRG MED/CURRENT MED MERGE: CPT | Mod: CPTII,S$GLB,, | Performed by: HOSPITALIST

## 2025-04-16 PROCEDURE — 3078F DIAST BP <80 MM HG: CPT | Mod: CPTII,S$GLB,, | Performed by: HOSPITALIST

## 2025-04-16 PROCEDURE — 3008F BODY MASS INDEX DOCD: CPT | Mod: CPTII,S$GLB,, | Performed by: HOSPITALIST

## 2025-04-16 PROCEDURE — 1159F MED LIST DOCD IN RCRD: CPT | Mod: CPTII,S$GLB,, | Performed by: HOSPITALIST

## 2025-04-16 PROCEDURE — 3066F NEPHROPATHY DOC TX: CPT | Mod: CPTII,S$GLB,, | Performed by: HOSPITALIST

## 2025-04-16 PROCEDURE — 3074F SYST BP LT 130 MM HG: CPT | Mod: CPTII,S$GLB,, | Performed by: HOSPITALIST

## 2025-04-16 PROCEDURE — 1160F RVW MEDS BY RX/DR IN RCRD: CPT | Mod: CPTII,S$GLB,, | Performed by: HOSPITALIST

## 2025-04-16 RX ORDER — ALBUTEROL SULFATE 0.83 MG/ML
2.5 SOLUTION RESPIRATORY (INHALATION) EVERY 6 HOURS PRN
Qty: 180 ML | Refills: 3 | Status: SHIPPED | OUTPATIENT
Start: 2025-04-16 | End: 2026-04-16

## 2025-04-16 RX ORDER — PREDNISONE 20 MG/1
TABLET ORAL
Qty: 20 TABLET | Refills: 0 | Status: SHIPPED | OUTPATIENT
Start: 2025-04-16 | End: 2025-04-29

## 2025-04-16 NOTE — ASSESSMENT & PLAN NOTE
- continue ics-laba, albuterol as needed, refilled neb solution  - wheezing on exam, stop chronic home 5mg prednisone and start steroid taper  - encouraged pt to procure a pulse ox to monitor her levels

## 2025-04-16 NOTE — PROGRESS NOTES
"Subjective:      Patient ID: Shayy Faust is a 49 y.o. female.    Chief Complaint: No chief complaint on file.    Interval Hx 4/16/25:    49 year old female with history of ANCA-associated vasculitis, obstructive pulmonary disease (followed by Dr. Jiménez) who presents to Pulmonary clinic for hospital follow up. She was recently admitted with positive flu and covid, required supplemental O2 while admitted, but not on discharge. Was discharged on Tamiflu and levaquin.     Shayy is feeling a little better day by day, is now back at work. Still with dyspnea, cough, and wheezing. Completed antibiotics and is back on 5mg prednisone daily.    Pertinent Work Up:  - PFT 3/2025- Severe obstruction with pre-FEV1 30.9%    Pulmonary Interventions:  - Albuterol HFA- 2x/day  - ICS-LABA 250mcg- bid  - Prednisone 5mg- daily  - albuterol neb- in the afternoon      Review of Systems   Respiratory:  Positive for cough, sputum production, chest tightness, wheezing and dyspnea on extertion.      Objective:     Physical Exam   Constitutional: She is oriented to person, place, and time. She appears well-developed and well-nourished. She is not obese.   Cardiovascular: Normal rate and regular rhythm.   Pulmonary/Chest:   Diffuse wheezing, coarse breath sounds, slight breathlessness with conversation on room air   Neurological: She is alert and oriented to person, place, and time.   Skin: Skin is warm and dry.     Personal Diagnostic Review  As Above      4/14/2025     1:39 PM 4/14/2025     1:15 PM 4/8/2025    12:00 PM 4/8/2025     8:05 AM 4/8/2025     7:43 AM 4/8/2025     4:52 AM 4/7/2025    11:55 PM   Pulmonary Function Tests   SpO2 87 % 91 % 97 % 95 % 93 % 94 % 93 %   Height  5' 4" (1.626 m)        Weight  45.5 kg (100 lb 5 oz)        BMI (Calculated)  17.2             Assessment:     No diagnosis found.     Encounter Medications[1]  No orders of the defined types were placed in this encounter.      Plan:     Problem List Items " Addressed This Visit       Moderate persistent asthma with acute exacerbation    - continue ics-laba, albuterol as needed, refilled neb solution  - wheezing on exam, stop chronic home 5mg prednisone and start steroid taper  - encouraged pt to procure a pulse ox to monitor her levels         Relevant Medications    albuterol (PROVENTIL) 2.5 mg /3 mL (0.083 %) nebulizer solution    Mild persistent asthma with acute exacerbation    Relevant Medications    albuterol (PROVENTIL) 2.5 mg /3 mL (0.083 %) nebulizer solution    ANCA-associated vasculitis (Chronic)    - followed by Nephrology  - starting prophylactic bactrim back          Other Visit Diagnoses         Viral URI    -  Primary    Relevant Medications    predniSONE (DELTASONE) 20 MG tablet          Follow up as scheduled with Dr. Jiménez or sooner as needed.          [1]   Outpatient Encounter Medications as of 2025   Medication Sig Dispense Refill    albuterol (PROVENTIL/VENTOLIN HFA) 90 mcg/actuation inhaler Inhale 2 puffs into the lungs every 4 (four) hours as needed for Wheezing or Shortness of Breath. 18 g 11    azaTHIOprine (IMURAN) 50 mg Tab Take 1 tablet (50 mg total) by mouth every other day. 15 tablet 11    doxycycline (MONODOX) 100 MG capsule Take 1 capsule (100 mg total) by mouth every 12 (twelve) hours. 20 capsule 1    fluticasone-salmeterol diskus inhaler 250-50 mcg Inhale 1 puff into the lungs 2 (two) times daily. Controller 60 each 11    [] guaiFENesin (MUCINEX) 600 mg 12 hr tablet Take 1 tablet (600 mg total) by mouth 2 (two) times daily. for 5 days 10 tablet 0    [] levoFLOXacin (LEVAQUIN) 750 MG tablet Take 1 tablet (750 mg total) by mouth every other day. for 4 days 2 tablet 0    lisinopriL 10 MG tablet Take 1 tablet (10 mg total) by mouth once daily.      [] oseltamivir (TAMIFLU) 30 MG capsule Take 1 capsule (30 mg total) by mouth once daily. for 1 day 1 capsule 0    predniSONE (DELTASONE) 20 MG tablet Prednisone 60  mg/ day for 3 days, 40 mg/day for 3 days,20 mg/ day for 3 days, (1/2 tablet )10 mg a day for 3 days. 20 tablet 0    predniSONE (DELTASONE) 5 MG tablet Take 1 tablet (5 mg total) by mouth once daily. Start after high dose prednisone 30 tablet 5    sulfamethoxazole-trimethoprim 800-160mg (BACTRIM DS) 800-160 mg Tab Take 1 tablet by mouth 3 (three) times a week. (Patient not taking: Reported on 4/14/2025) 12 tablet 11    vitamin D (VITAMIN D3) 1000 units Tab Take 1,000 Units by mouth once daily.       Facility-Administered Encounter Medications as of 4/16/2025   Medication Dose Route Frequency Provider Last Rate Last Admin    varicella zoster (Shingrix) IM vaccine (>/= 49 yo)  0.5 mL Intramuscular Q8 weeks    0.5 mL at 12/18/24 0482

## 2025-04-20 ENCOUNTER — HOSPITAL ENCOUNTER (INPATIENT)
Facility: HOSPITAL | Age: 50
LOS: 2 days | Discharge: HOME | DRG: 202 | End: 2025-04-23
Attending: EMERGENCY MEDICINE | Admitting: HOSPITALIST
Payer: COMMERCIAL

## 2025-04-20 DIAGNOSIS — J96.01 ACUTE RESPIRATORY FAILURE WITH HYPOXIA: Primary | ICD-10-CM

## 2025-04-20 DIAGNOSIS — R07.9 CHEST PAIN: ICD-10-CM

## 2025-04-20 DIAGNOSIS — R06.02 SOB (SHORTNESS OF BREATH): ICD-10-CM

## 2025-04-20 DIAGNOSIS — R06.00 DYSPNEA: ICD-10-CM

## 2025-04-20 LAB
ABSOLUTE EOSINOPHIL (OHS): 0.03 K/UL
ABSOLUTE MONOCYTE (OHS): 0.51 K/UL (ref 0.3–1)
ABSOLUTE NEUTROPHIL COUNT (OHS): 11.64 K/UL (ref 1.8–7.7)
ALBUMIN SERPL BCP-MCNC: 3.9 G/DL (ref 3.5–5.2)
ALLENS TEST: ABNORMAL
ALP SERPL-CCNC: 76 UNIT/L (ref 40–150)
ALT SERPL W/O P-5'-P-CCNC: 19 UNIT/L (ref 10–44)
ANION GAP (OHS): 15 MMOL/L (ref 8–16)
AST SERPL-CCNC: 14 UNIT/L (ref 11–45)
BASOPHILS # BLD AUTO: 0.01 K/UL
BASOPHILS NFR BLD AUTO: 0.1 %
BILIRUB SERPL-MCNC: 0.4 MG/DL (ref 0.1–1)
BNP SERPL-MCNC: 150 PG/ML (ref 0–99)
BUN SERPL-MCNC: 26 MG/DL (ref 6–20)
CALCIUM SERPL-MCNC: 9.3 MG/DL (ref 8.7–10.5)
CHLORIDE SERPL-SCNC: 109 MMOL/L (ref 95–110)
CK SERPL-CCNC: 38 U/L (ref 20–180)
CO2 SERPL-SCNC: 20 MMOL/L (ref 23–29)
CREAT SERPL-MCNC: 1.5 MG/DL (ref 0.5–1.4)
CTP QC/QA: YES
CTP QC/QA: YES
D DIMER PPP IA.FEU-MCNC: 0.61 MG/L FEU
DELSYS: ABNORMAL
ERYTHROCYTE [DISTWIDTH] IN BLOOD BY AUTOMATED COUNT: 13.4 % (ref 11.5–14.5)
GFR SERPLBLD CREATININE-BSD FMLA CKD-EPI: 42 ML/MIN/1.73/M2
GLUCOSE SERPL-MCNC: 117 MG/DL (ref 70–110)
HCO3 UR-SCNC: 22.2 MMOL/L (ref 24–28)
HCT VFR BLD AUTO: 44.2 % (ref 37–48.5)
HGB BLD-MCNC: 14.2 GM/DL (ref 12–16)
IMM GRANULOCYTES # BLD AUTO: 0.14 K/UL (ref 0–0.04)
IMM GRANULOCYTES NFR BLD AUTO: 1.1 % (ref 0–0.5)
LACTATE SERPL-SCNC: 2 MMOL/L (ref 0.5–2.2)
LYMPHOCYTES # BLD AUTO: 0.78 K/UL (ref 1–4.8)
MAGNESIUM SERPL-MCNC: 2.1 MG/DL (ref 1.6–2.6)
MCH RBC QN AUTO: 31.1 PG (ref 27–31)
MCHC RBC AUTO-ENTMCNC: 32.1 G/DL (ref 32–36)
MCV RBC AUTO: 97 FL (ref 82–98)
MODE: ABNORMAL
NUCLEATED RBC (/100WBC) (OHS): 0 /100 WBC
PCO2 BLDA: 35.1 MMHG (ref 35–45)
PH SMN: 7.41 [PH] (ref 7.35–7.45)
PLATELET # BLD AUTO: 470 K/UL (ref 150–450)
PMV BLD AUTO: 10.3 FL (ref 9.2–12.9)
PO2 BLDA: 50 MMHG (ref 80–100)
POC BE: -2 MMOL/L
POC MOLECULAR INFLUENZA A AGN: NEGATIVE
POC MOLECULAR INFLUENZA B AGN: NEGATIVE
POC SATURATED O2: 86 % (ref 95–100)
POTASSIUM SERPL-SCNC: 4.4 MMOL/L (ref 3.5–5.1)
PROCALCITONIN SERPL-MCNC: 0.04 NG/ML
PROT SERPL-MCNC: 6.9 GM/DL (ref 6–8.4)
RBC # BLD AUTO: 4.57 M/UL (ref 4–5.4)
RELATIVE EOSINOPHIL (OHS): 0.2 %
RELATIVE LYMPHOCYTE (OHS): 5.9 % (ref 18–48)
RELATIVE MONOCYTE (OHS): 3.9 % (ref 4–15)
RELATIVE NEUTROPHIL (OHS): 88.8 % (ref 38–73)
SAMPLE: ABNORMAL
SARS-COV-2 RDRP RESP QL NAA+PROBE: NEGATIVE
SITE: ABNORMAL
SODIUM SERPL-SCNC: 144 MMOL/L (ref 136–145)
TROPONIN I SERPL DL<=0.01 NG/ML-MCNC: 0.01 NG/ML
WBC # BLD AUTO: 13.11 K/UL (ref 3.9–12.7)

## 2025-04-20 PROCEDURE — 84484 ASSAY OF TROPONIN QUANT: CPT | Performed by: EMERGENCY MEDICINE

## 2025-04-20 PROCEDURE — G0378 HOSPITAL OBSERVATION PER HR: HCPCS | Mod: ER

## 2025-04-20 PROCEDURE — 27000221 HC OXYGEN, UP TO 24 HOURS

## 2025-04-20 PROCEDURE — 25000242 PHARM REV CODE 250 ALT 637 W/ HCPCS: Mod: ER | Performed by: EMERGENCY MEDICINE

## 2025-04-20 PROCEDURE — 25000242 PHARM REV CODE 250 ALT 637 W/ HCPCS: Performed by: NURSE PRACTITIONER

## 2025-04-20 PROCEDURE — 80053 COMPREHEN METABOLIC PANEL: CPT | Performed by: EMERGENCY MEDICINE

## 2025-04-20 PROCEDURE — 84145 PROCALCITONIN (PCT): CPT | Performed by: EMERGENCY MEDICINE

## 2025-04-20 PROCEDURE — 96361 HYDRATE IV INFUSION ADD-ON: CPT | Mod: ER

## 2025-04-20 PROCEDURE — 82550 ASSAY OF CK (CPK): CPT | Performed by: EMERGENCY MEDICINE

## 2025-04-20 PROCEDURE — 83880 ASSAY OF NATRIURETIC PEPTIDE: CPT | Performed by: EMERGENCY MEDICINE

## 2025-04-20 PROCEDURE — 94640 AIRWAY INHALATION TREATMENT: CPT | Mod: XB

## 2025-04-20 PROCEDURE — 94640 AIRWAY INHALATION TREATMENT: CPT | Mod: ER

## 2025-04-20 PROCEDURE — 96375 TX/PRO/DX INJ NEW DRUG ADDON: CPT

## 2025-04-20 PROCEDURE — 85379 FIBRIN DEGRADATION QUANT: CPT | Performed by: EMERGENCY MEDICINE

## 2025-04-20 PROCEDURE — 96374 THER/PROPH/DIAG INJ IV PUSH: CPT | Mod: ER

## 2025-04-20 PROCEDURE — 82803 BLOOD GASES ANY COMBINATION: CPT | Mod: ER

## 2025-04-20 PROCEDURE — 87502 INFLUENZA DNA AMP PROBE: CPT | Mod: ER

## 2025-04-20 PROCEDURE — 83605 ASSAY OF LACTIC ACID: CPT | Performed by: EMERGENCY MEDICINE

## 2025-04-20 PROCEDURE — 87635 SARS-COV-2 COVID-19 AMP PRB: CPT | Mod: ER | Performed by: EMERGENCY MEDICINE

## 2025-04-20 PROCEDURE — 63600175 PHARM REV CODE 636 W HCPCS: Mod: JZ,TB,ER | Performed by: EMERGENCY MEDICINE

## 2025-04-20 PROCEDURE — 99285 EMERGENCY DEPT VISIT HI MDM: CPT | Mod: 25,ER

## 2025-04-20 PROCEDURE — 96372 THER/PROPH/DIAG INJ SC/IM: CPT | Performed by: NURSE PRACTITIONER

## 2025-04-20 PROCEDURE — 83735 ASSAY OF MAGNESIUM: CPT | Performed by: EMERGENCY MEDICINE

## 2025-04-20 PROCEDURE — G0378 HOSPITAL OBSERVATION PER HR: HCPCS

## 2025-04-20 PROCEDURE — 94761 N-INVAS EAR/PLS OXIMETRY MLT: CPT | Mod: ER,XB

## 2025-04-20 PROCEDURE — 99900035 HC TECH TIME PER 15 MIN (STAT)

## 2025-04-20 PROCEDURE — 99900035 HC TECH TIME PER 15 MIN (STAT): Mod: ER

## 2025-04-20 PROCEDURE — 36600 WITHDRAWAL OF ARTERIAL BLOOD: CPT | Mod: ER

## 2025-04-20 PROCEDURE — 63600175 PHARM REV CODE 636 W HCPCS: Performed by: NURSE PRACTITIONER

## 2025-04-20 PROCEDURE — 85025 COMPLETE CBC W/AUTO DIFF WBC: CPT | Performed by: EMERGENCY MEDICINE

## 2025-04-20 PROCEDURE — 25000003 PHARM REV CODE 250: Performed by: NURSE PRACTITIONER

## 2025-04-20 PROCEDURE — 96376 TX/PRO/DX INJ SAME DRUG ADON: CPT

## 2025-04-20 PROCEDURE — 25000003 PHARM REV CODE 250: Mod: ER | Performed by: EMERGENCY MEDICINE

## 2025-04-20 RX ORDER — TALC
6 POWDER (GRAM) TOPICAL NIGHTLY PRN
Status: DISCONTINUED | OUTPATIENT
Start: 2025-04-20 | End: 2025-04-23 | Stop reason: HOSPADM

## 2025-04-20 RX ORDER — AZITHROMYCIN 250 MG/1
500 TABLET, FILM COATED ORAL EVERY 24 HOURS
Status: DISCONTINUED | OUTPATIENT
Start: 2025-04-21 | End: 2025-04-23

## 2025-04-20 RX ORDER — METHYLPREDNISOLONE SOD SUCC 125 MG
125 VIAL (EA) INJECTION
Status: COMPLETED | OUTPATIENT
Start: 2025-04-20 | End: 2025-04-20

## 2025-04-20 RX ORDER — AMLODIPINE BESYLATE 5 MG/1
5 TABLET ORAL DAILY
Status: DISCONTINUED | OUTPATIENT
Start: 2025-04-21 | End: 2025-04-20

## 2025-04-20 RX ORDER — PROCHLORPERAZINE EDISYLATE 5 MG/ML
5 INJECTION INTRAMUSCULAR; INTRAVENOUS EVERY 6 HOURS PRN
Status: DISCONTINUED | OUTPATIENT
Start: 2025-04-20 | End: 2025-04-23 | Stop reason: HOSPADM

## 2025-04-20 RX ORDER — ALUMINUM HYDROXIDE, MAGNESIUM HYDROXIDE, AND SIMETHICONE 1200; 120; 1200 MG/30ML; MG/30ML; MG/30ML
30 SUSPENSION ORAL 4 TIMES DAILY PRN
Status: DISCONTINUED | OUTPATIENT
Start: 2025-04-20 | End: 2025-04-23 | Stop reason: HOSPADM

## 2025-04-20 RX ORDER — IBUPROFEN 200 MG
16 TABLET ORAL
Status: DISCONTINUED | OUTPATIENT
Start: 2025-04-20 | End: 2025-04-23 | Stop reason: HOSPADM

## 2025-04-20 RX ORDER — IPRATROPIUM BROMIDE AND ALBUTEROL SULFATE 2.5; .5 MG/3ML; MG/3ML
3 SOLUTION RESPIRATORY (INHALATION)
Status: COMPLETED | OUTPATIENT
Start: 2025-04-20 | End: 2025-04-20

## 2025-04-20 RX ORDER — SODIUM CHLORIDE 0.9 % (FLUSH) 0.9 %
3 SYRINGE (ML) INJECTION
Status: DISCONTINUED | OUTPATIENT
Start: 2025-04-20 | End: 2025-04-23 | Stop reason: HOSPADM

## 2025-04-20 RX ORDER — ONDANSETRON HYDROCHLORIDE 2 MG/ML
4 INJECTION, SOLUTION INTRAVENOUS EVERY 8 HOURS PRN
Status: DISCONTINUED | OUTPATIENT
Start: 2025-04-20 | End: 2025-04-23 | Stop reason: HOSPADM

## 2025-04-20 RX ORDER — ACETAMINOPHEN 325 MG/1
650 TABLET ORAL EVERY 4 HOURS PRN
Status: DISCONTINUED | OUTPATIENT
Start: 2025-04-20 | End: 2025-04-23 | Stop reason: HOSPADM

## 2025-04-20 RX ORDER — SULFAMETHOXAZOLE AND TRIMETHOPRIM 800; 160 MG/1; MG/1
1 TABLET ORAL
Status: DISCONTINUED | OUTPATIENT
Start: 2025-04-21 | End: 2025-04-23 | Stop reason: HOSPADM

## 2025-04-20 RX ORDER — CEFTRIAXONE 1 G/1
1 INJECTION, POWDER, FOR SOLUTION INTRAMUSCULAR; INTRAVENOUS
Status: DISCONTINUED | OUTPATIENT
Start: 2025-04-20 | End: 2025-04-23

## 2025-04-20 RX ORDER — NALOXONE HCL 0.4 MG/ML
0.02 VIAL (ML) INJECTION
Status: DISCONTINUED | OUTPATIENT
Start: 2025-04-20 | End: 2025-04-23 | Stop reason: HOSPADM

## 2025-04-20 RX ORDER — SODIUM CHLORIDE 0.9 % (FLUSH) 0.9 %
10 SYRINGE (ML) INJECTION EVERY 12 HOURS PRN
Status: DISCONTINUED | OUTPATIENT
Start: 2025-04-20 | End: 2025-04-23 | Stop reason: HOSPADM

## 2025-04-20 RX ORDER — GLUCAGON 1 MG
1 KIT INJECTION
Status: DISCONTINUED | OUTPATIENT
Start: 2025-04-20 | End: 2025-04-23 | Stop reason: HOSPADM

## 2025-04-20 RX ORDER — HYDRALAZINE HYDROCHLORIDE 20 MG/ML
10 INJECTION INTRAMUSCULAR; INTRAVENOUS EVERY 6 HOURS PRN
Status: DISCONTINUED | OUTPATIENT
Start: 2025-04-20 | End: 2025-04-23 | Stop reason: HOSPADM

## 2025-04-20 RX ORDER — BUDESONIDE 0.5 MG/2ML
0.5 INHALANT ORAL EVERY 12 HOURS
Status: DISCONTINUED | OUTPATIENT
Start: 2025-04-20 | End: 2025-04-23 | Stop reason: HOSPADM

## 2025-04-20 RX ORDER — ENOXAPARIN SODIUM 100 MG/ML
40 INJECTION SUBCUTANEOUS EVERY 24 HOURS
Status: DISCONTINUED | OUTPATIENT
Start: 2025-04-20 | End: 2025-04-22

## 2025-04-20 RX ORDER — IPRATROPIUM BROMIDE AND ALBUTEROL SULFATE 2.5; .5 MG/3ML; MG/3ML
3 SOLUTION RESPIRATORY (INHALATION) EVERY 4 HOURS
Status: DISCONTINUED | OUTPATIENT
Start: 2025-04-20 | End: 2025-04-20

## 2025-04-20 RX ORDER — AMLODIPINE BESYLATE 5 MG/1
5 TABLET ORAL DAILY
Status: DISCONTINUED | OUTPATIENT
Start: 2025-04-20 | End: 2025-04-23 | Stop reason: HOSPADM

## 2025-04-20 RX ORDER — POLYETHYLENE GLYCOL 3350 17 G/17G
17 POWDER, FOR SOLUTION ORAL DAILY
Status: DISCONTINUED | OUTPATIENT
Start: 2025-04-21 | End: 2025-04-23 | Stop reason: HOSPADM

## 2025-04-20 RX ORDER — IPRATROPIUM BROMIDE AND ALBUTEROL SULFATE 2.5; .5 MG/3ML; MG/3ML
3 SOLUTION RESPIRATORY (INHALATION)
Status: DISCONTINUED | OUTPATIENT
Start: 2025-04-20 | End: 2025-04-23 | Stop reason: HOSPADM

## 2025-04-20 RX ORDER — IBUPROFEN 200 MG
24 TABLET ORAL
Status: DISCONTINUED | OUTPATIENT
Start: 2025-04-20 | End: 2025-04-23 | Stop reason: HOSPADM

## 2025-04-20 RX ADMIN — IPRATROPIUM BROMIDE AND ALBUTEROL SULFATE 3 ML: 2.5; .5 SOLUTION RESPIRATORY (INHALATION) at 01:04

## 2025-04-20 RX ADMIN — METHYLPREDNISOLONE SODIUM SUCCINATE 80 MG: 40 INJECTION, POWDER, FOR SOLUTION INTRAMUSCULAR; INTRAVENOUS at 09:04

## 2025-04-20 RX ADMIN — SODIUM CHLORIDE 500 ML: 0.9 INJECTION, SOLUTION INTRAVENOUS at 01:04

## 2025-04-20 RX ADMIN — CEFTRIAXONE 1 G: 1 INJECTION, POWDER, FOR SOLUTION INTRAMUSCULAR; INTRAVENOUS at 06:04

## 2025-04-20 RX ADMIN — IPRATROPIUM BROMIDE AND ALBUTEROL SULFATE 3 ML: 2.5; .5 SOLUTION RESPIRATORY (INHALATION) at 08:04

## 2025-04-20 RX ADMIN — AMLODIPINE BESYLATE 5 MG: 5 TABLET ORAL at 06:04

## 2025-04-20 RX ADMIN — BUDESONIDE INHALATION 0.5 MG: 0.5 SUSPENSION RESPIRATORY (INHALATION) at 08:04

## 2025-04-20 RX ADMIN — ENOXAPARIN SODIUM 40 MG: 40 INJECTION SUBCUTANEOUS at 06:04

## 2025-04-20 RX ADMIN — METHYLPREDNISOLONE SODIUM SUCCINATE 125 MG: 125 INJECTION, POWDER, FOR SOLUTION INTRAMUSCULAR; INTRAVENOUS at 01:04

## 2025-04-20 NOTE — PLAN OF CARE
Discussed poc with pt, pt verbalized understanding    Purposeful rounding every 2hours  VS wnl  Cardiac monitoring in use, pt is NSR, tele monitor # [id number]    Fall precautions in place, remains injury free  Pt declines any pain or nausea.   Pt on 23 liters of o2    Accurate I&Os  Abx given as prescribed  Bed locked at lowest position  Call light within reach    Chart check complete  Will cont with POC

## 2025-04-20 NOTE — ASSESSMENT & PLAN NOTE
Chronic not well controlled, exacerbations likely secondary to recent viral infection.  Patient initiated on steroid taper per pulmonology on 04/16 with no significant improvement.  Patient presented to the ED with complaints of worsening dyspnea at home.    --supplemental O2 to maintain SpO2> 90%  --IV steroid wean as tolerated  --pulmonology consulted

## 2025-04-20 NOTE — ASSESSMENT & PLAN NOTE
Anemia is likely due to chronic disease due to Chronic Kidney Disease. Most recent hemoglobin and hematocrit are listed below.  Recent Labs     04/20/25  1301   HGB 14.2   HCT 44.2     Plan  - Monitor serial CBC: Daily  - Transfuse PRBC if patient becomes hemodynamically unstable, symptomatic or H/H drops below 7/21.  - Patient has not received any PRBC transfusions to date

## 2025-04-20 NOTE — ED PROVIDER NOTES
Encounter Date: 4/20/2025       History     Chief Complaint   Patient presents with    Shortness of Breath     2 weeks ago covid and flu and was admitted to hospital of pneumonia. Today worsening SOB. Productive cough     The history is provided by the patient.   Shortness of Breath  This is a recurrent problem. The average episode lasts 2 weeks. The problem occurs frequently.The current episode started more than 1 week ago. The problem has been gradually worsening. Associated symptoms include cough, sputum production and wheezing. Pertinent negatives include no fever, no sore throat, no hemoptysis, no chest pain, no syncope, no vomiting, no rash, no leg pain and no leg swelling.   Recently admitted 4/4/25 for Covid/Flu. Currently on steroid taper.    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       Anemia, chronic renal failure, stage 4 (severe) 08/23/2024    Mild intermittent asthma, uncomplicated      Past Surgical History:   Procedure Laterality Date    BILATERAL TUBAL LIGATION N/A     COLONOSCOPY N/A 8/20/2024    Procedure: COLONOSCOPY;  Surgeon: Gena Saxena MD;  Location: [facility];  Service: Endoscopy;  Laterality: N/A;    ESOPHAGOGASTRODUODENOSCOPY N/A 8/20/2024    Procedure: EGD (ESOPHAGOGASTRODUODENOSCOPY);  Surgeon: Gena Saxena MD;  Location: [facility];  Service: Endoscopy;  Laterality: N/A;    INTRALUMINAL GASTROINTESTINAL TRACT IMAGING VIA CAPSULE N/A 9/19/2024    Procedure: IMAGING PROCEDURE, GI TRACT, INTRALUMINAL, VIA CAPSULE;  Surgeon: Ignacio Jimenez RN;  Location: [facility];  Service: Endoscopy;  Laterality: N/A;  Neg EGD/Colon. Hx MARIA ESTHER/weight loss/ARF     Family History   Problem Relation Name Age of Onset    Hypertension Mother      Hypertension Father       Social History[1]  Review of Systems    Constitutional:  Negative for fever.   HENT:  Negative for sore throat.    Respiratory:  Positive for cough, sputum production, shortness of breath and wheezing. Negative for hemoptysis.    Cardiovascular:  Negative for chest pain, leg swelling and syncope.   Gastrointestinal:  Negative for nausea and vomiting.   Genitourinary:  Negative for dysuria.   Musculoskeletal:  Negative for back pain.   Skin:  Negative for rash.   Neurological:  Negative for weakness.   Hematological:  Does not bruise/bleed easily.       Physical Exam     Initial Vitals [04/20/25 1250]   BP Pulse Resp Temp SpO2   (!) 171/100 (!) 113 (!) 24 98.9 °F (37.2 °C) (!) 92 %      MAP       --         Physical Exam    Nursing note and vitals reviewed.  Constitutional: She appears well-developed and well-nourished. She appears distressed.   HENT:   Head: Normocephalic and atraumatic. Mouth/Throat: Oropharynx is clear and moist.   Eyes: Conjunctivae and EOM are normal. Pupils are equal, round, and reactive to light.   Neck: Neck supple.   Normal range of motion.  Cardiovascular:  Normal rate, regular rhythm and normal heart sounds.           Pulmonary/Chest: Accessory muscle usage present. Tachypnea noted. She is in respiratory distress. She has decreased breath sounds. She has wheezes.   Abdominal: Abdomen is soft. Bowel sounds are normal. She exhibits no distension. There is no abdominal tenderness.   Musculoskeletal:         General: Normal range of motion.      Cervical back: Normal range of motion and neck supple.     Neurological: She is alert and oriented to person, place, and time. She has normal strength.   Skin: Skin is warm and dry.   Psychiatric: She has a normal mood and affect. Thought content normal.         ED Course   Critical Care    Date/Time: 4/20/2025 2:32 PM    Performed by: Ang Johnston MD  Authorized by: Reece Ruiz MD  Direct patient critical care time: 10 minutes  Additional history critical care  time: 4 minutes  Ordering / reviewing critical care time: 12 minutes  Documentation critical care time: 10 minutes  Consulting other physicians critical care time: 4 minutes  Total critical care time (exclusive of procedural time) : 40 minutes  Critical care time was exclusive of separately billable procedures and treating other patients.  Critical care was necessary to treat or prevent imminent or life-threatening deterioration of the following conditions: respiratory failure.  Critical care was time spent personally by me on the following activities: blood draw for specimens, development of treatment plan with patient or surrogate, discussions with consultants, interpretation of cardiac output measurements, evaluation of patient's response to treatment, examination of patient, obtaining history from patient or surrogate, ordering and performing treatments and interventions, ordering and review of laboratory studies, ordering and review of radiographic studies, pulse oximetry, re-evaluation of patient's condition and review of old charts.        Labs Reviewed   COMPREHENSIVE METABOLIC PANEL - Abnormal       Result Value    Sodium 144      Potassium 4.4      Chloride 109      CO2 20 (*)     Glucose 117 (*)     BUN 26 (*)     Creatinine 1.5 (*)     Calcium 9.3      Protein Total 6.9      Albumin 3.9      Bilirubin Total 0.4      ALP 76      AST 14      ALT 19      Anion Gap 15      eGFR 42 (*)    B-TYPE NATRIURETIC PEPTIDE - Abnormal     (*)    D DIMER, QUANTITATIVE - Abnormal    D-Dimer 0.61 (*)    CBC WITH DIFFERENTIAL - Abnormal    WBC 13.11 (*)     RBC 4.57      HGB 14.2      HCT 44.2      MCV 97      MCH 31.1 (*)     MCHC 32.1      RDW 13.4      Platelet Count 470 (*)     MPV 10.3      Nucleated RBC 0      Neut % 88.8 (*)     Lymph % 5.9 (*)     Mono % 3.9 (*)     Eos % 0.2      Basophil % 0.1      Imm Grans % 1.1 (*)     Neut # 11.64 (*)     Lymph # 0.78 (*)     Mono # 0.51      Eos # 0.03      Baso #  0.01      Imm Grans # 0.14 (*)    ISTAT PROCEDURE - Abnormal    POC PH 7.410      POC PCO2 35.1      POC PO2 50 (*)     POC HCO3 22.2 (*)     POC BE -2      POC SATURATED O2 86      Sample ARTERIAL      Site RR      Allens Test Pass      DelSys Room Air      Mode SPONT     MAGNESIUM - Normal    Magnesium  2.1     CK - Normal    CPK 38     TROPONIN I - Normal    Troponin-I 0.011     LACTIC ACID, PLASMA - Normal    Lactic Acid Level 2.0      Narrative:     Falsely low lactic acid results can be found in samples containing >=13.0 mg/dL total bilirubin and/or >=3.5 mg/dL direct bilirubin.    PROCALCITONIN - Normal    Procalcitonin 0.04     CBC W/ AUTO DIFFERENTIAL    Narrative:     The following orders were created for panel order CBC Auto Differential.  Procedure                               Abnormality         Status                     ---------                               -----------         ------                     CBC with Differential[[id number]]       Abnormal            Final result                 Please view results for these tests on the individual orders.   POCT INFLUENZA A/B MOLECULAR    POC Molecular Influenza A Ag Negative      POC Molecular Influenza B Ag Negative       Acceptable Yes     SARS-COV-2 RDRP GENE    POC Rapid COVID Negative       Acceptable Yes      Narrative:     This test utilizes isothermal nucleic acid amplification technology to detect the SARS-CoV-2 RdRp nucleic acid segment. The analytical sensitivity (limit of detection) is 500 copies/swab.     A POSITIVE result is indicative of the presence of SARS-CoV-2 RNA; clinical correlation with patient history and other diagnostic information is necessary to determine patient infection status.    A NEGATIVE result means that SARS-CoV-2 nucleic acids are not present above the limit of detection. A NEGATIVE result should be treated as presumptive. It does not rule out the possibility of COVID-19 and should  not be the sole basis for treatment decisions. If COVID-19 is strongly suspected based on clinical and exposure history, re-testing using an alternate molecular assay should be considered.     Commercial kits are provided by [employer].         EKG Readings: (Independently Interpreted)   Rhythm: Sinus Tachycardia. Heart Rate: 104. ST Segments: Normal ST Segments. T Waves: Normal. Axis: Normal. Clinical Impression: Sinus Tachycardia       Imaging Results              X-Ray Chest 1 View (Final result)  Result time 04/20/25 13:31:40      Final result by Joseluis Kingston MD (04/20/25 13:31:40)                   Narrative:    EXAM: XR CHEST 1 VIEW    CLINICAL HISTORY: [R06.02]-Shortness of breath.    COMPARISON:  04/14/2025    FINDINGS:  Heart size is normal and lungs are clear bilaterally.  Pulmonary vasculature is normal.    IMPRESSION:  No evidence of acute disease.    Finalized on: 4/20/2025 1:31 PM By:  Joseluis Kingston  [facility]# [id number]      2025-04-20 13:33:[id number]     [facility]                                1:51 PM Pt c persistent pursed lip breathing c sats 89-92%    2:30 PM Discussed lab/imaging studies with patient and the need for further evaluation/admission for Dyspnea. Pt verbalized understanding that this is a stand alone ER and we are unable to admit at this facility. Pt will be transferred to [facility] with care en route to include cm. I discussed this case with hs and care was accepted by Dr Ruiz.       Medications   methylPREDNISolone sodium succinate injection 125 mg (125 mg Intravenous Given 4/20/25 1325)   albuterol-ipratropium 2.5 mg-0.5 mg/3 mL nebulizer solution 3 mL (3 mLs Nebulization Given 4/20/25 1315)   sodium chloride 0.9% bolus 500 mL 500 mL (500 mLs Intravenous New Bag 4/20/25 1325)     Medical Decision Making  Dx COPD exac, Pneumonia, Pneumothorax, CHF    Problems Addressed:  Dyspnea: acute illness or injury    Amount and/or Complexity of Data  Reviewed  Labs: ordered.  Radiology: ordered.  ECG/medicine tests: ordered and independent interpretation performed. Decision-making details documented in ED Course.    Risk  Prescription drug management.  Decision regarding hospitalization.                                      Clinical Impression:  Final diagnoses:  [R06.02] SOB (shortness of breath)  [R06.00] Dyspnea (Primary)          ED Disposition Condition    Observation                   Ang Johnston MD  04/20/25 1433         [1]   Social History  Tobacco Use    Smoking status: Never    Smokeless tobacco: Never   Substance Use Topics    Alcohol use: Not Currently    Drug use: Never        Ang Johnston MD  04/20/25 1953

## 2025-04-20 NOTE — ASSESSMENT & PLAN NOTE
Chronic, followed by pulmonology    --nebs per orders  --ceftriaxone/azithromycin for now deescalate as appropriate  --IV steroid

## 2025-04-20 NOTE — ASSESSMENT & PLAN NOTE
Patient with Hypoxic Respiratory failure which is Acute.  she is not on home oxygen. Supplemental oxygen was provided and noted-      .   Signs/symptoms of respiratory failure include- tachypnea, increased work of breathing, respiratory distress, and wheezing. Contributing diagnoses includes - Interstitial lung disease Labs and images were reviewed. Patient Has recent ABG, which has been reviewed. Will treat underlying causes and adjust management of respiratory failure as follows-     --supplemental O2 to maintain SpO2> 90%

## 2025-04-20 NOTE — ASSESSMENT & PLAN NOTE
Patient's blood pressure range in the last 24 hours was: BP  Min: 141/88  Max: 171/100.The patient's inpatient anti-hypertensive regimen is listed below:  Current Antihypertensives  hydrALAZINE injection 10 mg, Every 6 hours PRN, Intravenous  amLODIPine tablet 5 mg, Daily, Oral    Plan  - BP is uncontrolled, will adjust as follows:  Hydralazine IV p.r.n.

## 2025-04-20 NOTE — H&P
[facility] Medicine  History & Physical    Patient Name: Shayy Faust  MRN: [id number]  Patient Class: OP- Observation  Admission Date: 2025  Attending Physician: Ovidio Sosa MD   Primary Care Provider: Karine Primary Doctor         Patient information was obtained from patient, past medical records, and ER records.     Subjective:     Principal Problem:Acute hypoxemic respiratory failure    Chief Complaint:   Chief Complaint   Patient presents with    Shortness of Breath     2 weeks ago covid and flu and was admitted to hospital of pneumonia. Today worsening SOB. Productive cough        HPI: 49 year old female, comorbid conditions include ANCA vasculitis, RA positive, anemia, interstitial pulmonary disease, HTN.  Presented to the [facility] ED with c/o SOB. Sx began 2 weeks ago, dx with COVID/Flu, admitted to the hospital - for Sepsis. Evaluated by Pulmonology on  for continued c/o SOB, mild hypoxia. Initiated on a steroid taper. Denies significant improvement. Patient is not on O2 at home. Managed with chronic steroid as OP. In the ED, vitals: 171/100, 113, 24, 98.9F, 92% RA on arrival. Significant Labs: WBC: 13.11, left shift, Pl: 470K, Cr: 1.5, D-Dimer: 0.61, BNP: 150, AB.410/35.1/50/22.2/88. CXR: NAF. Treated with Nebs, Steroid, IV Fluids. Patient required transfer to [facility] for admission, [facility]. Patient is a full code. Placed in observation under the care of Hospital Medicine for management of Acute Respiratory Failure.     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       Anemia, chronic renal failure, stage 4 (severe) 2024    Mild intermittent asthma, uncomplicated        Past Surgical History:   Procedure Laterality Date    BILATERAL TUBAL  LIGATION N/A     COLONOSCOPY N/A 8/20/2024    Procedure: COLONOSCOPY;  Surgeon: Gena Saxena MD;  Location: [facility] ENDO;  Service: Endoscopy;  Laterality: N/A;    ESOPHAGOGASTRODUODENOSCOPY N/A 8/20/2024    Procedure: EGD (ESOPHAGOGASTRODUODENOSCOPY);  Surgeon: Gena Saxena MD;  Location: [facility] ENDO;  Service: Endoscopy;  Laterality: N/A;    INTRALUMINAL GASTROINTESTINAL TRACT IMAGING VIA CAPSULE N/A 9/19/2024    Procedure: IMAGING PROCEDURE, GI TRACT, INTRALUMINAL, VIA CAPSULE;  Surgeon: Ignacio Jimenez RN;  Location: [facility] ENDO;  Service: Endoscopy;  Laterality: N/A;  Neg EGD/Colon. Hx MARIA ESTHER/weight loss/ARF       Review of patient's allergies indicates:  No Known Allergies    No current facility-administered medications on file prior to encounter.     Current Outpatient Medications on File Prior to Encounter   Medication Sig    albuterol (PROVENTIL) 2.5 mg /3 mL (0.083 %) nebulizer solution Take 3 mLs (2.5 mg total) by nebulization every 6 (six) hours as needed. Rescue    albuterol (PROVENTIL/VENTOLIN HFA) 90 mcg/actuation inhaler Inhale 2 puffs into the lungs every 4 (four) hours as needed for Wheezing or Shortness of Breath.    fluticasone-salmeterol diskus inhaler 250-50 mcg Inhale 1 puff into the lungs 2 (two) times daily. Controller    predniSONE (DELTASONE) 20 MG tablet Prednisone 60 mg/ day for 3 days, 40 mg/day for 3 days,20 mg/ day for 3 days, (1/2 tablet )10 mg a day for 3 days.    predniSONE (DELTASONE) 5 MG tablet Take 1 tablet (5 mg total) by mouth once daily. Start after high dose prednisone    azaTHIOprine (IMURAN) 50 mg Tab Take 1 tablet (50 mg total) by mouth every other day.    lisinopriL 10 MG tablet Take 1 tablet (10 mg total) by mouth once daily.    predniSONE (DELTASONE) 20 MG tablet Take 3 tablets (60 mg total) by mouth once daily for 3 days, THEN 2 tablets (40 mg total) once daily for 3 days, THEN 1 tablet (20 mg total) once daily for 3 days, THEN 0.5 tablets (10 mg total) once daily  for 4 days.    sulfamethoxazole-trimethoprim 800-160mg (BACTRIM DS) 800-160 mg Tab Take 1 tablet by mouth 3 (three) times a week.    vitamin D (VITAMIN D3) 1000 units Tab Take 1,000 Units by mouth once daily.    [DISCONTINUED] doxycycline (MONODOX) 100 MG capsule Take 1 capsule (100 mg total) by mouth every 12 (twelve) hours.     Family History       Problem Relation (Age of Onset)    Hypertension Mother, Father          Tobacco Use    Smoking status: Never    Smokeless tobacco: Never   Substance and Sexual Activity    Alcohol use: Not Currently    Drug use: Never    Sexual activity: Yes     Review of Systems   All other systems reviewed and are negative.    Objective:     Vital Signs (Most Recent):  Temp: 98.5 °F (36.9 °C) (04/20/25 1755)  Pulse: 109 (04/20/25 1755)  Resp: 18 (04/20/25 1755)  BP: (!) 147/94 (04/20/25 1755)  SpO2: (!) 93 % (04/20/25 1755) Vital Signs (24h Range):  Temp:  [98.5 °F (36.9 °C)-98.9 °F (37.2 °C)] 98.5 °F (36.9 °C)  Pulse:  [] 109  Resp:  [18-26] 18  SpO2:  [91 %-95 %] 93 %  BP: (141-171)/() 147/94     Weight: 44.3 kg (97 lb 10.6 oz)  Body mass index is 16.76 kg/m².     Physical Exam  Vitals and nursing note reviewed.   Constitutional:       General: She is not in acute distress.     Appearance: Normal appearance. She is normal weight. She is ill-appearing.   HENT:      Head: Normocephalic and atraumatic.      Nose: Nose normal.      Mouth/Throat:      Mouth: Mucous membranes are dry.      Pharynx: Oropharynx is clear.   Eyes:      Extraocular Movements: Extraocular movements intact.      Pupils: Pupils are equal, round, and reactive to light.   Cardiovascular:      Rate and Rhythm: Tachycardia present.      Pulses: Normal pulses.      Heart sounds: Normal heart sounds.   Pulmonary:      Effort: Pulmonary effort is normal. No respiratory distress.      Breath sounds: Wheezing and rhonchi present. No rales.      Comments: 2L NC  Decreased BS bilaterally  Abdominal:       General: Abdomen is flat. Bowel sounds are normal. There is no distension.      Palpations: Abdomen is soft.      Tenderness: There is no abdominal tenderness. There is no guarding.   Neurological:      General: No focal deficit present.      Mental Status: She is alert and oriented to person, place, and time.   Psychiatric:         Mood and Affect: Mood normal.         Behavior: Behavior normal.              CRANIAL NERVES     CN III, IV, VI   Pupils are equal, round, and reactive to light.       Significant Labs: All pertinent labs within the past 24 hours have been reviewed.  Recent Lab Results         04/20/25  1407   04/20/25  1340   04/20/25  1301        POC Molecular Influenza A Ag   Negative         POC Molecular Influenza B Ag   Negative         Procalcitonin     0.04  Comment: A concentration < 0.25 ng/mL represents a low risk of bacterial infection.  Procalcitonin may not be accurate among patients with localized   infection, recent trauma or major surgery, immunosuppressed state,   invasive fungal infection, renal dysfunction. Decisions regarding   initiation or continuation of antibiotic therapy should not be based   solely on procalcitonin levels.       Albumin     3.9       ALP     76       Allens Test Pass           ALT     19       Anion Gap     15       AST     14       Baso #     0.01       Basophil %     0.1       BILIRUBIN TOTAL     0.4  Comment: For infants and newborns, interpretation of results should be based   on gestational age, weight and in agreement with clinical   observations.    Premature Infant recommended reference ranges:   0-24 hours:  <8.0 mg/dL   24-48 hours: <12.0 mg/dL   3-5 days:    <15.0 mg/dL   6-29 days:   <15.0 mg/dL       BNP     150  Comment: Values of less than 100 pg/ml are consistent with non-CHF populations.        Site RR           BUN     26       Calcium     9.3       Chloride     109       CO2     20       CPK     38       Creatinine     1.5       D-Dimer      0.61  Comment: The quantitative D-dimer assay should be used as an aid in the diagnosis of deep vein thrombosis and pulmonary embolism in patients with the appropriate presentation and clinical history. The upper limit of the reference interval and the clinical cut off point are identical. Causes of a positive (>0.50 mg/L FEU) D-Dimer test include, but are not limited to: DVT, PE, DIC, thrombolytic therapy, anticoagulant therapy, recent surgery, trauma, or pregnancy, disseminated malignancy, aortic aneurysm, cirrhosis, and severe infection. False negative results may occur in patients with distal DVT.       [facility] Room Air           eGFR     42  Comment: Estimated GFR calculated using the CKD-EPI creatinine (2021) equation.       Eos #     0.03       Eos %     0.2       Glucose     117       Gran # (ANC)     11.64       Hematocrit     44.2       Hemoglobin     14.2       Immature Grans (Abs)     0.14  Comment: Mild elevation in immature granulocytes is non specific and can be seen in a variety of conditions including stress response, acute inflammation, trauma and pregnancy. Correlation with other laboratory and clinical findings is essential.       Immature Granulocytes     1.1       Lactic Acid Level     2.0       Lymph #     0.78       Lymph %     5.9       Magnesium      2.1       MCH     31.1       MCHC     32.1       MCV     97       Mode SPONT           Mono #     0.51       Mono %     3.9       MPV     10.3       Neut %     88.8       nRBC     0       Platelet Count     470       POC BE -2           POC HCO3 22.2           POC PCO2 35.1           POC PH 7.410           POC PO2 50           POC SATURATED O2 86           Potassium     4.4       PROTEIN TOTAL     6.9        Acceptable   Yes            Yes         RBC     4.57       RDW     13.4       Sample ARTERIAL           SARS-CoV-2 RNA, Amplification, Qual   Negative         Sodium     144       Troponin I     0.011  Comment: The reference  interval for Troponin I represents the 99th percentile cutoff for our facility and is consistent with 3rd generation assay performance.       WBC     13.11               Significant Imaging: I have reviewed all pertinent imaging results/findings within the past 24 hours.    X-Ray Chest 1 View   Final Result          Assessment/Plan:     Assessment & Plan  Acute hypoxemic respiratory failure  Patient with Hypoxic Respiratory failure which is Acute.  she is not on home oxygen. Supplemental oxygen was provided and noted-      .   Signs/symptoms of respiratory failure include- tachypnea, increased work of breathing, respiratory distress, and wheezing. Contributing diagnoses includes - Interstitial lung disease Labs and images were reviewed. Patient Has recent ABG, which has been reviewed. Will treat underlying causes and adjust management of respiratory failure as follows-     --supplemental O2 to maintain SpO2> 90%  Interstitial pulmonary disease, unspecified  Chronic, followed by pulmonology    --nebs per orders  --ceftriaxone/azithromycin for now deescalate as appropriate  --IV steroid    Moderate persistent asthma with acute exacerbation  Chronic not well controlled, exacerbations likely secondary to recent viral infection.  Patient initiated on steroid taper per pulmonology on 04/16 with no significant improvement.  Patient presented to the ED with complaints of worsening dyspnea at home.    --supplemental O2 to maintain SpO2> 90%  --IV steroid wean as tolerated  --pulmonology consulted  CKD (chronic kidney disease) stage 3, GFR 30-59 ml/min  Creatine stable for now. BMP reviewed- noted Estimated Creatinine Clearance: 31.4 mL/min (A) (based on SCr of 1.5 mg/dL (H)). according to latest data. Based on current GFR, CKD stage is stage 3 - GFR 30-59.  Monitor UOP and serial BMP and adjust therapy as needed. Renally dose meds. Avoid nephrotoxic medications and procedures.  Anemia, chronic renal failure, stage 4  (severe)  Anemia is likely due to chronic disease due to Chronic Kidney Disease. Most recent hemoglobin and hematocrit are listed below.  Recent Labs     04/20/25  1301   HGB 14.2   HCT 44.2     Plan  - Monitor serial CBC: Daily  - Transfuse PRBC if patient becomes hemodynamically unstable, symptomatic or H/H drops below 7/21.  - Patient has not received any PRBC transfusions to date  Primary hypertension  Patient's blood pressure range in the last 24 hours was: BP  Min: 141/88  Max: 171/100.The patient's inpatient anti-hypertensive regimen is listed below:  Current Antihypertensives  hydrALAZINE injection 10 mg, Every 6 hours PRN, Intravenous  amLODIPine tablet 5 mg, Daily, Oral    Plan  - BP is uncontrolled, will adjust as follows:  Hydralazine IV p.r.n.    VTE Risk Mitigation (From admission, onward)           Ordered     enoxaparin injection 40 mg  Daily         04/20/25 1751     IP VTE HIGH RISK PATIENT  Once         04/20/25 1751     Place sequential compression device  Until discontinued         04/20/25 1751                       On 04/20/2025, patient should be placed in hospital observation services under my care.             Ovidio Sosa MD  Department of Hospital Medicine  '[place] [facility]

## 2025-04-20 NOTE — ASSESSMENT & PLAN NOTE
Creatine stable for now. BMP reviewed- noted Estimated Creatinine Clearance: 31.4 mL/min (A) (based on SCr of 1.5 mg/dL (H)). according to latest data. Based on current GFR, CKD stage is stage 3 - GFR 30-59.  Monitor UOP and serial BMP and adjust therapy as needed. Renally dose meds. Avoid nephrotoxic medications and procedures.

## 2025-04-20 NOTE — HPI
49 year old female, comorbid conditions include ANCA vasculitis, RA positive, anemia, interstitial pulmonary disease, HTN.  Presented to the [facility] ED with c/o SOB. Sx began 2 weeks ago, dx with COVID/Flu, admitted to the hospital - for Sepsis. Evaluated by Pulmonology on  for continued c/o SOB, mild hypoxia. Initiated on a steroid taper. Denies significant improvement. Patient is not on O2 at home. Managed with chronic steroid as OP. In the ED, vitals: 171/100, 113, 24, 98.9F, 92% RA on arrival. Significant Labs: WBC: 13.11, left shift, Pl: 470K, Cr: 1.5, D-Dimer: 0.61, BNP: 150, AB.410/35.1/50/22.2/88. CXR: NAF. Treated with Nebs, Steroid, IV Fluids. Patient required transfer to [facility] for admission, theses services are not available [facility] Patient is a full code. Placed in observation under the care of Hospital Medicine for management of Acute Respiratory Failure.

## 2025-04-20 NOTE — SUBJECTIVE & OBJECTIVE
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       Anemia, chronic renal failure, stage 4 (severe) 08/23/2024    Mild intermittent asthma, uncomplicated        Past Surgical History:   Procedure Laterality Date    BILATERAL TUBAL LIGATION N/A     COLONOSCOPY N/A 8/20/2024    Procedure: COLONOSCOPY;  Surgeon: Gena Saxena MD;  Location: [facility] ENDO;  Service: Endoscopy;  Laterality: N/A;    ESOPHAGOGASTRODUODENOSCOPY N/A 8/20/2024    Procedure: EGD (ESOPHAGOGASTRODUODENOSCOPY);  Surgeon: Gena Saxena MD;  Location: [facility] ENDO;  Service: Endoscopy;  Laterality: N/A;    INTRALUMINAL GASTROINTESTINAL TRACT IMAGING VIA CAPSULE N/A 9/19/2024    Procedure: IMAGING PROCEDURE, GI TRACT, INTRALUMINAL, VIA CAPSULE;  Surgeon: Ignacio Jimenez RN;  Location: [facility] ENDO;  Service: Endoscopy;  Laterality: N/A;  Neg EGD/Colon. Hx MARIA ESTHER/weight loss/ARF       Review of patient's allergies indicates:  No Known Allergies    No current facility-administered medications on file prior to encounter.     Current Outpatient Medications on File Prior to Encounter   Medication Sig    albuterol (PROVENTIL) 2.5 mg /3 mL (0.083 %) nebulizer solution Take 3 mLs (2.5 mg total) by nebulization every 6 (six) hours as needed. Rescue    albuterol (PROVENTIL/VENTOLIN HFA) 90 mcg/actuation inhaler Inhale 2 puffs into the lungs every 4 (four) hours as needed for Wheezing or Shortness of Breath.    fluticasone-salmeterol diskus inhaler 250-50 mcg Inhale 1 puff into the lungs 2 (two) times daily. Controller    predniSONE (DELTASONE) 20 MG tablet Prednisone 60 mg/ day for 3 days, 40 mg/day for 3 days,20 mg/ day for 3 days, (1/2 tablet )10 mg a day for 3 days.    predniSONE (DELTASONE) 5 MG tablet Take 1 tablet (5 mg total) by mouth once daily. Start after high dose prednisone    azaTHIOprine (IMURAN) 50 mg Tab Take 1 tablet (50 mg  total) by mouth every other day.    lisinopriL 10 MG tablet Take 1 tablet (10 mg total) by mouth once daily.    predniSONE (DELTASONE) 20 MG tablet Take 3 tablets (60 mg total) by mouth once daily for 3 days, THEN 2 tablets (40 mg total) once daily for 3 days, THEN 1 tablet (20 mg total) once daily for 3 days, THEN 0.5 tablets (10 mg total) once daily for 4 days.    sulfamethoxazole-trimethoprim 800-160mg (BACTRIM DS) 800-160 mg Tab Take 1 tablet by mouth 3 (three) times a week.    vitamin D (VITAMIN D3) 1000 units Tab Take 1,000 Units by mouth once daily.    [DISCONTINUED] doxycycline (MONODOX) 100 MG capsule Take 1 capsule (100 mg total) by mouth every 12 (twelve) hours.     Family History       Problem Relation (Age of Onset)    Hypertension Mother, Father          Tobacco Use    Smoking status: Never    Smokeless tobacco: Never   Substance and Sexual Activity    Alcohol use: Not Currently    Drug use: Never    Sexual activity: Yes     Review of Systems   All other systems reviewed and are negative.    Objective:     Vital Signs (Most Recent):  Temp: 98.5 °F (36.9 °C) (04/20/25 1755)  Pulse: 109 (04/20/25 1755)  Resp: 18 (04/20/25 1755)  BP: (!) 147/94 (04/20/25 1755)  SpO2: (!) 93 % (04/20/25 1755) Vital Signs (24h Range):  Temp:  [98.5 °F (36.9 °C)-98.9 °F (37.2 °C)] 98.5 °F (36.9 °C)  Pulse:  [] 109  Resp:  [18-26] 18  SpO2:  [91 %-95 %] 93 %  BP: (141-171)/() 147/94     Weight: 44.3 kg (97 lb 10.6 oz)  Body mass index is 16.76 kg/m².     Physical Exam  Vitals and nursing note reviewed.   Constitutional:       General: She is not in acute distress.     Appearance: Normal appearance. She is normal weight. She is ill-appearing.   HENT:      Head: Normocephalic and atraumatic.      Nose: Nose normal.      Mouth/Throat:      Mouth: Mucous membranes are dry.      Pharynx: Oropharynx is clear.   Eyes:      Extraocular Movements: Extraocular movements intact.      Pupils: Pupils are equal, round, and  reactive to light.   Cardiovascular:      Rate and Rhythm: Tachycardia present.      Pulses: Normal pulses.      Heart sounds: Normal heart sounds.   Pulmonary:      Effort: Pulmonary effort is normal. No respiratory distress.      Breath sounds: Wheezing and rhonchi present. No rales.      Comments: 2L NC  Decreased BS bilaterally  Abdominal:      General: Abdomen is flat. Bowel sounds are normal. There is no distension.      Palpations: Abdomen is soft.      Tenderness: There is no abdominal tenderness. There is no guarding.   Neurological:      General: No focal deficit present.      Mental Status: She is alert and oriented to person, place, and time.   Psychiatric:         Mood and Affect: Mood normal.         Behavior: Behavior normal.              CRANIAL NERVES     CN III, IV, VI   Pupils are equal, round, and reactive to light.       Significant Labs: All pertinent labs within the past 24 hours have been reviewed.  Recent Lab Results         04/20/25  1407   04/20/25  1340   04/20/25  1301        POC Molecular Influenza A Ag   Negative         POC Molecular Influenza B Ag   Negative         Procalcitonin     0.04  Comment: A concentration < 0.25 ng/mL represents a low risk of bacterial infection.  Procalcitonin may not be accurate among patients with localized   infection, recent trauma or major surgery, immunosuppressed state,   invasive fungal infection, renal dysfunction. Decisions regarding   initiation or continuation of antibiotic therapy should not be based   solely on procalcitonin levels.       Albumin     3.9       ALP     76       Allens Test Pass           ALT     19       Anion Gap     15       AST     14       Baso #     0.01       Basophil %     0.1       BILIRUBIN TOTAL     0.4  Comment: For infants and newborns, interpretation of results should be based   on gestational age, weight and in agreement with clinical   observations.    Premature Infant recommended reference ranges:   0-24 hours:   <8.0 mg/dL   24-48 hours: <12.0 mg/dL   3-5 days:    <15.0 mg/dL   6-29 days:   <15.0 mg/dL       BNP     150  Comment: Values of less than 100 pg/ml are consistent with non-CHF populations.        Site RR           BUN     26       Calcium     9.3       Chloride     109       CO2     20       CPK     38       Creatinine     1.5       D-Dimer     0.61  Comment: The quantitative D-dimer assay should be used as an aid in the diagnosis of deep vein thrombosis and pulmonary embolism in patients with the appropriate presentation and clinical history. The upper limit of the reference interval and the clinical cut off point are identical. Causes of a positive (>0.50 mg/L FEU) D-Dimer test include, but are not limited to: DVT, PE, DIC, thrombolytic therapy, anticoagulant therapy, recent surgery, trauma, or pregnancy, disseminated malignancy, aortic aneurysm, cirrhosis, and severe infection. False negative results may occur in patients with distal DVT.       [facility] Room Air           eGFR     42  Comment: Estimated GFR calculated using the CKD-EPI creatinine (2021) equation.       Eos #     0.03       Eos %     0.2       Glucose     117       Gran # (ANC)     11.64       Hematocrit     44.2       Hemoglobin     14.2       Immature Grans (Abs)     0.14  Comment: Mild elevation in immature granulocytes is non specific and can be seen in a variety of conditions including stress response, acute inflammation, trauma and pregnancy. Correlation with other laboratory and clinical findings is essential.       Immature Granulocytes     1.1       Lactic Acid Level     2.0       Lymph #     0.78       Lymph %     5.9       Magnesium      2.1       MCH     31.1       MCHC     32.1       MCV     97       Mode SPONT           Mono #     0.51       Mono %     3.9       MPV     10.3       Neut %     88.8       nRBC     0       Platelet Count     470       POC BE -2           POC HCO3 22.2           POC PCO2 35.1           POC PH 7.410            POC PO2 50           POC SATURATED O2 86           Potassium     4.4       PROTEIN TOTAL     6.9        Acceptable   Yes            Yes         RBC     4.57       RDW     13.4       Sample ARTERIAL           SARS-CoV-2 RNA, Amplification, Qual   Negative         Sodium     144       Troponin I     0.011  Comment: The reference interval for Troponin I represents the 99th percentile cutoff for our facility and is consistent with 3rd generation assay performance.       WBC     13.11               Significant Imaging: I have reviewed all pertinent imaging results/findings within the past 24 hours.    X-Ray Chest 1 View   Final Result

## 2025-04-21 LAB
ABSOLUTE EOSINOPHIL (OHS): 0 K/UL
ABSOLUTE MONOCYTE (OHS): 0.14 K/UL (ref 0.3–1)
ABSOLUTE NEUTROPHIL COUNT (OHS): 6.54 K/UL (ref 1.8–7.7)
ALBUMIN SERPL BCP-MCNC: 3.4 G/DL (ref 3.5–5.2)
ALP SERPL-CCNC: 71 UNIT/L (ref 40–150)
ALT SERPL W/O P-5'-P-CCNC: 14 UNIT/L (ref 10–44)
ANION GAP (OHS): 10 MMOL/L (ref 8–16)
AORTIC ROOT ANNULUS: 2.62 CM
ASCENDING AORTA: 2.62 CM
AST SERPL-CCNC: 14 UNIT/L (ref 11–45)
AV INDEX (PROSTH): 0.76
AV MEAN GRADIENT: 7 MMHG
AV PEAK GRADIENT: 10 MMHG
AV VALVE AREA BY VELOCITY RATIO: 1.9 CM²
AV VALVE AREA: 2.1 CM²
AV VELOCITY RATIO: 0.69
BASOPHILS # BLD AUTO: 0.01 K/UL
BASOPHILS NFR BLD AUTO: 0.1 %
BILIRUB SERPL-MCNC: 0.5 MG/DL (ref 0.1–1)
BSA FOR ECHO PROCEDURE: 1.41 M2
BUN SERPL-MCNC: 32 MG/DL (ref 6–20)
CALCIUM SERPL-MCNC: 9.1 MG/DL (ref 8.7–10.5)
CHLORIDE SERPL-SCNC: 109 MMOL/L (ref 95–110)
CO2 SERPL-SCNC: 21 MMOL/L (ref 23–29)
CREAT SERPL-MCNC: 1.5 MG/DL (ref 0.5–1.4)
CV ECHO LV RWT: 0.47 CM
DOP CALC AO PEAK VEL: 1.6 M/S
DOP CALC AO VTI: 25.4 CM
DOP CALC LVOT AREA: 2.8 CM2
DOP CALC LVOT DIAMETER: 1.9 CM
DOP CALC LVOT PEAK VEL: 1.1 M/S
DOP CALC LVOT STROKE VOLUME: 54.4 CM3
DOP CALC RVOT PEAK VEL: 1.13 M/S
DOP CALC RVOT VTI: 17.2 CM
DOP CALCLVOT PEAK VEL VTI: 19.2 CM
E WAVE DECELERATION TIME: 203 MSEC
E/A RATIO: 1.07
E/E' RATIO: 8 M/S
ECHO LV POSTERIOR WALL: 1 CM (ref 0.6–1.1)
EJECTION FRACTION: 60 %
ERYTHROCYTE [DISTWIDTH] IN BLOOD BY AUTOMATED COUNT: 12.9 % (ref 11.5–14.5)
FRACTIONAL SHORTENING: 30.2 % (ref 28–44)
GFR SERPLBLD CREATININE-BSD FMLA CKD-EPI: 42 ML/MIN/1.73/M2
GLUCOSE SERPL-MCNC: 109 MG/DL (ref 70–110)
HCT VFR BLD AUTO: 43.1 % (ref 37–48.5)
HGB BLD-MCNC: 13.8 GM/DL (ref 12–16)
IMM GRANULOCYTES # BLD AUTO: 0.05 K/UL (ref 0–0.04)
IMM GRANULOCYTES NFR BLD AUTO: 0.7 % (ref 0–0.5)
INTERVENTRICULAR SEPTUM: 1 CM (ref 0.6–1.1)
IVC DIAMETER: 1.14 CM
IVRT: 72 MSEC
LA MAJOR: 4.5 CM
LA MINOR: 4.7 CM
LA WIDTH: 2.7 CM
LEFT ATRIUM SIZE: 2.6 CM
LEFT ATRIUM VOLUME INDEX: 19 ML/M2
LEFT ATRIUM VOLUME: 27 CM3
LEFT INTERNAL DIMENSION IN SYSTOLE: 3 CM (ref 2.1–4)
LEFT VENTRICLE DIASTOLIC VOLUME INDEX: 56.94 ML/M2
LEFT VENTRICLE DIASTOLIC VOLUME: 82 ML
LEFT VENTRICLE MASS INDEX: 99 G/M2
LEFT VENTRICLE SYSTOLIC VOLUME INDEX: 24.3 ML/M2
LEFT VENTRICLE SYSTOLIC VOLUME: 35 ML
LEFT VENTRICULAR INTERNAL DIMENSION IN DIASTOLE: 4.3 CM (ref 3.5–6)
LEFT VENTRICULAR MASS: 142.5 G
LV LATERAL E/E' RATIO: 8.8 M/S
LV SEPTAL E/E' RATIO: 8 M/S
LVED V (TEICH): 81.67 ML
LVES V (TEICH): 35.24 ML
LVOT MG: 3.24 MMHG
LVOT MV: 0.89 CM/S
LYMPHOCYTES # BLD AUTO: 0.67 K/UL (ref 1–4.8)
MAGNESIUM SERPL-MCNC: 2.1 MG/DL (ref 1.6–2.6)
MCH RBC QN AUTO: 30.9 PG (ref 27–31)
MCHC RBC AUTO-ENTMCNC: 32 G/DL (ref 32–36)
MCV RBC AUTO: 97 FL (ref 82–98)
MV PEAK A VEL: 0.82 M/S
MV PEAK E VEL: 0.88 M/S
MV STENOSIS PRESSURE HALF TIME: 58.91 MS
MV VALVE AREA P 1/2 METHOD: 3.73 CM2
NUCLEATED RBC (/100WBC) (OHS): 0 /100 WBC
PHOSPHATE SERPL-MCNC: 4.8 MG/DL (ref 2.7–4.5)
PISA MRMAX VEL: 2.51 M/S
PISA TR MAX VEL: 2.5 M/S
PLATELET # BLD AUTO: 363 K/UL (ref 150–450)
PMV BLD AUTO: 10.4 FL (ref 9.2–12.9)
POTASSIUM SERPL-SCNC: 4.5 MMOL/L (ref 3.5–5.1)
PROT SERPL-MCNC: 6.3 GM/DL (ref 6–8.4)
PV MEAN GRADIENT: 4 MMHG
RA MAJOR: 2.75 CM
RA PRESSURE ESTIMATED: 3 MMHG
RA WIDTH: 2.4 CM
RBC # BLD AUTO: 4.46 M/UL (ref 4–5.4)
RELATIVE EOSINOPHIL (OHS): 0 %
RELATIVE LYMPHOCYTE (OHS): 9 % (ref 18–48)
RELATIVE MONOCYTE (OHS): 1.9 % (ref 4–15)
RELATIVE NEUTROPHIL (OHS): 88.3 % (ref 38–73)
RV TB RVSP: 6 MMHG
SODIUM SERPL-SCNC: 140 MMOL/L (ref 136–145)
STJ: 2.68 CM
TDI LATERAL: 0.1 M/S
TDI SEPTAL: 0.11 M/S
TDI: 0.11 M/S
TR MAX PG: 26 MMHG
TRICUSPID ANNULAR PLANE SYSTOLIC EXCURSION: 1.91 CM
TV REST PULMONARY ARTERY PRESSURE: 28 MMHG
WBC # BLD AUTO: 7.41 K/UL (ref 3.9–12.7)
Z-SCORE OF LEFT VENTRICULAR DIMENSION IN END DIASTOLE: -0.18
Z-SCORE OF LEFT VENTRICULAR DIMENSION IN END SYSTOLE: 0.78

## 2025-04-21 PROCEDURE — A9567 TECHNETIUM TC-99M AEROSOL: HCPCS | Performed by: HOSPITALIST

## 2025-04-21 PROCEDURE — 94761 N-INVAS EAR/PLS OXIMETRY MLT: CPT

## 2025-04-21 PROCEDURE — 63600175 PHARM REV CODE 636 W HCPCS: Mod: JZ,TB | Performed by: NURSE PRACTITIONER

## 2025-04-21 PROCEDURE — 11000001 HC ACUTE MED/SURG PRIVATE ROOM

## 2025-04-21 PROCEDURE — 25000242 PHARM REV CODE 250 ALT 637 W/ HCPCS: Performed by: NURSE PRACTITIONER

## 2025-04-21 PROCEDURE — 36415 COLL VENOUS BLD VENIPUNCTURE: CPT | Performed by: NURSE PRACTITIONER

## 2025-04-21 PROCEDURE — 83735 ASSAY OF MAGNESIUM: CPT | Performed by: NURSE PRACTITIONER

## 2025-04-21 PROCEDURE — 27000221 HC OXYGEN, UP TO 24 HOURS

## 2025-04-21 PROCEDURE — 96376 TX/PRO/DX INJ SAME DRUG ADON: CPT

## 2025-04-21 PROCEDURE — A9540 TC99M MAA: HCPCS | Performed by: HOSPITALIST

## 2025-04-21 PROCEDURE — 84100 ASSAY OF PHOSPHORUS: CPT | Performed by: NURSE PRACTITIONER

## 2025-04-21 PROCEDURE — 63700000 PHARM REV CODE 250 ALT 637 W/O HCPCS: Performed by: NURSE PRACTITIONER

## 2025-04-21 PROCEDURE — 85025 COMPLETE CBC W/AUTO DIFF WBC: CPT | Performed by: NURSE PRACTITIONER

## 2025-04-21 PROCEDURE — 21400001 HC TELEMETRY ROOM

## 2025-04-21 PROCEDURE — 80053 COMPREHEN METABOLIC PANEL: CPT | Performed by: NURSE PRACTITIONER

## 2025-04-21 PROCEDURE — 99900035 HC TECH TIME PER 15 MIN (STAT)

## 2025-04-21 PROCEDURE — 94640 AIRWAY INHALATION TREATMENT: CPT | Mod: XB

## 2025-04-21 PROCEDURE — 25000003 PHARM REV CODE 250: Performed by: NURSE PRACTITIONER

## 2025-04-21 RX ADMIN — KIT FOR THE PREPARATION OF TECHNETIUM TC 99M ALBUMIN AGGREGATED 5.28 MILLICURIE: 2 INJECTION, POWDER, LYOPHILIZED, FOR SUSPENSION INTRAPERITONEAL; INTRAVENOUS at 03:04

## 2025-04-21 RX ADMIN — METHYLPREDNISOLONE SODIUM SUCCINATE 80 MG: 40 INJECTION, POWDER, FOR SOLUTION INTRAMUSCULAR; INTRAVENOUS at 09:04

## 2025-04-21 RX ADMIN — KIT FOR THE PREPARATION OF TECHNETIUM TC 99M PENTETATE 30 MILLICURIE: 20 INJECTION, POWDER, LYOPHILIZED, FOR SOLUTION INTRAVENOUS; RESPIRATORY (INHALATION) at 02:04

## 2025-04-21 RX ADMIN — AMLODIPINE BESYLATE 5 MG: 5 TABLET ORAL at 09:04

## 2025-04-21 RX ADMIN — BUDESONIDE INHALATION 0.5 MG: 0.5 SUSPENSION RESPIRATORY (INHALATION) at 07:04

## 2025-04-21 RX ADMIN — METHYLPREDNISOLONE SODIUM SUCCINATE 80 MG: 40 INJECTION, POWDER, FOR SOLUTION INTRAMUSCULAR; INTRAVENOUS at 03:04

## 2025-04-21 RX ADMIN — SULFAMETHOXAZOLE AND TRIMETHOPRIM 1 TABLET: 800; 160 TABLET ORAL at 09:04

## 2025-04-21 RX ADMIN — CEFTRIAXONE 1 G: 1 INJECTION, POWDER, FOR SOLUTION INTRAMUSCULAR; INTRAVENOUS at 05:04

## 2025-04-21 RX ADMIN — IPRATROPIUM BROMIDE AND ALBUTEROL SULFATE 3 ML: 2.5; .5 SOLUTION RESPIRATORY (INHALATION) at 07:04

## 2025-04-21 RX ADMIN — ENOXAPARIN SODIUM 40 MG: 40 INJECTION SUBCUTANEOUS at 05:04

## 2025-04-21 RX ADMIN — METHYLPREDNISOLONE SODIUM SUCCINATE 80 MG: 40 INJECTION, POWDER, FOR SOLUTION INTRAMUSCULAR; INTRAVENOUS at 05:04

## 2025-04-21 RX ADMIN — AZITHROMYCIN DIHYDRATE 500 MG: 250 TABLET ORAL at 09:04

## 2025-04-21 NOTE — ASSESSMENT & PLAN NOTE
Patient with Hypoxic Respiratory failure which is Acute.  she is not on home oxygen. 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   Signs/symptoms of respiratory failure include- tachypnea, increased work of breathing, and wheezing. Contributing diagnoses includes - Interstitial lung disease and Asthma Labs and images were reviewed. Patient Has recent ABG, which has been reviewed. Will treat underlying causes and adjust management of respiratory failure as follows-     -CXR with no infiltrates, do not see where she had infiltrates previously when she was positive for Covid and Flu  -VQ Scan nondiagnostic, high risk for worsening renal failure due to vasculitis/CKD if receives contrast dye, CTA Chest on hold  -Will order Echo, US BLE, and non contrast CT Chest for further evaluation.  -Agree with current treatment: IV steroids, nebulizers, antibiotics.

## 2025-04-21 NOTE — PLAN OF CARE
Discussed poc with pt, pt verbalized understanding    Purposeful rounding every 2hours    VS wnl  Cardiac monitoring in use, pt is NSR, tele monitor #[id number]  Fall precautions in place, remains injury free  Pt denies c/o pain or nausea.    Accurate I&Os  Abx given as prescribed  Bed locked at lowest position  Call light within reach    Chart check complete  Will cont with POC

## 2025-04-21 NOTE — SUBJECTIVE & OBJECTIVE
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       Anemia, chronic renal failure, stage 4 (severe) 08/23/2024    Mild intermittent asthma, uncomplicated        Past Surgical History:   Procedure Laterality Date    BILATERAL TUBAL LIGATION N/A     COLONOSCOPY N/A 8/20/2024    Procedure: COLONOSCOPY;  Surgeon: Gena Saxena MD;  Location: [facility];  Service: Endoscopy;  Laterality: N/A;    ESOPHAGOGASTRODUODENOSCOPY N/A 8/20/2024    Procedure: EGD (ESOPHAGOGASTRODUODENOSCOPY);  Surgeon: Gena Saxena MD;  Location: [facility];  Service: Endoscopy;  Laterality: N/A;    INTRALUMINAL GASTROINTESTINAL TRACT IMAGING VIA CAPSULE N/A 9/19/2024    Procedure: IMAGING PROCEDURE, GI TRACT, INTRALUMINAL, VIA CAPSULE;  Surgeon: Ignacio Jimenez RN;  [facility]: [facility] ENDO;  Service: Endoscopy;  Laterality: N/A;  Neg EGD/Colon. Hx MARIA ESTHER/weight loss/ARF       Review of patient's allergies indicates:  No Known Allergies    Family History       Problem Relation (Age of Onset)    Hypertension Mother, Father          Tobacco Use    Smoking status: Never    Smokeless tobacco: Never   Substance and Sexual Activity    Alcohol use: Not Currently    Drug use: Never    Sexual activity: Yes         Review of Systems   Constitutional:  Positive for fatigue. Negative for activity change and appetite change.   Respiratory:  Positive for cough and shortness of breath. Negative for choking, chest tightness, wheezing and stridor.         Nonproductive cough   Cardiovascular:  Negative for chest pain, palpitations and leg swelling.   Gastrointestinal:  Negative for abdominal pain, diarrhea, nausea and vomiting.     Objective:     Vital Signs (Most Recent):  Temp: 98.4 °F (36.9 °C) (04/21/25 1614)  Pulse: 86 (04/21/25 1614)  Resp: 16 (04/21/25 1614)  BP: 116/68 (04/21/25 1614)  SpO2: 96 % (04/21/25 1614) Vital Signs (24h  Range):  Temp:  [97.9 °F (36.6 °C)-98.7 °F (37.1 °C)] 98.4 °F (36.9 °C)  Pulse:  [] 86  Resp:  [15-24] 16  SpO2:  [89 %-96 %] 96 %  BP: (116-148)/(68-94) 116/68     Weight: 44 kg (97 lb)  Body mass index is 16.65 kg/m².      Intake/Output Summary (Last 24 hours) at 4/21/2025 1619  Last data filed at 4/21/2025 1508  Gross per 24 hour   Intake 480 ml   Output --   Net 480 ml        Physical Exam  Constitutional:       General: She is not in acute distress.     Appearance: She is underweight. She is ill-appearing.   HENT:      Head: Normocephalic and atraumatic.      Nose: Nose normal.      Mouth/Throat:      Mouth: Mucous membranes are moist.   Eyes:      Pupils: Pupils are equal, round, and reactive to light.   Cardiovascular:      Rate and Rhythm: Normal rate and regular rhythm.      Pulses: Normal pulses.      Heart sounds: Normal heart sounds.   Pulmonary:      Effort: Tachypnea present. No respiratory distress.      Breath sounds: Decreased air movement present. No wheezing.   Abdominal:      General: Bowel sounds are normal. There is no distension.      Palpations: Abdomen is soft.      Tenderness: There is no abdominal tenderness.   Musculoskeletal:         General: No swelling.      Cervical back: Neck supple.      Right lower leg: No edema.      Left lower leg: No edema.   Skin:     General: Skin is warm and dry.      Coloration: Skin is pale.   Neurological:      Mental Status: She is oriented to person, place, and time. Mental status is at baseline.      Motor: No weakness.          Vents:  Oxygen Concentration (%): 32 (04/21/25 0749)    Lines/Drains/Airways       Peripheral Intravenous Line  Duration                  Peripheral IV - Single Lumen 04/20/25 1305 20 G Right Antecubital 1 day                    Significant Labs:    CBC/Anemia Profile:  Recent Labs   Lab 04/20/25  1301 04/21/25  0746   WBC 13.11* 7.41   HGB 14.2 13.8   HCT 44.2 43.1   * 363   MCV 97 97   RDW 13.4 12.9         Chemistries:  Recent Labs   Lab 04/20/25  1301 04/21/25  0746    140   K 4.4 4.5    109   CO2 20* 21*   BUN 26* 32*   CREATININE 1.5* 1.5*   CALCIUM 9.3 9.1   ALBUMIN 3.9 3.4*   BILITOT 0.4 0.5   ALKPHOS 76 71   ALT 19 14   AST 14 14   GLUCOSE 117* 109   MG 2.1 2.1   PHOS  --  4.8*       All pertinent labs within the past 24 hours have been reviewed.    Significant Imaging:   I have reviewed all pertinent imaging results/findings within the past 24 hours.

## 2025-04-21 NOTE — HPI
Shayy Faust is 49 year old female with ANCA vasculitis, RA positive, anemia, asthma, interstitial pulmonary disease, HTN. Presented to the [facility] ED with c/o SOB on . Sx began 2 weeks ago, dx with COVID/Flu, admitted to the hospital - for Sepsis. Evaluated by Pulmonology on  for continued c/o SOB, mild hypoxia. Initiated on a steroid taper. Denies significant improvement with steroids or nebulizers. Patient is not on O2 at home. Managed with chronic steroid as OP. In the ED, vitals: 171/100, 113, 24, 98.9F, 92% RA on arrival. Significant Labs: WBC: 13.11, left shift, Pl: 470K, Cr: 1.5, D-Dimer: 0.61, BNP: 150, AB.410/35.1/50/22.2/88. CXR: NAF. Treated with Nebs, Steroid, IV Fluids. Patient required transfer to [facility] for admission. Patient is a full code. Placed in observation under the care of Hospital Medicine for management of Acute Respiratory Failure. Pulmonology consulted for acute respiratory failure with hypoxia.

## 2025-04-21 NOTE — CONSULTS
[facility]  Pulmonology  Consult Note    Patient Name: Shayy Fauts  MRN: [id number]  Admission Date: 2025  Hospital Length of Stay: 0 days  Code Status: Full Code  Attending Physician: Ovidio Sosa MD  Primary Care Provider: Karine, Primary Doctor   Principal Problem: Acute hypoxemic respiratory failure    Inpatient consult to Pulmonology  Consult performed by: Yanelis De Paz NP  Consult ordered by: Eugenie Glaser NP        Subjective:     HPI:  Shayy Faust is 49 year old female with ANCA vasculitis, RA positive, anemia, asthma, interstitial pulmonary disease, HTN. Presented to the [facility] ED with c/o SOB on . Sx began 2 weeks ago, dx with COVID/Flu, admitted to the hospital - for Sepsis. Evaluated by Pulmonology on  for continued c/o SOB, mild hypoxia. Initiated on a steroid taper. Denies significant improvement with steroids or nebulizers. Patient is not on O2 at home. Managed with chronic steroid as OP. In the ED, vitals: 171/100, 113, 24, 98.9F, 92% RA on arrival. Significant Labs: WBC: 13.11, left shift, Pl: 470K, Cr: 1.5, D-Dimer: 0.61, BNP: 150, AB.410/35.1/50/22.2/88. CXR: NAF. Treated with Nebs, Steroid, IV Fluids. Patient required transfer to [facility] for admission. Patient is a full code. Placed in observation under the care of Hospital Medicine for management of Acute Respiratory Failure. Pulmonology consulted for acute respiratory failure with hypoxia.     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       Anemia, chronic renal failure, stage 4 (severe) 2024    Mild intermittent asthma, uncomplicated        Past Surgical History:   Procedure Laterality Date    BILATERAL TUBAL LIGATION N/A     COLONOSCOPY N/A 2024    Procedure: COLONOSCOPY;  Surgeon: Gena Saxena MD;  Location: [facility];   Service: Endoscopy;  Laterality: N/A;    ESOPHAGOGASTRODUODENOSCOPY N/A 8/20/2024    Procedure: EGD (ESOPHAGOGASTRODUODENOSCOPY);  Surgeon: Gena Saxena MD;  Location: [facility] ENDO;  Service: Endoscopy;  Laterality: N/A;    INTRALUMINAL GASTROINTESTINAL TRACT IMAGING VIA CAPSULE N/A 9/19/2024    Procedure: IMAGING PROCEDURE, GI TRACT, INTRALUMINAL, VIA CAPSULE;  Surgeon: Ignacio Jimenez RN;  Location: [facility] ENDO;  Service: Endoscopy;  Laterality: N/A;  Neg EGD/Colon. Hx MARIA ESTHER/weight loss/ARF       Review of patient's allergies indicates:  No Known Allergies    Family History       Problem Relation (Age of Onset)    Hypertension Mother, Father          Tobacco Use    Smoking status: Never    Smokeless tobacco: Never   Substance and Sexual Activity    Alcohol use: Not Currently    Drug use: Never    Sexual activity: Yes         Review of Systems   Constitutional:  Positive for fatigue. Negative for activity change and appetite change.   Respiratory:  Positive for cough and shortness of breath. Negative for choking, chest tightness, wheezing and stridor.         Nonproductive cough   Cardiovascular:  Negative for chest pain, palpitations and leg swelling.   Gastrointestinal:  Negative for abdominal pain, diarrhea, nausea and vomiting.     Objective:     Vital Signs (Most Recent):  Temp: 98.4 °F (36.9 °C) (04/21/25 1614)  Pulse: 86 (04/21/25 1614)  Resp: 16 (04/21/25 1614)  BP: 116/68 (04/21/25 1614)  SpO2: 96 % (04/21/25 1614) Vital Signs (24h Range):  Temp:  [97.9 °F (36.6 °C)-98.7 °F (37.1 °C)] 98.4 °F (36.9 °C)  Pulse:  [] 86  Resp:  [15-24] 16  SpO2:  [89 %-96 %] 96 %  BP: (116-148)/(68-94) 116/68     Weight: 44 kg (97 lb)  Body mass index is 16.65 kg/m².      Intake/Output Summary (Last 24 hours) at 4/21/2025 1619  Last data filed at 4/21/2025 1508  Gross per 24 hour   Intake 480 ml   Output --   Net 480 ml        Physical Exam  Constitutional:       General: She is not in acute distress.     Appearance: She  is underweight. She is ill-appearing.   HENT:      Head: Normocephalic and atraumatic.      Nose: Nose normal.      Mouth/Throat:      Mouth: Mucous membranes are moist.   Eyes:      Pupils: Pupils are equal, round, and reactive to light.   Cardiovascular:      Rate and Rhythm: Normal rate and regular rhythm.      Pulses: Normal pulses.      Heart sounds: Normal heart sounds.   Pulmonary:      Effort: Tachypnea present. No respiratory distress.      Breath sounds: Decreased air movement present. No wheezing.   Abdominal:      General: Bowel sounds are normal. There is no distension.      Palpations: Abdomen is soft.      Tenderness: There is no abdominal tenderness.   Musculoskeletal:         General: No swelling.      Cervical back: Neck supple.      Right lower leg: No edema.      Left lower leg: No edema.   Skin:     General: Skin is warm and dry.      Coloration: Skin is pale.   Neurological:      Mental Status: She is oriented to person, place, and time. Mental status is at baseline.      Motor: No weakness.          Vents:  Oxygen Concentration (%): 32 (04/21/25 0749)    Lines/Drains/Airways       Peripheral Intravenous Line  Duration                  Peripheral IV - Single Lumen 04/20/25 1305 20 G Right Antecubital 1 day                    Significant Labs:    CBC/Anemia Profile:  Recent Labs   Lab 04/20/25  1301 04/21/25  0746   WBC 13.11* 7.41   HGB 14.2 13.8   HCT 44.2 43.1   * 363   MCV 97 97   RDW 13.4 12.9        Chemistries:  Recent Labs   Lab 04/20/25  1301 04/21/25  0746    140   K 4.4 4.5    109   CO2 20* 21*   BUN 26* 32*   CREATININE 1.5* 1.5*   CALCIUM 9.3 9.1   ALBUMIN 3.9 3.4*   BILITOT 0.4 0.5   ALKPHOS 76 71   ALT 19 14   AST 14 14   GLUCOSE 117* 109   MG 2.1 2.1   PHOS  --  4.8*       All pertinent labs within the past 24 hours have been reviewed.    Significant Imaging:   I have reviewed all pertinent imaging results/findings within the past 24 hours.    ABG  Recent Labs    Lab 04/20/25  1407   PH 7.410   PO2 50*   PCO2 35.1   HCO3 22.2*   BE -2     Assessment/Plan:     Assessment & Plan  Acute hypoxemic respiratory failure  Moderate persistent asthma with acute exacerbation  Interstitial pulmonary disease, unspecified  Patient with Hypoxic Respiratory failure which is Acute.  she is not on home oxygen. 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   Signs/symptoms of respiratory failure include- tachypnea, increased work of breathing, and wheezing. Contributing diagnoses includes - Interstitial lung disease and Asthma  Labs and images were reviewed. Patient Has recent ABG, which has been reviewed. Will treat underlying causes and adjust management of respiratory failure as follows-     -CXR with no infiltrates, do not see where she had infiltrates previously when she was positive for Covid and Flu  -VQ Scan nondiagnostic, high risk for worsening renal failure due to vasculitis/CKD if receives contrast dye, CTA Chest on hold  -Will order Echo, US BLE, and non contrast CT Chest for further evaluation.  -Agree with current treatment: IV steroids, nebulizers, antibiotics.     Thank you for your consult. Our team will follow-up with patient. Please contact us if you have any additional questions.     Yanelis De Paz NP  Pulmonology  [facility]

## 2025-04-21 NOTE — PLAN OF CARE
[facility]  Initial Discharge Assessment       Primary Care Provider: No, Primary Doctor    Admission Diagnosis: Dyspnea [R06.00]  SOB (shortness of breath) [R06.02]    Admission Date: 4/20/2025  Expected Discharge Date: Per Attending     Transition of Care Barriers: None    Payor: [employer] / Plan: [facility] CHOICE PLUS / Product Type: Commercial /     Extended Emergency Contact Information  Primary Emergency Contact: Deisy Faust  Mobile Phone: [phone]  Relation: Mother   needed? No    Discharge Plan A: Home with family         [facility] - [place], [place] - [address]  [address] [id number]  Phone: [phone] Fax: [phone]      Initial Assessment (most recent)       Adult Discharge Assessment - 04/21/25 1042          Discharge Assessment    Assessment Type Discharge Planning Assessment     Confirmed/corrected address, phone number and insurance Yes     Confirmed Demographics Correct on Facesheet     Source of Information patient     Communicated POOJA with patient/caregiver Date not available/Unable to determine     Reason For Admission cute hypoxemic respiratory failure     People in Home parent(s)     Do you expect to return to your current living situation? Yes     Do you have help at home or someone to help you manage your care at home? Yes     Who are your caregiver(s) and their phone number(s)? family     Prior to hospitilization cognitive status: Alert/Oriented     Current cognitive status: Alert/Oriented     Walking or Climbing Stairs Difficulty no     Dressing/Bathing Difficulty no     Home Layout Able to live on 1st floor     Equipment Currently Used at Home none     Readmission within 30 days? No     Patient currently being followed by outpatient case management? No     Do you currently have service(s) that help you manage your care at home? No     Do you take prescription medications? Yes     Do you have prescription coverage? Yes      Coverage [facility] Choice Plus     Do you have any problems affording any of your prescribed medications? No     Is the patient taking medications as prescribed? yes     Who is going to help you get home at discharge? family     How do you get to doctors appointments? car, drives self     Are you on dialysis? No     Do you take coumadin? No     Discharge Plan A Home with family     DME Needed Upon Discharge  none     Discharge Plan discussed with: Patient     Transition of Care Barriers None                   Anticipated DC dispo: Home   Prior Level of Function: Independent   People in home:  Family     Comments:  CM met with patient at bedside to introduce role and discuss discharge planning. Mother  will be help at home and can provide transport at time of discharge. CM discharge needs depends on hospital progress. CM will continue following to assist with other needs.

## 2025-04-22 LAB
ABSOLUTE EOSINOPHIL (OHS): 0.01 K/UL
ABSOLUTE MONOCYTE (OHS): 0.18 K/UL (ref 0.3–1)
ABSOLUTE NEUTROPHIL COUNT (OHS): 8.96 K/UL (ref 1.8–7.7)
ALBUMIN SERPL BCP-MCNC: 3.1 G/DL (ref 3.5–5.2)
ALP SERPL-CCNC: 61 UNIT/L (ref 40–150)
ALT SERPL W/O P-5'-P-CCNC: 12 UNIT/L (ref 10–44)
ANION GAP (OHS): 9 MMOL/L (ref 8–16)
AST SERPL-CCNC: 11 UNIT/L (ref 11–45)
BASOPHILS # BLD AUTO: 0.01 K/UL
BASOPHILS NFR BLD AUTO: 0.1 %
BILIRUB SERPL-MCNC: 0.3 MG/DL (ref 0.1–1)
BILIRUB UR QL STRIP.AUTO: NEGATIVE
BUN SERPL-MCNC: 47 MG/DL (ref 6–20)
CALCIUM SERPL-MCNC: 8.7 MG/DL (ref 8.7–10.5)
CHLORIDE SERPL-SCNC: 110 MMOL/L (ref 95–110)
CLARITY UR: CLEAR
CO2 SERPL-SCNC: 20 MMOL/L (ref 23–29)
COLOR UR AUTO: YELLOW
CREAT SERPL-MCNC: 1.5 MG/DL (ref 0.5–1.4)
ERYTHROCYTE [DISTWIDTH] IN BLOOD BY AUTOMATED COUNT: 12.6 % (ref 11.5–14.5)
GFR SERPLBLD CREATININE-BSD FMLA CKD-EPI: 42 ML/MIN/1.73/M2
GLUCOSE SERPL-MCNC: 126 MG/DL (ref 70–110)
GLUCOSE UR QL STRIP: NEGATIVE
HCT VFR BLD AUTO: 39.7 % (ref 37–48.5)
HGB BLD-MCNC: 12.7 GM/DL (ref 12–16)
HGB UR QL STRIP: NEGATIVE
HOLD SPECIMEN: NORMAL
IMM GRANULOCYTES # BLD AUTO: 0.07 K/UL (ref 0–0.04)
IMM GRANULOCYTES NFR BLD AUTO: 0.7 % (ref 0–0.5)
KETONES UR QL STRIP: NEGATIVE
LEUKOCYTE ESTERASE UR QL STRIP: NEGATIVE
LYMPHOCYTES # BLD AUTO: 0.64 K/UL (ref 1–4.8)
MAGNESIUM SERPL-MCNC: 2.1 MG/DL (ref 1.6–2.6)
MCH RBC QN AUTO: 30.7 PG (ref 27–31)
MCHC RBC AUTO-ENTMCNC: 32 G/DL (ref 32–36)
MCV RBC AUTO: 96 FL (ref 82–98)
NITRITE UR QL STRIP: NEGATIVE
NUCLEATED RBC (/100WBC) (OHS): 0 /100 WBC
PH UR STRIP: 7 [PH]
PHOSPHATE SERPL-MCNC: 5.4 MG/DL (ref 2.7–4.5)
PLATELET # BLD AUTO: 350 K/UL (ref 150–450)
PMV BLD AUTO: 10.2 FL (ref 9.2–12.9)
POTASSIUM SERPL-SCNC: 4.9 MMOL/L (ref 3.5–5.1)
PROT SERPL-MCNC: 5.4 GM/DL (ref 6–8.4)
PROT UR QL STRIP: ABNORMAL
RBC # BLD AUTO: 4.14 M/UL (ref 4–5.4)
RELATIVE EOSINOPHIL (OHS): 0.1 %
RELATIVE LYMPHOCYTE (OHS): 6.5 % (ref 18–48)
RELATIVE MONOCYTE (OHS): 1.8 % (ref 4–15)
RELATIVE NEUTROPHIL (OHS): 90.8 % (ref 38–73)
SODIUM SERPL-SCNC: 139 MMOL/L (ref 136–145)
SP GR UR STRIP: 1.03
UROBILINOGEN UR STRIP-ACNC: NEGATIVE EU/DL
WBC # BLD AUTO: 9.87 K/UL (ref 3.9–12.7)

## 2025-04-22 PROCEDURE — 63600175 PHARM REV CODE 636 W HCPCS: Performed by: NURSE PRACTITIONER

## 2025-04-22 PROCEDURE — 36415 COLL VENOUS BLD VENIPUNCTURE: CPT | Performed by: NURSE PRACTITIONER

## 2025-04-22 PROCEDURE — 84100 ASSAY OF PHOSPHORUS: CPT | Performed by: NURSE PRACTITIONER

## 2025-04-22 PROCEDURE — 94761 N-INVAS EAR/PLS OXIMETRY MLT: CPT

## 2025-04-22 PROCEDURE — 94640 AIRWAY INHALATION TREATMENT: CPT

## 2025-04-22 PROCEDURE — 21400001 HC TELEMETRY ROOM

## 2025-04-22 PROCEDURE — 83735 ASSAY OF MAGNESIUM: CPT | Performed by: NURSE PRACTITIONER

## 2025-04-22 PROCEDURE — 63600175 PHARM REV CODE 636 W HCPCS: Mod: JZ,TB | Performed by: INTERNAL MEDICINE

## 2025-04-22 PROCEDURE — 25000003 PHARM REV CODE 250: Performed by: NURSE PRACTITIONER

## 2025-04-22 PROCEDURE — 63700000 PHARM REV CODE 250 ALT 637 W/O HCPCS: Performed by: NURSE PRACTITIONER

## 2025-04-22 PROCEDURE — 63600175 PHARM REV CODE 636 W HCPCS: Performed by: HOSPITALIST

## 2025-04-22 PROCEDURE — 85025 COMPLETE CBC W/AUTO DIFF WBC: CPT | Performed by: NURSE PRACTITIONER

## 2025-04-22 PROCEDURE — 81003 URINALYSIS AUTO W/O SCOPE: CPT | Performed by: HOSPITALIST

## 2025-04-22 PROCEDURE — 25000242 PHARM REV CODE 250 ALT 637 W/ HCPCS: Performed by: NURSE PRACTITIONER

## 2025-04-22 PROCEDURE — 99900035 HC TECH TIME PER 15 MIN (STAT)

## 2025-04-22 PROCEDURE — 27000221 HC OXYGEN, UP TO 24 HOURS

## 2025-04-22 PROCEDURE — 80053 COMPREHEN METABOLIC PANEL: CPT | Performed by: NURSE PRACTITIONER

## 2025-04-22 RX ORDER — ENOXAPARIN SODIUM 100 MG/ML
30 INJECTION SUBCUTANEOUS EVERY 24 HOURS
Status: DISCONTINUED | OUTPATIENT
Start: 2025-04-22 | End: 2025-04-23 | Stop reason: HOSPADM

## 2025-04-22 RX ORDER — ENOXAPARIN SODIUM 100 MG/ML
30 INJECTION SUBCUTANEOUS EVERY 24 HOURS
Status: DISCONTINUED | OUTPATIENT
Start: 2025-04-23 | End: 2025-04-22

## 2025-04-22 RX ADMIN — BUDESONIDE INHALATION 0.5 MG: 0.5 SUSPENSION RESPIRATORY (INHALATION) at 06:04

## 2025-04-22 RX ADMIN — AMLODIPINE BESYLATE 5 MG: 5 TABLET ORAL at 09:04

## 2025-04-22 RX ADMIN — METHYLPREDNISOLONE SODIUM SUCCINATE 40 MG: 40 INJECTION, POWDER, FOR SOLUTION INTRAMUSCULAR; INTRAVENOUS at 10:04

## 2025-04-22 RX ADMIN — CEFTRIAXONE 1 G: 1 INJECTION, POWDER, FOR SOLUTION INTRAMUSCULAR; INTRAVENOUS at 05:04

## 2025-04-22 RX ADMIN — METHYLPREDNISOLONE SODIUM SUCCINATE 80 MG: 40 INJECTION, POWDER, FOR SOLUTION INTRAMUSCULAR; INTRAVENOUS at 06:04

## 2025-04-22 RX ADMIN — AZITHROMYCIN DIHYDRATE 500 MG: 250 TABLET ORAL at 09:04

## 2025-04-22 RX ADMIN — IPRATROPIUM BROMIDE AND ALBUTEROL SULFATE 3 ML: 2.5; .5 SOLUTION RESPIRATORY (INHALATION) at 01:04

## 2025-04-22 RX ADMIN — ENOXAPARIN SODIUM 30 MG: 30 INJECTION SUBCUTANEOUS at 05:04

## 2025-04-22 RX ADMIN — IPRATROPIUM BROMIDE AND ALBUTEROL SULFATE 3 ML: 2.5; .5 SOLUTION RESPIRATORY (INHALATION) at 07:04

## 2025-04-22 RX ADMIN — BUDESONIDE INHALATION 0.5 MG: 0.5 SUSPENSION RESPIRATORY (INHALATION) at 07:04

## 2025-04-22 RX ADMIN — METHYLPREDNISOLONE SODIUM SUCCINATE 80 MG: 40 INJECTION, POWDER, FOR SOLUTION INTRAMUSCULAR; INTRAVENOUS at 02:04

## 2025-04-22 RX ADMIN — IPRATROPIUM BROMIDE AND ALBUTEROL SULFATE 3 ML: 2.5; .5 SOLUTION RESPIRATORY (INHALATION) at 06:04

## 2025-04-22 RX ADMIN — POLYETHYLENE GLYCOL 3350 17 G: 17 POWDER, FOR SOLUTION ORAL at 09:04

## 2025-04-22 NOTE — ASSESSMENT & PLAN NOTE
Chronic  Pulmonary following    --nebs per orders  --ceftriaxone/azithromycin for now deescalate as appropriate  --IV steroid

## 2025-04-22 NOTE — ASSESSMENT & PLAN NOTE
Patient with Hypoxic Respiratory failure which is Acute.  she is not on home oxygen. 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   Signs/symptoms of respiratory failure include- tachypnea, increased work of breathing, and wheezing. Contributing diagnoses includes - Interstitial lung disease and Asthma Labs and images were reviewed. Patient Has recent ABG, which has been reviewed. Will treat underlying causes and adjust management of respiratory failure as follows-     -CXR with no infiltrates, do not see where she had infiltrates previously when she was positive for Covid and Flu  -VQ Scan nondiagnostic, high risk for worsening renal failure due to vasculitis/CKD if receives contrast dye, CTA Chest on hold  -Will order Echo, US BLE, and non contrast CT Chest for further evaluation.  -Agree with current treatment: IV steroids, nebulizers, antibiotics.   Patient with Hypoxic Respiratory failure which is Acute.  she is not on home oxygen. Supplemental oxygen was provided and noted- Oxygen Concentration (%):  [32] 32  4/22 ct scan no real change from previous/ us doppler neg / echo nl  Imuran held by primary   ? All asthma , no wheezing today  wean steroids change to pred in am   Will need close pulm f/u   Cont nebs  Likely can dc antibiotics  Further [place] if needed

## 2025-04-22 NOTE — PROGRESS NOTES
[facility] Medicine  Progress Note    Patient Name: Shayy Faust  MRN: [id number]  Patient Class: IP- Inpatient   Admission Date: 2025  Length of Stay: 0 days  Attending Physician: Ovidio Sosa MD  Primary Care Provider: Karine, Primary Doctor        Subjective     Principal Problem:Acute hypoxemic respiratory failure        HPI:  49 year old female, comorbid conditions include ANCA vasculitis, RA positive, anemia, interstitial pulmonary disease, HTN.  Presented to the [facility] ED with c/o SOB. Sx began 2 weeks ago, dx with COVID/Flu, admitted to the hospital - for Sepsis. Evaluated by Pulmonology on  for continued c/o SOB, mild hypoxia. Initiated on a steroid taper. Denies significant improvement. Patient is not on O2 at home. Managed with chronic steroid as OP. In the ED, vitals: 171/100, 113, 24, 98.9F, 92% RA on arrival. Significant Labs: WBC: 13.11, left shift, Pl: 470K, Cr: 1.5, D-Dimer: 0.61, BNP: 150, AB.410/35.1/50/22.2/88. CXR: NAF. Treated with Nebs, Steroid, IV Fluids. Patient required transfer to [facility] for admission, theses services are not available at a [facility] Patient is a full code. Placed in observation under the care of Hospital Medicine for management of Acute Respiratory Failure.     Overview/Hospital Course:      NAEON, patient in bed, reports coughing, SOB  O2 sats stable on 3L NC  Continue IV steroids, ceftriaxone/azithromycin  V/Q scan non-diagnostic, CTA recommended  Cr 1.5, at risk for renal failure with vasculitis  Pulmonary consulted, appreciate assistance      Review of Systems   All other systems reviewed and are negative.    Objective:     Vital Signs (Most Recent):  Temp: 98 °F (36.7 °C) (25)  Pulse: 69 (25)  Resp: 18 (25)  BP: 112/75 (25)  SpO2: 96 % (25) Vital Signs (24h Range):  Temp:  [97.9 °F (36.6 °C)-98.4 °F (36.9 °C)] 98  °F (36.7 °C)  Pulse:  [] 69  Resp:  [14-18] 18  SpO2:  [89 %-98 %] 96 %  BP: (112-145)/(68-83) 112/75     Weight: 44 kg (97 lb)  Body mass index is 16.65 kg/m².    Intake/Output Summary (Last 24 hours) at 4/21/2025 2306  Last data filed at 4/21/2025 1508  Gross per 24 hour   Intake 480 ml   Output --   Net 480 ml         Physical Exam  Vitals and nursing note reviewed.   Constitutional:       General: She is not in acute distress.     Appearance: Normal appearance. She is normal weight. She is ill-appearing.   Cardiovascular:      Rate and Rhythm: Normal rate and regular rhythm.      Heart sounds: No murmur heard.  Pulmonary:      Effort: Pulmonary effort is normal. No respiratory distress.      Breath sounds: Wheezing present.      Comments: 3L NC  Neurological:      General: No focal deficit present.      Mental Status: She is alert and oriented to person, place, and time.   Psychiatric:         Mood and Affect: Mood normal.         Behavior: Behavior normal.               Significant Labs: All pertinent labs within the past 24 hours have been reviewed.  Recent Lab Results         04/21/25  1654   04/21/25  0746        Albumin   3.4       ALP   71       ALT   14       Anion Gap   10       Ao root annulus 2.62         Ascending aorta 2.62         Ao peak amy 1.6         Ao VTI 25.4         AST   14       AV valve area 2.1         CESAR by Velocity Ratio 1.9         AV mean gradient 7         AV index (prosthetic) 0.76         AV peak gradient 10         AV Velocity Ratio 0.69         Baso #   0.01       Basophil %   0.1       BILIRUBIN TOTAL   0.5  Comment: For infants and newborns, interpretation of results should be based   on gestational age, weight and in agreement with clinical   observations.    Premature Infant recommended reference ranges:   0-24 hours:  <8.0 mg/dL   24-48 hours: <12.0 mg/dL   3-5 days:    <15.0 mg/dL   6-29 days:   <15.0 mg/dL       BSA 1.41         BUN   32       Calcium   9.1        Chloride   109       CO2   21       Creatinine   1.5       Left Ventricle Relative Wall Thickness 0.47         E/A ratio 1.07         E/E' ratio 8         eGFR   42  Comment: Estimated GFR calculated using the CKD-EPI creatinine (2021) equation.       EF 60         Eos #   0.00       Eos %   0.0       E wave deceleration time 203         FS 30.2         Glucose   109       Gran # (ANC)   6.54       Hematocrit   43.1       Hemoglobin   13.8       Immature Grans (Abs)   0.05  Comment: Mild elevation in immature granulocytes is non specific and can be seen in a variety of conditions including stress response, acute inflammation, trauma and pregnancy. Correlation with other laboratory and clinical findings is essential.       Immature Granulocytes   0.7       IVC diameter 1.14         IVRT 72         IVSd 1.0         LA WIDTH 2.7         Left Atrium Major Axis 4.5         Left Atrium Minor Axis 4.7         LA size 2.6         LA Vol 27         LA vol index 19         LVOT area 2.8         LV LATERAL E/E' RATIO 8.8         LV SEPTAL E/E' RATIO 8.0         LV EDV BP 82         LV Diastolic Volume Index 56.94         Left Ventricular End Diastolic Volume by Teichholz Method 81.67         Left Ventricular End Systolic Volume by Teichholz Method 35.24         LVIDd 4.3         LVIDs 3.0         LV mass 142.5         LV Mass Index 99.0         Left Ventricular Outflow Tract Mean Gradient 3.24         Left Ventricular Outflow Tract Mean Velocity 0.89         LVOT diameter 1.9         LVOT peak ricardo 1.1         LVOT stroke volume 54.4         LVOT peak VTI 19.2         LV ESV BP 35         LV Systolic Volume Index 24.3         Lymph #   0.67       Lymph %   9.0       Magnesium    2.1       MCH   30.9       MCHC   32.0       MCV   97       Mean e' 0.11         Mono #   0.14       Mono %   1.9       MPV   10.4       Mr max ricardo 2.51         MV valve area p 1/2 method 3.73         MV Peak A Ricardo 0.82         MV Peak E Ricardo 0.88          MV stenosis pressure 1/2 time 58.91         Neut %   88.3       nRBC   0       Phosphorus Level   4.8       Platelet Count   363       Potassium   4.5       PROTEIN TOTAL   6.3       PV mean gradient 4         PW 1.0         RA Major Axis 2.75         Est. RA pres 3         RA Width 2.4         RBC   4.46       RDW   12.9       RV TB RVSP 6         RVOT peak ricardo 1.13         RVOT peak VTI 17.2         Sodium   140       STJ 2.68         TAPSE 1.91         TDI SEPTAL 0.11         TDI LATERAL 0.10         Triscuspid Valve Regurgitation Peak Gradient 26         TR Max Ricardo 2.5         TV resting pulmonary artery pressure 28         WBC   7.41       ZLVIDD -0.18         ZLVIDS 0.78                 Significant Imaging: I have reviewed all pertinent imaging results/findings within the past 24 hours.    US Lower Extremity Veins Bilateral   Final Result      1.  No evidence of right or left deep venous thrombosis.         Electronically signed by: Storm Boyce MD   Date:    04/21/2025   Time:    18:54      CT Chest Without Contrast   Final Result      1.  Stable appearance to the chest and surrounding soft tissues without new pulmonary opacities or acute process.      All CT scans at this facility are performed  using dose modulation techniques as appropriate to performed exam including the following:  automated exposure control; adjustment of mA and/or kV according to the patients size (this includes techniques or standardized protocols for targeted exams where dose is matched to indication/reason for exam: i.e. extremities or head);  iterative reconstruction technique.         Electronically signed by: Storm Boyce MD   Date:    04/21/2025   Time:    20:08      NM Lung Scan Ventilation Perfusion   Final Result      Non-diagnostic for pulmonary embolism.   Perfusion defects are present and appear matched to ventilation abnormalities, but limited peripheral tracer distribution reduces confidence in interpretation.  Recommend clinical correlation and consider CT pulmonary angiography if suspicion for pulmonary embolism remains.         Electronically signed by: Ryan Goldstein   Date:    04/21/2025   Time:    16:01      X-Ray Chest 1 View   Final Result              Assessment & Plan  Acute hypoxemic respiratory failure  Patient with Hypoxic Respiratory failure which is Acute.  she is not on home oxygen. Supplemental oxygen was provided and noted- Oxygen Concentration (%):  [32] 32    .   Signs/symptoms of respiratory failure include- tachypnea, increased work of breathing, respiratory distress, and wheezing. Contributing diagnoses includes - Interstitial lung disease Labs and images were reviewed. Patient Has recent ABG, which has been reviewed. Will treat underlying causes and adjust management of respiratory failure as follows-     --supplemental O2 to maintain SpO2> 90%  Interstitial pulmonary disease, unspecified  Chronic  Pulmonary following    --nebs per orders  --ceftriaxone/azithromycin for now deescalate as appropriate  --IV steroid    Moderate persistent asthma with acute exacerbation  Chronic not well controlled, exacerbations likely secondary to recent viral infection.  Patient initiated on steroid taper per pulmonology on 04/16 with no significant improvement.  Patient presented to the ED with complaints of worsening dyspnea at home.    --supplemental O2 to maintain SpO2> 90%  --IV steroid wean as tolerated  CKD (chronic kidney disease) stage 3, GFR 30-59 ml/min  Creatine stable for now. BMP reviewed- noted Estimated Creatinine Clearance: 31.2 mL/min (A) (based on SCr of 1.5 mg/dL (H)). according to latest data. Based on current GFR, CKD stage is stage 3 - GFR 30-59.  Monitor UOP and serial BMP and adjust therapy as needed. Renally dose meds. Avoid nephrotoxic medications and procedures.  Anemia, chronic renal failure, stage 4 (severe)  Anemia is likely due to chronic disease due to Chronic Kidney Disease. Most  recent hemoglobin and hematocrit are listed below.  Recent Labs     04/20/25  1301 04/21/25  0746   HGB 14.2 13.8   HCT 44.2 43.1     Plan  - Monitor serial CBC: Daily  - Transfuse PRBC if patient becomes hemodynamically unstable, symptomatic or H/H drops below 7/21.  - Patient has not received any PRBC transfusions to date  Primary hypertension  Patient's blood pressure range in the last 24 hours was: BP  Min: 112/75  Max: 145/83.The patient's inpatient anti-hypertensive regimen is listed below:  Current Antihypertensives  hydrALAZINE injection 10 mg, Every 6 hours PRN, Intravenous  amLODIPine tablet 5 mg, Daily, Oral    Plan  - BP is controlled, no changes needed to their regimen  - Holding Lisinopril due to JAIME  VTE Risk Mitigation (From admission, onward)           Ordered     enoxaparin injection 40 mg  Daily         04/20/25 1751     IP VTE HIGH RISK PATIENT  Once         04/20/25 1751     Place sequential compression device  Until discontinued         04/20/25 1751                    Discharge Planning   POOJA:      Code Status: Full Code   Medical Readiness for Discharge Date:   Discharge Plan A: Home with family                        Ovidio Sosa MD  Department of Hospital Medicine   [facility] - Med Surg

## 2025-04-22 NOTE — ASSESSMENT & PLAN NOTE
Patient's blood pressure range in the last 24 hours was: BP  Min: 112/75  Max: 145/83.The patient's inpatient anti-hypertensive regimen is listed below:  Current Antihypertensives  hydrALAZINE injection 10 mg, Every 6 hours PRN, Intravenous  amLODIPine tablet 5 mg, Daily, Oral    Plan  - BP is controlled, no changes needed to their regimen  - Holding Lisinopril due to JAIME

## 2025-04-22 NOTE — PLAN OF CARE
Discussed poc with pt, pt verbalized understanding    Purposeful rounding every 2hours    VS wnl  Cardiac monitoring in use, pt is NSR, tele monitor # [id number]  Fall precautions in place, remains injury free  Pt denies c/o pain  Abx given as prescribed  Bed locked at lowest position  Call light within reach    Chart check complete  Will cont with POC

## 2025-04-22 NOTE — PROGRESS NOTES
[facility]  Pulmonology  Progress Note    Patient Name: Shayy Faust  MRN: [id number]  Admission Date: 4/20/2025  Hospital Length of Stay: 1 days  Code Status: Full Code  Attending Provider: Ovidio Sosa MD  Primary Care Provider: Karine, Primary Doctor   Principal Problem: Acute hypoxemic respiratory failure    Subjective:     Interval History: feels better  Still some cough      Objective:     Vital Signs (Most Recent):  Temp: 98.5 °F (36.9 °C) (04/22/25 1653)  Pulse: 76 (04/22/25 1711)  Resp: 14 (04/22/25 1653)  BP: 126/71 (04/22/25 1653)  SpO2: 97 % (04/22/25 1653) Vital Signs (24h Range):  Temp:  [97.7 °F (36.5 °C)-98.5 °F (36.9 °C)] 98.5 °F (36.9 °C)  Pulse:  [] 76  Resp:  [14-18] 14  SpO2:  [96 %-98 %] 97 %  BP: (109-133)/(70-75) 126/71     Weight: 44 kg (97 lb)  Body mass index is 16.65 kg/m².    No intake or output data in the 24 hours ending 04/22/25 1739     Physical Exam  Vitals and nursing note reviewed.   HENT:      Head: Normocephalic and atraumatic.   Eyes:      General:         Right eye: No discharge.         Left eye: No discharge.   Cardiovascular:      Rate and Rhythm: Normal rate and regular rhythm.      Heart sounds: No murmur heard.  Pulmonary:      Effort: No respiratory distress.      Breath sounds: No wheezing.   Abdominal:      General: There is no distension.      Palpations: Abdomen is soft.   Musculoskeletal:         General: No swelling or tenderness.      Cervical back: Neck supple. No rigidity.           Review of Systems   Respiratory:  Positive for cough.        Vents:  Oxygen Concentration (%): 32 (04/21/25 1903)    Lines/Drains/Airways       Peripheral Intravenous Line  Duration                  Peripheral IV - Single Lumen 04/20/25 1305 20 G Right Antecubital 2 days                    Significant Labs:    CBC/Anemia Profile:  Recent Labs   Lab 04/21/25  0746 04/22/25  0633   WBC 7.41 9.87   HGB 13.8 12.7   HCT 43.1 39.7    350   MCV 97 96   RDW 12.9 12.6         Chemistries:  Recent Labs   Lab 04/21/25  0746 04/22/25  0633    139   K 4.5 4.9    110   CO2 21* 20*   BUN 32* 47*   CREATININE 1.5* 1.5*   CALCIUM 9.1 8.7   ALBUMIN 3.4* 3.1*   BILITOT 0.5 0.3   ALKPHOS 71 61   ALT 14 12   AST 14 11   GLUCOSE 109 126*   MG 2.1 2.1   PHOS 4.8* 5.4*       All pertinent labs within the past 24 hours have been reviewed.    Significant Imaging:  I have reviewed all pertinent imaging results/findings within the past 24 hours.    ABG  Recent Labs   Lab 04/20/25  1407   PH 7.410   PO2 50*   PCO2 35.1   HCO3 22.2*   BE -2     Assessment/Plan:     Assessment & Plan  Acute hypoxemic respiratory failure  Moderate persistent asthma with acute exacerbation  Interstitial pulmonary disease, unspecified  Patient with Hypoxic Respiratory failure which is Acute.  she is not on home oxygen. 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   Signs/symptoms of respiratory failure include- tachypnea, increased work of breathing, and wheezing. Contributing diagnoses includes - Interstitial lung disease and Asthma  Labs and images were reviewed. Patient Has recent ABG, which has been reviewed. Will treat underlying causes and adjust management of respiratory failure as follows-     -CXR with no infiltrates, do not see where she had infiltrates previously when she was positive for Covid and Flu  -VQ Scan nondiagnostic, high risk for worsening renal failure due to vasculitis/CKD if receives contrast dye, CTA Chest on hold  -Will order Echo, US BLE, and non contrast CT Chest for further evaluation.  -Agree with current treatment: IV steroids, nebulizers, antibiotics.   Patient with Hypoxic Respiratory failure which is Acute.  she is not on home oxygen. Supplemental oxygen was provided and noted- Oxygen Concentration (%):  [32] 32  4/22 ct scan no real change from previous/ us doppler neg /  echo nl  Imuran held by primary   ? All asthma , no wheezing today  wean steroids change to pred in am   Will need close pulm f/u   Cont nebs  Likely can dc antibiotics  Further west if needed             Bethany Sherman MD  Pulmonology  [facility] Med [facility]

## 2025-04-22 NOTE — ASSESSMENT & PLAN NOTE
Anemia is likely due to chronic disease due to Chronic Kidney Disease. Most recent hemoglobin and hematocrit are listed below.  Recent Labs     04/20/25  1301 04/21/25  0746   HGB 14.2 13.8   HCT 44.2 43.1     Plan  - Monitor serial CBC: Daily  - Transfuse PRBC if patient becomes hemodynamically unstable, symptomatic or H/H drops below 7/21.  - Patient has not received any PRBC transfusions to date

## 2025-04-22 NOTE — SUBJECTIVE & OBJECTIVE
Interval History: feels better  Still some cough      Objective:     Vital Signs (Most Recent):  Temp: 98.5 °F (36.9 °C) (04/22/25 1653)  Pulse: 76 (04/22/25 1711)  Resp: 14 (04/22/25 1653)  BP: 126/71 (04/22/25 1653)  SpO2: 97 % (04/22/25 1653) Vital Signs (24h Range):  Temp:  [97.7 °F (36.5 °C)-98.5 °F (36.9 °C)] 98.5 °F (36.9 °C)  Pulse:  [] 76  Resp:  [14-18] 14  SpO2:  [96 %-98 %] 97 %  BP: (109-133)/(70-75) 126/71     Weight: 44 kg (97 lb)  Body mass index is 16.65 kg/m².    No intake or output data in the 24 hours ending 04/22/25 1739     Physical Exam  Vitals and nursing note reviewed.   HENT:      Head: Normocephalic and atraumatic.   Eyes:      General:         Right eye: No discharge.         Left eye: No discharge.   Cardiovascular:      Rate and Rhythm: Normal rate and regular rhythm.      Heart sounds: No murmur heard.  Pulmonary:      Effort: No respiratory distress.      Breath sounds: No wheezing.   Abdominal:      General: There is no distension.      Palpations: Abdomen is soft.   Musculoskeletal:         General: No swelling or tenderness.      Cervical back: Neck supple. No rigidity.           Review of Systems   Respiratory:  Positive for cough.        Vents:  Oxygen Concentration (%): 32 (04/21/25 1903)    Lines/Drains/Airways       Peripheral Intravenous Line  Duration                  Peripheral IV - Single Lumen 04/20/25 1305 20 G Right Antecubital 2 days                    Significant Labs:    CBC/Anemia Profile:  Recent Labs   Lab 04/21/25  0746 04/22/25  0633   WBC 7.41 9.87   HGB 13.8 12.7   HCT 43.1 39.7    350   MCV 97 96   RDW 12.9 12.6        Chemistries:  Recent Labs   Lab 04/21/25  0746 04/22/25  0633    139   K 4.5 4.9    110   CO2 21* 20*   BUN 32* 47*   CREATININE 1.5* 1.5*   CALCIUM 9.1 8.7   ALBUMIN 3.4* 3.1*   BILITOT 0.5 0.3   ALKPHOS 71 61   ALT 14 12   AST 14 11   GLUCOSE 109 126*   MG 2.1 2.1   PHOS 4.8* 5.4*       All pertinent labs within the  past 24 hours have been reviewed.    Significant Imaging:  I have reviewed all pertinent imaging results/findings within the past 24 hours.

## 2025-04-22 NOTE — ASSESSMENT & PLAN NOTE
Creatine stable for now. BMP reviewed- noted Estimated Creatinine Clearance: 31.2 mL/min (A) (based on SCr of 1.5 mg/dL (H)). according to latest data. Based on current GFR, CKD stage is stage 3 - GFR 30-59.  Monitor UOP and serial BMP and adjust therapy as needed. Renally dose meds. Avoid nephrotoxic medications and procedures.

## 2025-04-22 NOTE — PROGRESS NOTES
[facility] Medicine  Progress Note    Patient Name: Shayy Faust  MRN: [id number]  Patient Class: IP- Inpatient   Admission Date: 2025  Length of Stay: 1 days  Attending Physician: Ovidio Sosa MD  Primary Care Provider: Karine, Primary Doctor        Subjective     Principal Problem:Acute hypoxemic respiratory failure        HPI:  49 year old female, comorbid conditions include ANCA vasculitis, RA positive, anemia, interstitial pulmonary disease, HTN.  Presented to the [facility] ED with c/o SOB. Sx began 2 weeks ago, dx with COVID/Flu, admitted to the hospital - for Sepsis. Evaluated by Pulmonology on  for continued c/o SOB, mild hypoxia. Initiated on a steroid taper. Denies significant improvement. Patient is not on O2 at home. Managed with chronic steroid as OP. In the ED, vitals: 171/100, 113, 24, 98.9F, 92% RA on arrival. Significant Labs: WBC: 13.11, left shift, Pl: 470K, Cr: 1.5, D-Dimer: 0.61, BNP: 150, AB.410/35.1/50/22.2/88. CXR: NAF. Treated with Nebs, Steroid, IV Fluids. Patient required transfer to [facility] for admission, theses services are not available at [facility] Patient is a full code. Placed in observation under the care of Hospital Medicine for management of Acute Respiratory Failure.     Overview/Hospital Course:      NAEON, patient in bed, reports coughing, SOB  O2 sats stable on 3L NC  Continue IV steroids, ceftriaxone/azithromycin  V/Q scan non-diagnostic, CTA recommended  Cr 1.5, at risk for renal failure with vasculitis  Pulmonary consulted, appreciate assistance      On 2L NC, doesn't use O2 at home  Some improvement, continued coughing and SOB  Continue IV steroids, antibiotics, breathing treatments  Pulmonary following, appreciate assistance       Review of Systems   All other systems reviewed and are negative.    Objective:     Vital Signs (Most Recent):  Temp: 98.5 °F (36.9 °C)  (04/22/25 1653)  Pulse: 76 (04/22/25 1711)  Resp: 14 (04/22/25 1653)  BP: 126/71 (04/22/25 1653)  SpO2: 97 % (04/22/25 1653) Vital Signs (24h Range):  Temp:  [97.7 °F (36.5 °C)-98.5 °F (36.9 °C)] 98.5 °F (36.9 °C)  Pulse:  [] 76  Resp:  [14-18] 14  SpO2:  [96 %-98 %] 97 %  BP: (109-133)/(70-75) 126/71     Weight: 44 kg (97 lb)  Body mass index is 16.65 kg/m².  No intake or output data in the 24 hours ending 04/22/25 1724        Physical Exam  Vitals and nursing note reviewed.   Constitutional:       General: She is not in acute distress.     Appearance: Normal appearance. She is normal weight. She is ill-appearing.   Cardiovascular:      Rate and Rhythm: Normal rate and regular rhythm.      Heart sounds: No murmur heard.  Pulmonary:      Effort: Pulmonary effort is normal. No respiratory distress.      Breath sounds: Wheezing present.      Comments: 2L NC  Neurological:      General: No focal deficit present.      Mental Status: She is alert and oriented to person, place, and time.   Psychiatric:         Mood and Affect: Mood normal.         Behavior: Behavior normal.               Significant Labs: All pertinent labs within the past 24 hours have been reviewed.  Recent Lab Results         04/22/25  0633   04/22/25  0227        Albumin 3.1         ALP 61         ALT 12         Anion Gap 9         Appearance, UA   Clear       AST 11         Baso # 0.01         Basophil % 0.1         Bilirubin (UA)   Negative       BILIRUBIN TOTAL 0.3  Comment: For infants and newborns, interpretation of results should be based   on gestational age, weight and in agreement with clinical   observations.    Premature Infant recommended reference ranges:   0-24 hours:  <8.0 mg/dL   24-48 hours: <12.0 mg/dL   3-5 days:    <15.0 mg/dL   6-29 days:   <15.0 mg/dL         BUN 47         Calcium 8.7         Chloride 110         CO2 20         Color, UA   Yellow       Creatinine 1.5         eGFR 42  Comment: Estimated GFR calculated using  the CKD-EPI creatinine (2021) equation.         Eos # 0.01         Eos % 0.1         Glucose 126         Glucose, UA   Negative       Gran # (ANC) 8.96         Hematocrit 39.7         Hemoglobin 12.7         Extra Tube   Hold for add-ons.  Comment: Auto resulted.          Immature Grans (Abs) 0.07  Comment: Mild elevation in immature granulocytes is non specific and can be seen in a variety of conditions including stress response, acute inflammation, trauma and pregnancy. Correlation with other laboratory and clinical findings is essential.         Immature Granulocytes 0.7         Ketones, UA   Negative       Leukocyte Esterase, UA   Negative       Lymph # 0.64         Lymph % 6.5         Magnesium  2.1         MCH 30.7         MCHC 32.0         MCV 96         Mono # 0.18         Mono % 1.8         MPV 10.2         Neut % 90.8         NITRITE UA   Negative       nRBC 0         Blood, UA   Negative       pH, UA   7.0       Phosphorus Level 5.4         Platelet Count 350         Potassium 4.9         PROTEIN TOTAL 5.4         Protein, UA   Trace  Comment: Recommend a 24 hour urine protein or a urine protein/creatinine ratio if globulin induced proteinuria is clinically suspected.       RBC 4.14         RDW 12.6         Sodium 139         Spec Grav UA   1.030       Urobilinogen, UA   Negative       WBC 9.87                 Significant Imaging: I have reviewed all pertinent imaging results/findings within the past 24 hours.    US Lower Extremity Veins Bilateral   Final Result      1.  No evidence of right or left deep venous thrombosis.         Electronically signed by: Storm Boyce MD   Date:    04/21/2025   Time:    18:54      CT Chest Without Contrast   Final Result      1.  Stable appearance to the chest and surrounding soft tissues without new pulmonary opacities or acute process.      All CT scans at this facility are performed  using dose modulation techniques as appropriate to performed exam including the  following:  automated exposure control; adjustment of mA and/or kV according to the patients size (this includes techniques or standardized protocols for targeted exams where dose is matched to indication/reason for exam: i.e. extremities or head);  iterative reconstruction technique.         Electronically signed by: Storm Boyce MD   Date:    04/21/2025   Time:    20:08      NM Lung Scan Ventilation Perfusion   Final Result      Non-diagnostic for pulmonary embolism.   Perfusion defects are present and appear matched to ventilation abnormalities, but limited peripheral tracer distribution reduces confidence in interpretation. Recommend clinical correlation and consider CT pulmonary angiography if suspicion for pulmonary embolism remains.         Electronically signed by: Ryan Goldstein   Date:    04/21/2025   Time:    16:01      X-Ray Chest 1 View   Final Result              Assessment & Plan  Acute hypoxemic respiratory failure  Patient with Hypoxic Respiratory failure which is Acute.  she is not on home oxygen. Supplemental oxygen was provided and noted- Oxygen Concentration (%):  [32] 32    .   Signs/symptoms of respiratory failure include- tachypnea, increased work of breathing, respiratory distress, and wheezing. Contributing diagnoses includes - Interstitial lung disease Labs and images were reviewed. Patient Has recent ABG, which has been reviewed. Will treat underlying causes and adjust management of respiratory failure as follows-     --supplemental O2 to maintain SpO2> 90%  Interstitial pulmonary disease, unspecified  Chronic  Pulmonary following    --nebs per orders  --ceftriaxone/azithromycin for now deescalate as appropriate  --IV steroid    Moderate persistent asthma with acute exacerbation  Chronic not well controlled, exacerbations likely secondary to recent viral infection.  Patient initiated on steroid taper per pulmonology on 04/16 with no significant improvement.  Patient presented to the ED  with complaints of worsening dyspnea at home.    --supplemental O2 to maintain SpO2> 90%  --IV steroid wean as tolerated  CKD (chronic kidney disease) stage 3, GFR 30-59 ml/min  Creatine stable for now. BMP reviewed- noted Estimated Creatinine Clearance: 31.2 mL/min (A) (based on SCr of 1.5 mg/dL (H)). according to latest data. Based on current GFR, CKD stage is stage 3 - GFR 30-59.  Monitor UOP and serial BMP and adjust therapy as needed. Renally dose meds. Avoid nephrotoxic medications and procedures.  Anemia, chronic renal failure, stage 4 (severe)  Anemia is likely due to chronic disease due to Chronic Kidney Disease. Most recent hemoglobin and hematocrit are listed below.  Recent Labs     04/20/25  1301 04/21/25  0746 04/22/25  0633   HGB 14.2 13.8 12.7   HCT 44.2 43.1 39.7     Plan  - Monitor serial CBC: Daily  - Transfuse PRBC if patient becomes hemodynamically unstable, symptomatic or H/H drops below 7/21.  - Patient has not received any PRBC transfusions to date  Primary hypertension  Patient's blood pressure range in the last 24 hours was: BP  Min: 109/70  Max: 133/73.The patient's inpatient anti-hypertensive regimen is listed below:  Current Antihypertensives  hydrALAZINE injection 10 mg, Every 6 hours PRN, Intravenous  amLODIPine tablet 5 mg, Daily, Oral    Plan  - BP is controlled, no changes needed to their regimen  - Holding Lisinopril due to JAIME  VTE Risk Mitigation (From admission, onward)           Ordered     enoxaparin injection 30 mg  Every 24 hours         04/22/25 1350     IP VTE HIGH RISK PATIENT  Once         04/20/25 1751     Place sequential compression device  Until discontinued         04/20/25 1751                    Discharge Planning   POOJA:      Code Status: Full Code   Medical Readiness for Discharge Date:   Discharge Plan A: Home with family                        Ovidio Sosa MD  Department of Hospital Medicine   [facility]

## 2025-04-22 NOTE — SUBJECTIVE & OBJECTIVE
Review of Systems   All other systems reviewed and are negative.    Objective:     Vital Signs (Most Recent):  Temp: 98.5 °F (36.9 °C) (04/22/25 1653)  Pulse: 76 (04/22/25 1711)  Resp: 14 (04/22/25 1653)  BP: 126/71 (04/22/25 1653)  SpO2: 97 % (04/22/25 1653) Vital Signs (24h Range):  Temp:  [97.7 °F (36.5 °C)-98.5 °F (36.9 °C)] 98.5 °F (36.9 °C)  Pulse:  [] 76  Resp:  [14-18] 14  SpO2:  [96 %-98 %] 97 %  BP: (109-133)/(70-75) 126/71     Weight: 44 kg (97 lb)  Body mass index is 16.65 kg/m².  No intake or output data in the 24 hours ending 04/22/25 1724        Physical Exam  Vitals and nursing note reviewed.   Constitutional:       General: She is not in acute distress.     Appearance: Normal appearance. She is normal weight. She is ill-appearing.   Cardiovascular:      Rate and Rhythm: Normal rate and regular rhythm.      Heart sounds: No murmur heard.  Pulmonary:      Effort: Pulmonary effort is normal. No respiratory distress.      Breath sounds: Wheezing present.      Comments: 2L NC  Neurological:      General: No focal deficit present.      Mental Status: She is alert and oriented to person, place, and time.   Psychiatric:         Mood and Affect: Mood normal.         Behavior: Behavior normal.               Significant Labs: All pertinent labs within the past 24 hours have been reviewed.  Recent Lab Results         04/22/25  0633   04/22/25  0227        Albumin 3.1         ALP 61         ALT 12         Anion Gap 9         Appearance, UA   Clear       AST 11         Baso # 0.01         Basophil % 0.1         Bilirubin (UA)   Negative       BILIRUBIN TOTAL 0.3  Comment: For infants and newborns, interpretation of results should be based   on gestational age, weight and in agreement with clinical   observations.    Premature Infant recommended reference ranges:   0-24 hours:  <8.0 mg/dL   24-48 hours: <12.0 mg/dL   3-5 days:    <15.0 mg/dL   6-29 days:   <15.0 mg/dL         BUN 47         Calcium 8.7          Chloride 110         CO2 20         Color, UA   Yellow       Creatinine 1.5         eGFR 42  Comment: Estimated GFR calculated using the CKD-EPI creatinine (2021) equation.         Eos # 0.01         Eos % 0.1         Glucose 126         Glucose, UA   Negative       Gran # (ANC) 8.96         Hematocrit 39.7         Hemoglobin 12.7         Extra Tube   Hold for add-ons.  Comment: Auto resulted.          Immature Grans (Abs) 0.07  Comment: Mild elevation in immature granulocytes is non specific and can be seen in a variety of conditions including stress response, acute inflammation, trauma and pregnancy. Correlation with other laboratory and clinical findings is essential.         Immature Granulocytes 0.7         Ketones, UA   Negative       Leukocyte Esterase, UA   Negative       Lymph # 0.64         Lymph % 6.5         Magnesium  2.1         MCH 30.7         MCHC 32.0         MCV 96         Mono # 0.18         Mono % 1.8         MPV 10.2         Neut % 90.8         NITRITE UA   Negative       nRBC 0         Blood, UA   Negative       pH, UA   7.0       Phosphorus Level 5.4         Platelet Count 350         Potassium 4.9         PROTEIN TOTAL 5.4         Protein, UA   Trace  Comment: Recommend a 24 hour urine protein or a urine protein/creatinine ratio if globulin induced proteinuria is clinically suspected.       RBC 4.14         RDW 12.6         Sodium 139         Spec Grav UA   1.030       Urobilinogen, UA   Negative       WBC 9.87                 Significant Imaging: I have reviewed all pertinent imaging results/findings within the past 24 hours.    US Lower Extremity Veins Bilateral   Final Result      1.  No evidence of right or left deep venous thrombosis.         Electronically signed by: Storm Boyce MD   Date:    04/21/2025   Time:    18:54      CT Chest Without Contrast   Final Result      1.  Stable appearance to the chest and surrounding soft tissues without new pulmonary opacities or acute  process.      All CT scans at this facility are performed  using dose modulation techniques as appropriate to performed exam including the following:  automated exposure control; adjustment of mA and/or kV according to the patients size (this includes techniques or standardized protocols for targeted exams where dose is matched to indication/reason for exam: i.e. extremities or head);  iterative reconstruction technique.         Electronically signed by: Storm Boyce MD   Date:    04/21/2025   Time:    20:08      NM Lung Scan Ventilation Perfusion   Final Result      Non-diagnostic for pulmonary embolism.   Perfusion defects are present and appear matched to ventilation abnormalities, but limited peripheral tracer distribution reduces confidence in interpretation. Recommend clinical correlation and consider CT pulmonary angiography if suspicion for pulmonary embolism remains.         Electronically signed by: Ryan Goldstein   Date:    04/21/2025   Time:    16:01      X-Ray Chest 1 View   Final Result

## 2025-04-22 NOTE — ASSESSMENT & PLAN NOTE
Anemia is likely due to chronic disease due to Chronic Kidney Disease. Most recent hemoglobin and hematocrit are listed below.  Recent Labs     04/20/25  1301 04/21/25  0746 04/22/25  0633   HGB 14.2 13.8 12.7   HCT 44.2 43.1 39.7     Plan  - Monitor serial CBC: Daily  - Transfuse PRBC if patient becomes hemodynamically unstable, symptomatic or H/H drops below 7/21.  - Patient has not received any PRBC transfusions to date

## 2025-04-22 NOTE — SUBJECTIVE & OBJECTIVE
Review of Systems   All other systems reviewed and are negative.    Objective:     Vital Signs (Most Recent):  Temp: 98 °F (36.7 °C) (04/21/25 2027)  Pulse: 69 (04/21/25 2027)  Resp: 18 (04/21/25 2027)  BP: 112/75 (04/21/25 2027)  SpO2: 96 % (04/21/25 2027) Vital Signs (24h Range):  Temp:  [97.9 °F (36.6 °C)-98.4 °F (36.9 °C)] 98 °F (36.7 °C)  Pulse:  [] 69  Resp:  [14-18] 18  SpO2:  [89 %-98 %] 96 %  BP: (112-145)/(68-83) 112/75     Weight: 44 kg (97 lb)  Body mass index is 16.65 kg/m².    Intake/Output Summary (Last 24 hours) at 4/21/2025 2306  Last data filed at 4/21/2025 1508  Gross per 24 hour   Intake 480 ml   Output --   Net 480 ml         Physical Exam  Vitals and nursing note reviewed.   Constitutional:       General: She is not in acute distress.     Appearance: Normal appearance. She is normal weight. She is ill-appearing.   Cardiovascular:      Rate and Rhythm: Normal rate and regular rhythm.      Heart sounds: No murmur heard.  Pulmonary:      Effort: Pulmonary effort is normal. No respiratory distress.      Breath sounds: Wheezing present.      Comments: 3L NC  Neurological:      General: No focal deficit present.      Mental Status: She is alert and oriented to person, place, and time.   Psychiatric:         Mood and Affect: Mood normal.         Behavior: Behavior normal.               Significant Labs: All pertinent labs within the past 24 hours have been reviewed.  Recent Lab Results         04/21/25  1654   04/21/25  0746        Albumin   3.4       ALP   71       ALT   14       Anion Gap   10       Ao root annulus 2.62         Ascending aorta 2.62         Ao peak amy 1.6         Ao VTI 25.4         AST   14       AV valve area 2.1         CESAR by Velocity Ratio 1.9         AV mean gradient 7         AV index (prosthetic) 0.76         AV peak gradient 10         AV Velocity Ratio 0.69         Baso #   0.01       Basophil %   0.1       BILIRUBIN TOTAL   0.5  Comment: For infants and newborns,  interpretation of results should be based   on gestational age, weight and in agreement with clinical   observations.    Premature Infant recommended reference ranges:   0-24 hours:  <8.0 mg/dL   24-48 hours: <12.0 mg/dL   3-5 days:    <15.0 mg/dL   6-29 days:   <15.0 mg/dL       BSA 1.41         BUN   32       Calcium   9.1       Chloride   109       CO2   21       Creatinine   1.5       Left Ventricle Relative Wall Thickness 0.47         E/A ratio 1.07         E/E' ratio 8         eGFR   42  Comment: Estimated GFR calculated using the CKD-EPI creatinine (2021) equation.       EF 60         Eos #   0.00       Eos %   0.0       E wave deceleration time 203         FS 30.2         Glucose   109       Gran # (ANC)   6.54       Hematocrit   43.1       Hemoglobin   13.8       Immature Grans (Abs)   0.05  Comment: Mild elevation in immature granulocytes is non specific and can be seen in a variety of conditions including stress response, acute inflammation, trauma and pregnancy. Correlation with other laboratory and clinical findings is essential.       Immature Granulocytes   0.7       IVC diameter 1.14         IVRT 72         IVSd 1.0         LA WIDTH 2.7         Left Atrium Major Axis 4.5         Left Atrium Minor Axis 4.7         LA size 2.6         LA Vol 27         LA vol index 19         LVOT area 2.8         LV LATERAL E/E' RATIO 8.8         LV SEPTAL E/E' RATIO 8.0         LV EDV BP 82         LV Diastolic Volume Index 56.94         Left Ventricular End Diastolic Volume by Teichholz Method 81.67         Left Ventricular End Systolic Volume by Teichholz Method 35.24         LVIDd 4.3         LVIDs 3.0         LV mass 142.5         LV Mass Index 99.0         Left Ventricular Outflow Tract Mean Gradient 3.24         Left Ventricular Outflow Tract Mean Velocity 0.89         LVOT diameter 1.9         LVOT peak amy 1.1         LVOT stroke volume 54.4         LVOT peak VTI 19.2         LV ESV BP 35         LV Systolic  Volume Index 24.3         Lymph #   0.67       Lymph %   9.0       Magnesium    2.1       MCH   30.9       MCHC   32.0       MCV   97       Mean e' 0.11         Mono #   0.14       Mono %   1.9       MPV   10.4       Mr max ricardo 2.51         MV valve area p 1/2 method 3.73         MV Peak A Ricardo 0.82         MV Peak E Ricardo 0.88         MV stenosis pressure 1/2 time 58.91         Neut %   88.3       nRBC   0       Phosphorus Level   4.8       Platelet Count   363       Potassium   4.5       PROTEIN TOTAL   6.3       PV mean gradient 4         PW 1.0         RA Major Axis 2.75         Est. RA pres 3         RA Width 2.4         RBC   4.46       RDW   12.9       RV TB RVSP 6         RVOT peak ricardo 1.13         RVOT peak VTI 17.2         Sodium   140       STJ 2.68         TAPSE 1.91         TDI SEPTAL 0.11         TDI LATERAL 0.10         Triscuspid Valve Regurgitation Peak Gradient 26         TR Max Ricardo 2.5         TV resting pulmonary artery pressure 28         WBC   7.41       ZLVIDD -0.18         ZLVIDS 0.78                 Significant Imaging: I have reviewed all pertinent imaging results/findings within the past 24 hours.    US Lower Extremity Veins Bilateral   Final Result      1.  No evidence of right or left deep venous thrombosis.         Electronically signed by: Storm Boyce MD   Date:    04/21/2025   Time:    18:54      CT Chest Without Contrast   Final Result      1.  Stable appearance to the chest and surrounding soft tissues without new pulmonary opacities or acute process.      All CT scans at this facility are performed  using dose modulation techniques as appropriate to performed exam including the following:  automated exposure control; adjustment of mA and/or kV according to the patients size (this includes techniques or standardized protocols for targeted exams where dose is matched to indication/reason for exam: i.e. extremities or head);  iterative reconstruction technique.         Electronically  signed by: Storm Boyce MD   Date:    04/21/2025   Time:    20:08      NM Lung Scan Ventilation Perfusion   Final Result      Non-diagnostic for pulmonary embolism.   Perfusion defects are present and appear matched to ventilation abnormalities, but limited peripheral tracer distribution reduces confidence in interpretation. Recommend clinical correlation and consider CT pulmonary angiography if suspicion for pulmonary embolism remains.         Electronically signed by: Ryan Goldstein   Date:    04/21/2025   Time:    16:01      X-Ray Chest 1 View   Final Result

## 2025-04-22 NOTE — HOSPITAL COURSE
4/21  MARIA, patient in bed, reports coughing, SOB  O2 sats stable on 3L NC  Continue IV steroids, ceftriaxone/azithromycin  V/Q scan non-diagnostic, CTA recommended  Cr 1.5, at risk for renal failure with vasculitis  Pulmonary consulted, appreciate assistance    4/22  On 2L NC, doesn't use O2 at home  Some improvement, continued coughing and SOB  Continue IV steroids, antibiotics, breathing treatments  Pulmonary following, appreciate assistance     4/23- Pt seen and examined, chart, imaging reviewed. Briefly, 49 year old female, comorbid conditions include ANCA vasculitis, RA positive, anemia, interstitial pulmonary disease, HTN, dx with COVID/Flu, admitted to the hospital 4/4-4/8 for Sepsis and then was on Tapering dose of Steroids when she was readmitted with SOB/Cough- treated with O2, nebs, Steroids.     Looks and feels much better, cough, SOB much better, walking around comfortably on RA. No CP or MIRANDA, no wheezing and she does not need any home home O2 and she wishes to be discharged home now. Dr. Sherman has already cleared her. She was switched from Solumedrol to Prednisone 40 now. She was already on high dose prednisone started by Pulm as OP before admission, which she will continue now and will have early f/u as scheduled with Dr. Jiménez later this week. Pt is agreeable with the plan. She is eating drinking well, walking around well. She was seen and examined and deemed stable for discharge home today.

## 2025-04-22 NOTE — ASSESSMENT & PLAN NOTE
Chronic not well controlled, exacerbations likely secondary to recent viral infection.  Patient initiated on steroid taper per pulmonology on 04/16 with no significant improvement.  Patient presented to the ED with complaints of worsening dyspnea at home.    --supplemental O2 to maintain SpO2> 90%  --IV steroid wean as tolerated

## 2025-04-22 NOTE — PLAN OF CARE
Problem: Adult Inpatient Plan of Care  Goal: Plan of Care Review  Outcome: Progressing     Problem: Pneumonia  Goal: Effective Oxygenation and Ventilation  Outcome: Progressing     Problem: Pneumonia  Goal: Resolution of Infection Signs and Symptoms  Outcome: Progressing

## 2025-04-22 NOTE — ASSESSMENT & PLAN NOTE
Patient's blood pressure range in the last 24 hours was: BP  Min: 109/70  Max: 133/73.The patient's inpatient anti-hypertensive regimen is listed below:  Current Antihypertensives  hydrALAZINE injection 10 mg, Every 6 hours PRN, Intravenous  amLODIPine tablet 5 mg, Daily, Oral    Plan  - BP is controlled, no changes needed to their regimen  - Holding Lisinopril due to JAIME

## 2025-04-22 NOTE — ASSESSMENT & PLAN NOTE
Patient with Hypoxic Respiratory failure which is Acute.  she is not on home oxygen. Supplemental oxygen was provided and noted- Oxygen Concentration (%):  [32] 32    .   Signs/symptoms of respiratory failure include- tachypnea, increased work of breathing, respiratory distress, and wheezing. Contributing diagnoses includes - Interstitial lung disease Labs and images were reviewed. Patient Has recent ABG, which has been reviewed. Will treat underlying causes and adjust management of respiratory failure as follows-     --supplemental O2 to maintain SpO2> 90%

## 2025-04-23 ENCOUNTER — TELEPHONE (OUTPATIENT)
Dept: PULMONOLOGY | Facility: CLINIC | Age: 50
End: 2025-04-23
Payer: COMMERCIAL

## 2025-04-23 VITALS
DIASTOLIC BLOOD PRESSURE: 80 MMHG | BODY MASS INDEX: 16.56 KG/M2 | HEIGHT: 64 IN | HEART RATE: 84 BPM | TEMPERATURE: 99 F | WEIGHT: 97 LBS | SYSTOLIC BLOOD PRESSURE: 121 MMHG | OXYGEN SATURATION: 95 % | RESPIRATION RATE: 16 BRPM

## 2025-04-23 PROBLEM — N18.4 ANEMIA, CHRONIC RENAL FAILURE, STAGE 4 (SEVERE): Status: RESOLVED | Noted: 2024-08-23 | Resolved: 2025-04-23

## 2025-04-23 PROBLEM — J96.01 ACUTE HYPOXEMIC RESPIRATORY FAILURE: Status: RESOLVED | Noted: 2025-04-20 | Resolved: 2025-04-23

## 2025-04-23 PROBLEM — D63.1 ANEMIA, CHRONIC RENAL FAILURE, STAGE 4 (SEVERE): Status: RESOLVED | Noted: 2024-08-23 | Resolved: 2025-04-23

## 2025-04-23 LAB
ABSOLUTE EOSINOPHIL (OHS): 0 K/UL
ABSOLUTE MONOCYTE (OHS): 0.34 K/UL (ref 0.3–1)
ABSOLUTE NEUTROPHIL COUNT (OHS): 7.39 K/UL (ref 1.8–7.7)
ALBUMIN SERPL BCP-MCNC: 3.2 G/DL (ref 3.5–5.2)
ALP SERPL-CCNC: 57 UNIT/L (ref 40–150)
ALT SERPL W/O P-5'-P-CCNC: 11 UNIT/L (ref 10–44)
ANION GAP (OHS): 11 MMOL/L (ref 8–16)
AST SERPL-CCNC: 9 UNIT/L (ref 11–45)
BASOPHILS # BLD AUTO: 0.01 K/UL
BASOPHILS NFR BLD AUTO: 0.1 %
BILIRUB SERPL-MCNC: 0.3 MG/DL (ref 0.1–1)
BUN SERPL-MCNC: 39 MG/DL (ref 6–20)
CALCIUM SERPL-MCNC: 8.6 MG/DL (ref 8.7–10.5)
CHLORIDE SERPL-SCNC: 107 MMOL/L (ref 95–110)
CO2 SERPL-SCNC: 19 MMOL/L (ref 23–29)
CREAT SERPL-MCNC: 1.4 MG/DL (ref 0.5–1.4)
ERYTHROCYTE [DISTWIDTH] IN BLOOD BY AUTOMATED COUNT: 12.5 % (ref 11.5–14.5)
GFR SERPLBLD CREATININE-BSD FMLA CKD-EPI: 46 ML/MIN/1.73/M2
GLUCOSE SERPL-MCNC: 110 MG/DL (ref 70–110)
HCT VFR BLD AUTO: 43.3 % (ref 37–48.5)
HGB BLD-MCNC: 13.7 GM/DL (ref 12–16)
IMM GRANULOCYTES # BLD AUTO: 0.07 K/UL (ref 0–0.04)
IMM GRANULOCYTES NFR BLD AUTO: 0.8 % (ref 0–0.5)
LYMPHOCYTES # BLD AUTO: 0.67 K/UL (ref 1–4.8)
MAGNESIUM SERPL-MCNC: 2.1 MG/DL (ref 1.6–2.6)
MCH RBC QN AUTO: 30.9 PG (ref 27–31)
MCHC RBC AUTO-ENTMCNC: 31.6 G/DL (ref 32–36)
MCV RBC AUTO: 98 FL (ref 82–98)
NUCLEATED RBC (/100WBC) (OHS): 0 /100 WBC
PHOSPHATE SERPL-MCNC: 5.3 MG/DL (ref 2.7–4.5)
PLATELET # BLD AUTO: 294 K/UL (ref 150–450)
PMV BLD AUTO: 10.1 FL (ref 9.2–12.9)
POTASSIUM SERPL-SCNC: 4.4 MMOL/L (ref 3.5–5.1)
PROT SERPL-MCNC: 5.7 GM/DL (ref 6–8.4)
RBC # BLD AUTO: 4.44 M/UL (ref 4–5.4)
RELATIVE EOSINOPHIL (OHS): 0 %
RELATIVE LYMPHOCYTE (OHS): 7.9 % (ref 18–48)
RELATIVE MONOCYTE (OHS): 4 % (ref 4–15)
RELATIVE NEUTROPHIL (OHS): 87.2 % (ref 38–73)
SODIUM SERPL-SCNC: 137 MMOL/L (ref 136–145)
WBC # BLD AUTO: 8.48 K/UL (ref 3.9–12.7)

## 2025-04-23 PROCEDURE — 63700000 PHARM REV CODE 250 ALT 637 W/O HCPCS: Performed by: NURSE PRACTITIONER

## 2025-04-23 PROCEDURE — 83735 ASSAY OF MAGNESIUM: CPT | Performed by: NURSE PRACTITIONER

## 2025-04-23 PROCEDURE — 63600175 PHARM REV CODE 636 W HCPCS: Mod: JZ,TB | Performed by: INTERNAL MEDICINE

## 2025-04-23 PROCEDURE — 36415 COLL VENOUS BLD VENIPUNCTURE: CPT | Performed by: INTERNAL MEDICINE

## 2025-04-23 PROCEDURE — 82247 BILIRUBIN TOTAL: CPT | Performed by: NURSE PRACTITIONER

## 2025-04-23 PROCEDURE — 85025 COMPLETE CBC W/AUTO DIFF WBC: CPT | Performed by: NURSE PRACTITIONER

## 2025-04-23 PROCEDURE — 86698 HISTOPLASMA ANTIBODY: CPT | Performed by: INTERNAL MEDICINE

## 2025-04-23 PROCEDURE — 25000003 PHARM REV CODE 250: Performed by: NURSE PRACTITIONER

## 2025-04-23 PROCEDURE — 94761 N-INVAS EAR/PLS OXIMETRY MLT: CPT

## 2025-04-23 PROCEDURE — 84100 ASSAY OF PHOSPHORUS: CPT | Performed by: NURSE PRACTITIONER

## 2025-04-23 PROCEDURE — 36415 COLL VENOUS BLD VENIPUNCTURE: CPT | Performed by: NURSE PRACTITIONER

## 2025-04-23 PROCEDURE — 94640 AIRWAY INHALATION TREATMENT: CPT

## 2025-04-23 PROCEDURE — 25000242 PHARM REV CODE 250 ALT 637 W/ HCPCS: Performed by: NURSE PRACTITIONER

## 2025-04-23 PROCEDURE — 94618 PULMONARY STRESS TESTING: CPT

## 2025-04-23 RX ORDER — CEFDINIR 300 MG/1
300 CAPSULE ORAL EVERY 12 HOURS
Status: DISCONTINUED | OUTPATIENT
Start: 2025-04-23 | End: 2025-04-23 | Stop reason: HOSPADM

## 2025-04-23 RX ORDER — PREDNISONE 20 MG/1
40 TABLET ORAL DAILY
Status: DISCONTINUED | OUTPATIENT
Start: 2025-04-24 | End: 2025-04-23 | Stop reason: HOSPADM

## 2025-04-23 RX ADMIN — SULFAMETHOXAZOLE AND TRIMETHOPRIM 1 TABLET: 800; 160 TABLET ORAL at 09:04

## 2025-04-23 RX ADMIN — IPRATROPIUM BROMIDE AND ALBUTEROL SULFATE 3 ML: 2.5; .5 SOLUTION RESPIRATORY (INHALATION) at 07:04

## 2025-04-23 RX ADMIN — METHYLPREDNISOLONE SODIUM SUCCINATE 40 MG: 40 INJECTION, POWDER, FOR SOLUTION INTRAMUSCULAR; INTRAVENOUS at 05:04

## 2025-04-23 RX ADMIN — AMLODIPINE BESYLATE 5 MG: 5 TABLET ORAL at 09:04

## 2025-04-23 RX ADMIN — BUDESONIDE INHALATION 0.5 MG: 0.5 SUSPENSION RESPIRATORY (INHALATION) at 07:04

## 2025-04-23 RX ADMIN — AZITHROMYCIN DIHYDRATE 500 MG: 250 TABLET ORAL at 09:04

## 2025-04-23 NOTE — PLAN OF CARE
[facility] - Med Surg  Discharge Final Note    Primary Care Provider: Colleen Blackmon FNP-C    Expected Discharge Date: 4/23/2025    Final Discharge Note (most recent)       Final Note - 04/23/25 1224          Final Note    Assessment Type Final Discharge Note     Anticipated Discharge Disposition Home or Self Care     Hospital Resources/Appts/Education Provided Appointments scheduled and added to AVS        Post-Acute Status    Discharge Delays None known at this time                   Contact Info       Colleen Blackmon FNP-C   Specialty: Family Medicine   Relationship: PCP - General  Nurse Practitioner    [address] [place] [id number]   Phone: [phone]       Next Steps: Schedule an appointment as soon as possible for a visit in 3 day(s)    Instructions: Hospital follow up    Ulysses Jiménez MD   Specialty: Pulmonary Disease    [address]  [place] [address]   Phone: [phone]       Next Steps: Follow up in 1 week(s)    Instructions: Hospital follow up          Patient has no d/c needs at this time. Sw to follow up, as needed, for d/c planning purposes.

## 2025-04-23 NOTE — RESPIRATORY THERAPY
Home Oxygen Evaluation - [facility] - Cardiopulmonary Department      Date Performed: 4/23/2025      1) Patient's Home O2 Sat on room air, while at rest: Room Air SpO2 At Rest: 98 %        If O2 sats on room air at rest are 88% or below, patient qualifies.  Document O2 liter flow needed in Step 2.  If O2 sats are 89% or above, complete Step 3.        2)  If patient is not ambulated and O2 sats are <88%, what is the O2 liter flow required to meet ordered saturation?      If O2 sats on room air while exercising remain 89% or above patient does not qualify, no further testing needed Document N/A in step 3. If O2 sats on room air while exercising are 88% or below, continue to Step 4.    3) Patient's O2 Sat on room air while exercising: Room Air SpO2 During Ambulation: 94 %        4) Patient's O2 Sat while exercising on O2:   at           (Must show improvement from #4 for patients to qualify)

## 2025-04-23 NOTE — PLAN OF CARE
Discussed poc with pt, pt verbalized understanding    Purposeful rounding every 2hours    VS monitored for need of PRN interventions   Cardiac monitoring in use, pt is SB to NSR, tele monitor # [id number]  Fall precautions in place, remains injury free  Pt denies c/o n/v or pain     Bed locked at lowest position  Call light within reach    Chart check complete  Will cont with POC

## 2025-04-23 NOTE — ASSESSMENT & PLAN NOTE
Patient with Hypoxic Respiratory failure which is Acute.  she is not on home oxygen. 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   Signs/symptoms of respiratory failure include- tachypnea, increased work of breathing, and wheezing. Contributing diagnoses includes - Interstitial lung disease and Asthma Labs and images were reviewed. Patient Has recent ABG, which has been reviewed. Will treat underlying causes and adjust management of respiratory failure as follows-     -CXR with no infiltrates, do not see where she had infiltrates previously when she was positive for Covid and Flu  -VQ Scan nondiagnostic, high risk for worsening renal failure due to vasculitis/CKD if receives contrast dye, CTA Chest on hold  -Will order Echo, US BLE, and non contrast CT Chest for further evaluation.  -Agree with current treatment: IV steroids, nebulizers, antibiotics.   Patient with Hypoxic Respiratory failure which is Acute.  she is not on home oxygen. Supplemental oxygen was provided and noted- Oxygen Concentration (%):  [28] 28  4/22 ct scan no real change from previous/ us doppler neg / echo nl  Imuran held by primary   ? All asthma , no wheezing today  wean steroids change to pred in am   Will need close pulm f/u   Cont nebs  Likely can dc antibiotics  Further west if needed   4/23   On room air   No wheezing   Will need restart imuran at some pt  Pred taper to home dose  Need close pulm f/u - if cont to have issues could consider bronch however ct scans appear stable

## 2025-04-23 NOTE — SUBJECTIVE & OBJECTIVE
Interval History: no acute events        Objective:     Vital Signs (Most Recent):  Temp: 98.5 °F (36.9 °C) (04/23/25 1235)  Pulse: 84 (04/23/25 1334)  Resp: 16 (04/23/25 1235)  BP: 121/80 (04/23/25 1235)  SpO2: 95 % (04/23/25 1235) Vital Signs (24h Range):  Temp:  [97.6 °F (36.4 °C)-98.5 °F (36.9 °C)] 98.5 °F (36.9 °C)  Pulse:  [54-88] 84  Resp:  [14-20] 16  SpO2:  [94 %-98 %] 95 %  BP: (110-126)/(71-80) 121/80     Weight: 44 kg (97 lb)  Body mass index is 16.65 kg/m².      Intake/Output Summary (Last 24 hours) at 4/23/2025 1428  Last data filed at 4/23/2025 1036  Gross per 24 hour   Intake --   Output 1 ml   Net -1 ml        Physical Exam      Review of Systems   Constitutional: Negative.    Respiratory:  Positive for cough. Negative for wheezing.    Cardiovascular: Negative.    Gastrointestinal: Negative.        Vents:  Oxygen Concentration (%): 28 (04/22/25 1857)    Lines/Drains/Airways       Peripheral Intravenous Line  Duration                  Peripheral IV - Single Lumen 04/20/25 1305 20 G Right Antecubital 3 days                    Significant Labs:    CBC/Anemia Profile:  Recent Labs   Lab 04/22/25  0633 04/23/25  0702   WBC 9.87 8.48   HGB 12.7 13.7   HCT 39.7 43.3    294   MCV 96 98   RDW 12.6 12.5        Chemistries:  Recent Labs   Lab 04/22/25  0633 04/23/25  0702    137   K 4.9 4.4    107   CO2 20* 19*   BUN 47* 39*   CREATININE 1.5* 1.4   CALCIUM 8.7 8.6*   ALBUMIN 3.1* 3.2*   BILITOT 0.3 0.3   ALKPHOS 61 57   ALT 12 11   AST 11 9*   GLUCOSE 126* 110   MG 2.1 2.1   PHOS 5.4* 5.3*       All pertinent labs within the past 24 hours have been reviewed.    Significant Imaging:  I have reviewed all pertinent imaging results/findings within the past 24 hours.

## 2025-04-23 NOTE — PROGRESS NOTES
[facility] Surg  Pulmonology  Progress Note    Patient Name: Shayy Faust  MRN: [id number]  Admission Date: 4/20/2025  Hospital Length of Stay: 2 days  Code Status: Full Code  Attending Provider: Lan Garsia MD  Primary Care Provider: Colleen Blackmon FNP-C   Principal Problem: Acute hypoxemic respiratory failure    Subjective:     Interval History: no acute events        Objective:     Vital Signs (Most Recent):  Temp: 98.5 °F (36.9 °C) (04/23/25 1235)  Pulse: 84 (04/23/25 1334)  Resp: 16 (04/23/25 1235)  BP: 121/80 (04/23/25 1235)  SpO2: 95 % (04/23/25 1235) Vital Signs (24h Range):  Temp:  [97.6 °F (36.4 °C)-98.5 °F (36.9 °C)] 98.5 °F (36.9 °C)  Pulse:  [54-88] 84  Resp:  [14-20] 16  SpO2:  [94 %-98 %] 95 %  BP: (110-126)/(71-80) 121/80     Weight: 44 kg (97 lb)  Body mass index is 16.65 kg/m².      Intake/Output Summary (Last 24 hours) at 4/23/2025 1428  Last data filed at 4/23/2025 1036  Gross per 24 hour   Intake --   Output 1 ml   Net -1 ml        Physical Exam      Review of Systems   Constitutional: Negative.    Respiratory:  Positive for cough. Negative for wheezing.    Cardiovascular: Negative.    Gastrointestinal: Negative.        Vents:  Oxygen Concentration (%): 28 (04/22/25 1857)    Lines/Drains/Airways       Peripheral Intravenous Line  Duration                  Peripheral IV - Single Lumen 04/20/25 1305 20 G Right Antecubital 3 days                    Significant Labs:    CBC/Anemia Profile:  Recent Labs   Lab 04/22/25  0633 04/23/25  0702   WBC 9.87 8.48   HGB 12.7 13.7   HCT 39.7 43.3    294   MCV 96 98   RDW 12.6 12.5        Chemistries:  Recent Labs   Lab 04/22/25  0633 04/23/25  0702    137   K 4.9 4.4    107   CO2 20* 19*   BUN 47* 39*   CREATININE 1.5* 1.4   CALCIUM 8.7 8.6*   ALBUMIN 3.1* 3.2*   BILITOT 0.3 0.3   ALKPHOS 61 57   ALT 12 11   AST 11 9*   GLUCOSE 126* 110   MG 2.1 2.1   PHOS 5.4* 5.3*       All pertinent labs within the past 24 hours have  been reviewed.    Significant Imaging:  I have reviewed all pertinent imaging results/findings within the past 24 hours.    ABG  Recent Labs   Lab 04/20/25  1407   PH 7.410   PO2 50*   PCO2 35.1   HCO3 22.2*   BE -2     Assessment/Plan:     Assessment & Plan  Moderate persistent asthma with acute exacerbation  Interstitial pulmonary disease, unspecified  Patient with Hypoxic Respiratory failure which is Acute.  she is not on home oxygen. 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   Signs/symptoms of respiratory failure include- tachypnea, increased work of breathing, and wheezing. Contributing diagnoses includes - Interstitial lung disease and Asthma  Labs and images were reviewed. Patient Has recent ABG, which has been reviewed. Will treat underlying causes and adjust management of respiratory failure as follows-     -CXR with no infiltrates, do not see where she had infiltrates previously when she was positive for Covid and Flu  -VQ Scan nondiagnostic, high risk for worsening renal failure due to vasculitis/CKD if receives contrast dye, CTA Chest on hold  -Will order Echo, US BLE, and non contrast CT Chest for further evaluation.  -Agree with current treatment: IV steroids, nebulizers, antibiotics.   Patient with Hypoxic Respiratory failure which is Acute.  she is not on home oxygen. Supplemental oxygen was provided and noted- Oxygen Concentration (%):  [28] 28  4/22 ct scan no real change from previous/ us doppler neg / echo nl  Imuran held by primary   ? All asthma , no wheezing today  wean steroids change to pred in am   Will need close pulm f/u   Cont nebs  Likely can dc antibiotics  Further west if needed   4/23   On room air   No wheezing   Will need restart imuran at some pt  Pred taper to home dose  Need close pulm f/u - if cont to have issues could consider bronch however ct scans appear stable            Bethany Sherman MD  Pulmonology  '[facility] Surg

## 2025-04-24 ENCOUNTER — OFFICE VISIT (OUTPATIENT)
Dept: PULMONOLOGY | Facility: CLINIC | Age: 50
End: 2025-04-24
Payer: COMMERCIAL

## 2025-04-24 VITALS
OXYGEN SATURATION: 98 % | WEIGHT: 102.06 LBS | DIASTOLIC BLOOD PRESSURE: 82 MMHG | HEIGHT: 64 IN | RESPIRATION RATE: 12 BRPM | SYSTOLIC BLOOD PRESSURE: 120 MMHG | BODY MASS INDEX: 17.42 KG/M2

## 2025-04-24 DIAGNOSIS — J45.30 MILD PERSISTENT ASTHMA WITHOUT COMPLICATION: ICD-10-CM

## 2025-04-24 DIAGNOSIS — Z79.899 IMMUNODEFICIENCY DUE TO DRUG THERAPY: ICD-10-CM

## 2025-04-24 DIAGNOSIS — I77.82 ANCA-ASSOCIATED VASCULITIS: Primary | ICD-10-CM

## 2025-04-24 DIAGNOSIS — D84.821 IMMUNODEFICIENCY DUE TO DRUG THERAPY: ICD-10-CM

## 2025-04-24 PROCEDURE — 1159F MED LIST DOCD IN RCRD: CPT | Mod: CPTII,S$GLB,, | Performed by: INTERNAL MEDICINE

## 2025-04-24 PROCEDURE — 3079F DIAST BP 80-89 MM HG: CPT | Mod: CPTII,S$GLB,, | Performed by: INTERNAL MEDICINE

## 2025-04-24 PROCEDURE — 3074F SYST BP LT 130 MM HG: CPT | Mod: CPTII,S$GLB,, | Performed by: INTERNAL MEDICINE

## 2025-04-24 PROCEDURE — 3066F NEPHROPATHY DOC TX: CPT | Mod: CPTII,S$GLB,, | Performed by: INTERNAL MEDICINE

## 2025-04-24 PROCEDURE — 99214 OFFICE O/P EST MOD 30 MIN: CPT | Mod: S$GLB,,, | Performed by: INTERNAL MEDICINE

## 2025-04-24 PROCEDURE — 4010F ACE/ARB THERAPY RXD/TAKEN: CPT | Mod: CPTII,S$GLB,, | Performed by: INTERNAL MEDICINE

## 2025-04-24 PROCEDURE — 99999 PR PBB SHADOW E&M-EST. PATIENT-LVL IV: CPT | Mod: PBBFAC,,, | Performed by: INTERNAL MEDICINE

## 2025-04-24 PROCEDURE — 3008F BODY MASS INDEX DOCD: CPT | Mod: CPTII,S$GLB,, | Performed by: INTERNAL MEDICINE

## 2025-04-24 PROCEDURE — 1111F DSCHRG MED/CURRENT MED MERGE: CPT | Mod: CPTII,S$GLB,, | Performed by: INTERNAL MEDICINE

## 2025-04-24 RX ORDER — PREDNISONE 5 MG/1
5 TABLET ORAL DAILY
Qty: 30 TABLET | Refills: 5 | Status: SHIPPED | OUTPATIENT
Start: 2025-04-24

## 2025-04-24 RX ORDER — FOLIC ACID 1 MG/1
1 TABLET ORAL DAILY
Qty: 30 TABLET | Refills: 11 | Status: SHIPPED | OUTPATIENT
Start: 2025-04-24 | End: 2026-04-24

## 2025-04-24 NOTE — LETTER
April 24, 2025    Shayy Faust  [address]  [place] [address]       [address]  [address] [address] [facility] [place] [address]  Phone: [phone]  Fax: [phone] April 24, 2025     Patient: Shayy Faust   YOB: 1975   Date of Visit: 4/24/2025       To Whom It May Concern:    It is my medical opinion that Shayy Faust may return to work on 4/28/2025 .    No restrictions.    If you have any questions or concerns, please don't hesitate to call.    Sincerely,          Ulysses Jiménez MD

## 2025-04-24 NOTE — PROGRESS NOTES
Subjective:     Patient ID: Shayy Faust is a 49 y.o. female.    Chief Complaint:  shortness of breath and recent viral illness    HPI 49 y.o. with the flu and hospitalization. Was placed on Steroids , antibiotics. Now improved on prednisone taper and bactrim.    She   presents for evaluation and treatment of exacerbation of chronic obstructive pulmonary disease / viral illness  after being discharged from the hospital  3  days ago. Since discharge symptoms have gradually improving course since that time. Patient denies fever or chills. Symptoms are aggravated by activity. Symptoms improve with rest.  Respiratory: positive for cough, dyspnea on exertion, and wheezing; Cardiovascular: no chest pain or palpitations.  Patient currently is on oxygen at 2 L/min per nasal cannula..- prn    MEDICAL RECORDS FROM THE HOSPITAL REVIEWED: Discharge summary not available. Pulmonary Consult note reviewed    [facility] S[facility]  Pulmonology  Consult Note     Patient Name: Shayy Faust  MRN: [id number]  Admission Date: 4/20/2025  Hospital Length of Stay: 0 days  Code Status: Full Code  Attending Physician: Ovidio Sosa MD  Primary Care Provider: Karine, Primary Doctor   Principal Problem: Acute hypoxemic respiratory failure     Inpatient consult to Pulmonology  Consult performed by: Yanelis De Paz NP  Consult ordered by: Eugenie Glaser NP           Subjective:      HPI:  Shayy Faust is 49 year old female with ANCA vasculitis, RA positive, anemia, asthma, interstitial pulmonary disease, HTN. Presented to the [facility] ED with c/o SOB on 4/20. Sx began 2 weeks ago, dx with COVID/Flu, admitted to the hospital 4/4-4/8 for Sepsis. Evaluated by Pulmonology on 4/16 for continued c/o SOB, mild hypoxia. Initiated on a steroid taper. Denies significant improvement with steroids or nebulizers. Patient is not on O2 at home. Managed with chronic steroid as OP. In the ED, vitals: 171/100, 113, 24, 98.9F,  92% RA on arrival. Significant Labs: WBC: 13.11, left shift, Pl: 470K, Cr: 1.5, D-Dimer: 0.61, BNP: 150, AB.410/35.1/50/22.2/88. CXR: NAF. Treated with Nebs, Steroid, IV Fluids. Patient required transfer to [facility] for admission. Patient is a full code. Placed in observation under the care of Hospital Medicine for management of Acute Respiratory Failure. Pulmonology consulted for acute respiratory failure with hypoxia.           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       Anemia, chronic renal failure, stage 4 (severe) 2024    Mild intermittent asthma, uncomplicated                 Past Surgical History:   Procedure Laterality Date    BILATERAL TUBAL LIGATION N/A      COLONOSCOPY N/A 2024     Procedure: COLONOSCOPY;  Surgeon: Gena Saxena MD;  Location: [facility];  Service: Endoscopy;  Laterality: N/A;    ESOPHAGOGASTRODUODENOSCOPY N/A 2024     Procedure: EGD (ESOPHAGOGASTRODUODENOSCOPY);  Surgeon: Gena Saxena MD;  Location: [facility];  Service: Endoscopy;  Laterality: N/A;    INTRALUMINAL GASTROINTESTINAL TRACT IMAGING VIA CAPSULE N/A 2024     Procedure: IMAGING PROCEDURE, GI TRACT, INTRALUMINAL, VIA CAPSULE;  Surgeon: Ignacio Jimenez RN;  Location: [facility];  Service: Endoscopy;  Laterality: N/A;  Neg EGD/Colon. Hx MARIA ESTHER/weight loss/ARF         Review of patient's allergies indicates:  No Known Allergies     Family History         Problem Relation (Age of Onset)     Hypertension Mother, Father                  Tobacco Use    Smoking status: Never    Smokeless tobacco: Never   Substance and Sexual Activity    Alcohol use: Not Currently    Drug use: Never    Sexual activity: Yes          Review of Systems   Constitutional:  Positive for fatigue. Negative for activity change and appetite change.   Respiratory:  Positive for cough and shortness  of breath. Negative for choking, chest tightness, wheezing and stridor.         Nonproductive cough   Cardiovascular:  Negative for chest pain, palpitations and leg swelling.   Gastrointestinal:  Negative for abdominal pain, diarrhea, nausea and vomiting.      Objective:      Vital Signs (Most Recent):  Temp: 98.4 °F (36.9 °C) (04/21/25 1614)  Pulse: 86 (04/21/25 1614)  Resp: 16 (04/21/25 1614)  BP: 116/68 (04/21/25 1614)  SpO2: 96 % (04/21/25 1614) Vital Signs (24h Range):  Temp:  [97.9 °F (36.6 °C)-98.7 °F (37.1 °C)] 98.4 °F (36.9 °C)  Pulse:  [] 86  Resp:  [15-24] 16  SpO2:  [89 %-96 %] 96 %  BP: (116-148)/(68-94) 116/68      Weight: 44 kg (97 lb)  Body mass index is 16.65 kg/m².        Intake/Output Summary (Last 24 hours) at 4/21/2025 1619  Last data filed at 4/21/2025 1508      Gross per 24 hour   Intake 480 ml   Output --   Net 480 ml         Physical Exam  Constitutional:       General: She is not in acute distress.     Appearance: She is underweight. She is ill-appearing.   HENT:      Head: Normocephalic and atraumatic.      Nose: Nose normal.      Mouth/Throat:      Mouth: Mucous membranes are moist.   Eyes:      Pupils: Pupils are equal, round, and reactive to light.   Cardiovascular:      Rate and Rhythm: Normal rate and regular rhythm.      Pulses: Normal pulses.      Heart sounds: Normal heart sounds.   Pulmonary:      Effort: Tachypnea present. No respiratory distress.      Breath sounds: Decreased air movement present. No wheezing.   Abdominal:      General: Bowel sounds are normal. There is no distension.      Palpations: Abdomen is soft.      Tenderness: There is no abdominal tenderness.   Musculoskeletal:         General: No swelling.      Cervical back: Neck supple.      Right lower leg: No edema.      Left lower leg: No edema.   Skin:     General: Skin is warm and dry.      Coloration: Skin is pale.   Neurological:      Mental Status: She is oriented to person, place, and time. Mental  status is at baseline.      Motor: No weakness.            Vents:  Oxygen Concentration (%): 32 (04/21/25 0749)     Lines/Drains/Airways         Peripheral Intravenous Line  Duration                     Peripheral IV - Single Lumen 04/20/25 1305 20 G Right Antecubital 1 day                          Significant Labs:     CBC/Anemia Profile:       Recent Labs   Lab 04/20/25  1301 04/21/25  0746   WBC 13.11* 7.41   HGB 14.2 13.8   HCT 44.2 43.1   * 363   MCV 97 97   RDW 13.4 12.9         Chemistries:       Recent Labs   Lab 04/20/25  1301 04/21/25  0746    140   K 4.4 4.5    109   CO2 20* 21*   BUN 26* 32*   CREATININE 1.5* 1.5*   CALCIUM 9.3 9.1   ALBUMIN 3.9 3.4*   BILITOT 0.4 0.5   ALKPHOS 76 71   ALT 19 14   AST 14 14   GLUCOSE 117* 109   MG 2.1 2.1   PHOS  --  4.8*         All pertinent labs within the past 24 hours have been reviewed.     Significant Imaging:   I have reviewed all pertinent imaging results/findings within the past 24 hours.     ABG      Recent Labs   Lab 04/20/25  1407   PH 7.410   PO2 50*   PCO2 35.1   HCO3 22.2*   BE -2      Assessment/Plan:      Assessment & Plan  Acute hypoxemic respiratory failure  Moderate persistent asthma with acute exacerbation  Interstitial pulmonary disease, unspecified  Patient with Hypoxic Respiratory failure which is Acute.  she is not on home oxygen. 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   Signs/symptoms of respiratory failure include- tachypnea, increased work of breathing, and wheezing. Contributing diagnoses includes - Interstitial lung disease and Asthma  Labs and images were reviewed. Patient Has recent ABG, which has been reviewed. Will treat underlying causes and adjust management of respiratory failure as follows-      -CXR with no infiltrates, do not see where she had infiltrates previously when she was positive for Covid and  Flu  -VQ Scan nondiagnostic, high risk for worsening renal failure due to vasculitis/CKD if receives contrast dye, CTA Chest on hold  -Will order Echo, US BLE, and non contrast CT Chest for further evaluation.  -Agree with current treatment: IV steroids, nebulizers, antibiotics.      Thank you for your consult. Our team will follow-up with patient. Please contact us if you have any additional questions.     Yanelis De Paz NP  Pulmonology  [facility]          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       Anemia, chronic renal failure, stage 4 (severe) 08/23/2024    Mild intermittent asthma, uncomplicated      Past Surgical History:   Procedure Laterality Date    BILATERAL TUBAL LIGATION N/A     COLONOSCOPY N/A 8/20/2024    Procedure: COLONOSCOPY;  Surgeon: Gena Saxena MD;  Location: [facility];  Service: Endoscopy;  Laterality: N/A;    ESOPHAGOGASTRODUODENOSCOPY N/A 8/20/2024    Procedure: EGD (ESOPHAGOGASTRODUODENOSCOPY);  Surgeon: Gena Saxena MD;  Location: [facility];  Service: Endoscopy;  Laterality: N/A;    INTRALUMINAL GASTROINTESTINAL TRACT IMAGING VIA CAPSULE N/A 9/19/2024    Procedure: IMAGING PROCEDURE, GI TRACT, INTRALUMINAL, VIA CAPSULE;  Surgeon: Ignacio Jimenez RN;  Location: [facility];  Service: Endoscopy;  Laterality: N/A;  Neg EGD/Colon. Hx MARIA ESTHER/weight loss/ARF     Review of patient's allergies indicates:  No Known Allergies  Medications Ordered Prior to Encounter[1]  Social History[2]  Family History   Problem Relation Name Age of Onset    Hypertension Mother      Hypertension Father         Review of Systems   Constitutional:  Positive for fatigue. Negative for fever.   HENT:  Positive for postnasal drip, rhinorrhea and congestion.    Eyes:  Negative for redness and itching.   Respiratory:  Positive for cough, sputum production, shortness of breath,  "dyspnea on extertion, use of rescue inhaler and Paroxysmal Nocturnal Dyspnea.    Cardiovascular:  Negative for chest pain, palpitations and leg swelling.   Genitourinary:  Negative for difficulty urinating and hematuria.   Endocrine:  Negative for cold intolerance and heat intolerance.    Skin:  Negative for rash.   Gastrointestinal:  Negative for nausea and abdominal pain.   Neurological:  Negative for dizziness, syncope, weakness and light-headedness.   Hematological:  Negative for adenopathy. Does not bruise/bleed easily.   Psychiatric/Behavioral:  Negative for sleep disturbance. The patient is not nervous/anxious.      Objective:      /82   Resp 12   Ht 5' 4" (1.626 m)   Wt 46.3 kg (102 lb 1.2 oz)   SpO2 98%   BMI 17.52 kg/m²   Physical Exam  Vitals and nursing note reviewed.   Constitutional:       Appearance: She is well-developed.   HENT:      Head: Normocephalic and atraumatic.      Nose: Nose normal.      Mouth/Throat:      Pharynx: No oropharyngeal exudate.   Eyes:      Conjunctiva/sclera: Conjunctivae normal.      Pupils: Pupils are equal, round, and reactive to light.   Neck:      Thyroid: No thyromegaly.      Vascular: No JVD.      Trachea: No tracheal deviation.   Cardiovascular:      Rate and Rhythm: Normal rate and regular rhythm.      Heart sounds: Normal heart sounds.   Pulmonary:      Effort: Pulmonary effort is normal. No respiratory distress.      Breath sounds: Examination of the right-lower field reveals wheezing. Examination of the left-lower field reveals wheezing. Decreased breath sounds and wheezing present. No rhonchi or rales.   Chest:      Chest wall: No tenderness.   Abdominal:      General: Bowel sounds are normal.      Palpations: Abdomen is soft.   Musculoskeletal:         General: Normal range of motion.      Cervical back: Neck supple.   Lymphadenopathy:      Cervical: No cervical adenopathy.   Skin:     General: Skin is warm and dry.   Neurological:      Mental Status: " "She is alert and oriented to person, place, and time.     Personal Diagnostic Review  none pertinent        4/24/2025    10:09 AM   Pulmonary Studies Review   SpO2 98 %   Height 5' 4" (1.626 m)   Weight 46.3 kg (102 lb 1.2 oz)   BMI (Calculated) 17.5   Predicted Distance 477.3   Predicted Distance Meters (Calculated) 615.06 meters       CT Chest Without Contrast  Narrative: EXAMINATION:  CT CHEST WITHOUT CONTRAST, multiplanar reconstructions    CLINICAL HISTORY:  Cough, persistent;    TECHNIQUE:  Axial images through the chest were obtained without the use of IV contrast. Sagittal and coronal <reconstructions are provided for review>.    COMPARISON:  March 17, 2025    FINDINGS:  Marked emphysematous changes throughout the lungs again seen.  Similar degree of mild peripheral reticular interstitial changes, especially in the posterior right upper lobe and lingula.  Stable 4 mm right lower lobe nodule on image 366 of series 4.  Stable 5 mm anterior left lower lobe nodule on image 368 of series 4, with adjacent nodular scarring.  Stable miliary pattern of nodules within the bilateral lower lobes.  The lungs are free of new pulmonary opacities.  Negative for effusion or pneumothorax.    Prominent superior pericardial recess again seen.  There are no hilar or mediastinal masses or abnormal lymph nodes by size criteria.    Aortic calcifications without aneurysmal change again seen.  Negative for coronary artery calcifications.    The airways are patent.  The thyroid gland is normal.  The esophagus is normal.    The upper abdominal organs are unchanged in appearance.    The osseous structures are unchanged in appearance.  Multilevel marginal spondylosis.  Impression: 1.  Stable appearance to the chest and surrounding soft tissues without new pulmonary opacities or acute process.    All CT scans at this facility are performed  using dose modulation techniques as appropriate to performed exam including the following:  " automated exposure control; adjustment of mA and/or kV according to the patients size (this includes techniques or standardized protocols for targeted exams where dose is matched to indication/reason for exam: i.e. extremities or head);  iterative reconstruction technique.    Electronically signed by: Storm Boyce MD  Date:    04/21/2025  Time:    20:08  US Lower Extremity Veins Bilateral  Narrative: EXAMINATION:  US LOWER EXTREMITY VEINS BILATERAL    CLINICAL HISTORY:  R/O DVT;    TECHNIQUE:  Real-time, color, and duplex evaluation of the deep venous vessels in both lower extremities were performed.    COMPARISON:  No comparison studies are available.    FINDINGS:  No evidence of deep venous thrombosis in either lower extremity. The deep venous vessels demonstrate normal spontaneous and augmented flow with normal respiratory variation.  Deep venous vessels compress in a normal fashion throughout.  Impression: 1.  No evidence of right or left deep venous thrombosis.    Electronically signed by: Storm Boyce MD  Date:    04/21/2025  Time:    18:54  Echo    Left Ventricle: The left ventricle is normal in size. Normal wall   thickness. There is concentric hypertrophy. There is normal systolic   function with a visually estimated ejection fraction of 60 - 65%. Ejection   fraction is approximately 60%. There is normal diastolic function.    Right Ventricle: The right ventricle is normal in size. Wall thickness   is normal. Systolic function is normal.    Pulmonary Artery: The estimated pulmonary artery systolic pressure is   28 mmHg.    IVC/SVC: Normal venous pressure at 3 mmHg.  NM Lung Scan Ventilation Perfusion  Narrative: EXAMINATION:  NM LUNG VENTILATION AND PERFUSION IMAGING    CLINICAL HISTORY:  Pulmonary embolism (PE) suspected, positive D-dimer;    TECHNIQUE:  One mCi of Tc-99m-DTPA were placed in the nebulizer. Following the inhalation Tc-99m-DTPA in aerosol and the subsequent IV administration of 5.28 mCi of  "Tc-99m-MAA, multiple images of the thorax were obtained in various projections.    COMPARISON:  CT chest March 17, 2025.    FINDINGS:  Ventilation Imaging:  Ventilation images demonstrate limited peripheral distribution of inhaled Tc-99m DTPA, with relatively central deposition. This may be due to suboptimal aerosol delivery and/or underlying airway disease. The quality of the ventilation phase limits confident interpretation.    Perfusion Imaging:  Multiple perfusion defects are present and appear to correspond to areas of decreased ventilation.  Impression: Non-diagnostic for pulmonary embolism.  Perfusion defects are present and appear matched to ventilation abnormalities, but limited peripheral tracer distribution reduces confidence in interpretation. Recommend clinical correlation and consider CT pulmonary angiography if suspicion for pulmonary embolism remains.    Electronically signed by: Ryan Goldstein  Date:    04/21/2025  Time:    16:01      Office Spirometry Results:         4/24/2025    10:09 AM 4/23/2025    12:35 PM 4/23/2025     8:30 AM 4/23/2025     7:49 AM 4/23/2025     5:17 AM 4/22/2025    11:06 PM 4/22/2025     7:59 PM   Pulmonary Function Tests   SpO2 98 % 95 % 96 % 98 % 96 % 94 % 94 %   Height 5' 4" (1.626 m)         Weight 46.3 kg (102 lb 1.2 oz)         BMI (Calculated) 17.5               4/24/2025    10:09 AM   Pulmonary Studies Review   SpO2 98 %   Height 5' 4" (1.626 m)   Weight 46.3 kg (102 lb 1.2 oz)   BMI (Calculated) 17.5   Predicted Distance 477.3   Predicted Distance Meters (Calculated) 615.06 meters           Recent Results (from the past 2 weeks)   CBC with Differential    Collection Time: 04/23/25  7:02 AM   Result Value Ref Range    WBC 8.48 3.90 - 12.70 K/uL    HGB 13.7 12.0 - 16.0 gm/dL    HCT 43.3 37.0 - 48.5 %    Platelet Count 294 150 - 450 K/uL   CBC with Differential    Collection Time: 04/22/25  6:33 AM   Result Value Ref Range    WBC 9.87 3.90 - 12.70 K/uL    HGB 12.7 12.0 " - 16.0 gm/dL    HCT 39.7 37.0 - 48.5 %    Platelet Count 350 150 - 450 K/uL   CBC with Differential    Collection Time: 04/21/25  7:46 AM   Result Value Ref Range    WBC 7.41 3.90 - 12.70 K/uL    HGB 13.8 12.0 - 16.0 gm/dL    HCT 43.1 37.0 - 48.5 %    Platelet Count 363 150 - 450 K/uL       Assessment:            ANCA-associated vasculitis    Mild persistent asthma without complication  -     predniSONE (DELTASONE) 5 MG tablet; Take 1 tablet (5 mg total) by mouth once daily. Start after high dose prednisone  Dispense: 30 tablet; Refill: 5  -     Complete PFT with bronchodilator; Future; Expected date: 04/24/2025  -     Six Minute Walk Test to qualify for Home Oxygen; Future    Immunodeficiency due to drug therapy  -     folic acid (FOLVITE) 1 MG tablet; Take 1 tablet (1 mg total) by mouth once daily. Take while on bactrim(sulfa)  Dispense: 30 tablet; Refill: 11          Encounter Medications[3]  Plan:       Requested Prescriptions     Signed Prescriptions Disp Refills    predniSONE (DELTASONE) 5 MG tablet 30 tablet 5     Sig: Take 1 tablet (5 mg total) by mouth once daily. Start after high dose prednisone    folic acid (FOLVITE) 1 MG tablet 30 tablet 11     Sig: Take 1 tablet (1 mg total) by mouth once daily. Take while on bactrim(sulfa)     Problem List Items Addressed This Visit       ANCA-associated vasculitis - Primary (Chronic)    Overview   08/20/2024 Kidney biopsy: PAUCI-IMMUNE NECROTIZING CRESCENTIC GLOMERULONEPHRITIS          Immunodeficiency due to drug therapy    Relevant Medications    folic acid (FOLVITE) 1 MG tablet     Other Visit Diagnoses         Mild persistent asthma without complication        Relevant Medications    predniSONE (DELTASONE) 5 MG tablet    Other Relevant Orders    Complete PFT with bronchodilator    Six Minute Walk Test to qualify for Home Oxygen               Follow up in about 3 months (around 7/24/2025) for PFT -return, 6 min walk - on return.    MEDICAL DECISION MAKING:  Moderate to high complexity.  Overall, the multiple problems listed are of moderate to high severity that may impact quality of life and activities of daily living. Side effects of medications, treatment plan as well as options and alternatives reviewed and discussed with patient. There was counseling of patient concerning these issues.    Total time spent in counseling and coordination of care - 35  minutes of total time spent on the encounter, which includes face to face time and non-face to face time preparing to see the patient (eg, review of tests), Obtaining and/or reviewing separately obtained history, Documenting clinical information in the electronic or other health record, Independently interpreting results (not separately reported) and communicating results to the patient/family/caregiver, or Care coordination (not separately reported).    Time was used in discussion of prognosis, risks, benefits of treatment, instructions and compliance with regimen . Discussion with other physicians and/or health care providers - home health or for use of durable medical equipment (oxygen, nebulizers, CPAP, BiPAP) occurred.         [1]   Current Outpatient Medications on File Prior to Visit   Medication Sig Dispense Refill    albuterol (PROVENTIL) 2.5 mg /3 mL (0.083 %) nebulizer solution Take 3 mLs (2.5 mg total) by nebulization every 6 (six) hours as needed. Rescue 180 mL 3    albuterol (PROVENTIL/VENTOLIN HFA) 90 mcg/actuation inhaler Inhale 2 puffs into the lungs every 4 (four) hours as needed for Wheezing or Shortness of Breath. 18 g 11    fluticasone-salmeterol diskus inhaler 250-50 mcg Inhale 1 puff into the lungs 2 (two) times daily. Controller 60 each 11    lisinopriL 10 MG tablet Take 1 tablet (10 mg total) by mouth once daily.      predniSONE (DELTASONE) 20 MG tablet Take 3 tablets (60 mg total) by mouth once daily for 3 days, THEN 2 tablets (40 mg total) once daily for 3 days, THEN 1 tablet (20 mg total)  once daily for 3 days, THEN 0.5 tablets (10 mg total) once daily for 4 days. 20 tablet 0    sulfamethoxazole-trimethoprim 800-160mg (BACTRIM DS) 800-160 mg Tab Take 1 tablet by mouth 3 (three) times a week. 12 tablet 11    vitamin D (VITAMIN D3) 1000 units Tab Take 1,000 Units by mouth once daily.      [DISCONTINUED] predniSONE (DELTASONE) 5 MG tablet Take 1 tablet (5 mg total) by mouth once daily. Start after high dose prednisone 30 tablet 5    azaTHIOprine (IMURAN) 50 mg Tab Take 1 tablet (50 mg total) by mouth every other day. (Patient not taking: Reported on 4/24/2025) 15 tablet 11     Current Facility-Administered Medications on File Prior to Visit   Medication Dose Route Frequency Provider Last Rate Last Admin    varicella zoster (Shingrix) IM vaccine (>/= 49 yo)  0.5 mL Intramuscular Q8 weeks    0.5 mL at 12/18/24 1409    [DISCONTINUED] acetaminophen tablet 650 mg  650 mg Oral Q4H PRN Eugenie Glaser, NP        [DISCONTINUED] albuterol-ipratropium 2.5 mg-0.5 mg/3 mL nebulizer solution 3 mL  3 mL Nebulization Q6H WAKE Eugenie Glaser, NP   3 mL at 04/23/25 0749    [DISCONTINUED] aluminum-magnesium hydroxide-simethicone 200-200-20 mg/5 mL suspension 30 mL  30 mL Oral QID PRN Eugenie Glaser, NP        [DISCONTINUED] amLODIPine tablet 5 mg  5 mg Oral Daily Eugenie Glaser, NP   5 mg at 04/23/25 0905    [DISCONTINUED] azithromycin tablet 500 mg  500 mg Oral Daily Eugenie Glaser, NP   500 mg at 04/23/25 0905    [DISCONTINUED] budesonide nebulizer solution 0.5 mg  0.5 mg Nebulization Q12H Eugenie Glaser, NP   0.5 mg at 04/23/25 0749    [DISCONTINUED] cefdinir capsule 300 mg  300 mg Oral Q12H Lan Garsia MD        [DISCONTINUED] cefTRIAXone injection 1 g  1 g Intravenous Q24H Eugenie Glaser, NP   1 g at 04/22/25 1740    [DISCONTINUED] dextrose 50% injection 12.5 g  12.5 g Intravenous PRN Eugenie Glaser, NP        [DISCONTINUED] dextrose 50% injection 25 g  25 g Intravenous PRN Jailyn April  MARIALUISA, GENET        [DISCONTINUED] enoxaparin injection 30 mg  30 mg Subcutaneous Q24H (prophylaxis, 1700) Ovidio Sosa MD   30 mg at 04/22/25 1741    [DISCONTINUED] glucagon (human recombinant) injection 1 mg  1 mg Intramuscular PRN Eugenie Glaser, GENET        [DISCONTINUED] glucose chewable tablet 16 g  16 g Oral PRN Eugenie Glaser, NP        [DISCONTINUED] glucose chewable tablet 24 g  24 g Oral PRN Eugenie Glaser, NP        [DISCONTINUED] hydrALAZINE injection 10 mg  10 mg Intravenous Q6H PRN Eugenie Glaser, NP        [DISCONTINUED] melatonin tablet 6 mg  6 mg Oral Nightly PRN Eugenie Glaser, GENET        [DISCONTINUED] methylPREDNISolone sodium succinate injection 40 mg  40 mg Intravenous Q8H Bethany Sherman MD   40 mg at 04/23/25 0535    [DISCONTINUED] naloxone 0.4 mg/mL injection 0.02 mg  0.02 mg Intravenous PRN Jailyn April MARIALUISA, GENET        [DISCONTINUED] ondansetron injection 4 mg  4 mg Intravenous Q8H PRN Jailyn April MARIALUISA, NP        [DISCONTINUED] polyethylene glycol packet 17 g  17 g Oral Daily Eugenie Glaser, NP   17 g at 04/22/25 0912    [DISCONTINUED] predniSONE tablet 40 mg  40 mg Oral Daily Bethany Sherman MD        [DISCONTINUED] prochlorperazine injection Soln 5 mg  5 mg Intravenous Q6H PRN Eugenie Glaser, NP        [DISCONTINUED] sodium chloride 0.9% flush 10 mL  10 mL Intravenous Q12H PRN Eugenie Glaser, GENET        [DISCONTINUED] sodium chloride 0.9% flush 3 mL  3 mL Intravenous PRN Eugenie Glaser, GENET        [DISCONTINUED] sulfamethoxazole-trimethoprim 800-160mg per tablet 1 tablet  1 tablet Oral Once per day on Monday Wednesday Friday Eugenie Glaser NP   1 tablet at 04/23/25 0917   [2]   Social History  Socioeconomic History    Marital status: Single   Tobacco Use    Smoking status: Never    Smokeless tobacco: Never   Substance and Sexual Activity    Alcohol use: Not Currently    Drug use: Never    Sexual activity: Yes     Social Drivers of Health     Financial  Resource Strain: Low Risk  (4/21/2025)    Overall Financial Resource Strain (CARDIA)     Difficulty of Paying Living Expenses: Not hard at all   Food Insecurity: No Food Insecurity (4/21/2025)    Hunger Vital Sign     Worried About Running Out of Food in the Last Year: Never true     Ran Out of Food in the Last Year: Never true   Transportation Needs: No Transportation Needs (4/21/2025)    PRAPARE - Transportation     Lack of Transportation (Medical): No     Lack of Transportation (Non-Medical): No   Physical Activity: Insufficiently Active (12/31/2024)    Exercise Vital Sign     Days of Exercise per Week: 3 days     Minutes of Exercise per Session: 20 min   Stress: No Stress Concern Present (4/21/2025)    [employer] - Occupational Stress Questionnaire     Feeling of Stress : Not at all   Housing Stability: Low Risk  (4/21/2025)    Housing Stability Vital Sign     Unable to Pay for Housing in the Last Year: No     Homeless in the Last Year: No   [3]   Outpatient Encounter Medications as of 4/24/2025   Medication Sig Dispense Refill    albuterol (PROVENTIL) 2.5 mg /3 mL (0.083 %) nebulizer solution Take 3 mLs (2.5 mg total) by nebulization every 6 (six) hours as needed. Rescue 180 mL 3    albuterol (PROVENTIL/VENTOLIN HFA) 90 mcg/actuation inhaler Inhale 2 puffs into the lungs every 4 (four) hours as needed for Wheezing or Shortness of Breath. 18 g 11    fluticasone-salmeterol diskus inhaler 250-50 mcg Inhale 1 puff into the lungs 2 (two) times daily. Controller 60 each 11    lisinopriL 10 MG tablet Take 1 tablet (10 mg total) by mouth once daily.      predniSONE (DELTASONE) 20 MG tablet Take 3 tablets (60 mg total) by mouth once daily for 3 days, THEN 2 tablets (40 mg total) once daily for 3 days, THEN 1 tablet (20 mg total) once daily for 3 days, THEN 0.5 tablets (10 mg total) once daily for 4 days. 20 tablet 0    sulfamethoxazole-trimethoprim 800-160mg (BACTRIM DS) 800-160 mg Tab Take  1 tablet by mouth 3 (three) times a week. 12 tablet 11    vitamin D (VITAMIN D3) 1000 units Tab Take 1,000 Units by mouth once daily.      [DISCONTINUED] predniSONE (DELTASONE) 5 MG tablet Take 1 tablet (5 mg total) by mouth once daily. Start after high dose prednisone 30 tablet 5    azaTHIOprine (IMURAN) 50 mg Tab Take 1 tablet (50 mg total) by mouth every other day. (Patient not taking: Reported on 4/24/2025) 15 tablet 11    folic acid (FOLVITE) 1 MG tablet Take 1 tablet (1 mg total) by mouth once daily. Take while on bactrim(sulfa) 30 tablet 11    predniSONE (DELTASONE) 5 MG tablet Take 1 tablet (5 mg total) by mouth once daily. Start after high dose prednisone 30 tablet 5    [DISCONTINUED] predniSONE (DELTASONE) 20 MG tablet Prednisone 60 mg/ day for 3 days, 40 mg/day for 3 days,20 mg/ day for 3 days, (1/2 tablet )10 mg a day for 3 days. 20 tablet 0     Facility-Administered Encounter Medications as of 4/24/2025   Medication Dose Route Frequency Provider Last Rate Last Admin    varicella zoster (Shingrix) IM vaccine (>/= 49 yo)  0.5 mL Intramuscular Q8 weeks    0.5 mL at 12/18/24 1409    [DISCONTINUED] acetaminophen tablet 650 mg  650 mg Oral Q4H PRN Eugenie Glaser, NP        [DISCONTINUED] albuterol-ipratropium 2.5 mg-0.5 mg/3 mL nebulizer solution 3 mL  3 mL Nebulization Q6H WAKE Eugenie Glaser, NP   3 mL at 04/23/25 0749    [DISCONTINUED] aluminum-magnesium hydroxide-simethicone 200-200-20 mg/5 mL suspension 30 mL  30 mL Oral QID PRN Eugenie Glaser, NP        [DISCONTINUED] amLODIPine tablet 5 mg  5 mg Oral Daily Eugenie Glaser, NP   5 mg at 04/23/25 0905    [DISCONTINUED] azithromycin tablet 500 mg  500 mg Oral Daily Eugenie Glaser, NP   500 mg at 04/23/25 0905    [DISCONTINUED] budesonide nebulizer solution 0.5 mg  0.5 mg Nebulization Q12H Eugenie Glaser, NP   0.5 mg at 04/23/25 0749    [DISCONTINUED] cefdinir capsule 300 mg  300 mg Oral Q12H Lan Garsia MD        [DISCONTINUED]  cefTRIAXone injection 1 g  1 g Intravenous Q24H Jailyn April MAURY., NP   1 g at 04/22/25 1740    [DISCONTINUED] dextrose 50% injection 12.5 g  12.5 g Intravenous PRN Jailyn April J., NP        [DISCONTINUED] dextrose 50% injection 25 g  25 g Intravenous PRN Jailyn, April J., NP        [DISCONTINUED] enoxaparin injection 30 mg  30 mg Subcutaneous Q24H (prophylaxis, 1700) Ovidio Sosa MD   30 mg at 04/22/25 1741    [DISCONTINUED] glucagon (human recombinant) injection 1 mg  1 mg Intramuscular PRN Jailyn April J., NP        [DISCONTINUED] glucose chewable tablet 16 g  16 g Oral PRN Jailyn April J., NP        [DISCONTINUED] glucose chewable tablet 24 g  24 g Oral PRN Jailyn April J., NP        [DISCONTINUED] hydrALAZINE injection 10 mg  10 mg Intravenous Q6H PRN Jailyn April MAURY., NP        [DISCONTINUED] melatonin tablet 6 mg  6 mg Oral Nightly PRN Jailyn April MARIALUISA, NP        [DISCONTINUED] methylPREDNISolone sodium succinate injection 40 mg  40 mg Intravenous Q8H Bethany Sherman MD   40 mg at 04/23/25 0535    [DISCONTINUED] naloxone 0.4 mg/mL injection 0.02 mg  0.02 mg Intravenous PRN Jailyn April MARIALUISA, NP        [DISCONTINUED] ondansetron injection 4 mg  4 mg Intravenous Q8H PRN Jailyn April MARIALUISA, NP        [DISCONTINUED] polyethylene glycol packet 17 g  17 g Oral Daily Jailyn April MARIALUISA, NP   17 g at 04/22/25 0912    [DISCONTINUED] predniSONE tablet 40 mg  40 mg Oral Daily Bethany Sherman MD        [DISCONTINUED] prochlorperazine injection Soln 5 mg  5 mg Intravenous Q6H PRN Jailyn April MARIALUISA, NP        [DISCONTINUED] sodium chloride 0.9% flush 10 mL  10 mL Intravenous Q12H PRN Jailyn April MARIALUISA, NP        [DISCONTINUED] sodium chloride 0.9% flush 3 mL  3 mL Intravenous PRN Jailyn April MARIALUISA, NP        [DISCONTINUED] sulfamethoxazole-trimethoprim 800-160mg per tablet 1 tablet  1 tablet Oral Once per day on Monday Wednesday Friday Jailyn April MARIALUISA, NP   1 tablet at 04/23/25 0917

## 2025-04-26 NOTE — ASSESSMENT & PLAN NOTE
Patient's blood pressure range in the last 24 hours was: No data recorded.The patient's inpatient anti-hypertensive regimen is listed below:  Current Antihypertensives       Plan  - BP is controlled, no changes needed to their regimen  - Holding Lisinopril due to JAIME

## 2025-04-26 NOTE — DISCHARGE SUMMARY
[facility]  Discharge Summary      Patient Name: Shayy Faust  MRN: [id number]  FANI: [id number]  Patient Class: IP- Inpatient  Admission Date: 2025  Hospital Length of Stay: 2 days  Discharge Date and Time: 2025  4:21 PM  Attending Physician: No att. providers found   Discharging Provider: Lan Garsia MD  Primary Care Provider: Colleen Blackmon FNP-BESS    Primary Care Team: Networked reference to record PCT     HPI:   49 year old female, comorbid conditions include ANCA vasculitis, RA positive, anemia, interstitial pulmonary disease, HTN.  Presented to the [facility] ED with c/o SOB. Sx began 2 weeks ago, dx with COVID/Flu, admitted to the hospital - for Sepsis. Evaluated by Pulmonology on  for continued c/o SOB, mild hypoxia. Initiated on a steroid taper. Denies significant improvement. Patient is not on O2 at home. Managed with chronic steroid as OP. In the ED, vitals: 171/100, 113, 24, 98.9F, 92% RA on arrival. Significant Labs: WBC: 13.11, left shift, Pl: 470K, Cr: 1.5, D-Dimer: 0.61, BNP: 150, AB.410/35.1/50/22.2/88. CXR: NAF. Treated with Nebs, Steroid, IV Fluids. Patient required transfer to [facility] for admission, theses services are not available at a [facility] Patient is a full code. Placed in observation under the care of Hospital Medicine for management of Acute Respiratory Failure.     * No surgery found *      Hospital Course:       NAEON, patient in bed, reports coughing, SOB  O2 sats stable on 3L NC  Continue IV steroids, ceftriaxone/azithromycin  V/Q scan non-diagnostic, CTA recommended  Cr 1.5, at risk for renal failure with vasculitis  Pulmonary consulted, appreciate assistance      On 2L NC, doesn't use O2 at home  Some improvement, continued coughing and SOB  Continue IV steroids, antibiotics, breathing treatments  Pulmonary following, appreciate assistance     - Pt seen  and examined, chart, imaging reviewed. Briefly, 49 year old female, comorbid conditions include ANCA vasculitis, RA positive, anemia, interstitial pulmonary disease, HTN, dx with COVID/Flu, admitted to the hospital 4/4-4/8 for Sepsis and then was on Tapering dose of Steroids when she was readmitted with SOB/Cough- treated with O2, nebs, Steroids.     Looks and feels much better, cough, SOB much better, walking around comfortably on RA. No CP or MIRANDA, no wheezing and she does not need any home home O2 and she wishes to be discharged home now. Dr. Sherman has already cleared her. She was switched from Solumedrol to Prednisone 40 now. She was already on high dose prednisone started by Pulm as OP before admission, which she will continue now and will have early f/u as scheduled with Dr. Jiménez later this week. Pt is agreeable with the plan. She is eating drinking well, walking around well. She was seen and examined and deemed stable for discharge home today.      Goals of Care Treatment Preferences:  Code Status: Full Code      SDOH Screening:  The patient was screened for utility difficulties, food insecurity, transport difficulties, housing insecurity, and interpersonal safety and there were no concerns identified this admission.     Consults:   Consults (From admission, onward)          Status Ordering Provider     Inpatient consult to Pulmonology  Once        Provider:  Bethany Sherman MD    Completed KULWANT HU.            Assessment & Plan  Interstitial pulmonary disease, unspecified  Chronic  Pulmonary following    --nebs per orders  --ceftriaxone/azithromycin for now deescalate as appropriate  --IV steroid    Moderate persistent asthma with acute exacerbation  Chronic not well controlled, exacerbations likely secondary to recent viral infection.  Patient initiated on steroid taper per pulmonology on 04/16 with no significant improvement.  Patient presented to the ED with complaints of worsening  dyspnea at home.    --supplemental O2 to maintain SpO2> 90%  --IV steroid wean as tolerated  CKD (chronic kidney disease) stage 3, GFR 30-59 ml/min  Creatine stable for now. BMP reviewed- noted Estimated Creatinine Clearance: 33.4 mL/min (based on SCr of 1.4 mg/dL). according to latest data. Based on current GFR, CKD stage is stage 3 - GFR 30-59.  Monitor UOP and serial BMP and adjust therapy as needed. Renally dose meds. Avoid nephrotoxic medications and procedures.  Primary hypertension  Patient's blood pressure range in the last 24 hours was: No data recorded.The patient's inpatient anti-hypertensive regimen is listed below:  Current Antihypertensives       Plan  - BP is controlled, no changes needed to their regimen  - Holding Lisinopril due to JAIME  Final Active Diagnoses:    Diagnosis Date Noted POA    CKD (chronic kidney disease) stage 3, GFR 30-59 ml/min [N18.30] 04/05/2025 Yes     Chronic    Interstitial pulmonary disease, unspecified [J84.9] 03/17/2025 Yes     Chronic    Primary hypertension [I10] 12/05/2024 Yes     Chronic    Moderate persistent asthma with acute exacerbation [J45.41] 04/02/2024 Yes      Problems Resolved During this Admission:    Diagnosis Date Noted Date Resolved POA    PRINCIPAL PROBLEM:  Acute hypoxemic respiratory failure [J96.01] 04/20/2025 04/23/2025 Yes    Anemia, chronic renal failure, stage 4 (severe) [N18.4, D63.1] 08/23/2024 04/23/2025 Yes       Discharged Condition: stable    Disposition: Home or Self Care    Follow Up:   Follow-up Information       Colleen Blackmon, HOP-C. Schedule an appointment as soon as possible for a visit in 3 day(s).    Specialty: Family Medicine  Why: Hospital follow up  Contact information:  [address] [address] [address] [phone]               Ulysses Jiménez MD Follow up in 1 week(s).    Specialty: Pulmonary Disease  Why: Hospital follow up  Contact information:  [address]  [place] [address] [phone]                            Patient Instructions:      Diet Cardiac     Activity as tolerated       Significant Diagnostic Studies: Labs:    Lab Results   Component Value Date    WBC 8.48 04/23/2025    HGB 13.7 04/23/2025    HCT 43.3 04/23/2025    MCV 98 04/23/2025     04/23/2025        CMP  Sodium   Date Value Ref Range Status   04/23/2025 137 136 - 145 mmol/L Final   02/25/2025 142 136 - 145 mmol/L Final     Potassium   Date Value Ref Range Status   04/23/2025 4.4 3.5 - 5.1 mmol/L Final   02/25/2025 4.3 3.5 - 5.1 mmol/L Final     Chloride   Date Value Ref Range Status   04/23/2025 107 95 - 110 mmol/L Final   02/25/2025 107 95 - 110 mmol/L Final     CO2   Date Value Ref Range Status   04/23/2025 19 (L) 23 - 29 mmol/L Final   02/25/2025 26 23 - 29 mmol/L Final     Glucose   Date Value Ref Range Status   02/25/2025 85 70 - 110 mg/dL Final     BUN   Date Value Ref Range Status   04/23/2025 39 (H) 6 - 20 mg/dL Final     Creatinine   Date Value Ref Range Status   04/23/2025 1.4 0.5 - 1.4 mg/dL Final     Calcium   Date Value Ref Range Status   04/23/2025 8.6 (L) 8.7 - 10.5 mg/dL Final   02/25/2025 9.1 8.7 - 10.5 mg/dL Final     Total Protein   Date Value Ref Range Status   02/25/2025 6.7 6.0 - 8.4 g/dL Final     Albumin   Date Value Ref Range Status   04/23/2025 3.2 (L) 3.5 - 5.2 g/dL Final   02/25/2025 4.3 3.5 - 5.2 g/dL Final     Total Bilirubin   Date Value Ref Range Status   02/25/2025 0.3 0.1 - 1.0 mg/dL Final     Comment:     For infants and newborns, interpretation of results should be based  on gestational age, weight and in agreement with clinical  observations.    Premature Infant recommended reference ranges:  Up to 24 hours.............<8.0 mg/dL  Up to 48 hours............<12.0 mg/dL  3-5 days..................<15.0 mg/dL  6-29 days.................<15.0 mg/dL       Bilirubin Total   Date Value Ref Range Status   04/23/2025 0.3 0.1 - 1.0 mg/dL Final     Comment:     For infants and newborns, interpretation of  results should be based   on gestational age, weight and in agreement with clinical   observations.    Premature Infant recommended reference ranges:   0-24 hours:  <8.0 mg/dL   24-48 hours: <12.0 mg/dL   3-5 days:    <15.0 mg/dL   6-29 days:   <15.0 mg/dL     Alkaline Phosphatase   Date Value Ref Range Status   02/25/2025 67 40 - 150 U/L Final     ALP   Date Value Ref Range Status   04/23/2025 57 40 - 150 unit/L Final     AST   Date Value Ref Range Status   04/23/2025 9 (L) 11 - 45 unit/L Final   02/25/2025 20 10 - 40 U/L Final     ALT   Date Value Ref Range Status   04/23/2025 11 10 - 44 unit/L Final   02/25/2025 20 10 - 44 U/L Final     Anion Gap   Date Value Ref Range Status   04/23/2025 11 8 - 16 mmol/L Final     eGFR   Date Value Ref Range Status   04/23/2025 46 (L) >60 mL/min/1.73/m2 Final     Comment:     Estimated GFR calculated using the CKD-EPI creatinine (2021) equation.   02/25/2025 33.9 (A) >60 mL/min/1.73 m^2 Final      ABG  Recent Labs   Lab 04/20/25  1407   PH 7.410   PO2 50*   PCO2 35.1   HCO3 22.2*   BE -2          All labs within the past 24 hours have been reviewed  Microbiology: Blood Culture   Lab Results   Component Value Date    LABBLOO No growth after 5 days. 08/15/2024    and Sputum Culture   Radiology:   Imaging Results              X-Ray Chest 1 View (Final result)  Result time 04/20/25 13:31:40      Final result by Joseluis Kingston MD (04/20/25 13:31:40)                   Narrative:    EXAM: XR CHEST 1 VIEW    CLINICAL HISTORY: [R06.02]-Shortness of breath.    COMPARISON:  04/14/2025    FINDINGS:  Heart size is normal and lungs are clear bilaterally.  Pulmonary vasculature is normal.    IMPRESSION:  No evidence of acute disease.    Finalized on: 4/20/2025 1:31 PM By:  Joseluis Kingston  [facility]# [id number]      2025-04-20 13:33:47.577     [facility]                                     Pending Diagnostic Studies:       Procedure Component Value Units Date/Time    Fungal Immunodiffusion -  Blood [[id number]] Collected: 04/23/25 1131    Order Status: Sent Lab Status: In process Updated: 04/23/25 1138    Specimen: Blood            Medications:  Reconciled Home Medications:      Medication List        CHANGE how you take these medications      predniSONE 20 MG tablet  Commonly known as: DELTASONE  Take 3 tablets (60 mg total) by mouth once daily for 3 days, THEN 2 tablets (40 mg total) once daily for 3 days, THEN 1 tablet (20 mg total) once daily for 3 days, THEN 0.5 tablets (10 mg total) once daily for 4 days.  Start taking on: April 16, 2025  What changed: Another medication with the same name was removed. Continue taking this medication, and follow the directions you see here.            CONTINUE taking these medications      * albuterol 90 mcg/actuation inhaler  Commonly known as: PROVENTIL/VENTOLIN HFA  Inhale 2 puffs into the lungs every 4 (four) hours as needed for Wheezing or Shortness of Breath.     * albuterol 2.5 mg /3 mL (0.083 %) nebulizer solution  Commonly known as: PROVENTIL  Take 3 mLs (2.5 mg total) by nebulization every 6 (six) hours as needed. Rescue     fluticasone-salmeterol 250-50 mcg/dose 250-50 mcg/dose diskus inhaler  Commonly known as: ADVAIR  Inhale 1 puff into the lungs 2 (two) times daily. Controller     lisinopriL 10 MG tablet  Take 1 tablet (10 mg total) by mouth once daily.     sulfamethoxazole-trimethoprim 800-160mg 800-160 mg Tab  Commonly known as: BACTRIM DS  Take 1 tablet by mouth 3 (three) times a week.     vitamin D 1000 units Tab  Commonly known as: VITAMIN D3  Take 1,000 Units by mouth once daily.           * This list has 2 medication(s) that are the same as other medications prescribed for you. Read the directions carefully, and ask your doctor or other care provider to review them with you.                  Indwelling Lines/Drains at time of discharge:   Lines/Drains/Airways       None                   Time spent on the discharge of patient: 45 minutes          Lan Garsia MD  Department of [facility] Medicine  '[facility] Surg

## 2025-04-28 ENCOUNTER — OFFICE VISIT (OUTPATIENT)
Dept: INTERNAL MEDICINE | Facility: CLINIC | Age: 50
End: 2025-04-28
Payer: COMMERCIAL

## 2025-04-28 ENCOUNTER — RESULTS FOLLOW-UP (OUTPATIENT)
Dept: INTERNAL MEDICINE | Facility: CLINIC | Age: 50
End: 2025-04-28

## 2025-04-28 ENCOUNTER — HOSPITAL ENCOUNTER (EMERGENCY)
Facility: HOSPITAL | Age: 50
Discharge: LEFT BEFORE DISCHARGE | End: 2025-04-28
Attending: EMERGENCY MEDICINE
Payer: COMMERCIAL

## 2025-04-28 ENCOUNTER — LAB VISIT (OUTPATIENT)
Dept: LAB | Facility: HOSPITAL | Age: 50
End: 2025-04-28
Attending: NURSE PRACTITIONER
Payer: COMMERCIAL

## 2025-04-28 VITALS
BODY MASS INDEX: 16.18 KG/M2 | HEART RATE: 91 BPM | OXYGEN SATURATION: 95 % | DIASTOLIC BLOOD PRESSURE: 80 MMHG | RESPIRATION RATE: 22 BRPM | WEIGHT: 94.25 LBS | SYSTOLIC BLOOD PRESSURE: 114 MMHG

## 2025-04-28 VITALS
DIASTOLIC BLOOD PRESSURE: 88 MMHG | BODY MASS INDEX: 16.07 KG/M2 | WEIGHT: 94.13 LBS | SYSTOLIC BLOOD PRESSURE: 100 MMHG | OXYGEN SATURATION: 96 % | TEMPERATURE: 97 F | HEIGHT: 64 IN | RESPIRATION RATE: 18 BRPM | HEART RATE: 98 BPM

## 2025-04-28 DIAGNOSIS — I77.82 ANCA-ASSOCIATED VASCULITIS: ICD-10-CM

## 2025-04-28 DIAGNOSIS — I10 PRIMARY HYPERTENSION: ICD-10-CM

## 2025-04-28 DIAGNOSIS — Z09 HOSPITAL DISCHARGE FOLLOW-UP: Primary | ICD-10-CM

## 2025-04-28 DIAGNOSIS — Z13.6 SCREENING FOR CARDIOVASCULAR CONDITION: ICD-10-CM

## 2025-04-28 DIAGNOSIS — D84.821 IMMUNODEFICIENCY DUE TO DRUG THERAPY: ICD-10-CM

## 2025-04-28 DIAGNOSIS — Z79.899 IMMUNODEFICIENCY DUE TO DRUG THERAPY: ICD-10-CM

## 2025-04-28 DIAGNOSIS — J45.41 MODERATE PERSISTENT ASTHMA WITH ACUTE EXACERBATION: ICD-10-CM

## 2025-04-28 DIAGNOSIS — R06.00 DYSPNEA, UNSPECIFIED TYPE: ICD-10-CM

## 2025-04-28 DIAGNOSIS — R06.00 DYSPNEA, UNSPECIFIED TYPE: Primary | ICD-10-CM

## 2025-04-28 DIAGNOSIS — N18.31 STAGE 3A CHRONIC KIDNEY DISEASE: ICD-10-CM

## 2025-04-28 DIAGNOSIS — J84.9 INTERSTITIAL PULMONARY DISEASE, UNSPECIFIED: ICD-10-CM

## 2025-04-28 LAB
ABSOLUTE EOSINOPHIL (OHS): 0 K/UL
ABSOLUTE EOSINOPHIL (OHS): 0 K/UL
ABSOLUTE MONOCYTE (OHS): 0.45 K/UL (ref 0.3–1)
ABSOLUTE MONOCYTE (OHS): 1.44 K/UL (ref 0.3–1)
ABSOLUTE NEUTROPHIL COUNT (OHS): 12.62 K/UL (ref 1.8–7.7)
ABSOLUTE NEUTROPHIL COUNT (OHS): 14.12 K/UL (ref 1.8–7.7)
ALBUMIN SERPL BCP-MCNC: 4.3 G/DL (ref 3.5–5.2)
ALBUMIN SERPL BCP-MCNC: 4.8 G/DL (ref 3.5–5.2)
ALP SERPL-CCNC: 68 UNIT/L (ref 40–150)
ALP SERPL-CCNC: 77 UNIT/L (ref 40–150)
ALT SERPL W/O P-5'-P-CCNC: 18 UNIT/L (ref 10–44)
ALT SERPL W/O P-5'-P-CCNC: 19 UNIT/L (ref 10–44)
ANION GAP (OHS): 15 MMOL/L (ref 8–16)
ANION GAP (OHS): 16 MMOL/L (ref 8–16)
AST SERPL-CCNC: 15 UNIT/L (ref 11–45)
AST SERPL-CCNC: 16 UNIT/L (ref 11–45)
BASOPHILS # BLD AUTO: 0.04 K/UL
BASOPHILS # BLD AUTO: 0.05 K/UL
BASOPHILS NFR BLD AUTO: 0.3 %
BASOPHILS NFR BLD AUTO: 0.3 %
BILIRUB SERPL-MCNC: 0.5 MG/DL (ref 0.1–1)
BILIRUB SERPL-MCNC: 0.7 MG/DL (ref 0.1–1)
BNP SERPL-MCNC: 26 PG/ML (ref 0–99)
BNP SERPL-MCNC: 27 PG/ML (ref 0–99)
BUN SERPL-MCNC: 26 MG/DL (ref 6–20)
BUN SERPL-MCNC: 31 MG/DL (ref 6–20)
CALCIUM SERPL-MCNC: 10.4 MG/DL (ref 8.7–10.5)
CALCIUM SERPL-MCNC: 9.6 MG/DL (ref 8.7–10.5)
CHLORIDE SERPL-SCNC: 103 MMOL/L (ref 95–110)
CHLORIDE SERPL-SCNC: 105 MMOL/L (ref 95–110)
CO2 SERPL-SCNC: 22 MMOL/L (ref 23–29)
CO2 SERPL-SCNC: 23 MMOL/L (ref 23–29)
CREAT SERPL-MCNC: 1.7 MG/DL (ref 0.5–1.4)
CREAT SERPL-MCNC: 1.8 MG/DL (ref 0.5–1.4)
ERYTHROCYTE [DISTWIDTH] IN BLOOD BY AUTOMATED COUNT: 13.2 % (ref 11.5–14.5)
ERYTHROCYTE [DISTWIDTH] IN BLOOD BY AUTOMATED COUNT: 13.3 % (ref 11.5–14.5)
GFR SERPLBLD CREATININE-BSD FMLA CKD-EPI: 34 ML/MIN/1.73/M2
GFR SERPLBLD CREATININE-BSD FMLA CKD-EPI: 36 ML/MIN/1.73/M2
GLUCOSE SERPL-MCNC: 140 MG/DL (ref 70–110)
GLUCOSE SERPL-MCNC: 149 MG/DL (ref 70–110)
HCT VFR BLD AUTO: 47.1 % (ref 37–48.5)
HCT VFR BLD AUTO: 50.9 % (ref 37–48.5)
HGB BLD-MCNC: 15.5 GM/DL (ref 12–16)
HGB BLD-MCNC: 16.5 GM/DL (ref 12–16)
IMM GRANULOCYTES # BLD AUTO: 0.32 K/UL (ref 0–0.04)
IMM GRANULOCYTES # BLD AUTO: 0.42 K/UL (ref 0–0.04)
IMM GRANULOCYTES NFR BLD AUTO: 2.2 % (ref 0–0.5)
IMM GRANULOCYTES NFR BLD AUTO: 2.5 % (ref 0–0.5)
LACTATE SERPL-SCNC: 2.4 MMOL/L (ref 0.5–2.2)
LACTATE SERPL-SCNC: 3 MMOL/L (ref 0.5–2.2)
LYMPHOCYTES # BLD AUTO: 0.81 K/UL (ref 1–4.8)
LYMPHOCYTES # BLD AUTO: 0.9 K/UL (ref 1–4.8)
MCH RBC QN AUTO: 31.1 PG (ref 27–31)
MCH RBC QN AUTO: 31.3 PG (ref 27–31)
MCHC RBC AUTO-ENTMCNC: 32.4 G/DL (ref 32–36)
MCHC RBC AUTO-ENTMCNC: 32.9 G/DL (ref 32–36)
MCV RBC AUTO: 95 FL (ref 82–98)
MCV RBC AUTO: 96 FL (ref 82–98)
NUCLEATED RBC (/100WBC) (OHS): 0 /100 WBC
NUCLEATED RBC (/100WBC) (OHS): 0 /100 WBC
PLATELET # BLD AUTO: 387 K/UL (ref 150–450)
PLATELET # BLD AUTO: 404 K/UL (ref 150–450)
PMV BLD AUTO: 10 FL (ref 9.2–12.9)
PMV BLD AUTO: 10.2 FL (ref 9.2–12.9)
POTASSIUM SERPL-SCNC: 4.4 MMOL/L (ref 3.5–5.1)
POTASSIUM SERPL-SCNC: 4.9 MMOL/L (ref 3.5–5.1)
PROCALCITONIN SERPL-MCNC: 0.06 NG/ML
PROCALCITONIN SERPL-MCNC: 0.06 NG/ML
PROT SERPL-MCNC: 7.2 GM/DL (ref 6–8.4)
PROT SERPL-MCNC: 8.3 GM/DL (ref 6–8.4)
RBC # BLD AUTO: 4.95 M/UL (ref 4–5.4)
RBC # BLD AUTO: 5.31 M/UL (ref 4–5.4)
RELATIVE EOSINOPHIL (OHS): 0 %
RELATIVE EOSINOPHIL (OHS): 0 %
RELATIVE LYMPHOCYTE (OHS): 5.3 % (ref 18–48)
RELATIVE LYMPHOCYTE (OHS): 5.7 % (ref 18–48)
RELATIVE MONOCYTE (OHS): 3.2 % (ref 4–15)
RELATIVE MONOCYTE (OHS): 8.5 % (ref 4–15)
RELATIVE NEUTROPHIL (OHS): 83.4 % (ref 38–73)
RELATIVE NEUTROPHIL (OHS): 88.6 % (ref 38–73)
SODIUM SERPL-SCNC: 141 MMOL/L (ref 136–145)
SODIUM SERPL-SCNC: 143 MMOL/L (ref 136–145)
TROPONIN I SERPL DL<=0.01 NG/ML-MCNC: 0.01 NG/ML
WBC # BLD AUTO: 14.24 K/UL (ref 3.9–12.7)
WBC # BLD AUTO: 16.93 K/UL (ref 3.9–12.7)

## 2025-04-28 PROCEDURE — 83605 ASSAY OF LACTIC ACID: CPT

## 2025-04-28 PROCEDURE — 99999 PR PBB SHADOW E&M-EST. PATIENT-LVL IV: CPT | Mod: PBBFAC,,, | Performed by: NURSE PRACTITIONER

## 2025-04-28 PROCEDURE — 83880 ASSAY OF NATRIURETIC PEPTIDE: CPT

## 2025-04-28 PROCEDURE — 36415 COLL VENOUS BLD VENIPUNCTURE: CPT | Mod: PO

## 2025-04-28 PROCEDURE — 93005 ELECTROCARDIOGRAM TRACING: CPT | Mod: ER

## 2025-04-28 PROCEDURE — 25000242 PHARM REV CODE 250 ALT 637 W/ HCPCS: Mod: ER | Performed by: EMERGENCY MEDICINE

## 2025-04-28 PROCEDURE — 87389 HIV-1 AG W/HIV-1&-2 AB AG IA: CPT | Performed by: EMERGENCY MEDICINE

## 2025-04-28 PROCEDURE — 84484 ASSAY OF TROPONIN QUANT: CPT | Performed by: EMERGENCY MEDICINE

## 2025-04-28 PROCEDURE — 93010 ELECTROCARDIOGRAM REPORT: CPT | Mod: ,,, | Performed by: INTERNAL MEDICINE

## 2025-04-28 PROCEDURE — 85025 COMPLETE CBC W/AUTO DIFF WBC: CPT | Performed by: EMERGENCY MEDICINE

## 2025-04-28 PROCEDURE — 96360 HYDRATION IV INFUSION INIT: CPT | Mod: ER

## 2025-04-28 PROCEDURE — 25000003 PHARM REV CODE 250: Mod: ER | Performed by: EMERGENCY MEDICINE

## 2025-04-28 PROCEDURE — 84145 PROCALCITONIN (PCT): CPT | Performed by: EMERGENCY MEDICINE

## 2025-04-28 PROCEDURE — 85025 COMPLETE CBC W/AUTO DIFF WBC: CPT

## 2025-04-28 PROCEDURE — 84145 PROCALCITONIN (PCT): CPT

## 2025-04-28 PROCEDURE — 99285 EMERGENCY DEPT VISIT HI MDM: CPT | Mod: 25,ER

## 2025-04-28 PROCEDURE — 86803 HEPATITIS C AB TEST: CPT | Performed by: EMERGENCY MEDICINE

## 2025-04-28 PROCEDURE — 80053 COMPREHEN METABOLIC PANEL: CPT | Performed by: EMERGENCY MEDICINE

## 2025-04-28 PROCEDURE — 87040 BLOOD CULTURE FOR BACTERIA: CPT | Performed by: EMERGENCY MEDICINE

## 2025-04-28 PROCEDURE — 83605 ASSAY OF LACTIC ACID: CPT | Performed by: EMERGENCY MEDICINE

## 2025-04-28 PROCEDURE — 94640 AIRWAY INHALATION TREATMENT: CPT | Mod: ER

## 2025-04-28 PROCEDURE — 82040 ASSAY OF SERUM ALBUMIN: CPT

## 2025-04-28 PROCEDURE — 83880 ASSAY OF NATRIURETIC PEPTIDE: CPT | Performed by: EMERGENCY MEDICINE

## 2025-04-28 RX ORDER — PREDNISONE 20 MG/1
TABLET ORAL
Qty: 18 TABLET | Refills: 0 | Status: SHIPPED | OUTPATIENT
Start: 2025-04-28 | End: 2025-05-08

## 2025-04-28 RX ORDER — IPRATROPIUM BROMIDE AND ALBUTEROL SULFATE 2.5; .5 MG/3ML; MG/3ML
3 SOLUTION RESPIRATORY (INHALATION)
Status: COMPLETED | OUTPATIENT
Start: 2025-04-28 | End: 2025-04-28

## 2025-04-28 RX ORDER — METHYLPREDNISOLONE ACETATE 40 MG/ML
40 INJECTION, SUSPENSION INTRA-ARTICULAR; INTRALESIONAL; INTRAMUSCULAR; SOFT TISSUE
Status: COMPLETED | OUTPATIENT
Start: 2025-04-28 | End: 2025-04-28

## 2025-04-28 RX ORDER — SODIUM CHLORIDE 9 MG/ML
1000 INJECTION, SOLUTION INTRAVENOUS
Status: COMPLETED | OUTPATIENT
Start: 2025-04-28 | End: 2025-04-28

## 2025-04-28 RX ADMIN — METHYLPREDNISOLONE ACETATE 40 MG: 40 INJECTION, SUSPENSION INTRA-ARTICULAR; INTRALESIONAL; INTRAMUSCULAR; SOFT TISSUE at 04:04

## 2025-04-28 RX ADMIN — IPRATROPIUM BROMIDE AND ALBUTEROL SULFATE 3 ML: 2.5; .5 SOLUTION RESPIRATORY (INHALATION) at 05:04

## 2025-04-28 RX ADMIN — SODIUM CHLORIDE 1000 ML: 9 INJECTION, SOLUTION INTRAVENOUS at 07:04

## 2025-04-28 NOTE — PROGRESS NOTES
Subjective:       Patient ID: Shayy Faust is a 49 y.o. female.    Chief Complaint: Shortness of Breath    History of Present Illness    HPI:  Ms. Faust presents to clinic with her mother for hospital follow up. She was recently admitted to the hospital (4/20-4/23) for shortness of breath and hypoxia. Prior to this admission, she was admitted on 4/4/2025 after testing positive for COVID and influenza. In the hospital, she was initially given antibiotics. She was treated with a high-dose steroid. At time of discharge, she was not deemed a candidate for home oxygen. She was advised to continue prednisone taper prescribed prior to admission.    After discharge, she felt better for about 2 days before symptoms returned. She was seen by her pulmonologist on 4/24 and was feeling relatively okay at that time, with an SpO2 of 98%.    Currently, her SpO2 is fluctuating between 84% and 88% on room air. She reports difficulty catching her breath and mild coughing, which started the night before. Her symptoms tend to worsen when her steroid dose drops below 20 mg. She took an extra dose of prednisone the night before, which helped slightly, and took 2 doses this morning to manage her symptoms.    She denies chest pain.           HPI and discharge summary copied from hospital encounter:    Patient Name: Shayy Faust  MRN: [id number]  [facility]: [id number]  Patient Class: IP- Inpatient  Admission Date: 4/20/2025  Hospital Length of Stay: 2 days  Discharge Date and Time: 4/23/2025  4:21 PM  Attending Physician: No att. providers found   Discharging Provider: Lan Garsia MD  Primary Care Provider: Colleen Blackmon, FNP-C     Primary Care Team: Networked reference to record PCT      HPI:   49 year old female, comorbid conditions include ANCA vasculitis, RA positive, anemia, interstitial pulmonary disease, HTN.  Presented to the [facility] ED with c/o SOB. Sx began 2 weeks ago, dx with COVID/Flu, admitted to  the hospital - for Sepsis. Evaluated by Pulmonology on  for continued c/o SOB, mild hypoxia. Initiated on a steroid taper. Denies significant improvement. Patient is not on O2 at home. Managed with chronic steroid as OP. In the ED, vitals: 171/100, 113, 24, 98.9F, 92% RA on arrival. Significant Labs: WBC: 13.11, left shift, Pl: 470K, Cr: 1.5, D-Dimer: 0.61, BNP: 150, AB.410/35.1/50/22.2/88. CXR: NAF. Treated with Nebs, Steroid, IV Fluids. Patient required transfer to [facility] for admission, theses services are not available at a freestanding ED, [facility] Patient is a full code. Placed in observation under the care of Hospital Medicine for management of Acute Respiratory Failure.      * No surgery found *       Hospital Course:        NAEON, patient in bed, reports coughing, SOB  O2 sats stable on 3L NC  Continue IV steroids, ceftriaxone/azithromycin  V/Q scan non-diagnostic, CTA recommended  Cr 1.5, at risk for renal failure with vasculitis  Pulmonary consulted, appreciate assistance       On 2L NC, doesn't use O2 at home  Some improvement, continued coughing and SOB  Continue IV steroids, antibiotics, breathing treatments  Pulmonary following, appreciate assistance      - Pt seen and examined, chart, imaging reviewed. Briefly, 49 year old female, comorbid conditions include ANCA vasculitis, RA positive, anemia, interstitial pulmonary disease, HTN, dx with COVID/Flu, admitted to the hospital - for Sepsis and then was on Tapering dose of Steroids when she was readmitted with SOB/Cough- treated with O2, nebs, Steroids.      Looks and feels much better, cough, SOB much better, walking around comfortably on RA. No CP or MIRANDA, no wheezing and she does not need any home home O2 and she wishes to be discharged home now. Dr. Sherman has already cleared her. She was switched from Solumedrol to Prednisone 40 now. She was already on high dose prednisone started by Pulm  as OP before admission, which she will continue now and will have early f/u as scheduled with Dr. Jiménez later this week. Pt is agreeable with the plan. She is eating drinking well, walking around well. She was seen and examined and deemed stable for discharge home today.         Shortness of Breath  This is a new problem. Pertinent negatives include no abdominal pain, chest pain, fever or vomiting.   Transitional Care Note    Family and/or Caretaker present at visit?  Yes.  Diagnostic tests reviewed/disposition: I have reviewed all completed as well as pending diagnostic tests at the time of discharge.  Disease/illness education: yes  Home health/community services discussion/referrals: Patient does not have home health established from hospital visit.  They do not need home health.  If needed, we will set up home health for the patient.   Establishment or re-establishment of referral orders for community resources: No other necessary community resources.   Discussion with other health care providers:  Message sent pulmonologist .           Problem List[1]    Family History   Problem Relation Name Age of Onset    Hypertension Mother      Hypertension Father       Past Surgical History:   Procedure Laterality Date    BILATERAL TUBAL LIGATION N/A     COLONOSCOPY N/A 8/20/2024    Procedure: COLONOSCOPY;  Surgeon: Gena Saxena MD;  Location: [facility];  Service: Endoscopy;  Laterality: N/A;    ESOPHAGOGASTRODUODENOSCOPY N/A 8/20/2024    Procedure: EGD (ESOPHAGOGASTRODUODENOSCOPY);  Surgeon: Gena Saxena MD;  Location: [facility];  Service: Endoscopy;  Laterality: N/A;    INTRALUMINAL GASTROINTESTINAL TRACT IMAGING VIA CAPSULE N/A 9/19/2024    Procedure: IMAGING PROCEDURE, GI TRACT, INTRALUMINAL, VIA CAPSULE;  Surgeon: Ignacio Jimenez RN;  Location: [facility];  Service: Endoscopy;  Laterality: N/A;  Neg EGD/Colon. Hx MARIA ESTHER/weight loss/ARF       Current Medications[2]    Review of Systems   Constitutional:   "Positive for activity change, appetite change and fatigue. Negative for chills and fever.   Respiratory:  Positive for cough and shortness of breath.    Cardiovascular:  Negative for chest pain.   Gastrointestinal:  Negative for abdominal pain, diarrhea, nausea and vomiting.   Neurological:  Positive for weakness (generalized).       Objective:   /88   Pulse 98   Temp 97.4 °F (36.3 °C) (Tympanic)   Resp 18   Ht 5' 4" (1.626 m)   Wt 42.7 kg (94 lb 2.2 oz)   SpO2 96%   BMI 16.16 kg/m²      Physical Exam  Constitutional:       Appearance: She is ill-appearing.   Eyes:      Conjunctiva/sclera: Conjunctivae normal.   Cardiovascular:      Rate and Rhythm: Normal rate and regular rhythm.      Heart sounds: Normal heart sounds. No murmur heard.  Pulmonary:      Effort: Tachypnea present.      Breath sounds: Decreased air movement present.      Comments: coarse breath sounds bilateral upper  diminished breath sounds bilateral lower  breathlessness with conversation on room air   Neurological:      Mental Status: She is alert and oriented to person, place, and time.   Psychiatric:         Mood and Affect: Mood normal.         Behavior: Behavior normal.         Assessment & Plan     Assessment & Plan    HYPOXEMIA/SHORTNESS OF BREATH:  - Ms. Faust presenting with recurrent shortness of breath after recent hospital discharge.  - Monitored oxygen levels, which are fluctuating and dropping as low as 84% on room air, requiring supplementation.  - Placed on 3 L oxygen with nasal cannula in clinic with improved saturation 96%.  - Assessed need for home oxygen therapy.  - Direct messaged sent to patient's pulmonologist for guidance on management to potentially avoid another emergency department visit.  - Labs ordered to further evaluate.  - Prescribed an extended high-dose prednisone taper to better manage symptoms.  - Strict ER precautions reviewed with patient.   - Will further discuss treatment plan pending results.    "      1. Hospital discharge follow-up  Comments:  Reviewed hospital notes and labs. Reviewed pulmonology note.    2. Dyspnea, unspecified type  -     CBC Auto Differential; Future; Expected date: 04/28/2025  -     Comprehensive Metabolic Panel; Future; Expected date: 04/28/2025  -     LACTIC ACID, PLASMA; Future; Expected date: 04/28/2025  -     Procalcitonin; Future; Expected date: 04/28/2025  -     BNP; Future; Expected date: 04/28/2025  -     Discontinue: methylPREDNISolone sodium succinate injection 40 mg  -     methylPREDNISolone acetate injection 40 mg    3. Moderate persistent asthma with acute exacerbation  Assessment & Plan:  Reviewed hospital notes and labs. Worsening dyspnea with SpO2 84% on room air. On prednisone taper currently on 20 mg daily. Message sent to pulmonology. Labs today. IM steroid today in clinic. Low-threshold for return to ER. Strict ER precautions reviewed.    Orders:  -     predniSONE (DELTASONE) 20 MG tablet; Take 3 tablets (60 mg total) by mouth once daily for 3 days, THEN 2 tablets (40 mg total) once daily for 3 days, THEN 1 tablet (20 mg total) once daily for 2 days, THEN 0.5 tablets (10 mg total) once daily for 2 days.  Dispense: 18 tablet; Refill: 0  -     methylPREDNISolone acetate injection 40 mg    4. ANCA-associated vasculitis  Overview:  08/20/2024 Kidney biopsy: PAUCI-IMMUNE NECROTIZING CRESCENTIC GLOMERULONEPHRITIS     Assessment & Plan:  Nephrology previously advised to hold Imuran until follow up in May after recent hospitalizations. Continue Bactrim.      5. Immunodeficiency due to drug therapy  Assessment & Plan:  Imuran on hold. Continues prednisone daily. Followed by nephrology      6. Interstitial pulmonary disease, unspecified  Assessment & Plan:  Chronic. Followed by pulmonology.    Orders:  -     predniSONE (DELTASONE) 20 MG tablet; Take 3 tablets (60 mg total) by mouth once daily for 3 days, THEN 2 tablets (40 mg total) once daily for 3 days, THEN 1 tablet (20  "mg total) once daily for 2 days, THEN 0.5 tablets (10 mg total) once daily for 2 days.  Dispense: 18 tablet; Refill: 0  -     methylPREDNISolone acetate injection 40 mg    7. Primary hypertension  Assessment & Plan:  Initially elevated, improved with repeat reading.       8. Stage 3a chronic kidney disease  Assessment & Plan:  Repeat labs today. Followed by nephrology.         MICHELE Kline    This note was generated with the assistance of ambient listening technology. Verbal consent was obtained by the patient and accompanying visitor(s) for the recording of patient appointment to facilitate this note. I attest to having reviewed and edited the generated note for accuracy, though some syntax or spelling errors may persist. Please contact the author of this note for any clarification.       Portions of this note may have been created with voice recognition software. Occasional "wrong-word" or "sound-a-like" substitutions may have occurred due to the inherent limitations of voice recognition software. Please, read the note carefully and recognize, using context, where substitutions have occurred.             [1]   Patient Active Problem List  Diagnosis    Moderate persistent asthma with acute exacerbation    Rheumatoid factor positive    Mild persistent asthma with acute exacerbation    Iron deficiency anemia    PNA (pneumonia)    Thrombocytosis    Unintentional weight loss    Fatigue    Moderate protein-calorie malnutrition    ANCA-associated vasculitis    Immunodeficiency due to drug therapy    Primary hypertension    Interstitial pulmonary disease, unspecified    Sepsis    Influenza A    COVID-19    Anemia    CKD (chronic kidney disease) stage 3, GFR 30-59 ml/min   [2]   Current Outpatient Medications:     albuterol (PROVENTIL) 2.5 mg /3 mL (0.083 %) nebulizer solution, Take 3 mLs (2.5 mg total) by nebulization every 6 (six) hours as needed. Rescue, Disp: 180 mL, Rfl: 3    albuterol (PROVENTIL/VENTOLIN " HFA) 90 mcg/actuation inhaler, Inhale 2 puffs into the lungs every 4 (four) hours as needed for Wheezing or Shortness of Breath., Disp: 18 g, Rfl: 11    fluticasone-salmeterol diskus inhaler 250-50 mcg, Inhale 1 puff into the lungs 2 (two) times daily. Controller, Disp: 60 each, Rfl: 11    folic acid (FOLVITE) 1 MG tablet, Take 1 tablet (1 mg total) by mouth once daily. Take while on bactrim(sulfa), Disp: 30 tablet, Rfl: 11    lisinopriL 10 MG tablet, Take 1 tablet (10 mg total) by mouth once daily., Disp: , Rfl:     predniSONE (DELTASONE) 5 MG tablet, Take 1 tablet (5 mg total) by mouth once daily. Start after high dose prednisone, Disp: 30 tablet, Rfl: 5    sulfamethoxazole-trimethoprim 800-160mg (BACTRIM DS) 800-160 mg Tab, Take 1 tablet by mouth 3 (three) times a week., Disp: 12 tablet, Rfl: 11    vitamin D (VITAMIN D3) 1000 units Tab, Take 1,000 Units by mouth once daily., Disp: , Rfl:     predniSONE (DELTASONE) 20 MG tablet, Take 3 tablets (60 mg total) by mouth once daily for 3 days, THEN 2 tablets (40 mg total) once daily for 3 days, THEN 1 tablet (20 mg total) once daily for 2 days, THEN 0.5 tablets (10 mg total) once daily for 2 days., Disp: 18 tablet, Rfl: 0  No current facility-administered medications for this visit.

## 2025-04-28 NOTE — ED PROVIDER NOTES
Encounter Date: 4/28/2025       History     Chief Complaint   Patient presents with    Shortness of Breath     Recent admissions for similar issues. Today followed up with primary and had elevated white count with worsening SOB.      The history is provided by the patient.   Shortness of Breath  This is a recurrent problem. The current episode started more than 1 week ago. The problem has been gradually worsening. Associated symptoms include cough, sputum production and wheezing. Pertinent negatives include no fever, no headaches, no sore throat, no chest pain, no vomiting and no abdominal pain.     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       Anemia, chronic renal failure, stage 4 (severe) 08/23/2024    Mild intermittent asthma, uncomplicated      Past Surgical History:   Procedure Laterality Date    BILATERAL TUBAL LIGATION N/A     COLONOSCOPY N/A 8/20/2024    Procedure: COLONOSCOPY;  Surgeon: Gena Saxena MD;  Location: [facility];  Service: Endoscopy;  Laterality: N/A;    ESOPHAGOGASTRODUODENOSCOPY N/A 8/20/2024    Procedure: EGD (ESOPHAGOGASTRODUODENOSCOPY);  Surgeon: Gena Saxena MD;  Location: [facility];  Service: Endoscopy;  Laterality: N/A;    INTRALUMINAL GASTROINTESTINAL TRACT IMAGING VIA CAPSULE N/A 9/19/2024    Procedure: IMAGING PROCEDURE, GI TRACT, INTRALUMINAL, VIA CAPSULE;  Surgeon: Ignacio Jimenez RN;  Location: [facility];  Service: Endoscopy;  Laterality: N/A;  Neg EGD/Colon. Hx MARIA ESTHER/weight loss/ARF     Family History   Problem Relation Name Age of Onset    Hypertension Mother      Hypertension Father       Social History[1]  Review of Systems   Constitutional:  Negative for fever.   HENT: Negative.  Negative for congestion and sore throat.    Eyes: Negative.    Respiratory:  Positive for cough, sputum  production, shortness of breath and wheezing.    Cardiovascular:  Negative for chest pain.   Gastrointestinal:  Negative for abdominal pain, nausea and vomiting.   Genitourinary:  Negative for dysuria.   Neurological:  Negative for weakness, numbness and headaches.   Psychiatric/Behavioral:  Negative for confusion.        Physical Exam     Initial Vitals [04/28/25 1725]   BP Pulse Resp Temp SpO2   137/88 (!) 115 (!) 24 -- (!) 94 %      MAP       --         Physical Exam    Constitutional: She appears well-developed and well-nourished. She appears distressed.   HENT:   Head: Normocephalic and atraumatic.   Eyes: Conjunctivae are normal. Pupils are equal, round, and reactive to light.   Neck: Neck supple.   Normal range of motion.  Cardiovascular:  Normal rate, regular rhythm and normal heart sounds.           Pulmonary/Chest: Tachypnea noted. She has decreased breath sounds. She has wheezes.   Abdominal: Abdomen is soft. Bowel sounds are normal. She exhibits no distension. There is no abdominal tenderness. There is no rebound.   Musculoskeletal:         General: No edema. Normal range of motion.      Cervical back: Normal range of motion and neck supple.     Neurological: She is alert and oriented to person, place, and time. She has normal strength.   Skin: Skin is warm and dry.   Psychiatric: She has a normal mood and affect.         ED Course   Procedures  Labs Reviewed   COMPREHENSIVE METABOLIC PANEL - Abnormal       Result Value    Sodium 143      Potassium 4.4      Chloride 105      CO2 23      Glucose 140 (*)     BUN 31 (*)     Creatinine 1.8 (*)     Calcium 9.6      Protein Total 7.2      Albumin 4.3      Bilirubin Total 0.5      ALP 68      AST 15      ALT 18      Anion Gap 15      eGFR 34 (*)    LACTIC ACID, PLASMA - Abnormal    Lactic Acid Level 2.4 (*)     Narrative:     Falsely low lactic acid results can be found in samples containing >=13.0 mg/dL total bilirubin and/or >=3.5 mg/dL direct bilirubin.     CBC WITH DIFFERENTIAL - Abnormal    WBC 16.93 (*)     RBC 4.95      HGB 15.5      HCT 47.1      MCV 95      MCH 31.3 (*)     MCHC 32.9      RDW 13.3      Platelet Count 404      MPV 10.0      Nucleated RBC 0      Neut % 83.4 (*)     Lymph % 5.3 (*)     Mono % 8.5      Eos % 0.0      Basophil % 0.3      Imm Grans % 2.5 (*)     Neut # 14.12 (*)     Lymph # 0.90 (*)     Mono # 1.44 (*)     Eos # 0.00      Baso # 0.05      Imm Grans # 0.42 (*)    PROCALCITONIN - Normal    Procalcitonin 0.06     TROPONIN I - Normal    Troponin-I 0.014     B-TYPE NATRIURETIC PEPTIDE - Normal    BNP 26     CULTURE, BLOOD   CULTURE, BLOOD   CBC W/ AUTO DIFFERENTIAL    Narrative:     The following orders were created for panel order CBC auto differential.  Procedure                               Abnormality         Status                     ---------                               -----------         ------                     CBC with Differential[[id number]]       Abnormal            Final result                 Please view results for these tests on the individual orders.   LACTIC ACID, PLASMA   URINALYSIS, REFLEX TO URINE CULTURE   HEPATITIS C ANTIBODY   HEP C VIRUS HOLD SPECIMEN   HIV 1 / 2 ANTIBODY     EKG Readings: (Independently Interpreted)   Rhythm: Sinus Tachycardia. Heart Rate: 111. Ectopy: No Ectopy. Conduction: Normal. ST Segments: Normal ST Segments. T Waves: Normal. Clinical Impression: Normal Sinus Rhythm and Sinus Tachycardia       Imaging Results              X-Ray Chest AP Portable (Final result)  Result time 04/28/25 18:31:08      Final result by Yanira Mota MD (04/28/25 18:31:08)                   Narrative:    EXAM: XR CHEST AP PORTABLE    CLINICAL HISTORY: Sepsis    PRIOR:  04/20/2025    FINDINGS:   Lungs are hyperinflated similar prior exam with no new opacity or pleural effusion    IMPRESSION:  Stable chest exam    Finalized on: 4/28/2025 6:31 PM By:  Yanira Mota MD  [facility]# [id number]      2025-04-28  18:33:[id number]     [facility]                                This patient is choosing to leave against medical advice.  Dr. Johnston has personally explained to her that choosing to do so may result in permanent bodily harm or death.  The EP discussed at great length that without further evaluation and monitoring there may be unforeseen circumstances and/or deterioration causing permanent bodily harm or death as a result of her choice.  She verbalized these risks back to the physician in laymans terms.  She is alert, oriented, and shows the mental capacity to make clear decisions regarding her health care at this time. She continues to wish to leave against medical advice.     In light of her decision to leave AMA,  she is aware of the importance of following up as instructed.  She has been advised that she should return to the ED immediately if she changes her mind at any time, or if her condition begins to change or worsen in any way.       Medications   albuterol-ipratropium 2.5 mg-0.5 mg/3 mL nebulizer solution 3 mL (3 mLs Nebulization Given 4/28/25 4767)   0.9% NaCl infusion (1,000 mLs Intravenous New Bag 4/28/25 1919)     Medical Decision Making  DDx: asthma exacerbation, sob, hypoxia    Amount and/or Complexity of Data Reviewed  Labs: ordered.  Radiology: ordered.    Risk  Prescription drug management.    Pt requested transfer to [facility] but at capacity. Pt refusing further care and is leaving am                                  Clinical Impression:  Final diagnoses:  [Z13.6] Screening for cardiovascular condition  [R06.00] Dyspnea, unspecified type (Primary)          ED Disposition Condition    AMA                     [1]   Social History  Tobacco Use    Smoking status: Never    Smokeless tobacco: Never   Substance Use Topics    Alcohol use: Not Currently    Drug use: Never        Ang Johnston MD  04/28/25 1931

## 2025-04-28 NOTE — ASSESSMENT & PLAN NOTE
Reviewed hospital notes and labs. Worsening dyspnea with SpO2 84% on room air. On prednisone taper currently on 20 mg daily. Message sent to pulmonology. Labs today. IM steroid today in clinic. Low-threshold for return to ER. Strict ER precautions reviewed.

## 2025-04-28 NOTE — ASSESSMENT & PLAN NOTE
Nephrology previously advised to hold Imuran until follow up in May after recent hospitalizations. Continue Bactrim.

## 2025-04-29 LAB
ASPERGILLUS AB TITR SER IA: NOT DETECTED {TITER}
B DERMAT AB SER QL ID: NOT DETECTED
COCCIDIOIDES AB SER QL ID: NOT DETECTED
H CAPSUL AB SER QL ID: NOT DETECTED
HCV AB SERPL QL IA: NEGATIVE
HIV 1+2 AB+HIV1 P24 AG SERPL QL IA: NEGATIVE
OHS QRS DURATION: 64 MS
OHS QTC CALCULATION: 429 MS

## 2025-04-30 ENCOUNTER — PATIENT OUTREACH (OUTPATIENT)
Dept: ADMINISTRATIVE | Facility: HOSPITAL | Age: 50
End: 2025-04-30
Payer: COMMERCIAL

## 2025-04-30 ENCOUNTER — TELEPHONE (OUTPATIENT)
Dept: INTERNAL MEDICINE | Facility: CLINIC | Age: 50
End: 2025-04-30
Payer: COMMERCIAL

## 2025-04-30 DIAGNOSIS — N18.31 STAGE 3A CHRONIC KIDNEY DISEASE: Primary | Chronic | ICD-10-CM

## 2025-04-30 NOTE — TELEPHONE ENCOUNTER
Patient's mother called and states [facility] is doing the same as [facility]. Patient's mother wants to know if she can see Infectious Disease? Maybe go to [place] for a pulmonologist. The mother would like a call back to discuss.

## 2025-04-30 NOTE — TELEPHONE ENCOUNTER
Called patient's mother back and discuss current condition. She will follow up in clinic after discharged from hospital.

## 2025-04-30 NOTE — TELEPHONE ENCOUNTER
----- Message from Iniki sent at 4/30/2025  9:28 AM CDT -----  .Type: Patient Call BackWho called:Patient Rosemary is the request in detail:  calling concerning needing to speak with someone  Can the clinic reply by [facility]?   Would the patient rather a call back or a response via [facility] call back number:

## 2025-04-30 NOTE — PROGRESS NOTES
Working CKD Lab Report.  Pt has lab appt scheduled, 5/07/25.  Micro Albumin urine ordered and linked to appt,

## 2025-05-03 LAB
BACTERIA BLD CULT: NORMAL
BACTERIA BLD CULT: NORMAL

## 2025-05-05 ENCOUNTER — OFFICE VISIT (OUTPATIENT)
Dept: INTERNAL MEDICINE | Facility: CLINIC | Age: 50
End: 2025-05-05
Payer: COMMERCIAL

## 2025-05-05 ENCOUNTER — LAB VISIT (OUTPATIENT)
Dept: LAB | Facility: HOSPITAL | Age: 50
End: 2025-05-05
Attending: NURSE PRACTITIONER
Payer: COMMERCIAL

## 2025-05-05 VITALS
BODY MASS INDEX: 17.05 KG/M2 | RESPIRATION RATE: 18 BRPM | WEIGHT: 99.88 LBS | TEMPERATURE: 98 F | OXYGEN SATURATION: 93 % | HEIGHT: 64 IN | SYSTOLIC BLOOD PRESSURE: 122 MMHG | HEART RATE: 95 BPM | DIASTOLIC BLOOD PRESSURE: 80 MMHG

## 2025-05-05 DIAGNOSIS — D50.0 IRON DEFICIENCY ANEMIA DUE TO CHRONIC BLOOD LOSS: ICD-10-CM

## 2025-05-05 DIAGNOSIS — Z09 HOSPITAL DISCHARGE FOLLOW-UP: Primary | ICD-10-CM

## 2025-05-05 DIAGNOSIS — N18.31 STAGE 3A CHRONIC KIDNEY DISEASE: Chronic | ICD-10-CM

## 2025-05-05 DIAGNOSIS — I10 PRIMARY HYPERTENSION: Chronic | ICD-10-CM

## 2025-05-05 DIAGNOSIS — I77.82 ANCA-ASSOCIATED VASCULITIS: Chronic | ICD-10-CM

## 2025-05-05 DIAGNOSIS — J84.9 INTERSTITIAL PULMONARY DISEASE, UNSPECIFIED: Chronic | ICD-10-CM

## 2025-05-05 DIAGNOSIS — D75.839 THROMBOCYTOSIS: ICD-10-CM

## 2025-05-05 DIAGNOSIS — J10.1 INFLUENZA A: ICD-10-CM

## 2025-05-05 DIAGNOSIS — J45.41 MODERATE PERSISTENT ASTHMA WITH ACUTE EXACERBATION: ICD-10-CM

## 2025-05-05 DIAGNOSIS — D84.821 IMMUNODEFICIENCY DUE TO DRUG THERAPY: ICD-10-CM

## 2025-05-05 DIAGNOSIS — Z79.899 IMMUNODEFICIENCY DUE TO DRUG THERAPY: ICD-10-CM

## 2025-05-05 LAB
ABSOLUTE EOSINOPHIL (OHS): 0.11 K/UL
ABSOLUTE MONOCYTE (OHS): 1.19 K/UL (ref 0.3–1)
ABSOLUTE NEUTROPHIL COUNT (OHS): 11.8 K/UL (ref 1.8–7.7)
ALBUMIN SERPL BCP-MCNC: 3.9 G/DL (ref 3.5–5.2)
ALP SERPL-CCNC: 58 UNIT/L (ref 40–150)
ALT SERPL W/O P-5'-P-CCNC: 18 UNIT/L (ref 10–44)
ANION GAP (OHS): 11 MMOL/L (ref 8–16)
AST SERPL-CCNC: 14 UNIT/L (ref 11–45)
BASOPHILS # BLD AUTO: 0.02 K/UL
BASOPHILS NFR BLD AUTO: 0.1 %
BILIRUB SERPL-MCNC: 0.3 MG/DL (ref 0.1–1)
BUN SERPL-MCNC: 32 MG/DL (ref 6–20)
CALCIUM SERPL-MCNC: 9.3 MG/DL (ref 8.7–10.5)
CHLORIDE SERPL-SCNC: 104 MMOL/L (ref 95–110)
CO2 SERPL-SCNC: 25 MMOL/L (ref 23–29)
CREAT SERPL-MCNC: 1.4 MG/DL (ref 0.5–1.4)
ERYTHROCYTE [DISTWIDTH] IN BLOOD BY AUTOMATED COUNT: 13.3 % (ref 11.5–14.5)
FERRITIN SERPL-MCNC: 1648 NG/ML (ref 20–300)
GFR SERPLBLD CREATININE-BSD FMLA CKD-EPI: 46 ML/MIN/1.73/M2
GLUCOSE SERPL-MCNC: 81 MG/DL (ref 70–110)
HCT VFR BLD AUTO: 43.3 % (ref 37–48.5)
HGB BLD-MCNC: 14 GM/DL (ref 12–16)
IMM GRANULOCYTES # BLD AUTO: 0.19 K/UL (ref 0–0.04)
IMM GRANULOCYTES NFR BLD AUTO: 1.2 % (ref 0–0.5)
IRON SATN MFR SERPL: 36 % (ref 20–50)
IRON SERPL-MCNC: 103 UG/DL (ref 30–160)
LYMPHOCYTES # BLD AUTO: 2.23 K/UL (ref 1–4.8)
MCH RBC QN AUTO: 31 PG (ref 27–31)
MCHC RBC AUTO-ENTMCNC: 32.3 G/DL (ref 32–36)
MCV RBC AUTO: 96 FL (ref 82–98)
NUCLEATED RBC (/100WBC) (OHS): 0 /100 WBC
PLATELET # BLD AUTO: 390 K/UL (ref 150–450)
PMV BLD AUTO: 9.8 FL (ref 9.2–12.9)
POTASSIUM SERPL-SCNC: 4 MMOL/L (ref 3.5–5.1)
PROT SERPL-MCNC: 6.5 GM/DL (ref 6–8.4)
RBC # BLD AUTO: 4.51 M/UL (ref 4–5.4)
RELATIVE EOSINOPHIL (OHS): 0.7 %
RELATIVE LYMPHOCYTE (OHS): 14.4 % (ref 18–48)
RELATIVE MONOCYTE (OHS): 7.7 % (ref 4–15)
RELATIVE NEUTROPHIL (OHS): 75.9 % (ref 38–73)
SODIUM SERPL-SCNC: 140 MMOL/L (ref 136–145)
TIBC SERPL-MCNC: 287 UG/DL (ref 250–450)
TRANSFERRIN SERPL-MCNC: 194 MG/DL (ref 200–375)
WBC # BLD AUTO: 15.54 K/UL (ref 3.9–12.7)

## 2025-05-05 PROCEDURE — 85025 COMPLETE CBC W/AUTO DIFF WBC: CPT

## 2025-05-05 PROCEDURE — 36415 COLL VENOUS BLD VENIPUNCTURE: CPT | Mod: PO

## 2025-05-05 PROCEDURE — 3074F SYST BP LT 130 MM HG: CPT | Mod: CPTII,S$GLB,, | Performed by: NURSE PRACTITIONER

## 2025-05-05 PROCEDURE — 4010F ACE/ARB THERAPY RXD/TAKEN: CPT | Mod: CPTII,S$GLB,, | Performed by: NURSE PRACTITIONER

## 2025-05-05 PROCEDURE — 99999 PR PBB SHADOW E&M-EST. PATIENT-LVL V: CPT | Mod: PBBFAC,,, | Performed by: NURSE PRACTITIONER

## 2025-05-05 PROCEDURE — 84466 ASSAY OF TRANSFERRIN: CPT

## 2025-05-05 PROCEDURE — 1111F DSCHRG MED/CURRENT MED MERGE: CPT | Mod: CPTII,S$GLB,, | Performed by: NURSE PRACTITIONER

## 2025-05-05 PROCEDURE — 82728 ASSAY OF FERRITIN: CPT

## 2025-05-05 PROCEDURE — 1159F MED LIST DOCD IN RCRD: CPT | Mod: CPTII,S$GLB,, | Performed by: NURSE PRACTITIONER

## 2025-05-05 PROCEDURE — 3079F DIAST BP 80-89 MM HG: CPT | Mod: CPTII,S$GLB,, | Performed by: NURSE PRACTITIONER

## 2025-05-05 PROCEDURE — 80053 COMPREHEN METABOLIC PANEL: CPT

## 2025-05-05 PROCEDURE — 99215 OFFICE O/P EST HI 40 MIN: CPT | Mod: S$GLB,,, | Performed by: NURSE PRACTITIONER

## 2025-05-05 PROCEDURE — 1160F RVW MEDS BY RX/DR IN RCRD: CPT | Mod: CPTII,S$GLB,, | Performed by: NURSE PRACTITIONER

## 2025-05-05 PROCEDURE — 3066F NEPHROPATHY DOC TX: CPT | Mod: CPTII,S$GLB,, | Performed by: NURSE PRACTITIONER

## 2025-05-05 NOTE — PROGRESS NOTES
Subjective:       Patient ID: Shayy Faust is a 49 y.o. female.    Chief Complaint: Transitional Care (Hospital discharge)    History of Present Illness    HPI:  Ms. Faust returns to clinic with her mother for hospital follow up. She has recently had multiple hospital stays due to respiratory issues. Initially hospitalized on 4/4/2025 after testing positive for Influenza A and COVID, discharged 4/8/2025. She was hospitalized again on 4/20/2025 for acute respiratory failure, discharged on 4/23/2025. She followed up in clinic on 4/28 for a hospital follow up with worsening shortness of breath and SpO2 in 84-88%. Labs completed at that visit revealed elevated WBC and lactic acid. She was advised to proceed to ER.    She presented to [facility] where she tested positive for influenza A again. She received oxygen therapy and steroids during this stay and was discharged 3 days ago. She reports feeling improved compared to before the hospitalization, with her oxygen level currently at 93.    She is currently on a steroid taper following the recent hospital discharge. She is concerned respiratory issues will return as she nears the end of steroid taper. She has upcoming appointments with hematology on 5/12 and nephrology on the 5/28.She needs hospital follow up with pulmonology. Patient's mother requesting referral to outside pulmonology.         Transitional Care Note    Family and/or Caretaker present at visit?  Yes.  Diagnostic tests reviewed/disposition: Unable to review diagnostic test. ARIE for records sign.  Disease/illness education: Yes  Home health/community services discussion/referrals: Patient does not have home health established from hospital visit.  They do not need home health.  If needed, we will set up home health for the patient.   Establishment or re-establishment of referral orders for community resources: No other necessary community resources.   Discussion with other health care providers: Needs  "follow up with pulmonology .         HPI    Problem List[1]    Family History   Problem Relation Name Age of Onset    Hypertension Mother      Hypertension Father       Past Surgical History:   Procedure Laterality Date    BILATERAL TUBAL LIGATION N/A     COLONOSCOPY N/A 8/20/2024    Procedure: COLONOSCOPY;  Surgeon: Gena Saxena MD;  Location: [facility];  Service: Endoscopy;  Laterality: N/A;    ESOPHAGOGASTRODUODENOSCOPY N/A 8/20/2024    Procedure: EGD (ESOPHAGOGASTRODUODENOSCOPY);  Surgeon: Gena Saxena MD;  Location: [facility];  Service: Endoscopy;  Laterality: N/A;    INTRALUMINAL GASTROINTESTINAL TRACT IMAGING VIA CAPSULE N/A 9/19/2024    Procedure: IMAGING PROCEDURE, GI TRACT, INTRALUMINAL, VIA CAPSULE;  Surgeon: Ignacio Jimenez RN;  Location: [facility];  Service: Endoscopy;  Laterality: N/A;  Neg EGD/Colon. Hx MARIA ESTHER/weight loss/ARF       Current Medications[2]    Review of Systems   Constitutional:  Positive for activity change and fatigue. Negative for appetite change, chills, diaphoresis and fever.   Respiratory:  Positive for cough and shortness of breath (improved).    Cardiovascular:  Negative for chest pain.   Gastrointestinal:  Negative for abdominal pain, diarrhea, nausea and vomiting.       Objective:   /80 (BP Location: Left arm, Patient Position: Sitting)   Pulse 95   Temp 98.2 °F (36.8 °C) (Oral)   Resp 18   Ht 5' 4" (1.626 m)   Wt 45.3 kg (99 lb 13.9 oz)   SpO2 (!) 93%   BMI 17.14 kg/m²      Physical Exam  Vitals reviewed.   Constitutional:       General: She is not in acute distress.     Appearance: Normal appearance. She is not toxic-appearing or diaphoretic.      Comments: thin   HENT:      Head: Normocephalic.   Eyes:      Conjunctiva/sclera: Conjunctivae normal.   Cardiovascular:      Rate and Rhythm: Normal rate and regular rhythm.      Heart sounds: Normal heart sounds. No murmur heard.  Pulmonary:      Effort: Pulmonary effort is normal. No respiratory distress.    "   Breath sounds: Rhonchi present.      Comments: +frequent cough  Neurological:      Mental Status: She is alert and oriented to person, place, and time.      Coordination: Coordination normal.      Gait: Gait normal.   Psychiatric:         Mood and Affect: Mood normal.         Behavior: Behavior normal.         Assessment & Plan     Assessment & Plan    ## ASTHMA EXACERBATION:  - Ms. Faust has been experiencing wheezing and coughing.  - Will continue steroid taper as prescribed at hospital discharge.  - Referred patient to Dr. Juarez (pulmonologist).      ## INFLUENZA A:  - Ms. Faust tested positive for influenza A during the last hospital stay and was recently discharged after treatment with oxygen and steroids.  - This influenza infection triggered the current asthma exacerbation.      ## OXYGEN DEPENDENCE:  - Current oxygen level measured at 93 - 95%, showing improvement from previous visit.  - Ms. Faust did not require supplemental oxygen at home after hospital discharge.  - Instructed to contact the office if experiencing shortness of breath, difficulty breathing, or dropping oxygen levels.    ## GENERAL FOLLOW-UP AND MANAGEMENT:  - Ms. Faust's condition has improved after hospital discharge.  - Recommend time off work until next Monday  - Labs rescheduled for today.           1. Hospital discharge follow-up  Comments:  ARIE signed for [facility]. Records are not available for review.  Orders:  -     Ambulatory referral/consult to Pulmonology; Future; Expected date: 05/12/2025    2. Influenza A  Assessment & Plan:  Tested positive for Influenza A again on 4/28. ARIE from [facility]. Continue supportive care.      3. Moderate persistent asthma with acute exacerbation  Assessment & Plan:  Continue prednisone taper. Continue inhalers. Need pulmonology follow up    Orders:  -     Ambulatory referral/consult to Pulmonology; Future; Expected date: 05/12/2025    4. Interstitial pulmonary disease,  "unspecified  Assessment & Plan:  Continue steroid taper. Referral to pulmonology.    Orders:  -     Ambulatory referral/consult to Pulmonology; Future; Expected date: 05/12/2025    5. ANCA-associated vasculitis  Overview:  08/20/2024 Kidney biopsy: PAUCI-IMMUNE NECROTIZING CRESCENTIC GLOMERULONEPHRITIS     Assessment & Plan:  Followed by nephrology. Appt scheduled end of May. Nephrology previously advised to hold Imuran due to hospitalizations and side effects. Continue Bactrim      6. Stage 3a chronic kidney disease  Assessment & Plan:  Repeat labs today. Stable. Followed by nephrology      7. Primary hypertension  Assessment & Plan:  Stable. BP controlled. Continue lisinopril      8. Immunodeficiency due to drug therapy  Assessment & Plan:  Imuran on hold. Continues prednisone. Followed by nephrology      9. Iron deficiency anemia due to chronic blood loss  Overview:  8/20/24: EGD/Colon: minor gastritis, otherwise negative/ Poor prep in right colon and fair prep in left colon. No masses seen. No polyps appreciated.     Assessment & Plan:  Followed by hematology with upcoming appt. Received IV Venofer & Ferrlecit.      10. Thrombocytosis  Assessment & Plan:  Lab Results   Component Value Date     05/05/2025     Stable. Continue to monitor              MICHELE Kline    This note was generated with the assistance of ambient listening technology. Verbal consent was obtained by the patient and accompanying visitor(s) for the recording of patient appointment to facilitate this note. I attest to having reviewed and edited the generated note for accuracy, though some syntax or spelling errors may persist. Please contact the author of this note for any clarification.       Portions of this note may have been created with voice recognition software. Occasional "wrong-word" or "sound-a-like" substitutions may have occurred due to the inherent limitations of voice recognition software. Please, read the note " carefully and recognize, using context, where substitutions have occurred.             [1]   Patient Active Problem List  Diagnosis    Moderate persistent asthma with acute exacerbation    Rheumatoid factor positive    Mild persistent asthma with acute exacerbation    Iron deficiency anemia    PNA (pneumonia)    Thrombocytosis    Unintentional weight loss    Fatigue    Moderate protein-calorie malnutrition    ANCA-associated vasculitis    Immunodeficiency due to drug therapy    Primary hypertension    Interstitial pulmonary disease, unspecified    Sepsis    Influenza A    COVID-19    Anemia    CKD (chronic kidney disease) stage 3, GFR 30-59 ml/min   [2]   Current Outpatient Medications:     albuterol (PROVENTIL) 2.5 mg /3 mL (0.083 %) nebulizer solution, Take 3 mLs (2.5 mg total) by nebulization every 6 (six) hours as needed. Rescue, Disp: 180 mL, Rfl: 3    albuterol (PROVENTIL/VENTOLIN HFA) 90 mcg/actuation inhaler, Inhale 2 puffs into the lungs every 4 (four) hours as needed for Wheezing or Shortness of Breath., Disp: 18 g, Rfl: 11    fluticasone-salmeterol diskus inhaler 250-50 mcg, Inhale 1 puff into the lungs 2 (two) times daily. Controller, Disp: 60 each, Rfl: 11    folic acid (FOLVITE) 1 MG tablet, Take 1 tablet (1 mg total) by mouth once daily. Take while on bactrim(sulfa), Disp: 30 tablet, Rfl: 11    lisinopriL 10 MG tablet, Take 1 tablet (10 mg total) by mouth once daily., Disp: , Rfl:     predniSONE (DELTASONE) 20 MG tablet, Take 3 tablets (60 mg total) by mouth once daily for 3 days, THEN 2 tablets (40 mg total) once daily for 3 days, THEN 1 tablet (20 mg total) once daily for 2 days, THEN 0.5 tablets (10 mg total) once daily for 2 days., Disp: 18 tablet, Rfl: 0    predniSONE (DELTASONE) 5 MG tablet, Take 1 tablet (5 mg total) by mouth once daily. Start after high dose prednisone, Disp: 30 tablet, Rfl: 5    sulfamethoxazole-trimethoprim 800-160mg (BACTRIM DS) 800-160 mg Tab, Take 1 tablet by mouth 3  (three) times a week., Disp: 12 tablet, Rfl: 11    vitamin D (VITAMIN D3) 1000 units Tab, Take 1,000 Units by mouth once daily., Disp: , Rfl:

## 2025-05-06 NOTE — ASSESSMENT & PLAN NOTE
Tested positive for Influenza A again on 4/28. ARIE from [facility]. Continue supportive care.

## 2025-05-06 NOTE — ASSESSMENT & PLAN NOTE
Followed by nephrology. Appt scheduled end of May. Nephrology previously advised to hold Imuran due to hospitalizations and side effects. Continue Bactrim

## 2025-05-07 ENCOUNTER — TELEPHONE (OUTPATIENT)
Dept: INTERNAL MEDICINE | Facility: CLINIC | Age: 50
End: 2025-05-07
Payer: COMMERCIAL

## 2025-05-07 ENCOUNTER — PATIENT MESSAGE (OUTPATIENT)
Dept: INTERNAL MEDICINE | Facility: CLINIC | Age: 50
End: 2025-05-07
Payer: COMMERCIAL

## 2025-05-07 DIAGNOSIS — I77.82 ANCA-ASSOCIATED VASCULITIS: Primary | ICD-10-CM

## 2025-05-07 DIAGNOSIS — D84.821 IMMUNODEFICIENCY DUE TO DRUG THERAPY: ICD-10-CM

## 2025-05-07 DIAGNOSIS — R76.8 RHEUMATOID FACTOR POSITIVE: ICD-10-CM

## 2025-05-07 DIAGNOSIS — Z79.899 IMMUNODEFICIENCY DUE TO DRUG THERAPY: ICD-10-CM

## 2025-05-07 NOTE — TELEPHONE ENCOUNTER
----- Message from Nurse Mistry sent at 5/5/2025  1:58 PM CDT -----    ----- Message -----  From: Comfort Rollins  Sent: 5/5/2025  11:03 AM CDT  To: Colleen Blackmon Staff    .Type: Patient Call BackWho called:Patient Connerat is the request in detail: calling concerning needing to speak to someone regarding information for provider. Patient mom also stated she would like to ask a question  Can the clinic reply by [facility]?   Would the patient rather a call back or a response via [facility] call back number:  .[phone]

## 2025-05-07 NOTE — TELEPHONE ENCOUNTER
Called and spoke with patient's mother, Deisy. The soonest appt with Dr. Juarez's office was May 28th. She is going to call today to be placed on a waiting list and to discuss with his nurse for a sooner appointment.

## 2025-05-07 NOTE — TELEPHONE ENCOUNTER
----- Message from Summer sent at 5/7/2025  8:06 AM CDT -----  Contact: Jose Alfredo  Type:  Patient Call Back Request Who Called: Jose Alfredo Message for Patient: Nurse What this is regarding?:Information that the provider requested Would the patient rather a call back or a response via [facility]? Call back Best Call Back Number:[phone] Information:

## 2025-05-08 ENCOUNTER — OFFICE VISIT (OUTPATIENT)
Dept: PULMONOLOGY | Facility: CLINIC | Age: 50
End: 2025-05-08
Payer: COMMERCIAL

## 2025-05-08 ENCOUNTER — PATIENT MESSAGE (OUTPATIENT)
Dept: HEMATOLOGY/ONCOLOGY | Facility: CLINIC | Age: 50
End: 2025-05-08
Payer: COMMERCIAL

## 2025-05-08 ENCOUNTER — TELEPHONE (OUTPATIENT)
Dept: HEMATOLOGY/ONCOLOGY | Facility: CLINIC | Age: 50
End: 2025-05-08
Payer: COMMERCIAL

## 2025-05-08 ENCOUNTER — TELEPHONE (OUTPATIENT)
Dept: INTERNAL MEDICINE | Facility: CLINIC | Age: 50
End: 2025-05-08
Payer: COMMERCIAL

## 2025-05-08 VITALS
SYSTOLIC BLOOD PRESSURE: 128 MMHG | OXYGEN SATURATION: 93 % | HEART RATE: 115 BPM | HEIGHT: 64 IN | WEIGHT: 100.31 LBS | BODY MASS INDEX: 17.13 KG/M2 | DIASTOLIC BLOOD PRESSURE: 90 MMHG | RESPIRATION RATE: 18 BRPM

## 2025-05-08 DIAGNOSIS — I77.82 ANCA-ASSOCIATED VASCULITIS: Chronic | ICD-10-CM

## 2025-05-08 DIAGNOSIS — E44.0 MODERATE PROTEIN-CALORIE MALNUTRITION: ICD-10-CM

## 2025-05-08 DIAGNOSIS — D84.821 IMMUNODEFICIENCY DUE TO DRUG THERAPY: ICD-10-CM

## 2025-05-08 DIAGNOSIS — Z79.899 IMMUNODEFICIENCY DUE TO DRUG THERAPY: ICD-10-CM

## 2025-05-08 DIAGNOSIS — J44.9 STAGE 3 SEVERE COPD BY GOLD CLASSIFICATION: Primary | ICD-10-CM

## 2025-05-08 PROCEDURE — 1160F RVW MEDS BY RX/DR IN RCRD: CPT | Mod: CPTII,S$GLB,, | Performed by: INTERNAL MEDICINE

## 2025-05-08 PROCEDURE — 1159F MED LIST DOCD IN RCRD: CPT | Mod: CPTII,S$GLB,, | Performed by: INTERNAL MEDICINE

## 2025-05-08 PROCEDURE — 99999 PR PBB SHADOW E&M-EST. PATIENT-LVL IV: CPT | Mod: PBBFAC,,, | Performed by: INTERNAL MEDICINE

## 2025-05-08 PROCEDURE — 3074F SYST BP LT 130 MM HG: CPT | Mod: CPTII,S$GLB,, | Performed by: INTERNAL MEDICINE

## 2025-05-08 PROCEDURE — 1111F DSCHRG MED/CURRENT MED MERGE: CPT | Mod: CPTII,S$GLB,, | Performed by: INTERNAL MEDICINE

## 2025-05-08 PROCEDURE — 99214 OFFICE O/P EST MOD 30 MIN: CPT | Mod: S$GLB,,, | Performed by: INTERNAL MEDICINE

## 2025-05-08 PROCEDURE — 3080F DIAST BP >= 90 MM HG: CPT | Mod: CPTII,S$GLB,, | Performed by: INTERNAL MEDICINE

## 2025-05-08 PROCEDURE — 3008F BODY MASS INDEX DOCD: CPT | Mod: CPTII,S$GLB,, | Performed by: INTERNAL MEDICINE

## 2025-05-08 PROCEDURE — 4010F ACE/ARB THERAPY RXD/TAKEN: CPT | Mod: CPTII,S$GLB,, | Performed by: INTERNAL MEDICINE

## 2025-05-08 PROCEDURE — 3066F NEPHROPATHY DOC TX: CPT | Mod: CPTII,S$GLB,, | Performed by: INTERNAL MEDICINE

## 2025-05-08 RX ORDER — FLUTICASONE FUROATE, UMECLIDINIUM BROMIDE AND VILANTEROL TRIFENATATE 200; 62.5; 25 UG/1; UG/1; UG/1
1 POWDER RESPIRATORY (INHALATION) DAILY
Qty: 60 EACH | Refills: 6 | Status: SHIPPED | OUTPATIENT
Start: 2025-05-08

## 2025-05-08 NOTE — PROGRESS NOTES
Subjective:      Patient ID: Shayy Faust is a 49 y.o. female.    Chief Complaint: Asthma      Asthma  Her past medical history is significant for asthma.     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 Inflammatory markers at that time were very elevated. Was seen by Dr. Jiménez and had additional evaluation with alph-1 level (elevated) and normal phenotype. Soluble il-2 level was quite elevated (nonspecific). She has been followed by rheumatology and treated with cytoxan and then rituximab. PFTs at that time showed severe obstruction with FEV1 35%. Imaging shows scattered small nodules with some reticular peripheral densities and hyperinflation but no overt emphysema. She has been on advair bid. Recently she was admitted with acute hypoxemic respiratory failure. 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 She is currently on prednisone for this. Creatinine was 1.5 (high in the past of 3.3). She is here today for hospital f/u. Interestingly on review of prior records she was seen by Dr. Muñiz at [facility] in 2016 and had PFTs at that time with FEV1 of 50%. She is here today with her mother. She has conversational dyspnea. She gets breathless with minimal activity.    Review of Systems as per HPI otherwise negative    Objective:     Physical Exam   Constitutional: She is oriented to person, place, and time. She appears well-developed. She appears cachectic.   HENT:   Head: Normocephalic.   Cardiovascular: Normal rate and regular rhythm.   Pulmonary/Chest: She has decreased breath sounds. She has wheezes.   Musculoskeletal:      Cervical back: Neck supple.   Neurological: She is alert and oriented to person, place, and time.   Psychiatric: She has a normal mood and affect.   Nursing note and vitals reviewed.          5/8/2025     1:03 PM 5/5/2025     9:19 AM 5/5/2025     8:49 AM 4/28/2025     7:47 PM 4/28/2025     6:25 PM 4/28/2025     5:37 PM  "4/28/2025     5:25 PM   Pulmonary Function Tests   SpO2 93 % 93 % 95 % 95 % 95 % 94 % 94 %   Height 5' 4" (1.626 m)  5' 4" (1.626 m)       Weight 45.5 kg (100 lb 5 oz)  45.3 kg (99 lb 13.9 oz)    42.8 kg (94 lb 4 oz)   BMI (Calculated) 17.2  17.1    16.2        Assessment:     1. Stage 3 severe COPD by GOLD classification    2. ANCA-associated vasculitis    3. Immunodeficiency due to drug therapy    4. Moderate protein-calorie malnutrition      FINDINGS:  Marked emphysematous changes throughout the lungs again seen.  Similar degree of mild peripheral reticular interstitial changes, especially in the posterior right upper lobe and lingula.  Stable 4 mm right lower lobe nodule on image 366 of series 4.  Stable 5 mm anterior left lower lobe nodule on image 368 of series 4, with adjacent nodular scarring.  Stable miliary pattern of nodules within the bilateral lower lobes.  The lungs are free of new pulmonary opacities.  Negative for effusion or pneumothorax.     Prominent superior pericardial recess again seen.  There are no hilar or mediastinal masses or abnormal lymph nodes by size criteria.     Aortic calcifications without aneurysmal change again seen.  Negative for coronary artery calcifications.     The airways are patent.  The thyroid gland is normal.  The esophagus is normal.     The upper abdominal organs are unchanged in appearance.     The osseous structures are unchanged in appearance.  Multilevel marginal spondylosis.     Impression:     1.  Stable appearance to the chest and surrounding soft tissues without new pulmonary opacities or acute process.          Plan:       It is unclear to me why she has such severe obstructive lung disease  Severe obstruction is not typical for this disease process  The hyperinflation on CT does not demonstrate paraseptal emphysema or panlobular pattern  Involvement of smaller airways with air trapping is plausible but would be very atypical  In any event her obstruction has " progressed significantly since 2016 but has clearly been present since that time  Will step up treatment to Trelegy 200  Continue steroids as per renal and rheum  Close f/u with rheumatology  Discussed severity of her disease with patient and mother  Recommended consideration of quaternary referral center such as [facility] in [place] or [facility]  Will have her return in 4 weeks for PFTs and 6MWT, sooner if needed  Lung transplant referral is certainly a consideration  I personally reviewed the above with the patient and/or family including test and radiology results. They voiced understanding and agreement with the above. Questions were answered to their apparent satisfaction.

## 2025-05-08 NOTE — TELEPHONE ENCOUNTER
----- Message from Nurse Lozoya sent at 5/8/2025 11:54 AM CDT -----  Contact: Shayy    ----- Message -----  From: Poonam Collado, Student RN  Sent: 5/8/2025   8:11 AM CDT  To: [facility] Hematology Oncology Clinical Support    .Type: Patient  Requesting Call BackWho Called: Arley is this regarding?: Sooner AppointmentWould the patient rather a call back or a response via [facility]?  Call [facility] Call Back Number: [phone] Information:  Patient call in to possibly get sooner appointment. She is scheduled 5/12/2025

## 2025-05-08 NOTE — TELEPHONE ENCOUNTER
----- Message from Dasia sent at 5/8/2025  9:19 AM CDT -----  .Type:  Patient Returning CallWho Called:patient mother  Who Left Message for Patient:Colleen Blackmon, HOP-CDoes the patient know what this is regarding?:medical information Would the patient rather a call back or a response via [facility]? Call Best Call Back Number: Additional Information:

## 2025-05-09 ENCOUNTER — OFFICE VISIT (OUTPATIENT)
Dept: HEMATOLOGY/ONCOLOGY | Facility: CLINIC | Age: 50
End: 2025-05-09
Payer: COMMERCIAL

## 2025-05-09 ENCOUNTER — TELEPHONE (OUTPATIENT)
Dept: INTERNAL MEDICINE | Facility: CLINIC | Age: 50
End: 2025-05-09
Payer: COMMERCIAL

## 2025-05-09 ENCOUNTER — PATIENT MESSAGE (OUTPATIENT)
Dept: PULMONOLOGY | Facility: CLINIC | Age: 50
End: 2025-05-09
Payer: COMMERCIAL

## 2025-05-09 VITALS
WEIGHT: 100.31 LBS | HEART RATE: 95 BPM | OXYGEN SATURATION: 96 % | TEMPERATURE: 98 F | SYSTOLIC BLOOD PRESSURE: 129 MMHG | BODY MASS INDEX: 17.13 KG/M2 | HEIGHT: 64 IN | DIASTOLIC BLOOD PRESSURE: 92 MMHG

## 2025-05-09 DIAGNOSIS — D50.0 IRON DEFICIENCY ANEMIA DUE TO CHRONIC BLOOD LOSS: Primary | ICD-10-CM

## 2025-05-09 PROCEDURE — 99999 PR PBB SHADOW E&M-EST. PATIENT-LVL III: CPT | Mod: PBBFAC,,, | Performed by: NURSE PRACTITIONER

## 2025-05-09 NOTE — PROGRESS NOTES
Subjective:       Patient ID: Shayy Faust is a 49 y.o. female.    Chief Complaint:   1. Iron deficiency anemia due to chronic blood loss          Current Treatment:       Treatment History:  Venofer 200mg IV on 8/17/2024 & 8/24/2024 while inpatient        FERRLECIT (FERRIC GLUCONATE) QW on 8/28/2024, 9/4/2024, & 9/12/2024    HPI: This is a 49 year old  woman with ANCA-associated vasculitis who is seen in Hem/Onc for iron deficiency anemia. She was seen initially in 8/2024 while inpatient for JAIME. She complained of increasing fatigue and weakness and was found to be profoundly iron deficient. She was treated with 2 doses of IV Venofer. EGD noted gastritis; colonoscopy was normal. Biopsies were negative. Capsule endoscopy was done in 9/2024 and revealed erythematous gastropathy with a single red spot in the small bowel; no bleeding.     Her primary Hematologist/Oncologist is Dr. Mercado.    Interval History: Patient presents for follow up on labs. She presents with family and has no new complaints today. She is on steroids for ANCA vasculitis. WBC, ANC elevated. No anemia or iron deficiency. CMP with improvement in eGFR. Will continue to monitor.       Reviewed labs with patient:   CBC:   Recent Labs   Lab 05/05/25  0949   WBC 15.54 H   RBC 4.51   HGB 14.0   HCT 43.3   Platelet Count 390   MCV 96   MCH 31.0   MCHC 32.3     CMP:  Recent Labs   Lab 05/05/25  0949   Glucose 81   Calcium 9.3   Albumin 3.9   Protein Total 6.5   Sodium 140   Potassium 4.0   CO2 25   Chloride 104   BUN 32 H   Creatinine 1.4   ALP 58   ALT 18   AST 14   Bilirubin Total 0.3     Lab Results   Component Value Date    IRON 103 05/05/2025    TRANSFERRIN 194 (L) 05/05/2025    TIBC 287 05/05/2025    LABIRON 36 05/05/2025    FESATURATED 7 (L) 12/27/2024      Lab Results   Component Value Date    FERRITIN 1,648.0 (H) 05/05/2025     Social History     Socioeconomic History    Marital status: Single   Tobacco Use    Smoking status: Never     Smokeless tobacco: Never   Substance and Sexual Activity    Alcohol use: Not Currently    Drug use: Never    Sexual activity: Yes     Social Drivers of Health     Financial Resource Strain: Low Risk  (4/21/2025)    Overall Financial Resource Strain (CARDIA)     Difficulty of Paying Living Expenses: Not hard at all   Food Insecurity: No Food Insecurity (4/21/2025)    Hunger Vital Sign     Worried About Running Out of Food in the Last Year: Never true     Ran Out of Food in the Last Year: Never true   Transportation Needs: No Transportation Needs (4/21/2025)    PRAPARE - Transportation     Lack of Transportation (Medical): No     Lack of Transportation (Non-Medical): No   Physical Activity: Insufficiently Active (12/31/2024)    Exercise Vital Sign     Days of Exercise per Week: 3 days     Minutes of Exercise per Session: 20 min   Stress: No Stress Concern Present (4/21/2025)    [employer] - Occupational Stress Questionnaire     Feeling of Stress : Not at all   Housing Stability: Low Risk  (4/21/2025)    Housing Stability Vital Sign     Unable to Pay for Housing in the Last Year: No     Homeless in the Last Year: No     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       Anemia, chronic renal failure, stage 4 (severe) 08/23/2024    Mild intermittent asthma, uncomplicated     Stage 3 severe COPD by GOLD classification 05/08/2025     Family History   Problem Relation Name Age of Onset    Hypertension Mother      Hypertension Father       Past Surgical History:   Procedure Laterality Date    BILATERAL TUBAL LIGATION N/A     COLONOSCOPY N/A 8/20/2024    Procedure: COLONOSCOPY;  Surgeon: Gena Saxena MD;  Location: [facility];  Service: Endoscopy;  Laterality: N/A;    ESOPHAGOGASTRODUODENOSCOPY N/A 8/20/2024    Procedure: EGD (ESOPHAGOGASTRODUODENOSCOPY);  Surgeon:  Gena Saxena MD;  Location: [facility] ENDO;  Service: Endoscopy;  Laterality: N/A;    INTRALUMINAL GASTROINTESTINAL TRACT IMAGING VIA CAPSULE N/A 9/19/2024    Procedure: IMAGING PROCEDURE, GI TRACT, INTRALUMINAL, VIA CAPSULE;  Surgeon: Ignacio Jimenez RN;  Location: [facility] ENDO;  Service: Endoscopy;  Laterality: N/A;  Neg EGD/Colon. Hx MARIA ESTHER/weight loss/ARF     Review of Systems   Constitutional:  Negative for appetite change, chills, fatigue and fever.   HENT:  Negative for mouth sores, rhinorrhea and sore throat.    Eyes: Negative.    Respiratory: Negative.  Negative for cough and shortness of breath.    Cardiovascular: Negative.    Gastrointestinal:  Negative for constipation, diarrhea, nausea and vomiting.   Endocrine: Negative.  Negative for cold intolerance.   Genitourinary: Negative.    Musculoskeletal: Negative.    Integumentary:  Negative.   Allergic/Immunologic: Negative.    Neurological:  Negative for dizziness, weakness, light-headedness, numbness and headaches.   Hematological: Negative.    Psychiatric/Behavioral: Negative.         Medication List with Changes/Refills   Current Medications    ALBUTEROL (PROVENTIL) 2.5 MG /3 ML (0.083 %) NEBULIZER SOLUTION    Take 3 mLs (2.5 mg total) by nebulization every 6 (six) hours as needed. Rescue    ALBUTEROL (PROVENTIL/VENTOLIN HFA) 90 MCG/ACTUATION INHALER    Inhale 2 puffs into the lungs every 4 (four) hours as needed for Wheezing or Shortness of Breath.    FLUTICASONE-UMECLIDIN-VILANTER (TRELEGY ELLIPTA) 200-62.5-25 MCG INHALER    Inhale 1 puff into the lungs once daily.    FOLIC ACID (FOLVITE) 1 MG TABLET    Take 1 tablet (1 mg total) by mouth once daily. Take while on bactrim(sulfa)    LISINOPRIL 10 MG TABLET    Take 1 tablet (10 mg total) by mouth once daily.    PREDNISONE (DELTASONE) 5 MG TABLET    Take 1 tablet (5 mg total) by mouth once daily. Start after high dose prednisone    SULFAMETHOXAZOLE-TRIMETHOPRIM 800-160MG (BACTRIM DS) 800-160 MG TAB    Take 1  tablet by mouth 3 (three) times a week.    VITAMIN D (VITAMIN D3) 1000 UNITS TAB    Take 1,000 Units by mouth once daily.     Objective:     Vitals:    05/09/25 0905   BP: (!) 129/92   Pulse: 95   Temp: 97.6 °F (36.4 °C)     Physical Exam       (0) Fully active, able to carry on all predisease performance without restriction  Assessment:     Problem List Items Addressed This Visit          Oncology    Iron deficiency anemia - Primary    Treated with IV Venofer & Ferrlecit with repletion. Now significantly iron deficient.           Plan:     Iron deficiency anemia due to chronic blood loss    Labs reviewed.   No anemia or iron deficiency.   Follow up in3 monthswithiron profile, ferritin, CBC, and Comprehensive Metabolic Panel.    Route Chart for Scheduling    Med Onc Chart Routing      Follow up with physician    Follow up with [facility] 3 months.   Infusion scheduling note    Injection scheduling note    Labs CBC, ferritin, iron and TIBC and CMP   Scheduling:  Preferred lab:  Lab interval:  in 3 months, 2 days prior at [place]   Imaging None      Pharmacy appointment No pharmacy appointment needed      Other referrals No nutrition appointment needed -        No additional referrals needed         I will review assessment/plan with collaborating physician.      KRISTEN Denis

## 2025-05-12 ENCOUNTER — PATIENT MESSAGE (OUTPATIENT)
Dept: NEPHROLOGY | Facility: CLINIC | Age: 50
End: 2025-05-12
Payer: COMMERCIAL

## 2025-05-13 ENCOUNTER — PATIENT OUTREACH (OUTPATIENT)
Dept: ADMINISTRATIVE | Facility: HOSPITAL | Age: 50
End: 2025-05-13
Payer: COMMERCIAL

## 2025-05-13 NOTE — PROGRESS NOTES
Working CKD Lab Report.  Pt has lab appt scheduled, 5/20/25.  Micro Albumin urine already linked to appt,

## 2025-05-20 ENCOUNTER — LAB VISIT (OUTPATIENT)
Dept: LAB | Facility: HOSPITAL | Age: 50
End: 2025-05-20
Attending: INTERNAL MEDICINE
Payer: COMMERCIAL

## 2025-05-20 DIAGNOSIS — N18.30 STAGE 3 CHRONIC KIDNEY DISEASE, UNSPECIFIED WHETHER STAGE 3A OR 3B CKD: ICD-10-CM

## 2025-05-20 DIAGNOSIS — N18.4 ANEMIA, CHRONIC RENAL FAILURE, STAGE 4 (SEVERE): ICD-10-CM

## 2025-05-20 DIAGNOSIS — N18.31 STAGE 3A CHRONIC KIDNEY DISEASE: Chronic | ICD-10-CM

## 2025-05-20 DIAGNOSIS — D63.1 ANEMIA, CHRONIC RENAL FAILURE, STAGE 4 (SEVERE): ICD-10-CM

## 2025-05-20 LAB
ABSOLUTE EOSINOPHIL (OHS): 0 K/UL
ABSOLUTE MONOCYTE (OHS): 0.57 K/UL (ref 0.3–1)
ABSOLUTE NEUTROPHIL COUNT (OHS): 15.56 K/UL (ref 1.8–7.7)
ALBUMIN SERPL BCP-MCNC: 3.8 G/DL (ref 3.5–5.2)
ALBUMIN/CREAT UR: 37.9 UG/MG
ANION GAP (OHS): 13 MMOL/L (ref 8–16)
BASOPHILS # BLD AUTO: 0.04 K/UL
BASOPHILS NFR BLD AUTO: 0.2 %
BILIRUB UR QL STRIP.AUTO: NEGATIVE
BUN SERPL-MCNC: 17 MG/DL (ref 6–20)
CALCIUM SERPL-MCNC: 9.3 MG/DL (ref 8.7–10.5)
CHLORIDE SERPL-SCNC: 104 MMOL/L (ref 95–110)
CLARITY UR: CLEAR
CO2 SERPL-SCNC: 22 MMOL/L (ref 23–29)
COLOR UR AUTO: YELLOW
CREAT SERPL-MCNC: 1.5 MG/DL (ref 0.5–1.4)
CREAT UR-MCNC: 29 MG/DL (ref 15–325)
CREAT UR-MCNC: 29.7 MG/DL (ref 15–325)
ERYTHROCYTE [DISTWIDTH] IN BLOOD BY AUTOMATED COUNT: 13 % (ref 11.5–14.5)
GFR SERPLBLD CREATININE-BSD FMLA CKD-EPI: 42 ML/MIN/1.73/M2
GLUCOSE SERPL-MCNC: 88 MG/DL (ref 70–110)
GLUCOSE UR QL STRIP: NEGATIVE
HCT VFR BLD AUTO: 42.9 % (ref 37–48.5)
HGB BLD-MCNC: 13.6 GM/DL (ref 12–16)
HGB UR QL STRIP: NEGATIVE
IMM GRANULOCYTES # BLD AUTO: 0.11 K/UL (ref 0–0.04)
IMM GRANULOCYTES NFR BLD AUTO: 0.6 % (ref 0–0.5)
KETONES UR QL STRIP: NEGATIVE
LEUKOCYTE ESTERASE UR QL STRIP: NEGATIVE
LYMPHOCYTES # BLD AUTO: 0.73 K/UL (ref 1–4.8)
MCH RBC QN AUTO: 31.2 PG (ref 27–31)
MCHC RBC AUTO-ENTMCNC: 31.7 G/DL (ref 32–36)
MCV RBC AUTO: 98 FL (ref 82–98)
MICROALBUMIN UR-MCNC: 11 UG/ML (ref ?–5000)
NITRITE UR QL STRIP: NEGATIVE
NUCLEATED RBC (/100WBC) (OHS): 0 /100 WBC
PH UR STRIP: 6 [PH]
PHOSPHATE SERPL-MCNC: 3.5 MG/DL (ref 2.7–4.5)
PLATELET # BLD AUTO: 415 K/UL (ref 150–450)
PMV BLD AUTO: 9.3 FL (ref 9.2–12.9)
POTASSIUM SERPL-SCNC: 4.5 MMOL/L (ref 3.5–5.1)
PROT UR QL STRIP: NEGATIVE
PROT UR-MCNC: 11 MG/DL
PROT/CREAT UR: 0.37 MG/G{CREAT}
RBC # BLD AUTO: 4.36 M/UL (ref 4–5.4)
RELATIVE EOSINOPHIL (OHS): 0 %
RELATIVE LYMPHOCYTE (OHS): 4.3 % (ref 18–48)
RELATIVE MONOCYTE (OHS): 3.4 % (ref 4–15)
RELATIVE NEUTROPHIL (OHS): 91.5 % (ref 38–73)
SODIUM SERPL-SCNC: 139 MMOL/L (ref 136–145)
SP GR UR STRIP: <=1.005
UROBILINOGEN UR STRIP-ACNC: NEGATIVE EU/DL
WBC # BLD AUTO: 17.01 K/UL (ref 3.9–12.7)

## 2025-05-20 PROCEDURE — 83516 IMMUNOASSAY NONANTIBODY: CPT | Mod: 59

## 2025-05-20 PROCEDURE — 81003 URINALYSIS AUTO W/O SCOPE: CPT | Mod: PO

## 2025-05-20 PROCEDURE — 82043 UR ALBUMIN QUANTITATIVE: CPT

## 2025-05-20 PROCEDURE — 84156 ASSAY OF PROTEIN URINE: CPT | Mod: PO

## 2025-05-20 PROCEDURE — 82040 ASSAY OF SERUM ALBUMIN: CPT | Mod: PO

## 2025-05-20 PROCEDURE — 86036 ANCA SCREEN EACH ANTIBODY: CPT

## 2025-05-20 PROCEDURE — 83516 IMMUNOASSAY NONANTIBODY: CPT

## 2025-05-20 PROCEDURE — 85025 COMPLETE CBC W/AUTO DIFF WBC: CPT | Mod: PO

## 2025-05-20 PROCEDURE — 36415 COLL VENOUS BLD VENIPUNCTURE: CPT | Mod: PO

## 2025-05-23 LAB
PROTEINASE3 IGG SERPL IA-ACNC: <0.2 U
W C-ANCA: NORMAL TITER
W MYELOPEROXIDASE (MPO) AB: 2.2 U/ML
W P-ANCA: NORMAL TITER

## 2025-05-28 ENCOUNTER — OFFICE VISIT (OUTPATIENT)
Dept: NEPHROLOGY | Facility: CLINIC | Age: 50
End: 2025-05-28
Payer: COMMERCIAL

## 2025-05-28 VITALS
RESPIRATION RATE: 18 BRPM | HEART RATE: 111 BPM | SYSTOLIC BLOOD PRESSURE: 144 MMHG | DIASTOLIC BLOOD PRESSURE: 94 MMHG | WEIGHT: 99.19 LBS | HEIGHT: 64 IN | BODY MASS INDEX: 16.93 KG/M2

## 2025-05-28 DIAGNOSIS — I77.82 ANCA-ASSOCIATED VASCULITIS: Primary | ICD-10-CM

## 2025-05-28 PROCEDURE — 1159F MED LIST DOCD IN RCRD: CPT | Mod: CPTII,S$GLB,, | Performed by: INTERNAL MEDICINE

## 2025-05-28 PROCEDURE — 3008F BODY MASS INDEX DOCD: CPT | Mod: CPTII,S$GLB,, | Performed by: INTERNAL MEDICINE

## 2025-05-28 PROCEDURE — 3066F NEPHROPATHY DOC TX: CPT | Mod: CPTII,S$GLB,, | Performed by: INTERNAL MEDICINE

## 2025-05-28 PROCEDURE — 3080F DIAST BP >= 90 MM HG: CPT | Mod: CPTII,S$GLB,, | Performed by: INTERNAL MEDICINE

## 2025-05-28 PROCEDURE — 3077F SYST BP >= 140 MM HG: CPT | Mod: CPTII,S$GLB,, | Performed by: INTERNAL MEDICINE

## 2025-05-28 PROCEDURE — 99999 PR PBB SHADOW E&M-EST. PATIENT-LVL III: CPT | Mod: PBBFAC,,, | Performed by: INTERNAL MEDICINE

## 2025-05-28 PROCEDURE — 99215 OFFICE O/P EST HI 40 MIN: CPT | Mod: S$GLB,,, | Performed by: INTERNAL MEDICINE

## 2025-05-28 PROCEDURE — 4010F ACE/ARB THERAPY RXD/TAKEN: CPT | Mod: CPTII,S$GLB,, | Performed by: INTERNAL MEDICINE

## 2025-05-28 PROCEDURE — 3060F POS MICROALBUMINURIA REV: CPT | Mod: CPTII,S$GLB,, | Performed by: INTERNAL MEDICINE

## 2025-05-28 RX ORDER — AZATHIOPRINE 50 MG/1
25 TABLET ORAL DAILY
Qty: 15 TABLET | Refills: 11 | Status: SHIPPED | OUTPATIENT
Start: 2025-05-28 | End: 2026-05-28

## 2025-05-28 NOTE — PROGRESS NOTES
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 Pt was last seen in renal clinic in March 2025. Pt has completed induction treatment with retuximab. Pt then started imuran 50 mg po qd for maintenance treatment in Jan 2025. Dose was further lowered to 25 mg po qd reduce nausea pt had with imuran.    On f/u today, chart was reviewed.  Since last visit patient had some respiratory issues.  Was diagnosed with influenza a and COVID.  Spent about 3 weeks in hospital.  Imuran was held.  On presenting today, patient feels well no new issues no acute complaints, no SOB, no cough or fever report.  Pt reports no new c/o's, no rash, no ulcers, no blood in urine or in sputum.  Noted BP not fully controlled.  Patient is not taking previously prescribed lisinopril.     To review pt's initial presentation, pt presented with "asthma-like" symptoms  x 5 months that would not improve with conventional treatment. Pt was being seen by pulmonary, and was referred to nephrology. Renal failure was noted with s Cr 2.7 mg/dl. Based on initial positive serology for anti-GBP antibody (3.8) and the renal-pulmonary presentation, Goodpasture disease was suspected and pt immediately received 3 treatments with plasmapheresis, pule steroids IV x 3, and retuximab 1 g IV on 8/25/24, followed by a second dose on 9/13/24. Serologies were also remarkable for positive P-ANCA, but negative C-ANCA. 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 Patient tolerated above treatments well. Repeat serologies for anti-GBM antibody was " "negative. Started imuran for maintenance treatment 50 mg po qod on 1/25/25. Not fully tolerant of imuran (n/v), dose was lowered in Jan 2025 to 25 mg po qd to qod. Dose was held in April due to Influenza A and COVID.         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     PAST SURGICAL HISTORY:  She  has a past surgical history that includes Bilateral tubal ligation (N/A); Esophagogastroduodenoscopy (N/A, 8/20/2024); and Colonoscopy (N/A, 8/20/2024).     SOCIAL HISTORY:  She  reports that she has never smoked. She has never used smokeless tobacco. She reports that she does not currently use alcohol. She reports that she does not use drugs.     FAMILY MEDICAL HISTORY:  Her family history includes Hypertension in her father and mother.     Review of patient's allergies indicates:  No Known Allergies     Meds reviewed     Current Outpatient Medications   Medication Instructions    albuterol (PROVENTIL) 2.5 mg, Nebulization, Every 6 hours PRN, Rescue    albuterol (PROVENTIL/VENTOLIN HFA) 90 mcg/actuation inhaler 2 puffs, Inhalation, Every 4 hours PRN         fluticasone-umeclidin-vilanter (TRELEGY ELLIPTA) 200-62.5-25 mcg inhaler 1 puff, Inhalation, Daily    folic acid (FOLVITE) 1 mg, Oral, Daily, Take while on bactrim(sulfa)         predniSONE (DELTASONE) 5 mg, Oral, Daily, Start after high dose prednisone    sulfamethoxazole-trimethoprim 800-160mg (BACTRIM DS) 800-160 mg Tab 1 tablet, Oral, Three times weekly    vitamin D (VITAMIN D3) 1,000 Units, Daily                        REVIEW OF SYSTEMS:  Patient has no fever, fatigue, visual changes, chest pain, edema, cough, dyspnea, nausea, vomiting, constipation, diarrhea, arthralgias, pruritis, dizziness, weakness, depression, confusion.     PHYSICAL EXAM:  Blood pressure 144/94, pulse 111, height 5' 4" (1.626 m), weight 45.0 Kg, " from 45.4 Kg, BSA 1.42  Gen:WDWN female in no apparent distress  Psych:Normal mood and affect  Skin:No rashes or ulcers  Neck:No JVD  Chest:Clear with no rales, rhonchi, wheezing with normal effort  CV:noted tachycardia  Abd:Soft, nontender, no distension  Ext:No edema  Mouth, MM's clear, no ulcers     Labs reviewed, Cr before starting retuximab in Aug 2024 was 2.7  BMP  Lab Results   Component Value Date     05/20/2025    K 4.5 05/20/2025     05/20/2025    CO2 22 (L) 05/20/2025    BUN 17 05/20/2025    CREATININE 1.5 (H) 05/20/2025    CALCIUM 9.3 05/20/2025    ANIONGAP 13 05/20/2025    EGFRNORACEVR 42 (L) 05/20/2025     Lab Results   Component Value Date    WBC 17.01 (H) 05/20/2025    HGB 13.6 05/20/2025    HCT 42.9 05/20/2025    MCV 98 05/20/2025     05/20/2025     Hospital labs reviewed:  Influenza A positive  COVID positive      U/a: no protein, no blood  Urine p/cr 0.37 g, from 0.08 g, from 0.11 g, from 0.21 g, from 0.25 g, from 0.29 g, from 1.0 g     Anti-GBM antibody 0.5 ( normal), from 3.8  C-ANCA neg  P-ANCA: <1:20, from 1:20, from 1:20, from 1:20, from 1:20, from 1:20, from 1:80  Myeloperoxidase 2.2, from  7.1, from 5.4, from 12, from 16, from 27, from 43  Proteinase 3 antibodies neg  Cryo nege     ESR 1, was >60     Kidney biopsy results reviewed  Moderate interstitial fibrosis  Small sample  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  IF was negative  No linear staining of anti-GBM           IMPRESSION AND RECOMMENDATIONS: 48 y/o white female with h/o of JAIME due to kidney biopsy 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 presented for f/u:     Renal: s Cr has remained stable, not worse  Noted Cr (though improved) has not returned to normal since therapy was started and that pt has MODERATE fibrosis on kidney biopsy  Likely means pt has residual kidney damage  Suspect pt's renal injury began quite sometime before pt initially  presented  These two issues may predict less than optimal response to therapy     Clinically stable and has signs of further improvement  No sign of relapse at present  Asymptomatic and no new rash or ulcers   BP is not as controlled, not taking lisinopril, will re-start  Proteinuria, overall lower, stable  Hematuria, no blood on last u/a  P-ANCA's have improved, now in neg range  Myeloperoxidase lower, improved  C-ANCA and proteinase 3 antibody are negative  Anti-GBM antibody is negative  ESR normal  Good response to retuximab and prednisone high dose taper     Pt remains at risk of relapse  Started imuran for maintenance treatment on 1/25/25  Reported GI side effects (n/v) with 50 mg po qod and dose was held while hospitalized for influenza a and COVID  No current active infections  Recommend re-start dose to 25 mg po qd to qod  Alternative is to use mycophenolate  Restart lisinopril 10 mg p.o. q.d.  Discussed with pt     To review:   Kidney failure and pulmonary symptoms due to P-ANCA vasculitis.  Positive anti-GBM antibody, but kidney biopsy showed no sign of anti-GBM linear staining and IF was negative for Goodpasture disease  S/p acute treatment with the following:  Plasmapheresis x3  Solumedrol pulse dose x 3  Retuximab 1 g IV on 8/25/24 and again 1 g on 9/13/24. Dosing was reviewed with [facility]fusion [facility] and with rheumatology.  Pt did not received the renal dosing, which is 375 mg/m2 BSA q 1 week x 4 doses.  She received a larger dose, but less frequently, as normally given by rheumatology  Pt has received 2000 mg of retuximab (1 g x 2, which would have been the same as 375 mg x 1.42 m2 BSA (=500 mg) x 4)  On low dose Imuran for maintenance treatment, unable to tolerate a higher dose      Recommend:  Continue prednisone 5 mg po qd  Continue bactrim x 6 months  Restart lisinopril 10 mg po qd  Vaccinations per ID, were done  Continue imuran and lower dose to 25 mg po qd to qod  Side effects discussed with  pt.  Repeat labs with next visit        Plans and recommendations:  As discussed above  Total time spent 40 minutes including time needed to review the records, the   patient evaluation, documentation, face-to-face discussion with the patient,   more than 50% of the time was spent on coordination of care and counseling.    Level V visit.  RTC 2-3 months, OK to rosemary Montero MD

## 2025-06-02 ENCOUNTER — TELEPHONE (OUTPATIENT)
Dept: NEPHROLOGY | Facility: CLINIC | Age: 50
End: 2025-06-02
Payer: COMMERCIAL

## 2025-07-05 NOTE — ASSESSMENT & PLAN NOTE
Creatine stable for now. BMP reviewed- noted Estimated Creatinine Clearance: 31.2 mL/min (A) (based on SCr of 1.5 mg/dL (H)). according to latest data. Based on current GFR, CKD stage is stage 3 - GFR 30-59.  Monitor UOP and serial BMP and adjust therapy as needed. Renally dose meds. Avoid nephrotoxic medications and procedures.   nausea

## 2025-08-14 ENCOUNTER — LAB VISIT (OUTPATIENT)
Dept: LAB | Facility: HOSPITAL | Age: 50
End: 2025-08-14
Attending: NURSE PRACTITIONER
Payer: COMMERCIAL

## 2025-08-14 ENCOUNTER — PATIENT MESSAGE (OUTPATIENT)
Dept: NEPHROLOGY | Facility: CLINIC | Age: 50
End: 2025-08-14
Payer: COMMERCIAL

## 2025-08-14 DIAGNOSIS — D50.0 IRON DEFICIENCY ANEMIA DUE TO CHRONIC BLOOD LOSS: ICD-10-CM

## 2025-08-14 DIAGNOSIS — I77.6 VASCULITIS: ICD-10-CM

## 2025-08-14 DIAGNOSIS — I77.82 ANCA-ASSOCIATED VASCULITIS: ICD-10-CM

## 2025-08-14 LAB
ABSOLUTE EOSINOPHIL (OHS): 0.12 K/UL
ABSOLUTE MONOCYTE (OHS): 0.95 K/UL (ref 0.3–1)
ABSOLUTE NEUTROPHIL COUNT (OHS): 8.33 K/UL (ref 1.8–7.7)
ALBUMIN SERPL BCP-MCNC: 4.2 G/DL (ref 3.5–5.2)
ALP SERPL-CCNC: 77 UNIT/L (ref 40–150)
ALT SERPL W/O P-5'-P-CCNC: 8 UNIT/L (ref 10–44)
ANION GAP (OHS): 11 MMOL/L (ref 8–16)
AST SERPL-CCNC: 26 UNIT/L (ref 11–45)
BASOPHILS # BLD AUTO: 0.08 K/UL
BASOPHILS NFR BLD AUTO: 0.7 %
BILIRUB SERPL-MCNC: 0.2 MG/DL (ref 0.1–1)
BILIRUB UR QL STRIP.AUTO: NEGATIVE
BUN SERPL-MCNC: 21 MG/DL (ref 6–20)
CALCIUM SERPL-MCNC: 9.2 MG/DL (ref 8.7–10.5)
CHLORIDE SERPL-SCNC: 112 MMOL/L (ref 95–110)
CLARITY UR: CLEAR
CO2 SERPL-SCNC: 21 MMOL/L (ref 23–29)
COLOR UR AUTO: YELLOW
CREAT SERPL-MCNC: 1.4 MG/DL (ref 0.5–1.4)
CREAT UR-MCNC: 131.7 MG/DL (ref 15–325)
ERYTHROCYTE [DISTWIDTH] IN BLOOD BY AUTOMATED COUNT: 12.3 % (ref 11.5–14.5)
FERRITIN SERPL-MCNC: 748 NG/ML (ref 20–300)
GFR SERPLBLD CREATININE-BSD FMLA CKD-EPI: 46 ML/MIN/1.73/M2
GLUCOSE SERPL-MCNC: 75 MG/DL (ref 70–110)
GLUCOSE UR QL STRIP: NEGATIVE
HCT VFR BLD AUTO: 42.8 % (ref 37–48.5)
HGB BLD-MCNC: 14 GM/DL (ref 12–16)
HGB UR QL STRIP: NEGATIVE
IMM GRANULOCYTES # BLD AUTO: 0.03 K/UL (ref 0–0.04)
IMM GRANULOCYTES NFR BLD AUTO: 0.3 % (ref 0–0.5)
IRON SATN MFR SERPL: 28 % (ref 20–50)
IRON SERPL-MCNC: 81 UG/DL (ref 30–160)
KETONES UR QL STRIP: NEGATIVE
LEUKOCYTE ESTERASE UR QL STRIP: NEGATIVE
LYMPHOCYTES # BLD AUTO: 2.15 K/UL (ref 1–4.8)
MCH RBC QN AUTO: 30.4 PG (ref 27–31)
MCHC RBC AUTO-ENTMCNC: 32.7 G/DL (ref 32–36)
MCV RBC AUTO: 93 FL (ref 82–98)
NITRITE UR QL STRIP: NEGATIVE
NUCLEATED RBC (/100WBC) (OHS): 0 /100 WBC
PH UR STRIP: 6 [PH]
PLATELET # BLD AUTO: 368 K/UL (ref 150–450)
PMV BLD AUTO: 10 FL (ref 9.2–12.9)
POTASSIUM SERPL-SCNC: 3.5 MMOL/L (ref 3.5–5.1)
PROT SERPL-MCNC: 6.6 GM/DL (ref 6–8.4)
PROT UR QL STRIP: NEGATIVE
PROT UR-MCNC: 14 MG/DL
PROT/CREAT UR: 0.11 MG/G{CREAT}
RBC # BLD AUTO: 4.6 M/UL (ref 4–5.4)
RELATIVE EOSINOPHIL (OHS): 1 %
RELATIVE LYMPHOCYTE (OHS): 18.4 % (ref 18–48)
RELATIVE MONOCYTE (OHS): 8.1 % (ref 4–15)
RELATIVE NEUTROPHIL (OHS): 71.5 % (ref 38–73)
SODIUM SERPL-SCNC: 144 MMOL/L (ref 136–145)
SP GR UR STRIP: 1.02
TIBC SERPL-MCNC: 293 UG/DL (ref 250–450)
TRANSFERRIN SERPL-MCNC: 198 MG/DL (ref 200–375)
UROBILINOGEN UR STRIP-ACNC: NEGATIVE EU/DL
WBC # BLD AUTO: 11.66 K/UL (ref 3.9–12.7)

## 2025-08-14 PROCEDURE — 36415 COLL VENOUS BLD VENIPUNCTURE: CPT | Mod: PO

## 2025-08-14 PROCEDURE — 84466 ASSAY OF TRANSFERRIN: CPT

## 2025-08-14 PROCEDURE — 82728 ASSAY OF FERRITIN: CPT

## 2025-08-14 PROCEDURE — 83516 IMMUNOASSAY NONANTIBODY: CPT | Mod: 59

## 2025-08-14 PROCEDURE — 82040 ASSAY OF SERUM ALBUMIN: CPT

## 2025-08-14 PROCEDURE — 82570 ASSAY OF URINE CREATININE: CPT | Mod: PO

## 2025-08-14 PROCEDURE — 85025 COMPLETE CBC W/AUTO DIFF WBC: CPT | Mod: PO

## 2025-08-14 PROCEDURE — 83516 IMMUNOASSAY NONANTIBODY: CPT

## 2025-08-14 PROCEDURE — 81003 URINALYSIS AUTO W/O SCOPE: CPT | Mod: PO

## 2025-08-14 PROCEDURE — 86036 ANCA SCREEN EACH ANTIBODY: CPT

## 2025-08-16 LAB — PROTEINASE3 IGG SERPL IA-ACNC: <0.2 U

## 2025-08-18 LAB
W C-ANCA: NORMAL TITER
W MYELOPEROXIDASE (MPO) AB: 1.9 U/ML
W P-ANCA: NORMAL TITER

## 2025-08-20 ENCOUNTER — OFFICE VISIT (OUTPATIENT)
Dept: NEPHROLOGY | Facility: CLINIC | Age: 50
End: 2025-08-20
Payer: COMMERCIAL

## 2025-08-20 VITALS
HEART RATE: 73 BPM | DIASTOLIC BLOOD PRESSURE: 82 MMHG | BODY MASS INDEX: 17.08 KG/M2 | WEIGHT: 100.06 LBS | SYSTOLIC BLOOD PRESSURE: 108 MMHG | HEIGHT: 64 IN

## 2025-08-20 DIAGNOSIS — I77.82 ANCA-ASSOCIATED VASCULITIS: Primary | ICD-10-CM

## 2025-08-20 PROCEDURE — 99215 OFFICE O/P EST HI 40 MIN: CPT | Mod: S$GLB,,, | Performed by: INTERNAL MEDICINE

## 2025-08-20 PROCEDURE — 3074F SYST BP LT 130 MM HG: CPT | Mod: CPTII,S$GLB,, | Performed by: INTERNAL MEDICINE

## 2025-08-20 PROCEDURE — 3060F POS MICROALBUMINURIA REV: CPT | Mod: CPTII,S$GLB,, | Performed by: INTERNAL MEDICINE

## 2025-08-20 PROCEDURE — 3008F BODY MASS INDEX DOCD: CPT | Mod: CPTII,S$GLB,, | Performed by: INTERNAL MEDICINE

## 2025-08-20 PROCEDURE — 99999 PR PBB SHADOW E&M-EST. PATIENT-LVL III: CPT | Mod: PBBFAC,,, | Performed by: INTERNAL MEDICINE

## 2025-08-20 PROCEDURE — 3066F NEPHROPATHY DOC TX: CPT | Mod: CPTII,S$GLB,, | Performed by: INTERNAL MEDICINE

## 2025-08-20 PROCEDURE — 4010F ACE/ARB THERAPY RXD/TAKEN: CPT | Mod: CPTII,S$GLB,, | Performed by: INTERNAL MEDICINE

## 2025-08-20 PROCEDURE — 3079F DIAST BP 80-89 MM HG: CPT | Mod: CPTII,S$GLB,, | Performed by: INTERNAL MEDICINE
